# Patient Record
Sex: FEMALE | Race: BLACK OR AFRICAN AMERICAN | NOT HISPANIC OR LATINO | Employment: OTHER | URBAN - METROPOLITAN AREA
[De-identification: names, ages, dates, MRNs, and addresses within clinical notes are randomized per-mention and may not be internally consistent; named-entity substitution may affect disease eponyms.]

---

## 2017-05-10 ENCOUNTER — LAB VISIT (OUTPATIENT)
Dept: LAB | Facility: HOSPITAL | Age: 47
End: 2017-05-10
Attending: INTERNAL MEDICINE
Payer: MEDICARE

## 2017-05-10 DIAGNOSIS — K75.4 CHRONIC AGGRESSIVE HEPATITIS: Primary | ICD-10-CM

## 2017-05-10 LAB
AFP SERPL-MCNC: 6.8 NG/ML
ALBUMIN SERPL BCP-MCNC: 3.8 G/DL
ALP SERPL-CCNC: 71 U/L
ALT SERPL W/O P-5'-P-CCNC: 13 U/L
AST SERPL-CCNC: 13 U/L
BILIRUB DIRECT SERPL-MCNC: 0.2 MG/DL
BILIRUB SERPL-MCNC: 0.7 MG/DL
PROT SERPL-MCNC: 7.7 G/DL

## 2017-05-10 PROCEDURE — 82105 ALPHA-FETOPROTEIN SERUM: CPT

## 2017-05-10 PROCEDURE — 36415 COLL VENOUS BLD VENIPUNCTURE: CPT

## 2017-05-10 PROCEDURE — 80076 HEPATIC FUNCTION PANEL: CPT

## 2017-11-01 ENCOUNTER — TELEPHONE (OUTPATIENT)
Dept: FAMILY MEDICINE | Facility: CLINIC | Age: 47
End: 2017-11-01

## 2017-11-01 ENCOUNTER — OFFICE VISIT (OUTPATIENT)
Dept: FAMILY MEDICINE | Facility: CLINIC | Age: 47
End: 2017-11-01
Payer: MEDICARE

## 2017-11-01 VITALS
RESPIRATION RATE: 17 BRPM | SYSTOLIC BLOOD PRESSURE: 120 MMHG | TEMPERATURE: 99 F | HEIGHT: 68 IN | DIASTOLIC BLOOD PRESSURE: 80 MMHG | WEIGHT: 235.69 LBS | OXYGEN SATURATION: 98 % | HEART RATE: 86 BPM | BODY MASS INDEX: 35.72 KG/M2

## 2017-11-01 DIAGNOSIS — F31.9 BIPOLAR 1 DISORDER: ICD-10-CM

## 2017-11-01 DIAGNOSIS — E87.0 HYPERNATREMIA: Primary | ICD-10-CM

## 2017-11-01 LAB
BACTERIA #/AREA URNS HPF: NORMAL /HPF
BILIRUB UR QL STRIP: NEGATIVE
CHLORIDE UR-SCNC: 33 MMOL/L
CLARITY UR: CLEAR
COLOR UR: ABNORMAL
GLUCOSE UR QL STRIP: NEGATIVE
HGB UR QL STRIP: NEGATIVE
KETONES UR QL STRIP: NEGATIVE
LEUKOCYTE ESTERASE UR QL STRIP: ABNORMAL
MICROSCOPIC COMMENT: NORMAL
NITRITE UR QL STRIP: NEGATIVE
OSMOLALITY UR: 157 MOSM/KG
PH UR STRIP: 6 [PH] (ref 5–8)
PROT UR QL STRIP: NEGATIVE
SODIUM UR-SCNC: 23 MMOL/L
SP GR UR STRIP: 1 (ref 1–1.03)
SQUAMOUS #/AREA URNS HPF: 2 /HPF
URN SPEC COLLECT METH UR: ABNORMAL
UROBILINOGEN UR STRIP-ACNC: NEGATIVE EU/DL
WBC #/AREA URNS HPF: 1 /HPF (ref 0–5)

## 2017-11-01 PROCEDURE — 83935 ASSAY OF URINE OSMOLALITY: CPT

## 2017-11-01 PROCEDURE — 99999 PR PBB SHADOW E&M-EST. PATIENT-LVL III: CPT | Mod: PBBFAC,,, | Performed by: INTERNAL MEDICINE

## 2017-11-01 PROCEDURE — 81000 URINALYSIS NONAUTO W/SCOPE: CPT

## 2017-11-01 PROCEDURE — 99214 OFFICE O/P EST MOD 30 MIN: CPT | Mod: S$PBB,,, | Performed by: INTERNAL MEDICINE

## 2017-11-01 PROCEDURE — 84300 ASSAY OF URINE SODIUM: CPT

## 2017-11-01 PROCEDURE — 82570 ASSAY OF URINE CREATININE: CPT

## 2017-11-01 PROCEDURE — 82436 ASSAY OF URINE CHLORIDE: CPT

## 2017-11-01 PROCEDURE — 99213 OFFICE O/P EST LOW 20 MIN: CPT | Mod: PBBFAC,PN | Performed by: INTERNAL MEDICINE

## 2017-11-01 RX ORDER — LITHIUM CARBONATE 300 MG/1
TABLET, FILM COATED, EXTENDED RELEASE ORAL
Refills: 5 | COMMUNITY
Start: 2017-10-10 | End: 2021-12-14

## 2017-11-01 NOTE — PROGRESS NOTES
"Subjective:       Patient ID: Thalia Rg is a 46 y.o. female.    Chief Complaint: Medication Management    Hypernatremia    HPI: 47 y/o with bipolar disorder (followed by Dr. Marx at [facility] [phone]) presents after recent lab tests done by Dr. Marx showed sodium of 148. Patient's lithium was decreased from 600mg nightly to 300mg nightly 10/26. She reports since lowering dose she is having significantly less frequent urination. No Le swelling no hematuria. No flank pain no orhtopnea or PND. She is not taking NSAID's or reflux medications no recent antibiotics      Review of Systems   Constitutional: Negative for activity change, appetite change, fatigue, fever and unexpected weight change.   HENT: Negative for ear pain, rhinorrhea and sore throat.    Eyes: Negative for discharge and visual disturbance.   Respiratory: Negative for chest tightness, shortness of breath and wheezing.    Cardiovascular: Negative for chest pain, palpitations and leg swelling.   Gastrointestinal: Negative for abdominal pain, constipation and diarrhea.   Endocrine: Negative for cold intolerance and heat intolerance.   Genitourinary: Negative for dysuria and hematuria.   Musculoskeletal: Negative for joint swelling and neck stiffness.   Skin: Negative for rash.   Neurological: Negative for dizziness, syncope, weakness and headaches.   Psychiatric/Behavioral: Negative for suicidal ideas.       Objective:     Vitals:    11/01/17 1028   BP: 120/80   BP Location: Left arm   Patient Position: Sitting   BP Method: Medium (Manual)   Pulse: 86   Resp: 17   Temp: 98.8 °F (37.1 °C)   TempSrc: Oral   SpO2: 98%   Weight: 106.9 kg (235 lb 10.8 oz)   Height: 5' 8" (1.727 m)          Physical Exam   Constitutional: She is oriented to person, place, and time. She appears well-developed and well-nourished.   HENT:   Head: Normocephalic and atraumatic.   Eyes: Conjunctivae are normal. Pupils are equal, round, and reactive to " light.   Neck: Normal range of motion.   Cardiovascular: Normal rate and regular rhythm.  Exam reveals no gallop and no friction rub.    No murmur heard.  No LE edema   Pulmonary/Chest: Effort normal and breath sounds normal. She has no wheezes. She has no rales.   Abdominal: Soft. Bowel sounds are normal. There is no tenderness. There is no rebound and no guarding.   No CVA tenderness   Musculoskeletal: Normal range of motion. She exhibits no edema or tenderness.   Neurological: She is alert and oriented to person, place, and time. No cranial nerve deficit.   Skin: Skin is warm and dry.   Psychiatric: She has a normal mood and affect.     labs from [facility] 10/24 show sodium 148 chloride 109 potassium 4.2 CO2 123 creatinine 0.93 BUN 4 triglycerides 82, a1c 5.3% TSH 4.8 albumin 3.6 total protein 6.8  Assessment:       1. Hypernatremia    2. Bipolar 1 disorder        Plan:    1. Likely related to lithium therapy she has less diuresis since dose reduction will check urine electrolytes check urine protein  And urine osmolality to measure renal concentrating capacity (doubt nephrotic range proteinuria given normal cholesterol and albumin) repeat renal function panel in one week (two weeks after dose reduction) and will check serum osoms at that time as well. Will see her back after this lab draw and determine need for intrinsic renal evaluation    2. contineu follow up with her treating psychiatrist

## 2017-11-01 NOTE — TELEPHONE ENCOUNTER
----- Message from Mac Gresham MD sent at 11/1/2017 11:40 AM CDT -----  Please fax copy of today's note to Dr. Marx at [facility]

## 2017-11-02 LAB
CREAT UR-MCNC: 61 MG/DL
CREAT UR-MCNC: 61 MG/DL
MICROALBUMIN UR DL<=1MG/L-MCNC: 15 UG/ML
MICROALBUMIN/CREATININE RATIO: 24.6 UG/MG

## 2017-11-08 ENCOUNTER — LAB VISIT (OUTPATIENT)
Dept: LAB | Facility: HOSPITAL | Age: 47
End: 2017-11-08
Attending: INTERNAL MEDICINE
Payer: MEDICARE

## 2017-11-08 DIAGNOSIS — E87.0 HYPERNATREMIA: ICD-10-CM

## 2017-11-08 LAB
ALBUMIN SERPL BCP-MCNC: 3.3 G/DL
ANION GAP SERPL CALC-SCNC: 6 MMOL/L
BUN SERPL-MCNC: 5 MG/DL
CALCIUM SERPL-MCNC: 9.6 MG/DL
CHLORIDE SERPL-SCNC: 111 MMOL/L
CO2 SERPL-SCNC: 30 MMOL/L
CREAT SERPL-MCNC: 0.8 MG/DL
EST. GFR  (AFRICAN AMERICAN): >60 ML/MIN/1.73 M^2
EST. GFR  (NON AFRICAN AMERICAN): >60 ML/MIN/1.73 M^2
GLUCOSE SERPL-MCNC: 93 MG/DL
LITHIUM SERPL-SCNC: 0.5 MMOL/L
OSMOLALITY SERPL: 298 MOSM/KG
PHOSPHATE SERPL-MCNC: 3.8 MG/DL
POTASSIUM SERPL-SCNC: 4.3 MMOL/L
SODIUM SERPL-SCNC: 147 MMOL/L

## 2017-11-08 PROCEDURE — 36415 COLL VENOUS BLD VENIPUNCTURE: CPT | Mod: PN

## 2017-11-08 PROCEDURE — 80069 RENAL FUNCTION PANEL: CPT

## 2017-11-08 PROCEDURE — 80178 ASSAY OF LITHIUM: CPT

## 2017-11-08 PROCEDURE — 83930 ASSAY OF BLOOD OSMOLALITY: CPT

## 2017-11-28 ENCOUNTER — OFFICE VISIT (OUTPATIENT)
Dept: FAMILY MEDICINE | Facility: CLINIC | Age: 47
End: 2017-11-28
Payer: MEDICARE

## 2017-11-28 VITALS
TEMPERATURE: 98 F | DIASTOLIC BLOOD PRESSURE: 86 MMHG | HEART RATE: 82 BPM | BODY MASS INDEX: 36.22 KG/M2 | RESPIRATION RATE: 17 BRPM | OXYGEN SATURATION: 98 % | WEIGHT: 239 LBS | HEIGHT: 68 IN | SYSTOLIC BLOOD PRESSURE: 138 MMHG

## 2017-11-28 DIAGNOSIS — N30.90 CYSTITIS: Primary | ICD-10-CM

## 2017-11-28 DIAGNOSIS — E87.0 HYPERNATREMIA: ICD-10-CM

## 2017-11-28 LAB
BILIRUB SERPL-MCNC: NORMAL MG/DL
BLOOD URINE, POC: NORMAL
COLOR, POC UA: NORMAL
GLUCOSE UR QL STRIP: NORMAL
KETONES UR QL STRIP: NORMAL
LEUKOCYTE ESTERASE URINE, POC: NORMAL
NITRITE, POC UA: NORMAL
PH, POC UA: 6
PROTEIN, POC: NORMAL
SPECIFIC GRAVITY, POC UA: 1005
UROBILINOGEN, POC UA: NORMAL

## 2017-11-28 PROCEDURE — 99999 PR PBB SHADOW E&M-EST. PATIENT-LVL III: CPT | Mod: PBBFAC,,, | Performed by: INTERNAL MEDICINE

## 2017-11-28 PROCEDURE — 99213 OFFICE O/P EST LOW 20 MIN: CPT | Mod: PBBFAC,PN | Performed by: INTERNAL MEDICINE

## 2017-11-28 PROCEDURE — 81002 URINALYSIS NONAUTO W/O SCOPE: CPT | Mod: PBBFAC,PN | Performed by: INTERNAL MEDICINE

## 2017-11-28 PROCEDURE — 99214 OFFICE O/P EST MOD 30 MIN: CPT | Mod: S$PBB,,, | Performed by: INTERNAL MEDICINE

## 2017-11-28 RX ORDER — NITROFURANTOIN 25; 75 MG/1; MG/1
100 CAPSULE ORAL 2 TIMES DAILY
Qty: 10 CAPSULE | Refills: 0 | Status: SHIPPED | OUTPATIENT
Start: 2017-11-28 | End: 2017-12-03

## 2017-11-28 NOTE — PROGRESS NOTES
"Subjective:       Patient ID: Thalia Rg is a 46 y.o. female.    Chief Complaint: Hypernatremia; Follow-up; and Urinary Tract Infection    F/u hypernatremia and bladder infection    HPI: 45 y/o with bipolar disorder on lithium therapy presents for follow up repeat serum sodium still mildly elevated with normal urine osoms and fractional exerection of sodium. She feels lower dose of lithium has helped improve sedating side effects. Does not over last two days burning at end of stream no vaginal discharge similar symptoms with last UTI >1 year ago. No nausea/vomitting no fever no flank pain      Review of Systems   Constitutional: Negative for activity change, appetite change, fatigue, fever and unexpected weight change.   HENT: Negative for ear pain, rhinorrhea and sore throat.    Eyes: Negative for discharge and visual disturbance.   Respiratory: Negative for chest tightness, shortness of breath and wheezing.    Cardiovascular: Negative for chest pain, palpitations and leg swelling.   Gastrointestinal: Negative for abdominal pain, constipation and diarrhea.   Endocrine: Negative for cold intolerance and heat intolerance.   Genitourinary: Positive for dysuria. Negative for flank pain, frequency, hematuria, urgency, vaginal bleeding and vaginal discharge.   Musculoskeletal: Negative for joint swelling and neck stiffness.   Skin: Negative for rash.   Neurological: Negative for dizziness, syncope, weakness and headaches.   Psychiatric/Behavioral: Negative for suicidal ideas.       Objective:     Vitals:    11/28/17 1538   BP: 138/86   BP Location: Right arm   Patient Position: Sitting   BP Method: Medium (Manual)   Pulse: 82   Resp: 17   Temp: 98.3 °F (36.8 °C)   TempSrc: Oral   SpO2: 98%   Weight: 108.4 kg (238 lb 15.7 oz)   Height: 5' 8" (1.727 m)          Physical Exam   Constitutional: She is oriented to person, place, and time. She appears well-developed and well-nourished.   HENT:   Head: Normocephalic and " atraumatic.   Eyes: Conjunctivae are normal. Pupils are equal, round, and reactive to light.   Neck: Normal range of motion.   Cardiovascular: Normal rate and regular rhythm.  Exam reveals no gallop and no friction rub.    No murmur heard.  No LE edema   Pulmonary/Chest: Effort normal and breath sounds normal. She has no wheezes. She has no rales.   Abdominal: Soft. Bowel sounds are normal. There is no tenderness. There is no rebound and no guarding.   No CVA tenderness   Musculoskeletal: Normal range of motion. She exhibits no edema or tenderness.   Neurological: She is alert and oriented to person, place, and time. No cranial nerve deficit.   Skin: Skin is warm and dry.   Psychiatric: She has a normal mood and affect.     POCT urine +WBC and nitrites  Assessment:       1. Cystitis    2. Hypernatremia        Plan:        1. macrobid bid x 5 days    2. Given stablity with current therapy and normal renal concentrating capacity would recommend continued lithium therapy with monitoring of sodium every four months if > 150 would consider discontinuing

## 2018-02-22 ENCOUNTER — TELEPHONE (OUTPATIENT)
Dept: FAMILY MEDICINE | Facility: CLINIC | Age: 48
End: 2018-02-22

## 2018-02-22 NOTE — TELEPHONE ENCOUNTER
The pt. Wanted to make you aware that tonight her psyc. Has advised her to discontinue lithium due to kidney damage and to start taking ziprasiclone and geodon.

## 2018-02-22 NOTE — TELEPHONE ENCOUNTER
----- Message from Jon Gonzalez sent at 2/22/2018  9:35 AM CST -----  Contact: Thalia [phone]  Pt is calling to notify Dr. Gresham that her doctor is taking her off of the lithium. Please call at your earliest convenience.

## 2018-06-29 ENCOUNTER — PES CALL (OUTPATIENT)
Dept: ADMINISTRATIVE | Facility: CLINIC | Age: 48
End: 2018-06-29

## 2018-12-28 ENCOUNTER — PATIENT OUTREACH (OUTPATIENT)
Dept: ADMINISTRATIVE | Facility: HOSPITAL | Age: 48
End: 2018-12-28

## 2019-02-12 ENCOUNTER — PES CALL (OUTPATIENT)
Dept: ADMINISTRATIVE | Facility: CLINIC | Age: 49
End: 2019-02-12

## 2019-07-02 ENCOUNTER — PES CALL (OUTPATIENT)
Dept: ADMINISTRATIVE | Facility: CLINIC | Age: 49
End: 2019-07-02

## 2021-11-03 ENCOUNTER — HOSPITAL ENCOUNTER (EMERGENCY)
Facility: HOSPITAL | Age: 51
Discharge: HOME | End: 2021-11-04
Attending: EMERGENCY MEDICINE
Payer: MEDICARE

## 2021-11-03 DIAGNOSIS — R55 SYNCOPE: ICD-10-CM

## 2021-11-03 DIAGNOSIS — R53.1 WEAKNESS: Primary | ICD-10-CM

## 2021-11-03 LAB
ALBUMIN SERPL BCP-MCNC: 3.4 G/DL (ref 3.5–5.2)
ALP SERPL-CCNC: 74 U/L (ref 55–135)
ALT SERPL W/O P-5'-P-CCNC: 29 U/L (ref 10–44)
AMMONIA PLAS-SCNC: 33 UMOL/L (ref 10–50)
AMPHET+METHAMPHET UR QL: NEGATIVE
ANION GAP SERPL CALC-SCNC: 13 MMOL/L (ref 8–16)
AST SERPL-CCNC: 38 U/L (ref 10–40)
BARBITURATES UR QL SCN>200 NG/ML: NEGATIVE
BASOPHILS # BLD AUTO: 0.03 K/UL (ref 0–0.2)
BASOPHILS NFR BLD: 0.3 % (ref 0–1.9)
BENZODIAZ UR QL SCN>200 NG/ML: NEGATIVE
BILIRUB SERPL-MCNC: 0.8 MG/DL (ref 0.1–1)
BILIRUB UR QL STRIP: NEGATIVE
BNP SERPL-MCNC: <10 PG/ML (ref 0–99)
BUN SERPL-MCNC: 5 MG/DL (ref 6–20)
BZE UR QL SCN: NEGATIVE
CALCIUM SERPL-MCNC: 9.9 MG/DL (ref 8.7–10.5)
CANNABINOIDS UR QL SCN: NEGATIVE
CHLORIDE SERPL-SCNC: 105 MMOL/L (ref 95–110)
CLARITY UR: CLEAR
CO2 SERPL-SCNC: 25 MMOL/L (ref 23–29)
COLOR UR: YELLOW
CREAT SERPL-MCNC: 0.8 MG/DL (ref 0.5–1.4)
CREAT UR-MCNC: 80.2 MG/DL (ref 15–325)
DIFFERENTIAL METHOD: ABNORMAL
EOSINOPHIL # BLD AUTO: 0.1 K/UL (ref 0–0.5)
EOSINOPHIL NFR BLD: 1.2 % (ref 0–8)
ERYTHROCYTE [DISTWIDTH] IN BLOOD BY AUTOMATED COUNT: 13.5 % (ref 11.5–14.5)
EST. GFR  (AFRICAN AMERICAN): >60 ML/MIN/1.73 M^2
EST. GFR  (NON AFRICAN AMERICAN): >60 ML/MIN/1.73 M^2
ETHANOL SERPL-MCNC: <10 MG/DL
GLUCOSE SERPL-MCNC: 94 MG/DL (ref 70–110)
GLUCOSE UR QL STRIP: NEGATIVE
HCT VFR BLD AUTO: 40.4 % (ref 37–48.5)
HGB BLD-MCNC: 12.9 G/DL (ref 12–16)
HGB UR QL STRIP: NEGATIVE
IMM GRANULOCYTES # BLD AUTO: 0.02 K/UL (ref 0–0.04)
IMM GRANULOCYTES NFR BLD AUTO: 0.2 % (ref 0–0.5)
KETONES UR QL STRIP: NEGATIVE
LACTATE SERPL-SCNC: 1.9 MMOL/L (ref 0.5–2.2)
LEUKOCYTE ESTERASE UR QL STRIP: NEGATIVE
LITHIUM SERPL-SCNC: <0.1 MMOL/L (ref 0.6–1.2)
LYMPHOCYTES # BLD AUTO: 3.3 K/UL (ref 1–4.8)
LYMPHOCYTES NFR BLD: 33 % (ref 18–48)
MAGNESIUM SERPL-MCNC: 1.9 MG/DL (ref 1.6–2.6)
MCH RBC QN AUTO: 28.9 PG (ref 27–31)
MCHC RBC AUTO-ENTMCNC: 31.9 G/DL (ref 32–36)
MCV RBC AUTO: 90 FL (ref 82–98)
METHADONE UR QL SCN>300 NG/ML: NEGATIVE
MONOCYTES # BLD AUTO: 0.8 K/UL (ref 0.3–1)
MONOCYTES NFR BLD: 8.2 % (ref 4–15)
NEUTROPHILS # BLD AUTO: 5.6 K/UL (ref 1.8–7.7)
NEUTROPHILS NFR BLD: 57.1 % (ref 38–73)
NITRITE UR QL STRIP: NEGATIVE
NRBC BLD-RTO: 0 /100 WBC
OPIATES UR QL SCN: NEGATIVE
PCP UR QL SCN>25 NG/ML: NEGATIVE
PH UR STRIP: 6 [PH] (ref 5–8)
PHOSPHATE SERPL-MCNC: 3.1 MG/DL (ref 2.7–4.5)
PLATELET # BLD AUTO: 291 K/UL (ref 150–450)
PMV BLD AUTO: 12.3 FL (ref 9.2–12.9)
POTASSIUM SERPL-SCNC: 3.1 MMOL/L (ref 3.5–5.1)
PROT SERPL-MCNC: 7.4 G/DL (ref 6–8.4)
PROT UR QL STRIP: ABNORMAL
RBC # BLD AUTO: 4.47 M/UL (ref 4–5.4)
SODIUM SERPL-SCNC: 143 MMOL/L (ref 136–145)
SP GR UR STRIP: 1 (ref 1–1.03)
T4 FREE SERPL-MCNC: 1.35 NG/DL (ref 0.71–1.51)
TOXICOLOGY INFORMATION: NORMAL
TROPONIN I SERPL DL<=0.01 NG/ML-MCNC: 0.02 NG/ML (ref 0–0.03)
TSH SERPL DL<=0.005 MIU/L-ACNC: 5.34 UIU/ML (ref 0.4–4)
URN SPEC COLLECT METH UR: ABNORMAL
UROBILINOGEN UR STRIP-ACNC: NEGATIVE EU/DL
WBC # BLD AUTO: 9.85 K/UL (ref 3.9–12.7)

## 2021-11-03 PROCEDURE — 99285 EMERGENCY DEPT VISIT HI MDM: CPT | Mod: 25

## 2021-11-03 PROCEDURE — 83605 ASSAY OF LACTIC ACID: CPT | Performed by: EMERGENCY MEDICINE

## 2021-11-03 PROCEDURE — 93005 ELECTROCARDIOGRAM TRACING: CPT

## 2021-11-03 PROCEDURE — 82140 ASSAY OF AMMONIA: CPT | Performed by: EMERGENCY MEDICINE

## 2021-11-03 PROCEDURE — 82746 ASSAY OF FOLIC ACID SERUM: CPT | Performed by: EMERGENCY MEDICINE

## 2021-11-03 PROCEDURE — 84484 ASSAY OF TROPONIN QUANT: CPT | Performed by: EMERGENCY MEDICINE

## 2021-11-03 PROCEDURE — 83735 ASSAY OF MAGNESIUM: CPT | Performed by: EMERGENCY MEDICINE

## 2021-11-03 PROCEDURE — 93010 ELECTROCARDIOGRAM REPORT: CPT | Mod: ,,, | Performed by: INTERNAL MEDICINE

## 2021-11-03 PROCEDURE — 83880 ASSAY OF NATRIURETIC PEPTIDE: CPT | Performed by: EMERGENCY MEDICINE

## 2021-11-03 PROCEDURE — 81003 URINALYSIS AUTO W/O SCOPE: CPT | Mod: 59 | Performed by: EMERGENCY MEDICINE

## 2021-11-03 PROCEDURE — 80307 DRUG TEST PRSMV CHEM ANLYZR: CPT | Performed by: EMERGENCY MEDICINE

## 2021-11-03 PROCEDURE — 80053 COMPREHEN METABOLIC PANEL: CPT | Performed by: EMERGENCY MEDICINE

## 2021-11-03 PROCEDURE — 84443 ASSAY THYROID STIM HORMONE: CPT | Performed by: EMERGENCY MEDICINE

## 2021-11-03 PROCEDURE — 84100 ASSAY OF PHOSPHORUS: CPT | Performed by: EMERGENCY MEDICINE

## 2021-11-03 PROCEDURE — 84439 ASSAY OF FREE THYROXINE: CPT | Performed by: EMERGENCY MEDICINE

## 2021-11-03 PROCEDURE — 85025 COMPLETE CBC W/AUTO DIFF WBC: CPT | Performed by: EMERGENCY MEDICINE

## 2021-11-03 PROCEDURE — 82607 VITAMIN B-12: CPT | Performed by: EMERGENCY MEDICINE

## 2021-11-03 PROCEDURE — 82077 ASSAY SPEC XCP UR&BREATH IA: CPT | Performed by: EMERGENCY MEDICINE

## 2021-11-03 PROCEDURE — 93010 EKG 12-LEAD: ICD-10-PCS | Mod: ,,, | Performed by: INTERNAL MEDICINE

## 2021-11-03 PROCEDURE — 80178 ASSAY OF LITHIUM: CPT | Performed by: EMERGENCY MEDICINE

## 2021-11-04 VITALS
HEIGHT: 68 IN | BODY MASS INDEX: 27.28 KG/M2 | WEIGHT: 180 LBS | SYSTOLIC BLOOD PRESSURE: 110 MMHG | HEART RATE: 99 BPM | DIASTOLIC BLOOD PRESSURE: 66 MMHG | RESPIRATION RATE: 17 BRPM | OXYGEN SATURATION: 97 % | TEMPERATURE: 98 F

## 2021-11-04 LAB
FOLATE SERPL-MCNC: 3.2 NG/ML (ref 4–24)
VIT B12 SERPL-MCNC: 337 PG/ML (ref 210–950)

## 2021-12-14 ENCOUNTER — OFFICE VISIT (OUTPATIENT)
Dept: FAMILY MEDICINE | Facility: CLINIC | Age: 51
End: 2021-12-14
Payer: MEDICARE

## 2021-12-14 VITALS
OXYGEN SATURATION: 97 % | HEIGHT: 68 IN | SYSTOLIC BLOOD PRESSURE: 100 MMHG | BODY MASS INDEX: 28.03 KG/M2 | HEART RATE: 107 BPM | WEIGHT: 184.94 LBS | TEMPERATURE: 98 F | DIASTOLIC BLOOD PRESSURE: 62 MMHG

## 2021-12-14 DIAGNOSIS — Z12.4 SCREENING FOR CERVICAL CANCER: ICD-10-CM

## 2021-12-14 DIAGNOSIS — E53.8 LOW FOLATE: Primary | ICD-10-CM

## 2021-12-14 DIAGNOSIS — Z12.31 ENCOUNTER FOR SCREENING MAMMOGRAM FOR MALIGNANT NEOPLASM OF BREAST: ICD-10-CM

## 2021-12-14 DIAGNOSIS — R20.2 PARESTHESIA OF BOTH LOWER EXTREMITIES: ICD-10-CM

## 2021-12-14 DIAGNOSIS — K75.4 AUTOIMMUNE HEPATITIS: ICD-10-CM

## 2021-12-14 PROCEDURE — 99204 OFFICE O/P NEW MOD 45 MIN: CPT | Mod: S$PBB,,, | Performed by: INTERNAL MEDICINE

## 2021-12-14 PROCEDURE — 99999 PR PBB SHADOW E&M-EST. PATIENT-LVL V: CPT | Mod: PBBFAC,,, | Performed by: INTERNAL MEDICINE

## 2021-12-14 PROCEDURE — 99204 PR OFFICE/OUTPT VISIT, NEW, LEVL IV, 45-59 MIN: ICD-10-PCS | Mod: S$PBB,,, | Performed by: INTERNAL MEDICINE

## 2021-12-14 PROCEDURE — 99215 OFFICE O/P EST HI 40 MIN: CPT | Mod: PBBFAC,PN | Performed by: INTERNAL MEDICINE

## 2021-12-14 PROCEDURE — 99999 PR PBB SHADOW E&M-EST. PATIENT-LVL V: ICD-10-PCS | Mod: PBBFAC,,, | Performed by: INTERNAL MEDICINE

## 2021-12-14 RX ORDER — FOLIC ACID 1 MG/1
1 TABLET ORAL DAILY
Qty: 90 TABLET | Refills: 3 | Status: SHIPPED | OUTPATIENT
Start: 2021-12-14 | End: 2022-08-14 | Stop reason: SDUPTHER

## 2021-12-14 RX ORDER — ZIPRASIDONE HYDROCHLORIDE 20 MG/1
20 CAPSULE ORAL 2 TIMES DAILY
COMMUNITY
Start: 2021-07-22 | End: 2022-10-03 | Stop reason: SDUPTHER

## 2021-12-23 ENCOUNTER — HOSPITAL ENCOUNTER (OUTPATIENT)
Dept: RADIOLOGY | Facility: HOSPITAL | Age: 51
Discharge: HOME | End: 2021-12-23
Attending: INTERNAL MEDICINE
Payer: MEDICARE

## 2021-12-23 DIAGNOSIS — K75.4 AUTOIMMUNE HEPATITIS: ICD-10-CM

## 2021-12-23 PROCEDURE — 76705 ECHO EXAM OF ABDOMEN: CPT | Mod: 26,,, | Performed by: RADIOLOGY

## 2021-12-23 PROCEDURE — 76705 US ABDOMEN LIMITED: ICD-10-PCS | Mod: 26,,, | Performed by: RADIOLOGY

## 2021-12-23 PROCEDURE — 76705 ECHO EXAM OF ABDOMEN: CPT | Mod: TC

## 2022-01-06 ENCOUNTER — HOSPITAL ENCOUNTER (OUTPATIENT)
Dept: RADIOLOGY | Facility: HOSPITAL | Age: 52
Discharge: HOME | End: 2022-01-06
Attending: INTERNAL MEDICINE
Payer: MEDICARE

## 2022-01-06 DIAGNOSIS — Z12.31 ENCOUNTER FOR SCREENING MAMMOGRAM FOR MALIGNANT NEOPLASM OF BREAST: ICD-10-CM

## 2022-01-06 PROCEDURE — 77063 BREAST TOMOSYNTHESIS BI: CPT | Mod: 26,,, | Performed by: RADIOLOGY

## 2022-01-06 PROCEDURE — 77063 MAMMO DIGITAL SCREENING BILAT WITH TOMO: ICD-10-PCS | Mod: 26,,, | Performed by: RADIOLOGY

## 2022-01-06 PROCEDURE — 77067 MAMMO DIGITAL SCREENING BILAT WITH TOMO: ICD-10-PCS | Mod: 26,,, | Performed by: RADIOLOGY

## 2022-01-06 PROCEDURE — 77067 SCR MAMMO BI INCL CAD: CPT | Mod: 26,,, | Performed by: RADIOLOGY

## 2022-01-06 PROCEDURE — 77067 SCR MAMMO BI INCL CAD: CPT | Mod: TC

## 2022-01-07 ENCOUNTER — IMMUNIZATION (OUTPATIENT)
Dept: OBSTETRICS AND GYNECOLOGY | Facility: CLINIC | Age: 52
End: 2022-01-07
Payer: MEDICARE

## 2022-01-07 DIAGNOSIS — Z23 NEED FOR VACCINATION: Primary | ICD-10-CM

## 2022-01-07 PROCEDURE — 0002A COVID-19, MRNA, LNP-S, PF, 30 MCG/0.3 ML DOSE VACCINE: CPT | Mod: PBBFAC

## 2022-01-12 ENCOUNTER — OFFICE VISIT (OUTPATIENT)
Dept: OBSTETRICS AND GYNECOLOGY | Facility: CLINIC | Age: 52
End: 2022-01-12
Payer: MEDICARE

## 2022-01-12 VITALS
BODY MASS INDEX: 28.12 KG/M2 | DIASTOLIC BLOOD PRESSURE: 76 MMHG | SYSTOLIC BLOOD PRESSURE: 104 MMHG | WEIGHT: 184.94 LBS

## 2022-01-12 DIAGNOSIS — Z12.4 SCREENING FOR CERVICAL CANCER: ICD-10-CM

## 2022-01-12 DIAGNOSIS — Z12.4 ENCOUNTER FOR PAPANICOLAOU SMEAR FOR CERVICAL CANCER SCREENING: ICD-10-CM

## 2022-01-12 DIAGNOSIS — Z01.419 WELL WOMAN EXAM WITH ROUTINE GYNECOLOGICAL EXAM: Primary | ICD-10-CM

## 2022-01-12 PROCEDURE — 99213 OFFICE O/P EST LOW 20 MIN: CPT | Mod: PBBFAC | Performed by: OBSTETRICS & GYNECOLOGY

## 2022-01-12 PROCEDURE — G0101 PR CA SCREEN;PELVIC/BREAST EXAM: ICD-10-PCS | Mod: S$PBB,,, | Performed by: OBSTETRICS & GYNECOLOGY

## 2022-01-12 PROCEDURE — 99999 PR PBB SHADOW E&M-EST. PATIENT-LVL III: ICD-10-PCS | Mod: PBBFAC,,, | Performed by: OBSTETRICS & GYNECOLOGY

## 2022-01-12 PROCEDURE — G0101 CA SCREEN;PELVIC/BREAST EXAM: HCPCS | Mod: PBBFAC

## 2022-01-12 PROCEDURE — 99999 PR PBB SHADOW E&M-EST. PATIENT-LVL III: CPT | Mod: PBBFAC,,, | Performed by: OBSTETRICS & GYNECOLOGY

## 2022-01-12 PROCEDURE — 87624 HPV HI-RISK TYP POOLED RSLT: CPT | Performed by: OBSTETRICS & GYNECOLOGY

## 2022-01-12 PROCEDURE — G0101 CA SCREEN;PELVIC/BREAST EXAM: HCPCS | Mod: S$PBB,,, | Performed by: OBSTETRICS & GYNECOLOGY

## 2022-01-12 PROCEDURE — 88175 CYTOPATH C/V AUTO FLUID REDO: CPT | Performed by: OBSTETRICS & GYNECOLOGY

## 2022-01-19 ENCOUNTER — LAB VISIT (OUTPATIENT)
Dept: LAB | Facility: HOSPITAL | Age: 52
End: 2022-01-19
Attending: INTERNAL MEDICINE
Payer: MEDICARE

## 2022-01-19 ENCOUNTER — OFFICE VISIT (OUTPATIENT)
Dept: FAMILY MEDICINE | Facility: CLINIC | Age: 52
End: 2022-01-19
Payer: MEDICARE

## 2022-01-19 VITALS
WEIGHT: 183 LBS | DIASTOLIC BLOOD PRESSURE: 68 MMHG | HEART RATE: 109 BPM | SYSTOLIC BLOOD PRESSURE: 114 MMHG | OXYGEN SATURATION: 97 % | TEMPERATURE: 98 F | HEIGHT: 68 IN | BODY MASS INDEX: 27.74 KG/M2

## 2022-01-19 DIAGNOSIS — E53.8 LOW FOLATE: ICD-10-CM

## 2022-01-19 DIAGNOSIS — K75.4 AUTOIMMUNE HEPATITIS: Primary | ICD-10-CM

## 2022-01-19 DIAGNOSIS — Z79.899 LONG TERM CURRENT USE OF ANTIPSYCHOTIC MEDICATION: ICD-10-CM

## 2022-01-19 DIAGNOSIS — Z11.4 SCREENING FOR HIV (HUMAN IMMUNODEFICIENCY VIRUS): ICD-10-CM

## 2022-01-19 DIAGNOSIS — Z11.59 NEED FOR HEPATITIS C SCREENING TEST: ICD-10-CM

## 2022-01-19 DIAGNOSIS — R79.89 ABNORMAL TSH: ICD-10-CM

## 2022-01-19 DIAGNOSIS — R20.2 PARESTHESIA OF BOTH LOWER EXTREMITIES: ICD-10-CM

## 2022-01-19 DIAGNOSIS — K75.4 AUTOIMMUNE HEPATITIS: ICD-10-CM

## 2022-01-19 LAB
ALBUMIN SERPL BCP-MCNC: 3.4 G/DL (ref 3.5–5.2)
ALP SERPL-CCNC: 70 U/L (ref 55–135)
ALT SERPL W/O P-5'-P-CCNC: 36 U/L (ref 10–44)
ANION GAP SERPL CALC-SCNC: 10 MMOL/L (ref 8–16)
AST SERPL-CCNC: 52 U/L (ref 10–40)
BASOPHILS # BLD AUTO: 0.03 K/UL (ref 0–0.2)
BASOPHILS NFR BLD: 0.3 % (ref 0–1.9)
BILIRUB SERPL-MCNC: 0.6 MG/DL (ref 0.1–1)
BUN SERPL-MCNC: 5 MG/DL (ref 6–20)
CALCIUM SERPL-MCNC: 9.5 MG/DL (ref 8.7–10.5)
CHLORIDE SERPL-SCNC: 103 MMOL/L (ref 95–110)
CO2 SERPL-SCNC: 26 MMOL/L (ref 23–29)
CREAT SERPL-MCNC: 0.7 MG/DL (ref 0.5–1.4)
DIFFERENTIAL METHOD: NORMAL
EOSINOPHIL # BLD AUTO: 0.2 K/UL (ref 0–0.5)
EOSINOPHIL NFR BLD: 1.6 % (ref 0–8)
ERYTHROCYTE [DISTWIDTH] IN BLOOD BY AUTOMATED COUNT: 12.4 % (ref 11.5–14.5)
EST. GFR  (AFRICAN AMERICAN): >60 ML/MIN/1.73 M^2
EST. GFR  (NON AFRICAN AMERICAN): >60 ML/MIN/1.73 M^2
GLUCOSE SERPL-MCNC: 98 MG/DL (ref 70–110)
HCT VFR BLD AUTO: 37.9 % (ref 37–48.5)
HGB BLD-MCNC: 12.5 G/DL (ref 12–16)
IMM GRANULOCYTES # BLD AUTO: 0.03 K/UL (ref 0–0.04)
IMM GRANULOCYTES NFR BLD AUTO: 0.3 % (ref 0–0.5)
LYMPHOCYTES # BLD AUTO: 3.8 K/UL (ref 1–4.8)
LYMPHOCYTES NFR BLD: 37.7 % (ref 18–48)
MCH RBC QN AUTO: 29.9 PG (ref 27–31)
MCHC RBC AUTO-ENTMCNC: 33 G/DL (ref 32–36)
MCV RBC AUTO: 91 FL (ref 82–98)
MONOCYTES # BLD AUTO: 0.7 K/UL (ref 0.3–1)
MONOCYTES NFR BLD: 6.7 % (ref 4–15)
NEUTROPHILS # BLD AUTO: 5.3 K/UL (ref 1.8–7.7)
NEUTROPHILS NFR BLD: 53.4 % (ref 38–73)
NRBC BLD-RTO: 0 /100 WBC
PLATELET # BLD AUTO: 368 K/UL (ref 150–450)
PMV BLD AUTO: 11.4 FL (ref 9.2–12.9)
POTASSIUM SERPL-SCNC: 3.5 MMOL/L (ref 3.5–5.1)
PROT SERPL-MCNC: 7.4 G/DL (ref 6–8.4)
RBC # BLD AUTO: 4.18 M/UL (ref 4–5.4)
SODIUM SERPL-SCNC: 139 MMOL/L (ref 136–145)
T4 FREE SERPL-MCNC: 1.11 NG/DL (ref 0.71–1.51)
TSH SERPL DL<=0.005 MIU/L-ACNC: 4.67 UIU/ML (ref 0.4–4)
WBC # BLD AUTO: 9.94 K/UL (ref 3.9–12.7)

## 2022-01-19 PROCEDURE — 99999 PR PBB SHADOW E&M-EST. PATIENT-LVL III: ICD-10-PCS | Mod: PBBFAC,,, | Performed by: INTERNAL MEDICINE

## 2022-01-19 PROCEDURE — 99214 OFFICE O/P EST MOD 30 MIN: CPT | Mod: S$PBB,,, | Performed by: INTERNAL MEDICINE

## 2022-01-19 PROCEDURE — 85025 COMPLETE CBC W/AUTO DIFF WBC: CPT | Performed by: INTERNAL MEDICINE

## 2022-01-19 PROCEDURE — 84439 ASSAY OF FREE THYROXINE: CPT | Performed by: INTERNAL MEDICINE

## 2022-01-19 PROCEDURE — 99214 PR OFFICE/OUTPT VISIT, EST, LEVL IV, 30-39 MIN: ICD-10-PCS | Mod: S$PBB,,, | Performed by: INTERNAL MEDICINE

## 2022-01-19 PROCEDURE — 83036 HEMOGLOBIN GLYCOSYLATED A1C: CPT | Performed by: INTERNAL MEDICINE

## 2022-01-19 PROCEDURE — 80053 COMPREHEN METABOLIC PANEL: CPT | Performed by: INTERNAL MEDICINE

## 2022-01-19 PROCEDURE — 99999 PR PBB SHADOW E&M-EST. PATIENT-LVL III: CPT | Mod: PBBFAC,,, | Performed by: INTERNAL MEDICINE

## 2022-01-19 PROCEDURE — 84443 ASSAY THYROID STIM HORMONE: CPT | Performed by: INTERNAL MEDICINE

## 2022-01-19 PROCEDURE — 86803 HEPATITIS C AB TEST: CPT | Performed by: INTERNAL MEDICINE

## 2022-01-19 PROCEDURE — 99213 OFFICE O/P EST LOW 20 MIN: CPT | Mod: PBBFAC,PN | Performed by: INTERNAL MEDICINE

## 2022-01-19 PROCEDURE — 36415 COLL VENOUS BLD VENIPUNCTURE: CPT | Mod: PN | Performed by: INTERNAL MEDICINE

## 2022-01-19 PROCEDURE — 87389 HIV-1 AG W/HIV-1&-2 AB AG IA: CPT | Performed by: INTERNAL MEDICINE

## 2022-01-19 NOTE — PROGRESS NOTES
"Subjective:       Patient ID: Thalia Rg is a 51 y.o. female.    Chief Complaint: Follow-up (1 month)    F/u weakness    HPI: 52 y/o w/ bipolar disorder autoimmune hepatitis presents with  for follow up. Since last visit with me had abdominal ultrasound showing no evidence of ascites or hepatic infilitration. She has been taking mvi with folate daily.  notices she is able to walk down stairs now (with him near by to help for balance) no change in lower leg parathesia. Still using wheelchair for distances outside of home. No bowel or bladder incontinence no vision changes no upper extremity weakness or parathesia weight is stable good appetite    Review of Systems   Constitutional: Negative for activity change, appetite change, fatigue, fever and unexpected weight change.   HENT: Negative for ear pain, rhinorrhea and sore throat.    Eyes: Negative for discharge and visual disturbance.   Respiratory: Negative for chest tightness, shortness of breath and wheezing.    Cardiovascular: Negative for chest pain, palpitations and leg swelling.   Gastrointestinal: Negative for abdominal pain, constipation and diarrhea.   Endocrine: Negative for cold intolerance and heat intolerance.   Genitourinary: Negative for dysuria and hematuria.   Musculoskeletal: Negative for joint swelling and neck stiffness.   Skin: Negative for rash.   Neurological: Positive for weakness and numbness. Negative for dizziness, syncope and headaches.   Psychiatric/Behavioral: Negative for suicidal ideas.       Objective:     Vitals:    01/19/22 0913   BP: 114/68   BP Location: Left arm   Patient Position: Sitting   BP Method: Medium (Manual)   Pulse: 109   Temp: 98.4 °F (36.9 °C)   TempSrc: Oral   SpO2: 97%   Weight: 83 kg (182 lb 15.7 oz)   Height: 5' 8" (1.727 m)          Physical Exam  Constitutional:       General: She is not in acute distress.     Appearance: She is well-developed and well-nourished.      Comments: Sitting in " wheelchair   HENT:      Head: Normocephalic and atraumatic.   Eyes:      General: No scleral icterus.     Conjunctiva/sclera: Conjunctivae normal.   Cardiovascular:      Rate and Rhythm: Normal rate and regular rhythm.      Heart sounds: No murmur heard.  No friction rub. No gallop.    Pulmonary:      Effort: Pulmonary effort is normal. No respiratory distress.      Breath sounds: Normal breath sounds. No wheezing or rales.   Abdominal:      General: There is no distension.      Palpations: Abdomen is soft.      Tenderness: There is no abdominal tenderness. There is no right CVA tenderness, left CVA tenderness, guarding or rebound.   Musculoskeletal:         General: No tenderness or edema. Normal range of motion.      Cervical back: Normal range of motion.   Skin:     General: Skin is warm and dry.   Neurological:      Mental Status: She is alert and oriented to person, place, and time.      Cranial Nerves: No cranial nerve deficit.      Comments: Hip flexion 2/5 (unable to break against gravity) bilateraly  Dorsiflexion 4/5 bilaterally    Psychiatric:         Mood and Affect: Mood and affect normal.         Assessment and Plan   1. Autoimmune hepatitis  Repeat lfts check tsh needs gi follow up (Dr. Greg biswas [facility])  - Comprehensive Metabolic Panel; Future  - TSH; Future    2. Paresthesia of both lower extremities  Continue mvi repeat tsh  - TSH; Future    3. Low folate  As above  - CBC Auto Differential; Future  - TSH; Future    4. Abnormal TSH  As above  - TSH; Future    5. Long term current use of antipsychotic medication  Screen for dm   - TSH; Future  - Hemoglobin A1C; Future    6. Need for hepatitis C screening test  Hep c ab  - Hepatitis C Antibody; Future    7. Screening for HIV (human immunodeficiency virus)  hiv screening  - HIV 1/2 Ag/Ab (4th Gen); Future

## 2022-01-20 LAB
ESTIMATED AVG GLUCOSE: 88 MG/DL (ref 68–131)
FINAL PATHOLOGIC DIAGNOSIS: NORMAL
HBA1C MFR BLD: 4.7 % (ref 4–5.6)
HCV AB SERPL QL IA: NEGATIVE
HIV 1+2 AB+HIV1 P24 AG SERPL QL IA: NEGATIVE
Lab: NORMAL

## 2022-01-21 LAB
HPV HR 12 DNA SPEC QL NAA+PROBE: NEGATIVE
HPV16 AG SPEC QL: NEGATIVE
HPV18 DNA SPEC QL NAA+PROBE: NEGATIVE

## 2022-01-26 ENCOUNTER — TELEPHONE (OUTPATIENT)
Dept: OBSTETRICS AND GYNECOLOGY | Facility: CLINIC | Age: 52
End: 2022-01-26
Payer: MEDICARE

## 2022-01-26 NOTE — TELEPHONE ENCOUNTER
----- Message from Susie Mijares sent at 1/25/2022  1:28 PM CST -----  Type: Patient Call Back    Who called: self     What is the request in detail: missed a call from you to give pap results     Can the clinic reply by [facility]? No     Would the patient rather a call back or a response via [facility]? # call bk     Best call back number: [phone],  number

## 2022-01-31 ENCOUNTER — HOSPITAL ENCOUNTER (OUTPATIENT)
Dept: RADIOLOGY | Facility: HOSPITAL | Age: 52
Discharge: HOME | End: 2022-01-31
Attending: INTERNAL MEDICINE
Payer: MEDICARE

## 2022-01-31 VITALS — HEIGHT: 68 IN | BODY MASS INDEX: 27.74 KG/M2 | WEIGHT: 183 LBS

## 2022-01-31 DIAGNOSIS — R92.8 ABNORMAL MAMMOGRAM OF RIGHT BREAST: ICD-10-CM

## 2022-01-31 PROCEDURE — 77065 MAMMO DIGITAL DIAGNOSTIC RIGHT WITH TOMO: ICD-10-PCS | Mod: 26,RT,, | Performed by: RADIOLOGY

## 2022-01-31 PROCEDURE — 76642 ULTRASOUND BREAST LIMITED: CPT | Mod: 26,RT,, | Performed by: RADIOLOGY

## 2022-01-31 PROCEDURE — 77061 BREAST TOMOSYNTHESIS UNI: CPT | Mod: 26,RT,, | Performed by: RADIOLOGY

## 2022-01-31 PROCEDURE — 76642 US BREAST RIGHT LIMITED: ICD-10-PCS | Mod: 26,RT,, | Performed by: RADIOLOGY

## 2022-01-31 PROCEDURE — 76642 ULTRASOUND BREAST LIMITED: CPT | Mod: TC,RT

## 2022-01-31 PROCEDURE — 77065 DX MAMMO INCL CAD UNI: CPT | Mod: 26,RT,, | Performed by: RADIOLOGY

## 2022-01-31 PROCEDURE — 77065 DX MAMMO INCL CAD UNI: CPT | Mod: TC,RT

## 2022-01-31 PROCEDURE — 77061 MAMMO DIGITAL DIAGNOSTIC RIGHT WITH TOMO: ICD-10-PCS | Mod: 26,RT,, | Performed by: RADIOLOGY

## 2022-03-24 ENCOUNTER — OFFICE VISIT (OUTPATIENT)
Dept: FAMILY MEDICINE | Facility: CLINIC | Age: 52
End: 2022-03-24
Payer: MEDICARE

## 2022-03-24 VITALS
BODY MASS INDEX: 26.97 KG/M2 | HEART RATE: 95 BPM | WEIGHT: 177.94 LBS | SYSTOLIC BLOOD PRESSURE: 106 MMHG | OXYGEN SATURATION: 97 % | TEMPERATURE: 98 F | DIASTOLIC BLOOD PRESSURE: 58 MMHG | HEIGHT: 68 IN

## 2022-03-24 DIAGNOSIS — K75.4 AUTOIMMUNE HEPATITIS: ICD-10-CM

## 2022-03-24 DIAGNOSIS — R20.2 PARESTHESIA OF BOTH LOWER EXTREMITIES: ICD-10-CM

## 2022-03-24 DIAGNOSIS — M62.81 PROXIMAL LIMB MUSCLE WEAKNESS: Primary | ICD-10-CM

## 2022-03-24 PROCEDURE — 99214 OFFICE O/P EST MOD 30 MIN: CPT | Mod: PBBFAC,PN | Performed by: INTERNAL MEDICINE

## 2022-03-24 PROCEDURE — 99214 OFFICE O/P EST MOD 30 MIN: CPT | Mod: S$PBB,,, | Performed by: INTERNAL MEDICINE

## 2022-03-24 PROCEDURE — 99999 PR PBB SHADOW E&M-EST. PATIENT-LVL IV: ICD-10-PCS | Mod: PBBFAC,,, | Performed by: INTERNAL MEDICINE

## 2022-03-24 PROCEDURE — 99214 PR OFFICE/OUTPT VISIT, EST, LEVL IV, 30-39 MIN: ICD-10-PCS | Mod: S$PBB,,, | Performed by: INTERNAL MEDICINE

## 2022-03-24 PROCEDURE — 99999 PR PBB SHADOW E&M-EST. PATIENT-LVL IV: CPT | Mod: PBBFAC,,, | Performed by: INTERNAL MEDICINE

## 2022-03-24 NOTE — PROGRESS NOTES
"Subjective:       Patient ID: Thalia Rg is a 51 y.o. female.    Chief Complaint: Follow-up (2 month)    F/u weakness    HPI: 50 y/o with autoimmune hepatitis presents with  for follow up. persistant lower extremity weakness (bilateral proximal weakness) unchanged she has been using walker in home to help with balance no bowel or bladder incontinence no dysphagia no vision changes. She is taking folate and b12 supplement daily. She is scheduled for initial neurology evaluation in two weeks.     Review of Systems   Constitutional: Negative for activity change, appetite change, fatigue, fever and unexpected weight change.   HENT: Negative for ear pain, rhinorrhea and sore throat.    Eyes: Negative for discharge and visual disturbance.   Respiratory: Negative for chest tightness, shortness of breath and wheezing.    Cardiovascular: Negative for chest pain, palpitations and leg swelling.   Gastrointestinal: Negative for abdominal pain, constipation and diarrhea.   Endocrine: Negative for cold intolerance and heat intolerance.   Genitourinary: Negative for dysuria and hematuria.   Musculoskeletal: Positive for gait problem. Negative for joint swelling and neck stiffness.   Skin: Negative for rash.   Neurological: Positive for weakness and numbness. Negative for dizziness, syncope and headaches.   Psychiatric/Behavioral: Negative for suicidal ideas.       Objective:     Vitals:    03/24/22 0942   BP: (!) 106/58   BP Location: Right arm   Patient Position: Sitting   BP Method: Medium (Manual)   Pulse: 95   Temp: 98.1 °F (36.7 °C)   TempSrc: Oral   SpO2: 97%   Weight: 80.7 kg (177 lb 14.6 oz)   Height: 5' 8" (1.727 m)          Physical Exam  Constitutional:       General: She is not in acute distress.     Appearance: She is ill-appearing.   HENT:      Head: Normocephalic and atraumatic.   Eyes:      General: No scleral icterus.  Pulmonary:      Effort: Pulmonary effort is normal. No respiratory distress.      " Breath sounds: No wheezing.   Abdominal:      General: There is no distension.      Palpations: Abdomen is soft.      Tenderness: There is no right CVA tenderness.   Musculoskeletal:      Right lower leg: No edema.      Left lower leg: No edema.   Neurological:      Mental Status: She is alert.      Comments: Hip flexion bilaterally against gravity only knee flexion 3/5 bilaterally ankle/dorsi flexion 4+/5 no clonus with ankle jerk toes downward going on plantar reflex         Assessment and Plan   1. Proximal limb muscle weakness  See below    2. Paresthesia of both lower extremities  Concerning for central process vitamins wnl from recent labs to keep follow up with neurology for consideration of ncs/emg    3. Autoimmune hepatitis  Needs gi adam mendez referral resent   - Ambulatory referral/consult to Gastroenterology; Future

## 2022-03-28 ENCOUNTER — TELEPHONE (OUTPATIENT)
Dept: ADMINISTRATIVE | Facility: HOSPITAL | Age: 52
End: 2022-03-28
Payer: MEDICARE

## 2022-03-29 ENCOUNTER — OFFICE VISIT (OUTPATIENT)
Dept: NEUROLOGY | Facility: CLINIC | Age: 52
End: 2022-03-29
Payer: MEDICARE

## 2022-03-29 VITALS
BODY MASS INDEX: 26.93 KG/M2 | WEIGHT: 177.69 LBS | DIASTOLIC BLOOD PRESSURE: 71 MMHG | SYSTOLIC BLOOD PRESSURE: 105 MMHG | HEART RATE: 83 BPM | HEIGHT: 68 IN

## 2022-03-29 DIAGNOSIS — G82.50 QUADRIPARESIS: Primary | ICD-10-CM

## 2022-03-29 DIAGNOSIS — R20.2 PARESTHESIA OF BOTH LOWER EXTREMITIES: ICD-10-CM

## 2022-03-29 PROCEDURE — 99213 OFFICE O/P EST LOW 20 MIN: CPT | Mod: PBBFAC | Performed by: PSYCHIATRY & NEUROLOGY

## 2022-03-29 PROCEDURE — 99999 PR PBB SHADOW E&M-EST. PATIENT-LVL III: ICD-10-PCS | Mod: PBBFAC,,, | Performed by: PSYCHIATRY & NEUROLOGY

## 2022-03-29 PROCEDURE — 99999 PR PBB SHADOW E&M-EST. PATIENT-LVL III: CPT | Mod: PBBFAC,,, | Performed by: PSYCHIATRY & NEUROLOGY

## 2022-03-29 PROCEDURE — 99204 PR OFFICE/OUTPT VISIT, NEW, LEVL IV, 45-59 MIN: ICD-10-PCS | Mod: S$PBB,,, | Performed by: PSYCHIATRY & NEUROLOGY

## 2022-03-29 PROCEDURE — 99204 OFFICE O/P NEW MOD 45 MIN: CPT | Mod: S$PBB,,, | Performed by: PSYCHIATRY & NEUROLOGY

## 2022-03-29 NOTE — PROGRESS NOTES
Outpatient Neurology Consultation    Requesting physician:  Dr. Gresham    Impression:   Quadriparesis with sensory disturbance best localizing to the peripheral nerve but will image c-spine as most reflexes are preserved (ankles absent).  If peripheral, AIDP/CIDP in DDx.  Rapid course and sensory involvement argue against MND.    Plan:  1. C-spine MRI w/ w/out  2. EMG/NCV  3. PT/OT  4. I will update her on the test results via [facility]  5. RTC after EMG (approx 2 months)      Problem List Items Addressed This Visit        1 - High    Paresthesia of both lower extremities    Relevant Orders    EMG W/ ULTRASOUND AND NERVE CONDUCTION TEST 3 Extremities    MRI Cervical Spine W WO Cont    Ambulatory referral/consult to Physical/Occupational Therapy    Quadriparesis - Primary    Relevant Orders    EMG W/ ULTRASOUND AND NERVE CONDUCTION TEST 3 Extremities    MRI Cervical Spine W WO Cont    Ambulatory referral/consult to Physical/Occupational Therapy          CC:  Extremity tingling    HPI:  52 y/o AAF referred for evaluation of extremity tingling.  She presents with her . Tingling in feet 3 months ago travelled up to the thighs over days.  This was followed by tingling in the hands.  Gait is affected and she is using a walker. There is associated weakness in all 4 extremities.  The duration of progression is difficult to tease out by history; the history conflicts regarding stability vs progression.   Bladder function is fine. No neck pain. No history of similar symptoms.    Past Medical History:   Diagnosis Date    Abnormal Pap smear of vagina     Autoimmune hepatitis     Bipolar 1 disorder     Breast disorder       Past Surgical History:   Procedure Laterality Date    LIVER BIOPSY        Outpatient Medications Marked as Taking for the 3/29/22 encounter (Office Visit) with Reji Downey MD   Medication Sig Dispense Refill    folic acid (FOLVITE) 1 MG tablet Take 1 tablet (1 mg total) by mouth once  "daily. 90 tablet 3    mercaptopurine (PURINETHOL) 50 mg tablet Take 100 mg by mouth once daily.       ziprasidone (GEODON) 20 MG Cap Take 20 mg by mouth 2 (two) times daily.        Review of patient's allergies indicates:  No Known Allergies   Family History   Problem Relation Age of Onset    Cancer Father     Diabetes Maternal Grandmother     Breast cancer Maternal Aunt       Social History     Socioeconomic History    Marital status:    Tobacco Use    Smoking status: Never Smoker    Smokeless tobacco: Never Used   Substance and Sexual Activity    Alcohol use: No    Drug use: No    Sexual activity: Yes     Partners: Male     Birth control/protection: Condom     Review Of Systems:  General: Negative for fever   HENT: Negative for tinnitus, nose bleeds, neck stiffness   Cardiac Negative for palpitations   Vascular: Negative for easy bruising   Pulmonary: Negative for cough   Gastrointestinal: Negative for constipation   Urinary: Negative for incomplete bladder emptying   Musculoskeletal: Negative for muscle aches   Neurological: As above. Otherwise negative for abnormal movements   Psychiatric:  Negative for depression     /71   Pulse 83   Ht 5' 8" (1.727 m)   Wt 80.6 kg (177 lb 11.1 oz)   LMP 05/19/2012   BMI 27.02 kg/m²    Well developed, well nourished female  Extremities: no edema    Mental status:   Awake, alert and appropriately oriented   Normal recent and remote memory   Normal attention and concentration   Normal speech and language   Normal fund of knowledge   No extinction  Cranial nerves:   Normal funduscopic - discs sharp   PERRLA   EOMF without nystagmus   VFF   Normal facial sensation   Normal facial movements   Intact hearing bilaterally   Palate elevates symmetrically   Normal SCM and trapezius strength   Tongue midline  Motor:   No pronator drift   Slow FF movements bilaterally   Mild quadriparesis (except moderate at hips 3/5).  L ankle dorsiflexors 4- (4/5 otherwise " fairly diffuse but a slight extensor predominance in UEs)   No abnormal movements  Sensory   Glove-stocking temp, PP, position (absent toe proprioception)  DTRs   1+ B biceps and absent AJs bilaterally; otherwise 2+ and symmetric   No clonus   Plantar responses are flexor bilaterally  Coordination   Intact to FNF  Gait   NT (w/c)    Data Reviewed:    Lab Results   Component Value Date    WBC 9.94 01/19/2022    HGB 12.5 01/19/2022    HCT 37.9 01/19/2022    MCV 91 01/19/2022     01/19/2022       CMP  Sodium   Date Value Ref Range Status   01/19/2022 139 136 - 145 mmol/L Final     Potassium   Date Value Ref Range Status   01/19/2022 3.5 3.5 - 5.1 mmol/L Final     Chloride   Date Value Ref Range Status   01/19/2022 103 95 - 110 mmol/L Final     CO2   Date Value Ref Range Status   01/19/2022 26 23 - 29 mmol/L Final     Glucose   Date Value Ref Range Status   01/19/2022 98 70 - 110 mg/dL Final     BUN   Date Value Ref Range Status   01/19/2022 5 (L) 6 - 20 mg/dL Final     Creatinine   Date Value Ref Range Status   01/19/2022 0.7 0.5 - 1.4 mg/dL Final     Calcium   Date Value Ref Range Status   01/19/2022 9.5 8.7 - 10.5 mg/dL Final     Total Protein   Date Value Ref Range Status   01/19/2022 7.4 6.0 - 8.4 g/dL Final     Albumin   Date Value Ref Range Status   01/19/2022 3.4 (L) 3.5 - 5.2 g/dL Final     Total Bilirubin   Date Value Ref Range Status   01/19/2022 0.6 0.1 - 1.0 mg/dL Final     Comment:     For infants and newborns, interpretation of results should be based  on gestational age, weight and in agreement with clinical  observations.    Premature Infant recommended reference ranges:  Up to 24 hours.............<8.0 mg/dL  Up to 48 hours............<12.0 mg/dL  3-5 days..................<15.0 mg/dL  6-29 days.................<15.0 mg/dL       Alkaline Phosphatase   Date Value Ref Range Status   01/19/2022 70 55 - 135 U/L Final     AST   Date Value Ref Range Status   01/19/2022 52 (H) 10 - 40 U/L Final     ALT    Date Value Ref Range Status   01/19/2022 36 10 - 44 U/L Final     Anion Gap   Date Value Ref Range Status   01/19/2022 10 8 - 16 mmol/L Final     eGFR if    Date Value Ref Range Status   01/19/2022 >60 >60 mL/min/1.73 m^2 Final     eGFR if non    Date Value Ref Range Status   01/19/2022 >60 >60 mL/min/1.73 m^2 Final     Comment:     Calculation used to obtain the estimated glomerular filtration  rate (eGFR) is the CKD-EPI equation.        Lab Results   Component Value Date    WOVSALOX05 337 11/03/2021     Lab Results   Component Value Date    TSH 4.671 (H) 01/19/2022     Lab Results   Component Value Date    HGBA1C 4.7 01/19/2022     47 mins chart review, face to face, documentation    Reji Downey MD

## 2022-04-01 ENCOUNTER — HOSPITAL ENCOUNTER (OUTPATIENT)
Dept: RADIOLOGY | Facility: HOSPITAL | Age: 52
Discharge: HOME | End: 2022-04-01
Attending: PSYCHIATRY & NEUROLOGY
Payer: MEDICARE

## 2022-04-01 ENCOUNTER — TELEPHONE (OUTPATIENT)
Dept: NEUROLOGY | Facility: CLINIC | Age: 52
End: 2022-04-01

## 2022-04-01 DIAGNOSIS — R20.2 PARESTHESIA OF BOTH LOWER EXTREMITIES: ICD-10-CM

## 2022-04-01 DIAGNOSIS — G82.50 QUADRIPARESIS: ICD-10-CM

## 2022-04-01 DIAGNOSIS — R20.2 PARESTHESIA OF BOTH LOWER EXTREMITIES: Primary | ICD-10-CM

## 2022-04-01 NOTE — TELEPHONE ENCOUNTER
----- Message from Lorraine Spencer sent at 4/1/2022  3:32 PM CDT -----  Regarding: Pt Inquiry  Pts  called about pt not being able to complete Mri due to being claustrophobic        Call back number:[phone]

## 2022-04-05 ENCOUNTER — TELEPHONE (OUTPATIENT)
Dept: NEUROLOGY | Facility: CLINIC | Age: 52
End: 2022-04-05
Payer: MEDICARE

## 2022-04-05 NOTE — TELEPHONE ENCOUNTER
----- Message from Toña Rhoades sent at 4/5/2022 10:22 AM CDT -----  Type:  Patient Call Back    Who Called: Thalia     What is the reqeust in detail:Pts  called about pt not being able to complete Mri due to being claustrophobic, he is requesting a call to discuss over options to help her get the MRI done. Please Advise     Can the clinic reply by [facility]?    Best Call Back Number: [phone]

## 2022-04-06 NOTE — TELEPHONE ENCOUNTER
Spoke with  Arcenio,    States that his wife got real panicked and requested MRI be cut short. Any suggestions for rescheduling?

## 2022-04-07 NOTE — TELEPHONE ENCOUNTER
pls ask if she is willing to retry with Ativan.  The other option is to wait until after the EMG (which I don't see scheduled).  If the EMG gives us an answer, we won't need the MRI.    pls schedule EMG. thx

## 2022-04-11 ENCOUNTER — TELEPHONE (OUTPATIENT)
Dept: NEUROLOGY | Facility: CLINIC | Age: 52
End: 2022-04-11

## 2022-04-11 NOTE — TELEPHONE ENCOUNTER
Left message on Mr. Rg's voicemail requesting a call back in regards to message below.     Progress Notes      Reji Downey MD at 4/6/2022  8:31 PM    Status: Signed      pls ask if she is willing to retry with Ativan.  The other option is to wait until after the EMG (which I don't see scheduled).  If the EMG gives us an answer, we won't need the MRI.     pls schedule EMG. thx        Skylar Zaragoza MA at 4/6/2022  2:22 PM    Status: Signed      Spoke with Mr. Rg,     States that his wife got real panicked and requested MRI be cut short. Any suggestions for rescheduling?            Maria Guadalupe Resendiz MA at 4/5/2022  5:54 PM    Status: Signed      ----- Message from Toña Rhoades sent at 4/5/2022 10:22 AM CDT -----  Type:  Patient Call Back     Who Called: Thalia      What is the reqeust in detail:Pts  called about pt not being able to complete Mri due to being claustrophobic, he is requesting a call to discuss over options to help her get the MRI done. Please Advise      Can the clinic reply by [facility]?     Best Call Back Number: [phone]

## 2022-05-03 ENCOUNTER — CLINICAL SUPPORT (OUTPATIENT)
Dept: REHABILITATION | Facility: HOSPITAL | Age: 52
End: 2022-05-03
Attending: PSYCHIATRY & NEUROLOGY
Payer: MEDICARE

## 2022-05-03 DIAGNOSIS — R29.898 BILATERAL ARM WEAKNESS: ICD-10-CM

## 2022-05-03 DIAGNOSIS — Z78.9 IMPAIRED INSTRUMENTAL ACTIVITIES OF DAILY LIVING (IADL): ICD-10-CM

## 2022-05-03 DIAGNOSIS — R26.89 IMPAIRED GAIT AND MOBILITY: ICD-10-CM

## 2022-05-03 DIAGNOSIS — R29.898 WEAKNESS OF BOTH LOWER EXTREMITIES: ICD-10-CM

## 2022-05-03 DIAGNOSIS — R20.2 PARESTHESIA OF BOTH LOWER EXTREMITIES: ICD-10-CM

## 2022-05-03 DIAGNOSIS — G82.50 QUADRIPARESIS: ICD-10-CM

## 2022-05-03 DIAGNOSIS — Z78.9 DEFICIT IN ACTIVITIES OF DAILY LIVING (ADL): ICD-10-CM

## 2022-05-03 PROCEDURE — 97162 PT EVAL MOD COMPLEX 30 MIN: CPT | Mod: PN

## 2022-05-03 PROCEDURE — 97165 OT EVAL LOW COMPLEX 30 MIN: CPT | Mod: PN

## 2022-05-03 PROCEDURE — 97110 THERAPEUTIC EXERCISES: CPT | Mod: PN

## 2022-05-03 NOTE — PROGRESS NOTES
See initial evaluation in Plan of Care.     Myrna Thompson, PT, DPT  Physical Therapist  Residency Trained Neurological Physical Therapist  5/3/22

## 2022-05-04 PROBLEM — Z78.9 IMPAIRED INSTRUMENTAL ACTIVITIES OF DAILY LIVING (IADL): Status: ACTIVE | Noted: 2022-05-04

## 2022-05-04 PROBLEM — Z78.9 DEFICIT IN ACTIVITIES OF DAILY LIVING (ADL): Status: ACTIVE | Noted: 2022-05-04

## 2022-05-04 PROBLEM — R29.898 BILATERAL ARM WEAKNESS: Status: ACTIVE | Noted: 2022-05-04

## 2022-05-04 NOTE — PLAN OF CARE
"  [facility] Occupational Therapy  Initial Neurological Evaluation     Date: 5/3/2022  Patient: Thalia Rg  Chart Number: [id number]    Therapy Diagnosis:   Encounter Diagnoses   Name Primary?    Paresthesia of both lower extremities     Quadriparesis     Bilateral arm weakness     Deficit in activities of daily living (ADL)     Impaired instrumental activities of daily living (IADL)      Physician: Reji Downey MD    Physician Orders: OT eval and treat  Medical Diagnosis:   R20.2 (ICD-10-CM) - Paresthesia of both lower extremities   G82.50 (ICD-10-CM) - Quadriparesis     Evaluation Date: 5/3/2022  Plan of Care Expiration Period: 7/15/2022  Insurance Authorization period Expiration: 3/29/2022  Date of Return to MD: 5/26/22  Visit # / Visits Authorized: 1 / 1  FOTO: 5th visit    Time In:11:45am  Time Out: 12:30am  Total Billable (one on one) Time: 45 minutes    Precautions: Standard and Fall    Subjective     History of Current Condition: was having bilateral "pins and needles" with progressive distal to proximal numbness. 1 fall in shower    Involved Side: feels left side is weaker  Dominant Side: Right  Date of Onset: ~6 months ago  Surgical Procedure: n/a  Imaging: See epic image   Previous Therapy: none    Patient's Goals for Therapy: improve UE functional performance    Pain:  Pain Related Behaviors Observed: no   Functional Pain Scale Rating 0-10:   Reporting no pain ever    Occupation:  Retail work  Working presently: disability for 20 years since mental health  Duties: self-care    Functional Limitations/Social History:    Prior Level of Function: Independent  Current Level of Function:see chart below    Home/Living environment : lives with their family  Home Access: 2 SH, 16 steps to second, curently moving bedroom to first floor because stairs have been difficult   DME: standard walker, single point cane, shower chair, wheelchair and grab bars by toilet     Leisure: relax " and watch TV    Driving: no longer since onset    Past Medical History/Physical Systems Review:     Past Medical History:  Thalia Rg  has a past medical history of Abnormal Pap smear of vagina, Autoimmune hepatitis, Bipolar 1 disorder, and Breast disorder.    Past Surgical History:  Thalia Rg  has a past surgical history that includes Liver biopsy.    Current Medications:  Thalia has a current medication list which includes the following prescription(s): diphenhydramine hcl, folic acid, mercaptopurine, and ziprasidone.    Allergies:  Review of patient's allergies indicates:  No Known Allergies     Other: n/a    Objective     Cognitive Exam:  Oriented: Person, Place, Time and Situation  Behaviors: normal, cooperative  Follows Commands/attention: Follows multistep  commands  Communication: clear/fluent  Memory: No Deficits noted as determined by 3 word recall after 1 minute and 3 minutes  Safety awareness/insight to disability: aware of diagnosis, treatment, and prognosis  Coping skills/emotional control: Appropriate to situation    Visual/Perceptual:  NT reporting vision not affected    Physical Exam:  Postural examination/scapula alignment: Rounded shoulder and Slouched posture  Joint integrity: Firm end feeling  Skin integrity: warm/dry  Edema: none observed    Palpation: not TTP      Joint Evaluation  AROM  5/3/2022 AROM  5/3/2022    Left Right   Shoulder flex 0-180 130 130   Shoulder Abd 0-180 135 135   Shoulder ER 0-90 WFL WFL   Shoulder IR 0-90 wFL WFL   Shoulder Extension 0-80 60   wnl   Shoulder Horizontal adduction 0-90 30 30   Elbow flex/ext 0-150 WFL WFL   Wrist flex 0-80 WFL WFL   Wrist ext 0-70 WFL WFL   Supination 0-80 WFL WFL   Pronation 0-80 WFL WFL     Fist: normal      Strength 5/3/2022 5/3/2022   **within available ROM** Left Right   Shoulder flex 3+/5 3+/5   Shoulder abd 3+/5 4/5   Shoulder ER 3/5 3+/5   Shoulder IR 4/5 4/5   Shoulder Extension 4/5 4/5   Shoulder Horizontal adduction  4+/5 4+/5   Elbow flex 4+/5 4+/5   Elbow ext 4+/5 4+/5   Wrist flex 4/5 4/5   Wrist ext 4/5 4/5   Supination 4/5 4/5   Pronation 4/5 4/5      Strength: (BRANDY Dynamometer in lbs.) Average 3 trials, Position II:     5/3/2022 5/3/2022    Left Right   Rung II 21.1# 15.8#     Pinch Strength (Measured in psi)     5/3/2022 5/3/2022    Left Right   Key Pinch 3.8 psi 6.6 psi   3pt Pinch 2.5 psi 3.9 psi   2pt Pinch 1.5 psi 3.9 psi     Fine Motor Coordination: 9 Hole Peg Test  Left 5/3/2022 Right 5/3/2022   1:27.54  Several drops 1:31.86  Several drops     Gross motor coordination:   - JH (Rapid Alternating Movements): WFL  - Finger to Nose (5 times): WNL both with eye sopen and eyes closed  - Finger Flicks (coordination moving from digit flexion to digit extension): WFL    Tone:  Modified Kishor Scale:   0 - No increase in muscle tone    Comments: proximal weakness    Sensation:  Thalia  reports numbness and tingling B hands  Monofilament testing: WNL 2.83  Proprioception: bilateral intact when one UE put in postion and asked to recreate with opposite UE  Temperature: bilateral impaired (per pt. Report)    Balance:   Static Sit - GOOD: Takes MODERATE challenges from all directions  Dynamic sit- GOOD: Takes MODERATE challenges from all directions  Static Stand - NT  Dynamic stand - NT    Endurance Deficit: mild                    Functional Status      Functional Mobility:  Bed mobility: Mod I  Roll to left: Mod I  Roll to right: Mod I  Supine to sit: Mod I  Sit to supine: Mod I  Transfers to bed: Mod I  Transfers to toilet: Mod I  Car transfers: Mod A  Wheelchair mobility: D    ADL's:  Feeding: Min A with cutting food  Grooming: Mod A  Hygiene: Mod I  UB Dressing: Mod I  LB Dressing: Min A for tying shoes  Toileting: Mod I  Bathing: Min A for back    IADL's:  Homecare: D  Cooking: D  Laundry: D  Yard work: Does not do  Use of telephone: Mod I  Money management: D  Medication management: D  Handwriting:D  Technology  Use:Mod I    Comments:      CMS Impairment/Limitation/Restriction for FOTO Neuro Survey    Therapist reviewed FOTO scores for Thalia Rg on 5/3/2022.   FOTO documents entered into [employer] - see Media section.    Limitation Score: 66%  Category: Mobility           Treatment     Home Exercises and Patient Education Provided    Education provided:   -role of OT, goals for OT, scheduling/cancellations, insurance limitations with patient.  -Additional Education provided: Importance of increased UE use and ADL/IADL participation      Assessment     Thalia Rg is a 51 y.o. female referred to outpatient occupational therapy and presents with a medical diagnosis of Quadriparesis, resulting in decreased range of motion, decreased muscle strength, impaired function and decreased work ability and demonstrates limitations as described in the chart below. Following medical record review it is determined that patient will benefit from occupational therapy services in order to maximize pain free and/or functional use of bilateral UEs.    Patient prognosis is Good due to  Level of UE function   Patient will benefit from skilled outpatient Occupational Therapy to address the deficits stated above and in the chart below, provide patient/family education, and to maximize patient's level of independence.     Plan of care discussed with patient: Yes  Patient's spiritual, cultural and educational needs considered and patient is agreeable to the plan of care and goals as stated below:     Anticipated Barriers for therapy: none    Medical Necessity is demonstrated by the following  Profile and History Assessment of Occupational Performance Level of Clinical Decision Making Complexity Score   Occupational Profile:   Thalia Rg is a 51 y.o. female who lives with their family and is currently on disability. Thalia Rg has difficulty with  feeding, bathing, grooming and dressing  housework/household chores and medication  management  affecting his/her daily functional abilities. His/her main goal for therapy is improve UE function.     Comorbidities:   see chart    Medical and Therapy History Review:   Brief               Performance Deficits    Physical:  Muscle Power/Strength  Muscle Endurance   Strength  Pinch Strength  Gross Motor Coordination  Fine Motor Coordination  Muscle Tone    Cognitive:  No Deficits    Psychosocial:    No Deficits     Clinical Decision Making:  low    Assessment Process:  Problem-Focused Assessments    Modification/Need for Assistance:  Minimal-Moderate Modifications/Assistance    Intervention Selection:  Limited Treatment Options       low  Based on PMHX, co morbidities , data from assessments and functional level of assistance required with task and clinical presentation directly impacting function.       The following goals were discussed with the patient and patient is in agreement with them as to be addressed in the treatment plan.     Goals:  Short Term Goals: 5 weeks   - Pt. To be educated on HEPs and be able return demonstrate with visual cues  - Pt. Will increased bilateral shoulder FF AROM by at least 10 degrees to better wash hair  - Pt. Will have improved functional strength as shown by an improved MMT of at least 1 MMT all planes  - Pt. Will have improved bilateral  of at least 5# over baseline for increased functional grasp  - Pt. Will have improved fine motor control as demonstrated by improved 9HPT of at least 30 sec over base line  - Pt. Will be able to cut her own food a feed herself with Mod I  - Pt. Will be educated in adaptive equipment for bathing  - Pt. Will be able to complete simple home care (wipe counter tops/table, dry dishes, etc) with Mod A  - Pt. Will be able to complete laundry task of putting clean clothes away with Mod A     Long Term Goals: 10 weeks   - Pt. To be educated on HEPs and be able return demonstrate   - Pt. Will increased bilateral shoulder FF AROM by  at least 15 degrees to better wash hair  - Pt. Will have improved functional strength as shown by an improved MMT of at least 1.5 MMT grades all planes  - Pt. Will have improved bilateral  of at least 10# over baseline for increased functional grasp  - Pt. Will have improved fine motor control as demonstrated by improved 9HPT of at least 45 sec over base line  - Pt. Will be hortencia to bathe herself with Mod I      More goals to be made as appropriate     Plan   Certification Period/Plan of care expiration: 5/3/2022 to 7/15/2022.    Outpatient Occupational Therapy 2 times weekly for 10 weeks to include the following interventions: Electrical Stimulation of R UE, Fluidotherapy, Manual Therapy, Moist Heat/ Ice, Neuromuscular Re-ed, Orthotic Management and Training, Paraffin, Patient Education, Self Care, Therapeutic Activities, Therapeutic Exercise and Ultrasound.    KIARA Paz      I certify the need for these services furnished under this plan of treatment and while under my care.  ____________________________________ Physician/Referring Practitioner   Date of Signature

## 2022-05-06 PROBLEM — R29.898 LOWER EXTREMITY WEAKNESS: Status: ACTIVE | Noted: 2022-05-06

## 2022-05-06 PROBLEM — R26.89 IMPAIRED GAIT AND MOBILITY: Status: ACTIVE | Noted: 2022-05-06

## 2022-05-07 NOTE — PLAN OF CARE
[facility]  Physical Therapy Neurological Rehabilitation Initial Evaluation    Name: Thalia Rg  Clinic Number: [id number]    Therapy Diagnosis:   Encounter Diagnoses   Name Primary?    Impaired gait and mobility     Weakness of both lower extremities      Physician: Reji Downey MD    Physician Orders: PT Eval and Treat   Medical Diagnosis from Referral:   R20.2 (ICD-10-CM) - Paresthesia of skin   G82.50 (ICD-10-CM) - Quadriplegia, unspecified     Evaluation Date: 5/3/2022  Authorization Period Expiration: 5/3/23  Plan of Care Expiration: 7/26/22  Visit # / Visits authorized: 1/ 1    Time In: 10:15  Time Out: 11:00  Total Billable Time: 10 minutes  Total Time: 45    Precautions: Standard and Fall; quadriparesis with sensory disturbance (ddx in works)     Subjective   Date of onset: September 2021  History of current condition - Thalia reports: initially numbness from distal to proximal beginning in the lower extremities and then moving to the hands. Pt reports this was followed by decreased function/weakness in BLE.following same pattern.     MRI scheduled; EMG/NCV scheduled - patient states doctor thinks it is the nerves in her lower back that are the issue.      Medical History:   Past Medical History:   Diagnosis Date    Abnormal Pap smear of vagina     Autoimmune hepatitis     Bipolar 1 disorder     Breast disorder      Surgical History:   Thalia Rg  has a past surgical history that includes Liver biopsy.    Medications:   Thalia has a current medication list which includes the following prescription(s): diphenhydramine hcl, folic acid, mercaptopurine, and ziprasidone.    Allergies:   Review of patient's allergies indicates:  No Known Allergies     Imaging, none: nothing related currently    Prior Therapy: none  Social History: , daughter   Falls: 1 month ago in shower    DME: Manual wheelchair, Walker, Straight cane and grab bars around toilet and  shower; shower chair    Home Environment: 16 steps to 2nd floor where master is; able to convert sons room on 1st floor to bedroom with full bath; no JESIKA from outside; walk-in shower    Exercise Routine / History: none  Family Present at time of Eval:    Occupation: disabled;  is a contractor   Prior Level of Function: independent function  Current Level of Function: RW for about a month; 50% assist from patient/ with all functional tasks     Pain:  Current 0/10    Pts goals: get stronger and start walking again    Fatigue: 10/10     Objective     - Follows commands: intact   - Speech: no deficits     Mental status: alert, oriented to person, place, and time, normal mood, behavior, speech, dress, motor activity, and thought processes, depressed mood  Appearance: Casually dressed  Behavior:  calm, cooperative and adequate rapport can be established  Attention Span and Concentration:  Normal    Dominant hand:  right     Posture Alignment :slouched posture    Skin integrity:  Intact    Sensation:  Light Touch: diminished below ankles; BLE and genitals numbness            Proprioception:   RLE 0/6; LLE 3/6 at ankles; RLE 3/3 at knee     Tone: NT  Limbs/muscles affected: NA    Visual/Auditory: denies changes     Coordination:   - fine motor: refer to OT note   - UE coordination: refer to OT note    - LE coordination:  alternating toe taps: intact                                 Heel to shin: intact limited on L due to weakness    ROM: UPPER EXTREMITY   refer to OT note          RANGE OF MOTION--LOWER EXTREMITIES  (R) LE WFL  (L) LE: WFL    Strength: manual muscle test grades below   Upper Extremity Strength - refer to OT note     Lower Extremity Strength   RLE LLE   Hip Flexion: 4-/5 4-/5   Hip Extension:  Can bridge  Can bridge    Hip Abduction: 4+/5 4-/5   Hip Adduction: 4-/5 4-/5   Knee Extension: 4/5 4/5   Knee Flexion: 4/5 4-/5   Ankle Dorsiflexion: 4/5 4/5   Ankle Plantarflexion: 4-/5 4-/5      Abdominal Strength: 3+/5     Evaluation   Single Limb Stance R LE NT  (<10 sec = HIGH FALL RISK)   Single Limb Stance L LE NT  (<10 sec = HIGH FALL RISK)   30 second sit to stand 4 repetitions   Staley/ FGA/ Tinetti NT     Postural control:    - sitting able to maintain static sitting; dynamic sitting with BUE for support   - standing NT at this time    Gait Assessment:   - AD used: RW and wheelchair   - Assistance: with upright mobility Harriet; wheelchair mobility modA  - Distance: 50+ ft     GAIT DEVIATIONS:  Gait component performance: Decreased speed, shuffled steps B, limited step length B, limited B foot clearance without heel strike or toe off, increased UE weightbearing through RW, limited hip flexion, knee flexion and mild B knee buckling    Endurance Deficit: YES       Evaluation   Timed Up and Go 51 sec - BUE, RW  < 20 sec safe for independent transfers,     < 30 sec assist required for transfers   6 meter walk test 0.12 m/s    6 min walk test NT     Functional Mobility (Bed mobility, transfers)  Bed mobility: Mod I  Supine to sit: Harriet  Sit to supine: Harriet  Rolling: Min A  Transfers to bed: Min A  Transfers to toilet: Min A  Sit to stand:  Min A  Stand pivot:  Min A  Car transfers: Min A  Wheelchair mobility: Mod A    CMS Impairment/Limitation/Restriction for FOTO Survey    Therapist reviewed FOTO scores for Thalia Rg on 5/3/2022.   FOTO documents entered into [facility] - see Media section.    FOTO SET UP BY STAFF ONLY FOR OT NOT PT.        TREATMENT   Treatment Time In: 10:50  Treatment Time Out: 11:00  Total Treatment time separate from Evaluation: 10 minutes    Thalia received therapeutic exercises to develop strength, endurance and posture for 10 minutes including:    Sit to stand 10x1  Bridge 10x2   Seated marching 10x2   Seated LAQ 10x2   Ankle pumps 10x2     Home Exercises and Patient Education Provided    Education provided:   - role of PT, POC, scheduling, home exercise program     Written Home  Exercises Provided: yes.  Exercises were reviewed and Thalia was able to demonstrate them prior to the end of the session.  Thalia demonstrated good  understanding of the education provided.     See EMR under Patient Instructions for exercises provided 5/3/2022.    Assessment   Thalia is a 51 y.o. female referred to outpatient Physical Therapy with a medical diagnosis of quadriplegia. Pt presents with decreased lower extremity strength, limited function, mobility and ambulation requiring assistance from  at least 50% of the time and utilizes her wheelchair and walker on a daily basis. Pt with decreased sensation and proprioception at bilateral ankles, decreased muscular endurance and decreased overall stability and postural control of the body. 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     Pt prognosis is Good.   Pt will benefit from skilled outpatient Physical Therapy to address the deficits stated above and in the chart below, provide pt/family education, and to maximize pt's level of independence.     Plan of care discussed with patient: Yes  Pt's spiritual, cultural and educational needs considered and patient is agreeable to the plan of care and goals as stated below:     Anticipated Barriers for therapy: differential diagnosis in the process     Medical Necessity is demonstrated by the following  History  Co-morbidities and personal factors that may impact the plan of care Co-morbidities:   young age and bipolar disorder, autoimmune hepatitis    Personal Factors:   no deficits     moderate   Examination  Body Structures and Functions, activity limitations and participation restrictions that may impact the plan of care Body Regions:   lower extremities  upper extremities  trunk    Body Systems:    strength  balance  gait  transfers  transitions  motor control    Participation Restrictions:   50% functional  assistance    Activity limitations:   Learning and applying knowledge  no deficits    General Tasks and Commands  no deficits    Communication  no deficits    Mobility  lifting and carrying objects  walking  moving around using equipment (WC)    Self care  refer to OT note     Domestic Life  refer to OT note     Interactions/Relationships  no deficits    Life Areas  employment    Community and Social Life  community life  recreation and leisure         high   Clinical Presentation evolving clinical presentation with changing clinical characteristics moderate   Decision Making/ Complexity Score: moderate     Goals:  Short Term Goals: 6 weeks   1. Patient will transfer sit to/from standing with RW, modified independent with good body mechanics to promote safe ambulation household distances.  2. Pt will ambulate 150 ft with RW, modified independent with good balance to reduce risk of falls.  3. Patient will improve functional strength by completing 5xSTS outcome measure score.  4. Patient will improve TUG score by 9 seconds to promote improved functional mobility.    Long Term Goals: 12 weeks   1. Pt will be independent with HEP to address any remaining deficits in balance, strength, or gait.  2. Pt will improve LE strength to >/= 4/5 in order to improve functional mobility at home and in the community.  3. Patient will improve TUG score by 18 seconds to promote improved functional mobility.  4. Patient will improve gait speed by 0.4 m/s to promote improved community ambulation.  5.  Assess static and dynamic balance and create appropriate goals to track progression and improvements.     Plan   Plan of care Certification: 5/3/2022 to 7/26/22.     Outpatient Physical Therapy 2 times weekly for 12 weeks to include the following interventions: Gait Training, Manual Therapy, Moist Heat/ Ice, Neuromuscular Re-ed, Patient Education, Therapeutic Activities and Therapeutic Exercise.     Myrna Thompson, PT, DPT  5/3/22

## 2022-05-16 ENCOUNTER — CLINICAL SUPPORT (OUTPATIENT)
Dept: REHABILITATION | Facility: HOSPITAL | Age: 52
End: 2022-05-16
Attending: PSYCHIATRY & NEUROLOGY
Payer: MEDICARE

## 2022-05-16 DIAGNOSIS — R26.89 IMPAIRED GAIT AND MOBILITY: Primary | ICD-10-CM

## 2022-05-16 DIAGNOSIS — R29.898 WEAKNESS OF LOWER EXTREMITY, UNSPECIFIED LATERALITY: ICD-10-CM

## 2022-05-16 PROCEDURE — 97110 THERAPEUTIC EXERCISES: CPT | Mod: PN

## 2022-05-16 NOTE — PROGRESS NOTES
"[facility]   Physical Therapy Treatment Note     Name: Thalia Rg  Clinic Number: [id number]    Therapy Diagnosis:   Encounter Diagnoses   Name Primary?   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  Response to previous treatment: tired for day   Functional change: ongoing     Pain: 0/10  Location:     OBJECTIVE     Objective Measures updated at progress report unless specified.     HR: 77  SpO2: 96%    Fatigue: 5/10 --> 8/10     Treatment     Thalia received the treatments listed below:      therapeutic exercises to develop strength, endurance, posture and core stabilization for 45 minutes including:    Home exercise program: discussed   1. Sit to stand 10x1  2. Bridge 10x2   3. Seated marching 10x2   4. Seated LAQ 10x2   5. Ankle pumps 10x2     Nustep L2 - 3 min  Nustep L1 - 1.5 min  Nustep L1 - 1 min    Sit to stand - x8, x5 Rolling Walker (RW) - "feet, knees, hips"     Bridge 10x2  Hip ADD - in hooklying - green ball 10x2   Hip ABD - in hooklying - RTB 10x2   SAQ - large bolster - 10x2 bilateral     Patient Education and Home Exercises     Home Exercises Provided and Patient Education Provided     Education provided:   - role of PT, POC, " scheduling, home exercise program      Written Home Exercises Provided: yes.  Exercises were reviewed and Thalia was able to demonstrate them prior to the end of the session.  Thalia demonstrated good  understanding of the education provided.      See EMR under Patient Instructions for exercises provided 5/3/2022.    ASSESSMENT   Thalia demonstrating moderate endurance and activity tolerance impairments at this time, however managed with rest breaks in supine and sitting positions. Will continue to address progression of exercise in a safe manner addressing dosage of treatment and bouts of treatment. Pt continues to be appropriate for skilled PT services.     Thalia Is progressing well towards her goals.   Pt prognosis is Good.     Pt will continue to benefit from skilled outpatient physical therapy to address the deficits listed in the problem list box on initial evaluation, provide pt/family education and to maximize pt's level of independence in the home and community environment.     Pt's spiritual, cultural and educational needs considered and pt agreeable to plan of care and goals.     Anticipated barriers to physical therapy: differential diagnosis in the process     Goals:   Short Term Goals: 6 weeks   1. Patient will transfer sit to/from standing with RW, modified independent with good body mechanics to promote safe ambulation household distances.  2. Pt will ambulate 150 ft with RW, modified independent with good balance to reduce risk of falls.  3. Patient will improve functional strength by completing 5xSTS outcome measure score.  4. Patient will improve TUG score by 9 seconds to promote improved functional mobility.     Long Term Goals: 12 weeks   1. Pt will be independent with HEP to address any remaining deficits in balance, strength, or gait.  2. Pt will improve LE strength to >/= 4/5 in order to improve functional mobility at home and in the community.  3. Patient will improve TUG score by 18  seconds to promote improved functional mobility.  4. Patient will improve gait speed by 0.4 m/s to promote improved community ambulation.  5.  Assess static and dynamic balance and create appropriate goals to track progression and improvements.     PLAN   Plan of care Certification: 5/3/2022 to 7/26/22.      Outpatient Physical Therapy 2 times weekly for 12 weeks to include the following interventions: Gait Training, Manual Therapy, Moist Heat/ Ice, Neuromuscular Re-ed, Patient Education, Therapeutic Activities and Therapeutic Exercise.      Myrna Thompson, PT, DPT  5/16/22

## 2022-05-18 ENCOUNTER — CLINICAL SUPPORT (OUTPATIENT)
Dept: REHABILITATION | Facility: HOSPITAL | Age: 52
End: 2022-05-18
Attending: PSYCHIATRY & NEUROLOGY
Payer: MEDICARE

## 2022-05-18 DIAGNOSIS — R26.89 IMPAIRED GAIT AND MOBILITY: Primary | ICD-10-CM

## 2022-05-18 PROCEDURE — 97110 THERAPEUTIC EXERCISES: CPT | Mod: PN

## 2022-05-18 NOTE — PROGRESS NOTES
[facility]   Physical Therapy Treatment Note     Name: Thalia Rg  Clinic Number: [id number]    Therapy Diagnosis:   Encounter Diagnosis   Name Primary?   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  Response to previous treatment: tired and sore for 2 days   Functional change: ongoing     Pain: 0/10  Location: N/A     OBJECTIVE     Objective Measures updated at progress report unless specified.     INITIAL VITALS:    HR: 97   SpO2: 97%    POST 2nd bout on Nustep sitting: LIGHTHEADED   HR: 101 bpm   SpO2: 98%   BP: 111/95    +2 min POST 2nd bout on Nustep sitting: IMPROVED    BP: 101/82   HR: 85    SpO2: 90    Supine:    BP: 119/83   HR: 108   SpO2: 97    Supine POST exercise:    BP: 110/83   HR: 91   SpO2: 97    Sitting POST exercise:    BP: 98/75   HR: 101   SpO2: 92    Fatigue: 6/10 --> 8/10     Treatment     Thalia received the treatments listed below:      therapeutic exercises to develop strength, endurance, posture and core stabilization for 45 minutes including:    home exercise program:   1. Sit to stand 10x1  2. Bridge 10x2   3. Seated marching 10x2   4. Seated LAQ 10x2   5. Ankle pumps 10x2     Sitting:    Nustep L1 1.5 min  Nustep L1 1.5 min - lightheaded     SUPINE:    Bridge 5x2   Hip ADD - in hooklying - green ball 5x2   Hip ABD - in hooklying - RTB 5x2   SAQ - large bolster - 5x2 bilateral   Heel slide - slide board and pillow case 5x2 bilateral    - right lower extremity increased challenge - Harriet for guide   Hip ABD (supine) - slide board and pillow case 5x2 bilateral     Patient Education and Home Exercises     Home Exercises Provided and Patient Education Provided     Education provided:   - role of PT, POC, scheduling, home exercise program   - change home exercise program to 5 reps, 2 sets      Written Home Exercises Provided: yes.  Exercises were reviewed and hTalia was able to demonstrate them prior to the end of the session.  Thalia demonstrated good  understanding of the education provided.      See EMR under Patient Instructions for exercises provided 5/3/2022.    ASSESSMENT   Thalia is demonstrating significant muscular and CV fatigue this session following first follow-up on Monday consisting of supine and sitting exercise. Pt educated on home exercise program consisting of 5 repetitions and 2 sets with adequate rest. Today's session consisting of decreased time on nustep with adequate rest and supine exercise with adequate rest. Vitals are monitored throughout session and diastolic BP looks to increased with exertional and standing activities which we will monitor at future sessions. Pt is appropriate for skilled PT services at this time.      Thalia Is progressing well towards her goals.   Pt prognosis is Good.     Pt will continue to benefit from skilled outpatient physical therapy to address the deficits listed in the problem list box on initial evaluation, provide pt/family education and to maximize pt's level of independence in the home and community environment.     Pt's spiritual, cultural and educational needs considered and pt agreeable to plan of care and goals.     Anticipated barriers to physical therapy: differential diagnosis in the process      Goals:   Short Term Goals: 6 weeks   1. Patient will transfer sit to/from standing with RW, modified independent with good body mechanics to promote safe ambulation household distances.  2. Pt will ambulate 150 ft with RW, modified independent with good balance to reduce risk of falls.  3. Patient will improve functional strength by completing 5xSTS outcome measure score.  4. Patient will improve TUG score by 9 seconds to promote improved functional mobility.     Long Term Goals: 12 weeks   1. Pt will be independent with HEP to address any remaining deficits in balance, strength, or gait.  2. Pt will improve LE strength to >/= 4/5 in order to improve functional mobility at home and in the community.  3. Patient will improve TUG score by 18 seconds to promote improved functional mobility.  4. Patient will improve gait speed by 0.4 m/s to promote improved community ambulation.  5.  Assess static and dynamic balance and create appropriate goals to track progression and improvements.     PLAN   Plan of care Certification: 5/3/2022 to 7/26/22.      Outpatient Physical Therapy 2 times weekly for 12 weeks to include the following interventions: Gait Training, Manual Therapy, Moist Heat/ Ice, Neuromuscular Re-ed, Patient Education, Therapeutic Activities and Therapeutic Exercise.      Monitor vitals, low reps, low sets, low weights.     Myrna Thompson, PT, DPT  05/18/2022

## 2022-05-23 ENCOUNTER — CLINICAL SUPPORT (OUTPATIENT)
Dept: REHABILITATION | Facility: HOSPITAL | Age: 52
End: 2022-05-23
Attending: PSYCHIATRY & NEUROLOGY
Payer: MEDICARE

## 2022-05-23 DIAGNOSIS — R26.89 IMPAIRED GAIT AND MOBILITY: Primary | ICD-10-CM

## 2022-05-23 DIAGNOSIS — R29.898 WEAKNESS OF LOWER EXTREMITY, UNSPECIFIED LATERALITY: ICD-10-CM

## 2022-05-23 PROCEDURE — 97110 THERAPEUTIC EXERCISES: CPT | Mod: PN

## 2022-05-23 NOTE — PROGRESS NOTES
[facility]   Physical Therapy Treatment Note     Name: Thalia Rg  Clinic Number: [id number]    Therapy Diagnosis:   Encounter Diagnoses   Name Primary?   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 - changed reps to 5 from 10 per education last session   Response to previous treatment: tired and sore for 2 days   Functional change: ongoing     Pain: 0/10  Location: N/A     OBJECTIVE     Objective Measures updated at progress report unless specified.     INITIAL VITALS:    HR: 79   SpO2: 97%   BP: 116/76    Fatigue: 5/10 --> 10/10     Treatment     Thalia received the treatments listed below:      therapeutic exercises to develop strength, endurance, posture and core stabilization for 37 minutes including:    home exercise program:   1. Sit to stand 10x1  2. Bridge 10x2   3. Seated marching 10x2   4. Seated LAQ 10x2   5. Ankle pumps 10x2     Sitting:    Nustep L1 1.5 min  Nustep L1 1 min  Nustep L1 1 min     Stand pivot transfer wheelchair <> nustep and wheelchair <> mat - with Rolling Walker (RW)     SUPINE:   Bridge 5x3   Hip ADD - in hooklying - green ball 5x3    Hip ABD - in hooklying - RTB 5x3  SAQ - large bolster - 5x3 bilateral   Heel slide - slide board and pillow case 5x2 bilateral    - right lower extremity increased challenge - Harriet for guide   Hip ABD (supine) - slide board and pillow case 5x2 bilateral    - right lower extremity increased challenge - Harriet for guide     Patient Education and Home Exercises     Home Exercises Provided and Patient Education Provided     Education provided:   - role of PT, POC, scheduling, home exercise program   - change home exercise program to 5 reps, 2 sets   - finding seated daily tasks that can be performed to utilize upper extremity within daily tasks - sorting silverware, folding hand towels     Written Home Exercises Provided: yes.  Exercises were reviewed and Thalia was able to demonstrate them prior to the end of the session.  Thalia demonstrated good  understanding of the education provided.      See EMR under Patient Instructions for exercises provided 5/3/2022.    ASSESSMENT   Thalia continues to be limited by significant fatigue throughout session and requires multiple seated and supine restbreaks. Nustep performance x3 bouts of ~ 1 minute increases fatigue to 10/10 however decreases to initial 5/10 with sitting rest as above. Pt maintains low level fatigue in supine with rest between sets and is able to tolerate increase set for 3 supine exercises out of 5. Vitals appropriate this date. Pt is appropriate for skilled PT services at this time.      Thalia Is progressing well towards her goals.   Pt prognosis is Good.     Pt will continue to benefit from skilled outpatient physical therapy to address the deficits listed in the problem list box on initial evaluation, provide pt/family education and to maximize pt's level of independence in the home and community environment.     Pt's spiritual, cultural and educational needs considered and pt agreeable to plan of care and goals.     Anticipated barriers to physical  therapy: differential diagnosis in the process     Goals:   Short Term Goals: 6 weeks   1. Patient will transfer sit to/from standing with RW, modified independent with good body mechanics to promote safe ambulation household distances.  2. Pt will ambulate 150 ft with RW, modified independent with good balance to reduce risk of falls.  3. Patient will improve functional strength by completing 5xSTS outcome measure score.  4. Patient will improve TUG score by 9 seconds to promote improved functional mobility.     Long Term Goals: 12 weeks   1. Pt will be independent with HEP to address any remaining deficits in balance, strength, or gait.  2. Pt will improve LE strength to >/= 4/5 in order to improve functional mobility at home and in the community.  3. Patient will improve TUG score by 18 seconds to promote improved functional mobility.  4. Patient will improve gait speed by 0.4 m/s to promote improved community ambulation.  5.  Assess static and dynamic balance and create appropriate goals to track progression and improvements.     PLAN   Plan of care Certification: 5/3/2022 to 7/26/22.      Outpatient Physical Therapy 2 times weekly for 12 weeks to include the following interventions: Gait Training, Manual Therapy, Moist Heat/ Ice, Neuromuscular Re-ed, Patient Education, Therapeutic Activities and Therapeutic Exercise.      Monitor vitals, low reps, low sets, low weights.     Myrna Thompson, PT, DPT  05/23/2022

## 2022-05-26 ENCOUNTER — OFFICE VISIT (OUTPATIENT)
Dept: FAMILY MEDICINE | Facility: CLINIC | Age: 52
End: 2022-05-26
Payer: MEDICARE

## 2022-05-26 VITALS
OXYGEN SATURATION: 97 % | WEIGHT: 175.94 LBS | SYSTOLIC BLOOD PRESSURE: 98 MMHG | TEMPERATURE: 98 F | BODY MASS INDEX: 26.66 KG/M2 | DIASTOLIC BLOOD PRESSURE: 62 MMHG | HEART RATE: 114 BPM | HEIGHT: 68 IN

## 2022-05-26 DIAGNOSIS — M62.81 PROXIMAL LIMB MUSCLE WEAKNESS: ICD-10-CM

## 2022-05-26 DIAGNOSIS — R20.2 PARESTHESIA OF BOTH LOWER EXTREMITIES: Primary | ICD-10-CM

## 2022-05-26 PROCEDURE — 99214 OFFICE O/P EST MOD 30 MIN: CPT | Mod: S$PBB,,, | Performed by: INTERNAL MEDICINE

## 2022-05-26 PROCEDURE — 99999 PR PBB SHADOW E&M-EST. PATIENT-LVL III: ICD-10-PCS | Mod: PBBFAC,,, | Performed by: INTERNAL MEDICINE

## 2022-05-26 PROCEDURE — 99213 OFFICE O/P EST LOW 20 MIN: CPT | Mod: PBBFAC,PN | Performed by: INTERNAL MEDICINE

## 2022-05-26 PROCEDURE — 99999 PR PBB SHADOW E&M-EST. PATIENT-LVL III: CPT | Mod: PBBFAC,,, | Performed by: INTERNAL MEDICINE

## 2022-05-26 PROCEDURE — 99214 PR OFFICE/OUTPT VISIT, EST, LEVL IV, 30-39 MIN: ICD-10-PCS | Mod: S$PBB,,, | Performed by: INTERNAL MEDICINE

## 2022-05-26 NOTE — PROGRESS NOTES
"Subjective:       Patient ID: Thalia Rg is a 51 y.o. female.    Chief Complaint: Follow-up (2 months)    F/u weakness    HPI: 50 y/o presents with  for follow up. Had evaluationw [facility] neurology has started PT. Does feel this has helped her leg strength  notes she does not need as much assistance with going up stairs or with transfers. Her numbness to lower legs continues to progress up her thighs no bowel or bladder in continence no dysphagia/odonyphagia breathing "fine" no orthopnea. No LE swelling she is scheduled for EMG/NCS next week     Review of Systems   Constitutional: Negative for activity change, appetite change, fatigue, fever and unexpected weight change.   HENT: Negative for ear pain, rhinorrhea and sore throat.    Eyes: Negative for discharge and visual disturbance.   Respiratory: Negative for chest tightness, shortness of breath and wheezing.    Cardiovascular: Negative for chest pain, palpitations and leg swelling.   Gastrointestinal: Negative for abdominal pain, constipation and diarrhea.   Endocrine: Negative for cold intolerance and heat intolerance.   Genitourinary: Negative for dysuria and hematuria.   Musculoskeletal: Negative for joint swelling and neck stiffness.   Skin: Negative for rash.   Neurological: Positive for weakness and numbness. Negative for dizziness, syncope and headaches.   Psychiatric/Behavioral: Negative for suicidal ideas.       Objective:     Vitals:    05/26/22 0940   BP: 98/62   BP Location: Left arm   Patient Position: Sitting   BP Method: Medium (Automatic)   Pulse: (!) 114   Temp: 98.4 °F (36.9 °C)   TempSrc: Oral   SpO2: 97%   Weight: 79.8 kg (175 lb 14.8 oz)   Height: 5' 8" (1.727 m)          Physical Exam  Constitutional:       Appearance: She is well-developed.   HENT:      Head: Normocephalic and atraumatic.   Eyes:      General: No scleral icterus.     Conjunctiva/sclera: Conjunctivae normal.   Cardiovascular:      Rate and Rhythm: Normal rate " and regular rhythm.      Heart sounds: No murmur heard.    No friction rub. No gallop.   Pulmonary:      Effort: Pulmonary effort is normal.      Breath sounds: Normal breath sounds. No wheezing or rales.   Abdominal:      General: There is no distension.      Palpations: Abdomen is soft.      Tenderness: There is no abdominal tenderness. There is no guarding or rebound.   Musculoskeletal:         General: No tenderness. Normal range of motion.      Cervical back: Normal range of motion.      Right lower leg: No edema.      Left lower leg: No edema.   Skin:     General: Skin is warm and dry.   Neurological:      Mental Status: She is alert and oriented to person, place, and time.      Cranial Nerves: No cranial nerve deficit.      Comments: Proximal muscle strenght 4/5 to hip flexion and knee extension decreased dorsi/plantar flexion no clonus with ankle jerk         Assessment and Plan   1. Paresthesia of both lower extremities  Keep appointment for EMG/NCS continue PT     2. Proximal limb muscle weakness  As above

## 2022-05-31 ENCOUNTER — TELEPHONE (OUTPATIENT)
Dept: NEUROLOGY | Facility: CLINIC | Age: 52
End: 2022-05-31
Payer: MEDICARE

## 2022-05-31 ENCOUNTER — PROCEDURE VISIT (OUTPATIENT)
Dept: NEUROLOGY | Facility: CLINIC | Age: 52
End: 2022-05-31
Payer: MEDICARE

## 2022-05-31 DIAGNOSIS — G60.3 IDIOPATHIC PROGRESSIVE NEUROPATHY: ICD-10-CM

## 2022-05-31 DIAGNOSIS — G82.50 QUADRIPARESIS: ICD-10-CM

## 2022-05-31 DIAGNOSIS — R20.2 PARESTHESIA OF BOTH LOWER EXTREMITIES: ICD-10-CM

## 2022-05-31 DIAGNOSIS — G62.9 SENSORY NEUROPATHY: Primary | ICD-10-CM

## 2022-05-31 PROCEDURE — 95886 PR EMG COMPLETE, W/ NERVE CONDUCTION STUDIES, 5+ MUSCLES: ICD-10-PCS | Mod: 26,S$PBB,, | Performed by: PSYCHIATRY & NEUROLOGY

## 2022-05-31 PROCEDURE — 95886 MUSC TEST DONE W/N TEST COMP: CPT | Mod: PBBFAC | Performed by: PSYCHIATRY & NEUROLOGY

## 2022-05-31 PROCEDURE — 95913 NRV CNDJ TEST 13/> STUDIES: CPT | Mod: 26,S$PBB,, | Performed by: PSYCHIATRY & NEUROLOGY

## 2022-05-31 PROCEDURE — 95913 PR NERVE CONDUCTION STUDY; 13 OR MORE STUDIES: ICD-10-PCS | Mod: 26,S$PBB,, | Performed by: PSYCHIATRY & NEUROLOGY

## 2022-05-31 PROCEDURE — 95913 NRV CNDJ TEST 13/> STUDIES: CPT | Mod: PBBFAC | Performed by: PSYCHIATRY & NEUROLOGY

## 2022-05-31 PROCEDURE — 95886 MUSC TEST DONE W/N TEST COMP: CPT | Mod: 26,S$PBB,, | Performed by: PSYCHIATRY & NEUROLOGY

## 2022-06-02 ENCOUNTER — CLINICAL SUPPORT (OUTPATIENT)
Dept: REHABILITATION | Facility: HOSPITAL | Age: 52
End: 2022-06-02
Attending: PSYCHIATRY & NEUROLOGY
Payer: MEDICARE

## 2022-06-02 DIAGNOSIS — R29.898 WEAKNESS OF LOWER EXTREMITY, UNSPECIFIED LATERALITY: ICD-10-CM

## 2022-06-02 DIAGNOSIS — R26.89 IMPAIRED GAIT AND MOBILITY: Primary | ICD-10-CM

## 2022-06-02 PROCEDURE — 97110 THERAPEUTIC EXERCISES: CPT | Mod: PN

## 2022-06-02 NOTE — PROGRESS NOTES
[facility]   Physical Therapy Treatment Note     Name: Thalia Rg  Clinic Number: [id number]    Therapy Diagnosis:   Encounter Diagnoses   Name Primary?   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 - changed reps to 5 from 10 per education last session   Response to previous treatment: tired and sore for 2 days   Functional change: ongoing     Pain: 0/10  Location: N/A     OBJECTIVE     Objective Measures updated at progress report unless specified.     INITIAL VITALS:    HR: 79   SpO2: 97%   BP: 116/76    Fatigue: 5/10 --> 10/10     Treatment     Thalia received the treatments listed below:      therapeutic exercises to develop strength, endurance, posture and core stabilization for 40 minutes including:    home exercise program:   1. Sit to stand 10x1  2. Bridge 10x2   3. Seated marching 10x2   4. Seated LAQ 10x2   5. Ankle pumps 10x2     Sittin minute rest break between ea trial  Nustep L1 1 min  Nustep L1 1 min  Nustep L1 1 min     Stand pivot transfer wheelchair <> nustep and wheelchair <> mat - with Rolling Walker (RW)     SUPINE:   Bridge 5x3   Hip ADD - in hooklying - green ball 5x3   Hip ABD - in hooklying - RTB 5x3  SAQ - large bolster - 5x3 bilateral   Heel slide  - slide board and pillow case 5x2 bilateral    - right lower extremity increased challenge - Harriet for guide   Hip ABD (supine) - slide board and pillow case 5x2 bilateral    - right lower extremity increased challenge - Harriet for guide     Patient Education and Home Exercises     Home Exercises Provided and Patient Education Provided     Education provided:   - role of PT, POC, scheduling, home exercise program   - change home exercise program to 5 reps, 2 sets   - finding seated daily tasks that can be performed to utilize upper extremity within daily tasks - sorting silverware, folding hand towels     Written Home Exercises Provided: yes.  Exercises were reviewed and Thalia was able to demonstrate them prior to the end of the session.  Thalia demonstrated good  understanding of the education provided.      See EMR under Patient Instructions for exercises provided 5/3/2022.    ASSESSMENT   Thalia continues to be limited by significant fatigue throughout session and requires multiple seated and supine restbreaks. Greatest increase in fatigue with Nu-Step at this time, however, able to complete 3 trials of 1 min with 2 min recovery in between ea trial. Appropriate vital response noted with session today. Pt remains appropriate for therapy at this time, monitor response and adjust sessions as appropriate.       Thalia Is progressing well towards her goals.   Pt prognosis is Good.     Pt will continue to benefit from skilled outpatient physical therapy to address the deficits listed in the problem list box on initial evaluation, provide pt/family education and to maximize pt's level of independence in the home and community environment.     Pt's spiritual, cultural and educational needs considered and pt agreeable to plan of care and goals.     Anticipated barriers to physical therapy: differential diagnosis in the process     Goals:   Short Term Goals: 6 weeks   1. Patient will transfer sit to/from standing with RW,  modified independent with good body mechanics to promote safe ambulation household distances.  2. Pt will ambulate 150 ft with RW, modified independent with good balance to reduce risk of falls.  3. Patient will improve functional strength by completing 5xSTS outcome measure score.  4. Patient will improve TUG score by 9 seconds to promote improved functional mobility.     Long Term Goals: 12 weeks   1. Pt will be independent with HEP to address any remaining deficits in balance, strength, or gait.  2. Pt will improve LE strength to >/= 4/5 in order to improve functional mobility at home and in the community.  3. Patient will improve TUG score by 18 seconds to promote improved functional mobility.  4. Patient will improve gait speed by 0.4 m/s to promote improved community ambulation.  5.  Assess static and dynamic balance and create appropriate goals to track progression and improvements.     PLAN   Plan of care Certification: 5/3/2022 to 7/26/22.      Outpatient Physical Therapy 2 times weekly for 12 weeks to include the following interventions: Gait Training, Manual Therapy, Moist Heat/ Ice, Neuromuscular Re-ed, Patient Education, Therapeutic Activities and Therapeutic Exercise.      Monitor vitals, low reps, low sets, low weights.     Stephanie Hicks, PT, DPT  06/08/2022

## 2022-06-02 NOTE — TELEPHONE ENCOUNTER
Spoke with  (Adriel).  He will have pt go to lab.      Skylar - kylee schedule a consultation with Dr. River (he agreed to see her).  Dx: sensory polyneuropathy.    RZ

## 2022-06-06 ENCOUNTER — CLINICAL SUPPORT (OUTPATIENT)
Dept: REHABILITATION | Facility: HOSPITAL | Age: 52
End: 2022-06-06
Attending: PSYCHIATRY & NEUROLOGY
Payer: MEDICARE

## 2022-06-06 ENCOUNTER — LAB VISIT (OUTPATIENT)
Dept: LAB | Facility: HOSPITAL | Age: 52
End: 2022-06-06
Attending: PSYCHIATRY & NEUROLOGY
Payer: MEDICARE

## 2022-06-06 DIAGNOSIS — G62.9 SENSORY NEUROPATHY: ICD-10-CM

## 2022-06-06 DIAGNOSIS — Z78.9 DEFICIT IN ACTIVITIES OF DAILY LIVING (ADL): ICD-10-CM

## 2022-06-06 DIAGNOSIS — G60.3 IDIOPATHIC PROGRESSIVE NEUROPATHY: ICD-10-CM

## 2022-06-06 DIAGNOSIS — R29.898 WEAKNESS OF LOWER EXTREMITY, UNSPECIFIED LATERALITY: ICD-10-CM

## 2022-06-06 DIAGNOSIS — R29.898 BILATERAL ARM WEAKNESS: Primary | ICD-10-CM

## 2022-06-06 DIAGNOSIS — R26.89 IMPAIRED GAIT AND MOBILITY: Primary | ICD-10-CM

## 2022-06-06 DIAGNOSIS — Z78.9 IMPAIRED INSTRUMENTAL ACTIVITIES OF DAILY LIVING (IADL): ICD-10-CM

## 2022-06-06 LAB — ERYTHROCYTE [SEDIMENTATION RATE] IN BLOOD BY WESTERGREN METHOD: 53 MM/HR (ref 0–20)

## 2022-06-06 PROCEDURE — 86334 PATHOLOGIST INTERPRETATION IFE: ICD-10-PCS | Mod: 26,,, | Performed by: PATHOLOGY

## 2022-06-06 PROCEDURE — 83921 ORGANIC ACID SINGLE QUANT: CPT | Performed by: PSYCHIATRY & NEUROLOGY

## 2022-06-06 PROCEDURE — 85652 RBC SED RATE AUTOMATED: CPT | Performed by: PSYCHIATRY & NEUROLOGY

## 2022-06-06 PROCEDURE — 97530 THERAPEUTIC ACTIVITIES: CPT | Mod: PN

## 2022-06-06 PROCEDURE — 86334 IMMUNOFIX E-PHORESIS SERUM: CPT | Performed by: PSYCHIATRY & NEUROLOGY

## 2022-06-06 PROCEDURE — 86334 IMMUNOFIX E-PHORESIS SERUM: CPT | Mod: 26,,, | Performed by: PATHOLOGY

## 2022-06-06 PROCEDURE — 97110 THERAPEUTIC EXERCISES: CPT | Mod: PN

## 2022-06-06 PROCEDURE — 86235 NUCLEAR ANTIGEN ANTIBODY: CPT | Performed by: PSYCHIATRY & NEUROLOGY

## 2022-06-06 PROCEDURE — 36415 COLL VENOUS BLD VENIPUNCTURE: CPT | Mod: PN | Performed by: PSYCHIATRY & NEUROLOGY

## 2022-06-06 PROCEDURE — 86235 NUCLEAR ANTIGEN ANTIBODY: CPT | Mod: 59 | Performed by: PSYCHIATRY & NEUROLOGY

## 2022-06-06 PROCEDURE — 82607 VITAMIN B-12: CPT | Performed by: PSYCHIATRY & NEUROLOGY

## 2022-06-06 NOTE — PROGRESS NOTES
[facility]   Physical Therapy Treatment Note     Name: Thalia Rg  Clinic Number: [id number]    Therapy Diagnosis:   Encounter Diagnoses   Name Primary?    Impaired gait and mobility Yes    Weakness of lower extremity, unspecified laterality      Physician: Reji Downey MD    Visit Date: 2022    Physician Orders: PT Eval and Treat   Medical Diagnosis from Referral:   R20.2 (ICD-10-CM) - Paresthesia of skin   G82.50 (ICD-10-CM) - Quadriplegia, unspecified      Evaluation Date: 5/3/2022  Authorization Period Expiration: 5/3/23  Plan of Care Expiration: 22  Visit # / Visits authorized: ,     PTA Visit #: 0/5     Time In: 12:30  Time Out: 1:15  Total Billable Time: 45 minutes    Precautions: Standard and Fall; quadriparesis with sensory disturbance (ddx in works)     SUBJECTIVE     Pt reports: still very tired and had EMG done by Dr. River noting sensory changes (per pt spouse)      She was compliant with home exercise program. - changed reps to 5 from 10 per education last session   Response to previous treatment: tired and sore for 2 days   Functional change: ongoing     Pain: 0/10  Location: N/A     OBJECTIVE     Objective Measures updated at progress report unless specified.     INITIAL VITALS:    HR: 65   SpO2: 98%   BP: 104/84    Fatigue: 8/10 --> 10/10     Treatment     Thalia received the treatments listed below:      therapeutic exercises to develop strength, endurance, posture and core stabilization for 45 minutes including:    home exercise program:   1. Sit to stand 10x1  2. Bridge 10x2   3. Seated marching 10x2   4. Seated LAQ 10x2   5. Ankle pumps 10x2     Sittin minute rest break between ea trial  Nustep L1 1 min  Nustep L1 1 min  Nustep L1 1 min     Stand pivot transfer wheelchair <> nustep and wheelchair <> mat - with Rolling Walker (RW)     SUPINE:   Bridge 5x3   Hip ADD - in hooklying - green ball 5x3   Hip ABD - in hooklying - RTB  5x3  SAQ - large bolster - 5x3 bilateral   Heel slide - slide board and pillow case 5x3 bilateral    - right lower extremity increased challenge - Harriet for guide   Hip ABD (supine) - slide board and pillow case 5x3 bilateral    - right lower extremity increased challenge - Harriet for guide     Sit to stand x2 with Harriet at BLE for foot placement - increased education for substitution methods for all mobility with vision.     Patient Education and Home Exercises     Home Exercises Provided and Patient Education Provided     Education provided:   - role of PT, POC, scheduling, home exercise program   - change home exercise program to 5 reps, 2 sets   - finding seated daily tasks that can be performed to utilize upper extremity within daily tasks - sorting silverware, folding hand towels     Written Home Exercises Provided: yes.  Exercises were reviewed and Thalia was able to demonstrate them prior to the end of the session.  Thalia demonstrated good  understanding of the education provided.      See EMR under Patient Instructions for exercises provided 5/3/2022.    ASSESSMENT   Thalia continues to be limited secondary to fatigue with required extended rest breaks. Vitals were taken and were WNL. Pt demonstrated improved safety and stability with sit to supine on the mat. No tactile cues were required for proper form with supine hip abduction on this date. Pt was educated on the use of visual aids for safety with transfers and all mobility at a substitution method (vision > sensory). Continue skilled PT services.     Thalia Is progressing well towards her goals.   Pt prognosis is Good.     Pt will continue to benefit from skilled outpatient physical therapy to address the deficits listed in the problem list box on initial evaluation, provide pt/family education and to maximize pt's level of independence in the home and community environment.     Pt's spiritual, cultural and educational needs considered and pt agreeable to  plan of care and goals.     Anticipated barriers to physical therapy: differential diagnosis in the process     Goals:   Short Term Goals: 6 weeks   1. Patient will transfer sit to/from standing with RW, modified independent with good body mechanics to promote safe ambulation household distances.  2. Pt will ambulate 150 ft with RW, modified independent with good balance to reduce risk of falls.  3. Patient will improve functional strength by completing 5xSTS outcome measure score.  4. Patient will improve TUG score by 9 seconds to promote improved functional mobility.     Long Term Goals: 12 weeks   1. Pt will be independent with HEP to address any remaining deficits in balance, strength, or gait.  2. Pt will improve LE strength to >/= 4/5 in order to improve functional mobility at home and in the community.  3. Patient will improve TUG score by 18 seconds to promote improved functional mobility.  4. Patient will improve gait speed by 0.4 m/s to promote improved community ambulation.  5.  Assess static and dynamic balance and create appropriate goals to track progression and improvements.     PLAN   Plan of care Certification: 5/3/2022 to 7/26/22.      Outpatient Physical Therapy 2 times weekly for 12 weeks to include the following interventions: Gait Training, Manual Therapy, Moist Heat/ Ice, Neuromuscular Re-ed, Patient Education, Therapeutic Activities and Therapeutic Exercise.      Monitor vitals, low reps, low sets, low weights. Vision > sensory (substitution).     Myrna Thompson, PT, DPT  06/07/2022

## 2022-06-06 NOTE — PROGRESS NOTES
[facility]  Occupational Therapy Treatment Note    Date: 6/6/2022  Name: Thalia Rg  Clinic Number: [id number]    Therapy Diagnosis:   Encounter Diagnoses   Name Primary?    Bilateral arm weakness Yes    Deficit in activities of daily living (ADL)     Impaired instrumental activities of daily living (IADL)      Physician: Reji Downey MD    Physician Orders: OT eval and treat  Medical Diagnosis:   R20.2 (ICD-10-CM) - Paresthesia of both lower extremities   G82.50 (ICD-10-CM) - Quadriparesis      Evaluation Date: 5/3/2022  Plan of Care Expiration: 7/15/2022  Progress Note Due: 10th visit  Insurance Authorization period Expiration: 12/31/2022  Date of Return to MD: 5/26/22  Visit # / Visits Authorized: 2 / 20 (inlcuding eval)  FOTO: 66% evaluation     Precautions:  Standard and Fall    Time In: 13:17  Time Out: 13:59  Total Billable Time: 44 minutes    SUBJECTIVE     Pt reports: My hands are always cold now; I think my fingernails grow faster.   She was not given home exercise program given last session.   Response to previous treatment: no complaints  Functional change: n/a, eval only.    Pain:  Pain Related Behaviors Observed: no   Functional Pain Scale Rating 0-10:   Reporting no pain ever    Involved Side: feels left side is weaker  Dominant Side: Right  Date of Onset: ~6 months ago  Patient's Goals for Therapy: improve UE functional performance     EMG 5/31/2022  Summary:  Nerve conduction study of the right upper and bilateral lower extremities revealed absent sensory responses in the median, ulnar, sural, and superficial peroneal nerves. The right radial nerve had normal antidromic sensory peak latency, diminished action potential, and normal conduction velocity.   Motor peak latencies, amplitudes, and conduction velocities were normal when adjusted for temperature. F-waves were absent in the left peroneal nerve and prolonged in the right median, right ulnar, right  peroneal, and bilateral tibial nerves.   Concentric needle examination of selected muscles in the right upper and lower extremities revealed no evidence of acute or chronic denervation.   Impression   This is an abnormal study. There is electrophysiologic evidence of:   1. 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 Pure sensory neuropathies can occur in paraneoplastic syndromes, paraproteinemias, some congenital polyneuropathies, as a side effect of certain medications, some autoimmune diseases, and some metabolic and vitamin deficiency syndromes. There are a few case reports that suggest that immune-mediated polyneuropathies like AIDP can rarely present with pure sensory pathology, however electrodiagnostic evaluation in those cases revealed characteristic demyelinating polyneuropathy rather than the axonal polyneuropathy seen in this study.     OBJECTIVE     Objective Measures updated at progress report unless specified.    Cognitive Exam:  Oriented: Person, Place, Time and Situation  Behaviors: normal, cooperative  Follows Commands/attention: Follows multistep  commands  Communication: clear/fluent  Memory: No Deficits noted as determined by 3 word recall after 1 minute and 3 minutes  Safety awareness/insight to disability: aware of diagnosis, treatment, and prognosis  Coping skills/emotional control: Appropriate to situation     Physical Exam:  Postural examination/scapula alignment: Rounded shoulder and Slouched posture  Joint integrity: Firm end feeling  Skin integrity: warm/dry  Edema: none observed    Palpation: not TTP     Joint Evaluation  AROM  5/3/2022 AROM  5/3/2022     Left Right   Shoulder flex 0-180 130 130   Shoulder Abd 0-180 135 135   Shoulder ER 0-90 WFL WFL   Shoulder IR 0-90 wFL WFL   Shoulder Extension 0-80 60    wnl   Shoulder Horizontal adduction 0-90 30 30   Elbow flex/ext 0-150 WFL WFL   Wrist flex 0-80 WFL WFL   Wrist ext 0-70 WFL  WFL   Supination 0-80 WFL WFL   Pronation 0-80 WFL WFL      Fist: normal     Strength 5/3/2022 5/3/2022   **within available ROM** Left Right   Shoulder flex 3+/5 3+/5   Shoulder abd 3+/5 4/5   Shoulder ER 3/5 3+/5   Shoulder IR 4/5 4/5   Shoulder Extension 4/5 4/5   Shoulder Horizontal adduction 4+/5 4+/5   Elbow flex 4+/5 4+/5   Elbow ext 4+/5 4+/5   Wrist flex 4/5 4/5   Wrist ext 4/5 4/5   Supination 4/5 4/5   Pronation 4/5 4/5       Strength: (BRANDY Dynamometer in lbs.) Average 3 trials, Position II:    5/3/2022 5/3/2022     Left Right   Rung II 21.1# 15.8#   [norms for women aged 50-54: R=65.8 +/-11.6; L=57.3 +/-10.7 (Leona et al, 1985)]    Pinch Strength (Measured in psi)    5/3/2022 5/3/2022     Left Right   Key Pinch 3.8 psi 6.6 psi   3pt Pinch 2.5 psi 3.9 psi   2pt Pinch 1.5 psi 3.9 psi      Fine Motor Coordination: 9 Hole Peg Test  Left 5/3/2022 Right 5/3/2022   1:27.54  Several drops 1:31.86  Several drops   [norms for women aged 51-55: R=17.38s +/-1.88s: L=18.92s +/-2.29s (Heather et al, 2003)]     Gross motor coordination:   · JH (Rapid Alternating Movements): WFL  · Finger to Nose (5 times): WNL both with eye sopen and eyes closed  · Finger Flicks (coordination moving from digit flexion to digit extension): WFL     Sensation:  Thalia  reports numbness and tingling B hands  Monofilament testing: WNL 2.83  Proprioception: bilateral intact when one UE put in postion and asked to recreate with opposite UE  Temperature: bilateral impaired (per pt. Report)  Weight recognition: (B)UE impaired     Balance:   Static Sit - GOOD: Takes MODERATE challenges from all directions  Dynamic sit- GOOD: Takes MODERATE challenges from all directions  Static Stand - NT  Dynamic stand - NT     Endurance Deficit: mild    Treatment     Thalia received the treatments listed below:     Therapeutic activities to improve functional performance for 44 minutes, including:  - sensory review using different weights, impaired  (B)UE; unable to distinguish differences.  - transfer to EOM from transport wheelchair with min(A) and SW.   - seated (B)UE scapular mobilization into elevation, depression, adduction, and protraction with opposite upper extremity in weight bearing.  - Thalia performed left upper extremity weight bearing on a static surface with body on arm weight shift with min A provided for closed chain body on arm and arm on body weight shifts, then repeated with (R).   - Gentle AROM to bilateral upper extremity; all planes with hold at end range  - Supine pendulums x 10 reps counter and counter clock wise each.  - scapular protraction/retraction x 10 reps  - supine PNF D1 and D2 flexion/extension patterns x 5 each direction with use of reach, grasp and release element.   In seated,   - reverse quadriped with attempt to weight shift side to side using hip ROM x 3 reps.  - using bed side table, reaching to shoulder height to  cones to place on mat, crossing midline, 2x5 reps each hand.  - modified scrub and carry: 30 seconds WB using (B)UE through physio ball, followed by hold of 1# lbs dumbbell 3x 30 seconds, x 5 reps   - transfer EOM to wheelchair with CGA, cues to use hands to push up from mat.  - leg rests adjusted to increase 90° angle in hips, knees and ankles, and decrease pressure on buttocks.    Patient Education and Home Exercises      Education provided:   - role of OT, goals for OT, scheduling/cancellations, insurance limitations with patient.  - Importance of increased UE use and ADL/IADL participation  - Placing UE in WB when reaching  - shoulder stretches  - Progress towards goals     Written Home Exercises Provided: no, no written HEP provided; educated regarding WB therex..  Exercises were reviewed and Thalia was able to demonstrate them prior to the end of the session.  Thalia demonstrated good  understanding of the HEP provided.     See EMR under Patient Instructions for exercises provided during therapy  sessions.       Assessment     Patient presented to OT s/p PT session. Focus on shoulder ROM, as well as WB activities to increase shoulder strength and to stimulate nerve fibers. Tolerated all activities presented to her.     Thalia is progressing well towards her goals and there are no updates to goals at this time. Pt prognosis is Fair due to sensory limitations.    Pt will continue to benefit from skilled outpatient occupational therapy to address the deficits listed in the problem list on initial evaluation provide pt/family education and to maximize pt's level of independence in the home and community environment.     Pt's spiritual, cultural and educational needs considered and pt agreeable to plan of care and goals.    Anticipated barriers to occupational therapy: none identified    Goals:  Short Term Goals: 5 weeks   - Pt. To be educated on HEPs and be able return demonstrate with visual cues  - Pt. Will increased bilateral shoulder FF AROM by at least 10 degrees to better wash hair  - Pt. Will have improved functional strength as shown by an improved MMT of at least 1 MMT all planes  - Pt. Will have improved bilateral  of at least 5# over baseline for increased functional grasp  - Pt. Will have improved fine motor control as demonstrated by improved 9HPT of at least 30 sec over base line  - Pt. Will be able to cut her own food a feed herself with Mod I  - Pt. Will be educated in adaptive equipment for bathing  - Pt. Will be able to complete simple home care (wipe counter tops/table, dry dishes, etc) with Mod A  - Pt. Will be able to complete laundry task of putting clean clothes away with Mod A      Long Term Goals: 10 weeks   - Pt. To be educated on HEPs and be able return demonstrate   - Pt. Will increased bilateral shoulder FF AROM by at least 15 degrees to better wash hair  - Pt. Will have improved functional strength as shown by an improved MMT of at least 1.5 MMT grades all planes  - Pt. Will  have improved bilateral  of at least 10# over baseline for increased functional grasp  - Pt. Will have improved fine motor control as demonstrated by improved 9HPT of at least 45 sec over base line  - Pt. Will be able to bathe herself with Mod I     More goals to be made as appropriate      Plan   Certification Period/Plan of care expiration: 5/3/2022 to 7/15/2022.     Outpatient Occupational Therapy 2 times weekly for 10 weeks to include the following interventions: Electrical Stimulation of R UE, Fluidotherapy, Manual Therapy, Moist Heat/ Ice, Neuromuscular Re-ed, Orthotic Management and Training, Paraffin, Patient Education, Self Care, Therapeutic Activities, Therapeutic Exercise and Ultrasound.    Updates/Grading for next session: HEP, WB (attempt in standing), fine motor coordination and  strength    GIACOMO Ruiz, COURTNEYR/L, CEAS-I  6/6/2022

## 2022-06-07 LAB — VIT B12 SERPL-MCNC: 631 PG/ML (ref 210–950)

## 2022-06-08 ENCOUNTER — DOCUMENTATION ONLY (OUTPATIENT)
Dept: REHABILITATION | Facility: HOSPITAL | Age: 52
End: 2022-06-08
Payer: MEDICARE

## 2022-06-08 LAB — INTERPRETATION SERPL IFE-IMP: NORMAL

## 2022-06-08 NOTE — PROGRESS NOTES
Missed Visit/Cancellation      Date: 6/8/2022         Canceled Number: 1  No Show Number: 0                                                                                                                Pt initially had visit scheduled for today for 1400.   Reason for cancellation: no reason provided.    Pt's next scheduled physical therapy visit is 6/13/2022.    Laquita Gunn, PT, DPT  6/8/2022

## 2022-06-09 LAB
ANTI-SSA ANTIBODY: 0.05 RATIO (ref 0–0.99)
ANTI-SSA INTERPRETATION: NEGATIVE
ANTI-SSB ANTIBODY: 0.05 RATIO (ref 0–0.99)
ANTI-SSB INTERPRETATION: NEGATIVE
PATHOLOGIST INTERPRETATION IFE: NORMAL

## 2022-06-10 LAB — METHYLMALONATE SERPL-SCNC: 0.15 UMOL/L

## 2022-06-13 ENCOUNTER — CLINICAL SUPPORT (OUTPATIENT)
Dept: REHABILITATION | Facility: HOSPITAL | Age: 52
End: 2022-06-13
Attending: PSYCHIATRY & NEUROLOGY
Payer: MEDICARE

## 2022-06-13 DIAGNOSIS — R29.898 BILATERAL ARM WEAKNESS: Primary | ICD-10-CM

## 2022-06-13 DIAGNOSIS — R26.89 IMPAIRED GAIT AND MOBILITY: Primary | ICD-10-CM

## 2022-06-13 DIAGNOSIS — R29.898 WEAKNESS OF LOWER EXTREMITY, UNSPECIFIED LATERALITY: ICD-10-CM

## 2022-06-13 DIAGNOSIS — Z78.9 DEFICIT IN ACTIVITIES OF DAILY LIVING (ADL): ICD-10-CM

## 2022-06-13 DIAGNOSIS — Z78.9 IMPAIRED INSTRUMENTAL ACTIVITIES OF DAILY LIVING (IADL): ICD-10-CM

## 2022-06-13 PROCEDURE — 97110 THERAPEUTIC EXERCISES: CPT | Mod: PN

## 2022-06-13 PROCEDURE — 97112 NEUROMUSCULAR REEDUCATION: CPT | Mod: PN

## 2022-06-13 PROCEDURE — 97530 THERAPEUTIC ACTIVITIES: CPT | Mod: PN

## 2022-06-13 NOTE — PROGRESS NOTES
[facility]  Occupational Therapy Treatment Note    Date: 6/13/2022  Name: Thalia Rg  Clinic Number: [id number]    Therapy Diagnosis:   Encounter Diagnoses   Name Primary?    Bilateral arm weakness Yes    Deficit in activities of daily living (ADL)     Impaired instrumental activities of daily living (IADL)      Physician: Reji Downey MD    Physician Orders: OT eval and treat  Medical Diagnosis:   R20.2 (ICD-10-CM) - Paresthesia of both lower extremities   G82.50 (ICD-10-CM) - Quadriparesis      Evaluation Date: 5/3/2022  Plan of Care Expiration: 7/15/2022  Progress Note Due: 10th visit  Insurance Authorization period Expiration: 12/31/2022  Date of Return to MD: 5/26/22  Visit # / Visits Authorized: 3 / 20 (including eval)  FOTO: 66% evaluation     Precautions:  Standard and Fall    Time In: 13:59  Time Out: 15:00  Total Billable Time: 61 minutes    SUBJECTIVE     Pt reports: I am doing pretty good.   She was not given home exercise program given last session.   Response to previous treatment: n/a  Functional change: none reported.    Pain:  Pain Related Behaviors Observed: no   Functional Pain Scale Rating 0-10:   Reporting no pain ever    Involved Side: feels left side is weaker  Dominant Side: Right  Date of Onset: ~6 months ago  Patient's Goals for Therapy: improve UE functional performance     OBJECTIVE     Objective Measures updated at progress report unless specified.      Treatment     Thalia received the treatments listed below:     Neuromuscular re-education activities to improve Balance, Coordination, Kinesthetic, Sense, Proprioception and Posture for 35 minutes. The following activities were included:  - transfer to EOM from transport wheelchair with min(A) and SW.   - seated (B)UE scapular mobilization into elevation, depression, adduction, and protraction with opposite upper extremity in weight bearing.  - Thalia performed left upper extremity weight bearing  on a static surface with body on arm weight shift with min A provided for closed chain body on arm and arm on body weight shifts, then repeated with (R).   - prolonged web massage (B) hands with stretching of CMC  - General wrist stretches including 1. Scaphoid on radius 2. Increasing mobility of metacarpals 3. Carpal rolls 4. Increasing mobility towards radial deviation 5. Increasing mobility of wrist towards extension 6. Increasing mobility of wrist towards supination/pronation  - log rolling to supine.  - Gentle AROM to bilateral upper extremity; all planes with hold at end range  - scapular protraction/retraction x 10 reps  - use of dowel stick (no weight) for ROM/stretch 3 reps FF, abduction and ER.  - supine PNF D1 and D2 flexion/extension patterns x 8 each direction with use of reach, grasp and release element.   - Supine pendulums x 10 reps counter and counter clock wise each.  - log rolling with min(A) to come to sit; cued to move shoulder forward to avoid gravity pulling back.  In seated,   - using dowel , IR and shoulder extension x 10 reps.    Thalia participated in dynamic functional therapeutic activities to improve functional performance for 25 minutes, including: rest breaks needed and increased time needed for position changes.  - in standing, reaching down towards knees to remove red and yellow clothes pins from pants.  - in seated, 'don' theraband as pants, then from standing, pull over hips, then back to knees, and seated to remove over feet.  - grasp and flip MRM discs moving across midline and with reach forward  -  multiple medium size items and large buttons with translation fingers <> palm to deposit one by one in slotted container.     Patient Education and Home Exercises      Education provided:   - role of OT, goals for OT, scheduling/cancellations, insurance limitations with patient.  - Importance of increased UE use and ADL/IADL participation  - Placing UE in WB when reaching  -  shoulder stretches  - Progress towards goals     Written Home Exercises Provided: no, no written HEP provided; educated regarding WB therex..  Exercises were reviewed and Thalia was able to demonstrate them prior to the end of the session.  Thalia demonstrated good  understanding of the HEP provided.     See EMR under Patient Instructions for exercises provided during therapy sessions.       Assessment     Patient presented to OT s/p PT session. Tolerated all activities presented to her, with fatigue reported for simulated ADL activity in standing.     Thalia is progressing well towards her goals and there are no updates to goals at this time. Pt prognosis is Fair due to sensory limitations.    Pt will continue to benefit from skilled outpatient occupational therapy to address the deficits listed in the problem list on initial evaluation provide pt/family education and to maximize pt's level of independence in the home and community environment.     Pt's spiritual, cultural and educational needs considered and pt agreeable to plan of care and goals.    Anticipated barriers to occupational therapy: none identified    Goals:  Short Term Goals: 5 weeks   - Pt. To be educated on HEPs and be able return demonstrate with visual cues  - Pt. Will increased bilateral shoulder FF AROM by at least 10 degrees to better wash hair  - Pt. Will have improved functional strength as shown by an improved MMT of at least 1 MMT all planes  - Pt. Will have improved bilateral  of at least 5# over baseline for increased functional grasp  - Pt. Will have improved fine motor control as demonstrated by improved 9HPT of at least 30 sec over base line  - Pt. Will be able to cut her own food a feed herself with Mod I  - Pt. Will be educated in adaptive equipment for bathing  - Pt. Will be able to complete simple home care (wipe counter tops/table, dry dishes, etc) with Mod A  - Pt. Will be able to complete laundry task of putting clean  clothes away with Mod A      Long Term Goals: 10 weeks   - Pt. To be educated on HEPs and be able return demonstrate   - Pt. Will increased bilateral shoulder FF AROM by at least 15 degrees to better wash hair  - Pt. Will have improved functional strength as shown by an improved MMT of at least 1.5 MMT grades all planes  - Pt. Will have improved bilateral  of at least 10# over baseline for increased functional grasp  - Pt. Will have improved fine motor control as demonstrated by improved 9HPT of at least 45 sec over base line  - Pt. Will be able to bathe herself with Mod I     More goals to be made as appropriate      Plan   Certification Period/Plan of care expiration: 5/3/2022 to 7/15/2022.     Outpatient Occupational Therapy 2 times weekly for 10 weeks to include the following interventions: Electrical Stimulation of R UE, Fluidotherapy, Manual Therapy, Moist Heat/ Ice, Neuromuscular Re-ed, Orthotic Management and Training, Paraffin, Patient Education, Self Care, Therapeutic Activities, Therapeutic Exercise and Ultrasound.    Updates/Grading for next session: review and extend HEP as tolerated, WB (attempt in standing), fine motor coordination and  strength, painting    GIACOMO Ruiz OTR/L, CEAS-I  6/13/2022

## 2022-06-13 NOTE — PATIENT INSTRUCTIONS
Adduction (Active)        Maintaining erect posture, draw shoulders back while bringing elbows back and inward.  Repeat 5 times. Do 2 sessions per day.      Elevation (Active)        Shrug shoulders up, breathing in. Relax, breathing out.  Repeat 5 times. Do 2 sessions per day.    Copyright © I. All rights reserved.        ROM: Flexion - Wand (Supine)        Lie on back holding wand. Raise arms over head.   Repeat 10 times per set. Do 1-3 sets per session. Do 3 sessions per day.       ROM: Extension - Wand (Standing)        Stand holding wand behind back. Raise arms as far as possible.  Repeat 10 times per set. Do 1-3 sets per session. Do 3 sessions per day.      ROM: Horizontal Abduction / Adduction - Wand        Keeping both palms down, push right hand across body with other hand. Then pull back across body, keeping arms parallel to floor. Do not allow trunk to twist.   Repeat 10 times per set. Do 1-3 sets per session. Do 3 sessions per day.     ROM: Extension / Internal Rotation - Wand        Stand holding cane behind back with both hands palm-up. Slide cane up spine toward head. Repeat 10 times per set. Perform 1-3 sets per session. Do 3 sessions per day.

## 2022-06-13 NOTE — PROGRESS NOTES
[facility]   Physical Therapy Treatment Note     Name: Thalia Rg  Clinic Number: [id number]    Therapy Diagnosis:   Encounter Diagnoses   Name Primary?    Impaired gait and mobility Yes    Weakness of lower extremity, unspecified laterality      Physician: Reji Downey MD    Visit Date: 2022    Physician Orders: PT Eval and Treat   Medical Diagnosis from Referral:   R20.2 (ICD-10-CM) - Paresthesia of skin   G82.50 (ICD-10-CM) - Quadriplegia, unspecified      Evaluation Date: 5/3/2022  Authorization Period Expiration: 5/3/23  Plan of Care Expiration: 22  Visit # / Visits authorized: ,     PTA Visit #: 0/5     Time In: 1:15  Time Out: 2:00  Total Billable Time: 45 minutes    Precautions: Standard and Fall; quadriparesis with sensory disturbance (ddx in works)     SUBJECTIVE     Pt reports: uncontrolled right foot > left foot movement     She was compliant with home exercise program. - changed reps to 5 from 10 per education last session   Response to previous treatment: tired and sore for 2 days   Functional change: ongoing     Pain: 0/10  Location: N/A     OBJECTIVE     Objective Measures updated at progress report unless specified.     Fatigue: /10 --> 8/10     Treatment     Thalia received the treatments listed below:      therapeutic exercises to develop strength, endurance, posture and core stabilization for 45 minutes including:    home exercise program:   1. Sit to stand 10x1  2. Bridge 10x2   3. Seated marching 10x2   4. Seated LAQ 10x2   5. Ankle pumps 10x2     Sittin minute rest break between ea trial  Nustep L1 1 min  Nustep L1 1 min  Nustep L1 1 min     Stand pivot transfer wheelchair <> nustep and wheelchair <> mat - with Rolling Walker (RW)     Sit to stand x5, x5   - blue squares for feet positioning/targets   Seated marches    - 10x2    - external target   Seated clam shells with red theraband    - 10x2   - visual demonstration   Seated  LAQ    - 10 x2    - external target   Seated hip ADD with green ball    - 10x2     FORWARD ambulation with Rolling Walker (RW) 10 ft - MAT to //bars    Lateral stepping in //bars 1 length to the right     Patient Education and Home Exercises     Home Exercises Provided and Patient Education Provided     Education provided:   - role of PT, POC, scheduling, home exercise program   - change home exercise program to 5 reps, 2 sets   - finding seated daily tasks that can be performed to utilize upper extremity within daily tasks - sorting silverware, folding hand towels     Written Home Exercises Provided: yes.  Exercises were reviewed and Thalia was able to demonstrate them prior to the end of the session.  Thalia demonstrated good  understanding of the education provided.      See EMR under Patient Instructions for exercises provided 5/3/2022.    ASSESSMENT   Thalia demonstrating improved engagement in session with progression to seated exercise and short bout of upright mobility. Pt requires min-mod verbal and tactile cues with visual targets to assist in substitution of sensory impairment. Continue skilled PT services.     Thalia Is progressing well towards her goals.   Pt prognosis is Good.     Pt will continue to benefit from skilled outpatient physical therapy to address the deficits listed in the problem list box on initial evaluation, provide pt/family education and to maximize pt's level of independence in the home and community environment.     Pt's spiritual, cultural and educational needs considered and pt agreeable to plan of care and goals.     Anticipated barriers to physical therapy: differential diagnosis in the process     Goals:   Short Term Goals: 6 weeks   1. Patient will transfer sit to/from standing with RW, modified independent with good body mechanics to promote safe ambulation household distances.  2. Pt will ambulate 150 ft with RW, modified independent with good balance to reduce risk of  falls.  3. Patient will improve functional strength by completing 5xSTS outcome measure score.  4. Patient will improve TUG score by 9 seconds to promote improved functional mobility.     Long Term Goals: 12 weeks   1. Pt will be independent with HEP to address any remaining deficits in balance, strength, or gait.  2. Pt will improve LE strength to >/= 4/5 in order to improve functional mobility at home and in the community.  3. Patient will improve TUG score by 18 seconds to promote improved functional mobility.  4. Patient will improve gait speed by 0.4 m/s to promote improved community ambulation.  5.  Assess static and dynamic balance and create appropriate goals to track progression and improvements.     PLAN   Plan of care Certification: 5/3/2022 to 7/26/22.      Outpatient Physical Therapy 2 times weekly for 12 weeks to include the following interventions: Gait Training, Manual Therapy, Moist Heat/ Ice, Neuromuscular Re-ed, Patient Education, Therapeutic Activities and Therapeutic Exercise.      Monitor vitals, low reps, low sets, low weights. Vision > sensory (substitution).     Myrna Thompson, PT, DPT  06/13/2022

## 2022-06-14 LAB
AMPHIPHYSIN AB TITR SER: NEGATIVE TITER
CV2 IGG TITR SER: NEGATIVE TITER
DEPRECATED GD1B DISIALYL IGG SER QL: NEGATIVE
DEPRECATED GD1B DISIALYL IGM SER QL: NEGATIVE
GLIAL NUC TYPE 1 AB TITR SER: NEGATIVE TITER
GM1 ASIALO IGG SER QL: NEGATIVE
GM1 ASIALO IGM SER QL: NEGATIVE
GM1 GANGL IGG SER QL: NEGATIVE
GM1 GANGL IGM SER QL: NEGATIVE
HU1 AB TITR SER: NEGATIVE TITER
HU2 AB TITR SER IF: NEGATIVE TITER
HU3 AB TITR SER: NEGATIVE TITER
IMMUNOLOGIST REVIEW: ABNORMAL
PAVAL REFLEX TEST ADDED: ABNORMAL
PCA-1 AB TITR SER: NEGATIVE TITER
PCA-TR AB TITR SER: NEGATIVE TITER
PURKINJE CELL CYTOPLASMIC AB TYPE2: NEGATIVE TITER
VGCC-P/Q BIND AB SER-SCNC: 0.09 NMOL/L
VGKC AB SER-SCNC: 0 NMOL/L

## 2022-06-15 ENCOUNTER — PATIENT MESSAGE (OUTPATIENT)
Dept: NEUROLOGY | Facility: CLINIC | Age: 52
End: 2022-06-15
Payer: MEDICARE

## 2022-06-15 ENCOUNTER — DOCUMENTATION ONLY (OUTPATIENT)
Dept: REHABILITATION | Facility: HOSPITAL | Age: 52
End: 2022-06-15
Payer: MEDICARE

## 2022-06-15 NOTE — PROGRESS NOTES
Missed Visit/Cancellation      Date: 6/15/2022          Canceled Number: 2  No Show Number: 0                                                                                                                Pt initially had visit scheduled for today for 1615   Reason for cancellation: no reason provided.    Pt's next scheduled physical therapy visit is 6/20/2022.     Laquita Gunn, PT, DPT  6/15/2022

## 2022-06-16 DIAGNOSIS — G62.9 SENSORY NEUROPATHY: ICD-10-CM

## 2022-06-16 DIAGNOSIS — G60.3 IDIOPATHIC PROGRESSIVE NEUROPATHY: Primary | ICD-10-CM

## 2022-06-17 ENCOUNTER — TELEPHONE (OUTPATIENT)
Dept: NEUROLOGY | Facility: CLINIC | Age: 52
End: 2022-06-17
Payer: MEDICARE

## 2022-06-17 NOTE — TELEPHONE ENCOUNTER
Spoke with Jacqui and was able to get Mrs. Rg's CT scheduled 06/21/2022 @ 3:45 pm ([facility]) Appt confirmed with pt's ....

## 2022-06-17 NOTE — TELEPHONE ENCOUNTER
Spoke with pt's -    Offered appt Monday 06/20/2022 @ 9 am [facility].     Dr. Downey,    I had to place the appt slot on hold, because I was unable to schedule due to the order (stating w/w-o contrast), I placed a call to [facility] and was advised that the appointment is a no go due to contrast outage (Spoke with Karla ext #[phone]. In order to schedule appt order must be changed to w/o contrast.       Please advise

## 2022-06-18 ENCOUNTER — PATIENT MESSAGE (OUTPATIENT)
Dept: NEUROLOGY | Facility: CLINIC | Age: 52
End: 2022-06-18
Payer: MEDICARE

## 2022-06-21 ENCOUNTER — HOSPITAL ENCOUNTER (OUTPATIENT)
Facility: HOSPITAL | Age: 52
LOS: 1 days | Discharge: HOME | End: 2022-06-22
Attending: EMERGENCY MEDICINE | Admitting: STUDENT IN AN ORGANIZED HEALTH CARE EDUCATION/TRAINING PROGRAM
Payer: MEDICARE

## 2022-06-21 ENCOUNTER — HOSPITAL ENCOUNTER (OUTPATIENT)
Dept: RADIOLOGY | Facility: HOSPITAL | Age: 52
Discharge: HOME | End: 2022-06-21
Attending: PSYCHIATRY & NEUROLOGY
Payer: MEDICARE

## 2022-06-21 ENCOUNTER — TELEPHONE (OUTPATIENT)
Dept: NEUROLOGY | Facility: CLINIC | Age: 52
End: 2022-06-21
Payer: MEDICARE

## 2022-06-21 DIAGNOSIS — G60.3 IDIOPATHIC PROGRESSIVE NEUROPATHY: ICD-10-CM

## 2022-06-21 DIAGNOSIS — I26.99 PULMONARY EMBOLI: ICD-10-CM

## 2022-06-21 DIAGNOSIS — G62.9 SENSORY NEUROPATHY: ICD-10-CM

## 2022-06-21 DIAGNOSIS — I26.99 PE (PULMONARY THROMBOEMBOLISM): ICD-10-CM

## 2022-06-21 PROBLEM — I82.409 ACUTE DVT (DEEP VENOUS THROMBOSIS): Status: ACTIVE | Noted: 2022-06-21

## 2022-06-21 PROBLEM — M87.059 AVASCULAR NECROSIS OF FEMORAL HEAD: Status: ACTIVE | Noted: 2022-06-21

## 2022-06-21 PROBLEM — D17.71 ANGIOMYOLIPOMA OF RIGHT KIDNEY: Status: ACTIVE | Noted: 2022-06-21

## 2022-06-21 LAB
ALBUMIN SERPL BCP-MCNC: 3.3 G/DL (ref 3.5–5.2)
ALP SERPL-CCNC: 84 U/L (ref 55–135)
ALT SERPL W/O P-5'-P-CCNC: 11 U/L (ref 10–44)
ANION GAP SERPL CALC-SCNC: 11 MMOL/L (ref 8–16)
APTT BLDCRRT: 129.4 SEC (ref 21–32)
APTT BLDCRRT: 22.2 SEC (ref 21–32)
AST SERPL-CCNC: 23 U/L (ref 10–40)
BASOPHILS # BLD AUTO: 0.04 K/UL (ref 0–0.2)
BASOPHILS NFR BLD: 0.3 % (ref 0–1.9)
BILIRUB SERPL-MCNC: 0.6 MG/DL (ref 0.1–1)
BNP SERPL-MCNC: <10 PG/ML (ref 0–99)
BUN SERPL-MCNC: 5 MG/DL (ref 6–20)
CALCIUM SERPL-MCNC: 9.4 MG/DL (ref 8.7–10.5)
CHLORIDE SERPL-SCNC: 104 MMOL/L (ref 95–110)
CO2 SERPL-SCNC: 27 MMOL/L (ref 23–29)
CREAT SERPL-MCNC: 0.7 MG/DL (ref 0.5–1.4)
DIFFERENTIAL METHOD: ABNORMAL
EOSINOPHIL # BLD AUTO: 0.3 K/UL (ref 0–0.5)
EOSINOPHIL NFR BLD: 2.3 % (ref 0–8)
ERYTHROCYTE [DISTWIDTH] IN BLOOD BY AUTOMATED COUNT: 13.2 % (ref 11.5–14.5)
EST. GFR  (AFRICAN AMERICAN): >60 ML/MIN/1.73 M^2
EST. GFR  (NON AFRICAN AMERICAN): >60 ML/MIN/1.73 M^2
GLUCOSE SERPL-MCNC: 77 MG/DL (ref 70–110)
HCT VFR BLD AUTO: 37.5 % (ref 37–48.5)
HGB BLD-MCNC: 11.9 G/DL (ref 12–16)
IMM GRANULOCYTES # BLD AUTO: 0.03 K/UL (ref 0–0.04)
IMM GRANULOCYTES NFR BLD AUTO: 0.3 % (ref 0–0.5)
INR PPP: 1 (ref 0.8–1.2)
LYMPHOCYTES # BLD AUTO: 3.7 K/UL (ref 1–4.8)
LYMPHOCYTES NFR BLD: 32.1 % (ref 18–48)
MCH RBC QN AUTO: 27.2 PG (ref 27–31)
MCHC RBC AUTO-ENTMCNC: 31.7 G/DL (ref 32–36)
MCV RBC AUTO: 86 FL (ref 82–98)
MONOCYTES # BLD AUTO: 0.7 K/UL (ref 0.3–1)
MONOCYTES NFR BLD: 6.1 % (ref 4–15)
NEUTROPHILS # BLD AUTO: 6.8 K/UL (ref 1.8–7.7)
NEUTROPHILS NFR BLD: 58.9 % (ref 38–73)
NRBC BLD-RTO: 0 /100 WBC
PLATELET # BLD AUTO: 262 K/UL (ref 150–450)
PMV BLD AUTO: 11.6 FL (ref 9.2–12.9)
POTASSIUM SERPL-SCNC: 3.6 MMOL/L (ref 3.5–5.1)
PROT SERPL-MCNC: 7.7 G/DL (ref 6–8.4)
PROTHROMBIN TIME: 10.5 SEC (ref 9–12.5)
RBC # BLD AUTO: 4.37 M/UL (ref 4–5.4)
SODIUM SERPL-SCNC: 142 MMOL/L (ref 136–145)
TROPONIN I SERPL DL<=0.01 NG/ML-MCNC: 0.01 NG/ML (ref 0–0.03)
WBC # BLD AUTO: 11.59 K/UL (ref 3.9–12.7)

## 2022-06-21 PROCEDURE — 85730 THROMBOPLASTIN TIME PARTIAL: CPT | Mod: 91 | Performed by: EMERGENCY MEDICINE

## 2022-06-21 PROCEDURE — 93005 ELECTROCARDIOGRAM TRACING: CPT

## 2022-06-21 PROCEDURE — 93010 EKG 12-LEAD: ICD-10-PCS | Mod: ,,, | Performed by: INTERNAL MEDICINE

## 2022-06-21 PROCEDURE — 36415 COLL VENOUS BLD VENIPUNCTURE: CPT | Performed by: STUDENT IN AN ORGANIZED HEALTH CARE EDUCATION/TRAINING PROGRAM

## 2022-06-21 PROCEDURE — 74178 CT ABD&PLV WO CNTR FLWD CNTR: CPT | Mod: TC

## 2022-06-21 PROCEDURE — 74178 CT ABD&PLV WO CNTR FLWD CNTR: CPT | Mod: 26,,, | Performed by: RADIOLOGY

## 2022-06-21 PROCEDURE — 80053 COMPREHEN METABOLIC PANEL: CPT | Performed by: EMERGENCY MEDICINE

## 2022-06-21 PROCEDURE — 85730 THROMBOPLASTIN TIME PARTIAL: CPT | Performed by: STUDENT IN AN ORGANIZED HEALTH CARE EDUCATION/TRAINING PROGRAM

## 2022-06-21 PROCEDURE — 84484 ASSAY OF TROPONIN QUANT: CPT | Performed by: EMERGENCY MEDICINE

## 2022-06-21 PROCEDURE — 85610 PROTHROMBIN TIME: CPT | Performed by: EMERGENCY MEDICINE

## 2022-06-21 PROCEDURE — 63600175 PHARM REV CODE 636 W HCPCS: Performed by: EMERGENCY MEDICINE

## 2022-06-21 PROCEDURE — 25000003 PHARM REV CODE 250: Performed by: EMERGENCY MEDICINE

## 2022-06-21 PROCEDURE — 11000001 HC ACUTE MED/SURG PRIVATE ROOM

## 2022-06-21 PROCEDURE — 85025 COMPLETE CBC W/AUTO DIFF WBC: CPT | Performed by: EMERGENCY MEDICINE

## 2022-06-21 PROCEDURE — 83880 ASSAY OF NATRIURETIC PEPTIDE: CPT | Performed by: EMERGENCY MEDICINE

## 2022-06-21 PROCEDURE — 71260 CT THORAX DX C+: CPT | Mod: 26,,, | Performed by: RADIOLOGY

## 2022-06-21 PROCEDURE — 74178 CT CHEST ABDOMEN PELVIS W W/O CONTRAST (XPD): ICD-10-PCS | Mod: 26,,, | Performed by: RADIOLOGY

## 2022-06-21 PROCEDURE — 93010 ELECTROCARDIOGRAM REPORT: CPT | Mod: ,,, | Performed by: INTERNAL MEDICINE

## 2022-06-21 PROCEDURE — 99291 CRITICAL CARE FIRST HOUR: CPT | Mod: 25

## 2022-06-21 PROCEDURE — 94761 N-INVAS EAR/PLS OXIMETRY MLT: CPT

## 2022-06-21 PROCEDURE — 71260 CT CHEST ABDOMEN PELVIS W W/O CONTRAST (XPD): ICD-10-PCS | Mod: 26,,, | Performed by: RADIOLOGY

## 2022-06-21 RX ORDER — ACETAMINOPHEN 325 MG/1
650 TABLET ORAL EVERY 6 HOURS PRN
Status: DISCONTINUED | OUTPATIENT
Start: 2022-06-21 | End: 2022-06-22 | Stop reason: HOSPADM

## 2022-06-21 RX ORDER — FOLIC ACID 1 MG/1
1 TABLET ORAL DAILY
Status: DISCONTINUED | OUTPATIENT
Start: 2022-06-22 | End: 2022-06-22 | Stop reason: HOSPADM

## 2022-06-21 RX ORDER — TALC
6 POWDER (GRAM) TOPICAL NIGHTLY PRN
Status: DISCONTINUED | OUTPATIENT
Start: 2022-06-21 | End: 2022-06-22 | Stop reason: HOSPADM

## 2022-06-21 RX ORDER — HEPARIN SODIUM,PORCINE/D5W 25000/250
0-40 INTRAVENOUS SOLUTION INTRAVENOUS CONTINUOUS
Status: DISCONTINUED | OUTPATIENT
Start: 2022-06-21 | End: 2022-06-22

## 2022-06-21 RX ADMIN — HEPARIN SODIUM 18 UNITS/KG/HR: 5000 INJECTION INTRAVENOUS; SUBCUTANEOUS at 09:06

## 2022-06-21 NOTE — TELEPHONE ENCOUNTER
I was contacted by radiology (Dr. Urias) that pt has B pulmonary artery emboli on CT chest/abd/pelvis today.  The remainder of read is still pending.    I called and spoke with pt's  who will bring her to the [facility] ED.  I alerted ED charge nurse.    Reji Downey MD

## 2022-06-21 NOTE — ED TRIAGE NOTES
Patient presents to ED c/o blood clots, patient reports MD sent her to ED due to abnormal CT, reports PE noted on CT. Hx of Sensory Nerve issues x 6 months. Patient reports ascending sensory loss (legs, pelvis, stomach, arms), SOB x 1 month. Patient reports fall on Saturday due to sensory loss of legs. Reports incontinence     Denies chest pain, fever, chills, bilateral leg/calf pain and numbness/tingling    AAO x 4.

## 2022-06-21 NOTE — ED PROVIDER NOTES
"Encounter Date: 6/21/2022    SCRIBE #1 NOTE: I, Yony Cespedes, am scribing for, and in the presence of,  Norman Watson MD. I have scribed the following portions of the note - Other sections scribed: HPI, ROS, PE.       History     Chief Complaint   Patient presents with    blood clots     Presents after call from her doctor after she had imaging today. Dr stated that pt needed to come to ER because of 'blood clots on the lungs'. Pt in no obvious respiratory distress, speaking in full sentences.Denies chest pain, shortness of breath..     This is a 51 y. o female with a PMHx of autoimmune hepatitis, that comes to the ED for possible PE findings after scans today. Patient reports ongoing numbness to her BLE, endorsing it started at her feet, and over the course of 6 months the numbness has reached her abdomen and upper extremities, and underwent CT scans today. Patient reports she was called by her doctor shortly after leaving the office and was told to come to the ED due to blood clots in her lungs. Patient reports complaints of SOB increasingly exacerbating for a month, as well as incontinence stating "she has been having accidents trying to get to the restroom" in the last month. Patient's  reports a recent fall on Saturday where "her legs gave out", but notes no head trauma or LOC. No medications taken PTA. No alleviating or exacerbating factors noted. Denies leg swelling, or chest pain. No known allergies.    The history is provided by the patient. No  was used.     Review of patient's allergies indicates:  No Known Allergies  Past Medical History:   Diagnosis Date    Abnormal Pap smear of vagina     Autoimmune hepatitis     Bipolar 1 disorder     Breast disorder      Past Surgical History:   Procedure Laterality Date    LIVER BIOPSY       Family History   Problem Relation Age of Onset    Cancer Father     Diabetes Maternal Grandmother     Breast cancer Maternal Aunt  "     Social History     Tobacco Use    Smoking status: Never Smoker    Smokeless tobacco: Never Used   Substance Use Topics    Alcohol use: No    Drug use: No     Review of Systems   Constitutional: Negative for fever.   HENT: Negative for sore throat.    Eyes: Negative for pain.   Respiratory: Positive for shortness of breath.    Cardiovascular: Negative for chest pain.   Gastrointestinal: Negative for abdominal pain and nausea.   Genitourinary: Negative for dysuria.        (+) incontinence   Musculoskeletal: Negative for back pain.   Skin: Negative for rash.   Neurological: Positive for numbness (BLE, BUE, abdomen). Negative for weakness.       Physical Exam     Initial Vitals [06/21/22 1729]   BP Pulse Resp Temp SpO2   113/75 70 18 97.7 °F (36.5 °C) 98 %      MAP       --         Physical Exam    Nursing note and vitals reviewed.  Constitutional: She is not diaphoretic. No distress.   HENT:   Head: Normocephalic and atraumatic.   Mouth/Throat: Oropharynx is clear and moist.   Eyes: Conjunctivae and EOM are normal. No scleral icterus.   Neck: Neck supple. No tracheal deviation present.   Normal range of motion.  Cardiovascular: Normal rate, regular rhythm and intact distal pulses.   Pulmonary/Chest: Breath sounds normal. No stridor. No respiratory distress.   Abdominal: Abdomen is soft. She exhibits no distension. There is no abdominal tenderness.   Musculoskeletal:         General: Edema (slight, LLE) present. No tenderness. Normal range of motion.      Cervical back: Normal range of motion and neck supple.     Neurological: She is alert. She has normal strength. No cranial nerve deficit. GCS score is 15. GCS eye subscore is 4. GCS verbal subscore is 5. GCS motor subscore is 6.   Decreased sensory on bilateral upper and lower extremities. 5/5 strength on bilateral upper and lower extremities.    Skin: Skin is warm and dry.   Psychiatric: She has a normal mood and affect.         ED Course   Procedures  Labs  Reviewed   CBC W/ AUTO DIFFERENTIAL - Abnormal; Notable for the following components:       Result Value    Hemoglobin 11.9 (*)     MCHC 31.7 (*)     All other components within normal limits   COMPREHENSIVE METABOLIC PANEL - Abnormal; Notable for the following components:    BUN 5 (*)     Albumin 3.3 (*)     All other components within normal limits   TROPONIN I   B-TYPE NATRIURETIC PEPTIDE   APTT   PROTIME-INR        ECG Results          EKG 12-lead (Final result)  Result time 06/22/22 15:10:40    Final result by Interface, Lab In [facility] (06/22/22 15:10:40)                 Narrative:    Test Reason : I26.99,    Vent. Rate : 085 BPM     Atrial Rate : 085 BPM     P-R Int : 164 ms          QRS Dur : 080 ms      QT Int : 386 ms       P-R-T Axes : 042 056 038 degrees     QTc Int : 459 ms    Sinus rhythm with occasional Premature ventricular complexes  Otherwise normal ECG  When compared with ECG of 03-NOV-2021 18:37,  Significant changes have occurred  Confirmed by Hao Benítez MD ([id number]) on 6/22/2022 3:10:26 PM    Referred By: [email or web address]   SELF           Confirmed By:Hao Benítez MD                            Imaging Results           US Lower Extremity Veins Bilateral (Final result)  Result time 06/21/22 20:42:50    Final result by Alessio Lebron MD (06/21/22 20:42:50)                 Impression:      This report was flagged in Epic as abnormal.    Partially occlusive thrombus within the right femoral vein and within 1 of the paired right posterior tibial veins and 1 of the paired right peroneal veins.    No thrombus within the left lower extremity.      Electronically signed by: Alessio Lebron MD  Date:    06/21/2022  Time:    20:42             Narrative:    EXAMINATION:  US LOWER EXTREMITY VEINS BILATERAL    CLINICAL HISTORY:  Other pulmonary embolism without acute cor pulmonale    TECHNIQUE:  Duplex and color flow Doppler and dynamic compression was performed of the bilateral lower extremity veins was  performed.    COMPARISON:  None    FINDINGS:  There is thrombus within the right femoral vein proximally extending to the mid and distal aspects.  No thrombus within the right popliteal vein.  There is thrombus within 1 of the paired right posterior tibial veins as well as within 1 of the paired right peroneal vein.  The right anterior tibial veins are patent.  The right common femoral vein is patent.  The right greater saphenous vein is patent.    No thrombus within the left common femoral vein, greater saphenous vein, femoral vein, popliteal vein, anterior tibial veins, posterior tibial veins or peroneal veins.                               X-Ray Chest AP Portable (Final result)  Result time 06/21/22 18:34:16    Final result by Reba Reid MD (06/21/22 18:34:16)                 Impression:      Mild blunting the left costophrenic angle, which may suggest small left pleural effusion and/or pleural thickening.      Electronically signed by: Reba Reid  Date:    06/21/2022  Time:    18:34             Narrative:    EXAMINATION:  AP PORTABLE CHEST    CLINICAL HISTORY:  PE;    TECHNIQUE:  AP portable chest radiograph was submitted.    COMPARISON:  11/03/2021    FINDINGS:  AP portable chest radiograph demonstrates a cardiac silhouette within normal limits.  There is mild blunting of the left costophrenic angle.  There is no focal consolidation or pneumothorax.                                 Medications   heparin 25,000 units in dextrose 5% (100 units/ml) IV bolus from bag INITIAL BOLUS (5,560 Units Intravenous Bolus from Bag 6/21/22 2121)     Medical Decision Making:   History:   Old Medical Records: I decided to obtain old medical records.  Old Records Summarized: other records.       <> Summary of Records: Patient was previously seen on 11/03/2021 for a near syncopal episode, during which she endorsed weakness of her lower extremities, numbness of her BLE up to the knee, and difficulty walking due to her  previous mentioned symptoms. Results were unremarkable during visit besides some mild hypokalemia: 3.1.  Differential Diagnosis:   This includes, but it is not limited to: PE, DVT, hypercoagulability.   Independently Interpreted Test(s):   I have ordered and independently interpreted EKG Reading(s) - see prior notes  Clinical Tests:   Lab Tests: Ordered and Reviewed  Radiological Study: Ordered and Reviewed  Medical Tests: Ordered and Reviewed  ED Management:  CT demonstrates PE.  Patient started on heparin infusion.  She is not hypoxic, tachypneic or in respiratory distress.  She is to be admitted to Hospital Medicine for further management of bilateral PE.    Please put in 30 minutes of critical care due to patient having a high risk of respiratory failure.   Separate from teaching and exclusive of procedure and ekg time  Includes:  Time at bedside  Time reviewing test results  Time discussing case with staff  Time documenting the medical record  Time spent with family members  Time spent with consults  Management   This chart was completed using dictation software, as a result there may be some transcription errors.           Scribe Attestation:   Scribe #1: I performed the above scribed service and the documentation accurately describes the services I performed. I attest to the accuracy of the note.               I, Norman Watson , personally performed the services described in this documentation. All medical record entries made by the scribe were at my direction and in my presence. I have reviewed the chart and agree that the record reflects my personal performance and is accurate and complete.    Clinical Impression:   Final diagnoses:  [I26.99] PE (pulmonary thromboembolism)          ED Disposition Condition    Admit               Norman Watson MD  06/23/22 0356

## 2022-06-22 ENCOUNTER — PATIENT MESSAGE (OUTPATIENT)
Dept: NEUROLOGY | Facility: CLINIC | Age: 52
End: 2022-06-22
Payer: MEDICARE

## 2022-06-22 ENCOUNTER — DOCUMENTATION ONLY (OUTPATIENT)
Dept: NEUROLOGY | Facility: CLINIC | Age: 52
End: 2022-06-22
Payer: MEDICARE

## 2022-06-22 VITALS
SYSTOLIC BLOOD PRESSURE: 112 MMHG | RESPIRATION RATE: 14 BRPM | OXYGEN SATURATION: 97 % | HEIGHT: 68 IN | WEIGHT: 172.81 LBS | HEART RATE: 73 BPM | BODY MASS INDEX: 26.19 KG/M2 | TEMPERATURE: 99 F | DIASTOLIC BLOOD PRESSURE: 79 MMHG

## 2022-06-22 DIAGNOSIS — G62.9 SENSORY NEUROPATHY: ICD-10-CM

## 2022-06-22 DIAGNOSIS — G60.3 IDIOPATHIC PROGRESSIVE NEUROPATHY: Primary | ICD-10-CM

## 2022-06-22 LAB
APTT BLDCRRT: 28.7 SEC (ref 21–32)
APTT BLDCRRT: >150 SEC (ref 21–32)
AV INDEX (PROSTH): 0.95
AV MEAN GRADIENT: 3 MMHG
AV PEAK GRADIENT: 6 MMHG
AV VALVE AREA: 2.42 CM2
AV VELOCITY RATIO: 0.76
BASOPHILS # BLD AUTO: 0.03 K/UL (ref 0–0.2)
BASOPHILS NFR BLD: 0.3 % (ref 0–1.9)
BSA FOR ECHO PROCEDURE: 1.94 M2
CV ECHO LV RWT: 0.32 CM
DIFFERENTIAL METHOD: ABNORMAL
DOP CALC AO PEAK VEL: 1.22 M/S
DOP CALC AO VTI: 19.05 CM
DOP CALC LVOT AREA: 2.5 CM2
DOP CALC LVOT DIAMETER: 1.8 CM
DOP CALC LVOT PEAK VEL: 0.93 M/S
DOP CALC LVOT STROKE VOLUME: 46.14 CM3
DOP CALCLVOT PEAK VEL VTI: 18.14 CM
E WAVE DECELERATION TIME: 238.4 MSEC
E/A RATIO: 0.82
E/E' RATIO: 5.44 M/S
ECHO LV POSTERIOR WALL: 0.63 CM (ref 0.6–1.1)
EJECTION FRACTION: 60 %
EOSINOPHIL # BLD AUTO: 0.3 K/UL (ref 0–0.5)
EOSINOPHIL NFR BLD: 2.3 % (ref 0–8)
ERYTHROCYTE [DISTWIDTH] IN BLOOD BY AUTOMATED COUNT: 13.1 % (ref 11.5–14.5)
FRACTIONAL SHORTENING: 27 % (ref 28–44)
HCT VFR BLD AUTO: 36.4 % (ref 37–48.5)
HGB BLD-MCNC: 11.7 G/DL (ref 12–16)
IMM GRANULOCYTES # BLD AUTO: 0.03 K/UL (ref 0–0.04)
IMM GRANULOCYTES NFR BLD AUTO: 0.3 % (ref 0–0.5)
INTERVENTRICULAR SEPTUM: 0.94 CM (ref 0.6–1.1)
IVRT: 121.11 MSEC
LA MAJOR: 5.57 CM
LA MINOR: 4.78 CM
LA WIDTH: 3.54 CM
LEFT ATRIUM SIZE: 3.33 CM
LEFT ATRIUM VOLUME INDEX: 26.8 ML/M2
LEFT ATRIUM VOLUME: 51.55 CM3
LEFT INTERNAL DIMENSION IN SYSTOLE: 2.89 CM (ref 2.1–4)
LEFT VENTRICLE DIASTOLIC VOLUME INDEX: 35.29 ML/M2
LEFT VENTRICLE DIASTOLIC VOLUME: 67.75 ML
LEFT VENTRICLE MASS INDEX: 46 G/M2
LEFT VENTRICLE SYSTOLIC VOLUME INDEX: 16.6 ML/M2
LEFT VENTRICLE SYSTOLIC VOLUME: 31.86 ML
LEFT VENTRICULAR INTERNAL DIMENSION IN DIASTOLE: 3.95 CM (ref 3.5–6)
LEFT VENTRICULAR MASS: 89.26 G
LV LATERAL E/E' RATIO: 4.9 M/S
LV SEPTAL E/E' RATIO: 6.13 M/S
LYMPHOCYTES # BLD AUTO: 2.8 K/UL (ref 1–4.8)
LYMPHOCYTES NFR BLD: 24.9 % (ref 18–48)
MCH RBC QN AUTO: 27 PG (ref 27–31)
MCHC RBC AUTO-ENTMCNC: 32.1 G/DL (ref 32–36)
MCV RBC AUTO: 84 FL (ref 82–98)
MONOCYTES # BLD AUTO: 0.7 K/UL (ref 0.3–1)
MONOCYTES NFR BLD: 6.5 % (ref 4–15)
MV PEAK A VEL: 0.6 M/S
MV PEAK E VEL: 0.49 M/S
MV STENOSIS PRESSURE HALF TIME: 69.13 MS
MV VALVE AREA P 1/2 METHOD: 3.18 CM2
NEUTROPHILS # BLD AUTO: 7.4 K/UL (ref 1.8–7.7)
NEUTROPHILS NFR BLD: 65.7 % (ref 38–73)
NRBC BLD-RTO: 0 /100 WBC
PISA TR MAX VEL: 2.22 M/S
PLATELET # BLD AUTO: 265 K/UL (ref 150–450)
PMV BLD AUTO: 11.1 FL (ref 9.2–12.9)
PULM VEIN S/D RATIO: 1.04
PV PEAK D VEL: 0.25 M/S
PV PEAK S VEL: 0.26 M/S
PV PEAK VELOCITY: 0.8 CM/S
RA MAJOR: 4.26 CM
RA PRESSURE: 8 MMHG
RA WIDTH: 3.36 CM
RBC # BLD AUTO: 4.34 M/UL (ref 4–5.4)
RIGHT VENTRICULAR END-DIASTOLIC DIMENSION: 3.35 CM
SINUS: 2.84 CM
STJ: 2.02 CM
TDI LATERAL: 0.1 M/S
TDI SEPTAL: 0.08 M/S
TDI: 0.09 M/S
TR MAX PG: 20 MMHG
TRICUSPID ANNULAR PLANE SYSTOLIC EXCURSION: 1.87 CM
TV REST PULMONARY ARTERY PRESSURE: 28 MMHG
WBC # BLD AUTO: 11.31 K/UL (ref 3.9–12.7)

## 2022-06-22 PROCEDURE — 36415 COLL VENOUS BLD VENIPUNCTURE: CPT | Performed by: STUDENT IN AN ORGANIZED HEALTH CARE EDUCATION/TRAINING PROGRAM

## 2022-06-22 PROCEDURE — G0378 HOSPITAL OBSERVATION PER HR: HCPCS

## 2022-06-22 PROCEDURE — 85025 COMPLETE CBC W/AUTO DIFF WBC: CPT | Performed by: EMERGENCY MEDICINE

## 2022-06-22 PROCEDURE — 25000003 PHARM REV CODE 250: Performed by: STUDENT IN AN ORGANIZED HEALTH CARE EDUCATION/TRAINING PROGRAM

## 2022-06-22 PROCEDURE — 85730 THROMBOPLASTIN TIME PARTIAL: CPT | Mod: 91 | Performed by: STUDENT IN AN ORGANIZED HEALTH CARE EDUCATION/TRAINING PROGRAM

## 2022-06-22 PROCEDURE — 85730 THROMBOPLASTIN TIME PARTIAL: CPT | Performed by: STUDENT IN AN ORGANIZED HEALTH CARE EDUCATION/TRAINING PROGRAM

## 2022-06-22 RX ORDER — APIXABAN 5 MG (74)
KIT ORAL
Qty: 74 TABLET | Refills: 0 | Status: SHIPPED | OUTPATIENT
Start: 2022-06-22 | End: 2022-07-01

## 2022-06-22 RX ADMIN — APIXABAN 10 MG: 5 TABLET, FILM COATED ORAL at 08:06

## 2022-06-22 RX ADMIN — HEPARIN SODIUM 60 UNITS/KG/HR: 5000 INJECTION INTRAVENOUS; SUBCUTANEOUS at 04:06

## 2022-06-22 RX ADMIN — FOLIC ACID 1 MG: 1 TABLET ORAL at 08:06

## 2022-06-22 RX ADMIN — HEPARIN SODIUM 22.3 UNITS/KG/HR: 5000 INJECTION INTRAVENOUS; SUBCUTANEOUS at 04:06

## 2022-06-22 NOTE — CONSULTS
[facility] - Med Surg  Initial Discharge Assessment    Patient from home with spouse, who will be her help at home. Pt currently uses wc, rw, bsc and shower chair at home. Pt had echo today. TN to continue to follow for discharge need.        Primary Care Provider: Mac Gresham MD    Admission Diagnosis: PE (pulmonary thromboembolism) [I26.99]    Admission Date: 6/21/2022  Expected Discharge Date:     Discharge Barriers Identified: None    Payor: MEDICARE / Plan: MEDICARE PART A & B / Product Type: Government /     Extended Emergency Contact Information  Primary Emergency Contact: Adriel Rg  Address: [address] [place] [address] [facility]  Home Phone: [phone]  Mobile Phone: [phone]  Relation: Spouse    Discharge Plan A: Home with family  Discharge Plan B: Home      [email or web address] [id number] [place], [place] - [address] AT [facility] [address] [place] [address] [address]  [facility] [address]  Phone: [phone] Fax: [phone]      Initial Assessment (most recent)     Adult Discharge Assessment - 06/22/22 1148        Discharge Assessment    Assessment Type Discharge Planning Assessment     Confirmed/corrected address, phone number and insurance Yes     Confirmed Demographics Correct on Facesheet     Source of Information patient     When was your last doctors appointment? --   Last month    Does patient/caregiver understand observation status No     Was observation education provided? No     Communicated CHITRA with patient/caregiver Date not available/Unable to determine     Reason For Admission Acute PE     Lives With spouse     Facility Arrived From: Home     Do you expect to return to your current living situation? Yes     Do you have help at home or someone to help you manage your care at home? Yes     Who are your caregiver(s) and their phone number(s)? Adriel Rg (Spouse)   [phone]     Prior to hospitilization cognitive status:  Alert/Oriented     Current cognitive status: Alert/Oriented     Walking or Climbing Stairs Difficulty ambulation difficulty, requires equipment     Dressing/Bathing Difficulty bathing difficulty, requires equipment     Equipment Currently Used at Home walker, rolling;bedside commode;shower chair;cane, straight;wheelchair     Readmission within 30 days? No     Patient currently being followed by outpatient case management? No     Do you currently have service(s) that help you manage your care at home? No     Do you take prescription medications? Yes     Do you have prescription coverage? Yes     Coverage Medicare     Do you have any problems affording any of your prescribed medications? No     Is the patient taking medications as prescribed? yes     Who is going to help you get home at discharge? Aly Rgry (Spouse)   [phone]     How do you get to doctors appointments? family or friend will provide     Are you on dialysis? No     Do you take coumadin? No     Discharge Plan A Home with family     Discharge Plan B Home     DME Needed Upon Discharge  --   TBD    Discharge Plan discussed with: Patient     Discharge Barriers Identified None        Relationship/Environment    Name(s) of Who Lives With Patient Adriel gR (Spouse)   [phone]

## 2022-06-22 NOTE — HPI
Patient is a 50yo AA f with history of autoimmune hepatitis, bipolar disorder, BLE weakness presenting due to outpatient CT finding of PE. Patient states that she has been having progressive BLE paresthesia, which has been causing her to have BLE weakness, gait instability and she is not as active as she used to. She is mostly in bed or chair watching TV but still able to get up and walk with a FWW. She also has some degree of BUE paresthesia and weakness but much much less than her LE.    She has been undergoing extensive workup with neurology outpatient to determine the cause of her paresthesia/neuropathy. A CT chest/A/P was obtained as part of the workup and she was noted to have PE on the CT and her neurologist requested she presents to the ER. Apart from her chronic BLE paresthesia she completely denies any other symptoms    She had BLE venous US done which also shows RLE DVT. VS and labs otherwise mostly unremarkable

## 2022-06-22 NOTE — PLAN OF CARE
[facility] - Med Surg  Discharge Final Note    Patient clear to discharge from case management stand point. Reviewed follow up appointments with patient spouse, verbalized understanding. Pt's spouse stated he will be her help at home and bringing her home at discharge.     Primary Care Provider: Mac Gresham MD    Expected Discharge Date: 6/22/2022    Final Discharge Note (most recent)       Final Note - 06/22/22 1344          Final Note    Assessment Type Final Discharge Note (P)      Anticipated Discharge Disposition Home or Self Care (P)      What phone number can be called within the next 1-3 days to see how you are doing after discharge? [phone] (P)      Hospital Resources/Appts/Education Provided Provided patient/caregiver with written discharge plan information;Appointments scheduled and added to AVS (P)         Post-Acute Status    Coverage Medicare (P)      Discharge Delays None known at this time (P)                      Important Message from Medicare             Contact Info       Mac Gresham MD   Specialty: Internal Medicine, Wound Care   Relationship: PCP - General    [place] [id number]   Phone: [phone]       Next Steps: Follow up

## 2022-06-22 NOTE — HOSPITAL COURSE
Mrs. Rg was placed under observation after she was found to have bilateral pulmonary emboli and partial thrombus of the right lower extremity on outpatient imaging.  She was not hypoxic, tachycardic or hypotensive.  She actually had no chest pain or symptoms of this.  She was started on heparin drip and transitioned to Eliquis.  An echocardiogram was performed and she had no evidence of right heart strain or pulmonary hypertension.  After further discussion with the patient, she was discharged home in stable condition to start Eliquis starter pack at 10 mg twice daily for 7 days and then 5 mg twice daily.  She will need to follow up with her primary care physician and continue with Neurology and Physical therapy.

## 2022-06-22 NOTE — H&P
[facility] Medicine  History & Physical    Patient Name: Thalia Rg  MRN: [id number]  Patient Class: IP- Inpatient  Admission Date: 6/21/2022  Attending Physician: Edmundo Ruvalcaba MD  Primary Care Provider: Mac Gresham MD         Patient information was obtained from parent, relative(s) and ER records.     Subjective:     Principal Problem:Acute pulmonary embolism    Chief Complaint:   Chief Complaint   Patient presents with    blood clots     Presents after call from her doctor after she had imaging today. Dr stated that pt needed to come to ER because of 'blood clots on the lungs'. Pt in no obvious respiratory distress, speaking in full sentences.Denies chest pain, shortness of breath..        HPI: Patient is a 52yo AA f with history of autoimmune hepatitis, bipolar disorder, BLE weakness presenting due to outpatient CT finding of PE. Patient states that she has been having progressive BLE paresthesia, which has been causing her to have BLE weakness, gait instability and she is not as active as she used to. She is mostly in bed or chair watching TV but still able to get up and walk with a FWW. She also has some degree of BUE paresthesia and weakness but much much less than her LE.    She has been undergoing extensive workup with neurology outpatient to determine the cause of her paresthesia/neuropathy. A CT chest/A/P was obtained as part of the workup and she was noted to have PE on the CT and her neurologist requested she presents to the ER. Apart from her chronic BLE paresthesia she completely denies any other symptoms    She had BLE venous US done which also shows RLE DVT. VS and labs otherwise mostly unremarkable      Past Medical History:   Diagnosis Date    Abnormal Pap smear of vagina     Autoimmune hepatitis     Bipolar 1 disorder     Breast disorder        Past Surgical History:   Procedure Laterality Date    LIVER BIOPSY         Review of patient's allergies  indicates:  No Known Allergies    Current Facility-Administered Medications on File Prior to Encounter   Medication    barium (READI-CAT 2) suspension 450 mL    iohexoL (OMNIPAQUE 350) injection 75 mL     Current Outpatient Medications on File Prior to Encounter   Medication Sig    folic acid (FOLVITE) 1 MG tablet Take 1 tablet (1 mg total) by mouth once daily.    mercaptopurine (PURINETHOL) 50 mg tablet Take 100 mg by mouth once daily.     ziprasidone (GEODON) 20 MG Cap Take 20 mg by mouth 2 (two) times daily.     Family History       Problem Relation (Age of Onset)    Breast cancer Maternal Aunt    Cancer Father    Diabetes Maternal Grandmother          Tobacco Use    Smoking status: Never Smoker    Smokeless tobacco: Never Used   Substance and Sexual Activity    Alcohol use: No    Drug use: No    Sexual activity: Yes     Partners: Male     Birth control/protection: Condom     Review of Systems   Constitutional: Negative.    Respiratory: Negative.     Cardiovascular: Negative.    Gastrointestinal: Negative.    Genitourinary: Negative.    Musculoskeletal: Negative.    Neurological: Negative.    Hematological: Negative.    Psychiatric/Behavioral: Negative.     Objective:     Vital Signs (Most Recent):  Temp: 97.8 °F (36.6 °C) (06/21/22 2109)  Pulse: 73 (06/21/22 2119)  Resp: 13 (06/21/22 2119)  BP: 138/79 (06/21/22 2119)  SpO2: 99 % (06/21/22 2119) Vital Signs (24h Range):  Temp:  [97.7 °F (36.5 °C)-97.8 °F (36.6 °C)] 97.8 °F (36.6 °C)  Pulse:  [70-75] 73  Resp:  [13-18] 13  SpO2:  [98 %-100 %] 99 %  BP: (113-138)/(75-93) 138/79     Weight: 78.4 kg (172 lb 13.5 oz)  Body mass index is 26.28 kg/m².    Physical Exam  Constitutional:       General: She is not in acute distress.     Appearance: Normal appearance. She is not ill-appearing, toxic-appearing or diaphoretic.   HENT:      Head: Normocephalic and atraumatic.   Eyes:      General:         Right eye: No discharge.         Left eye: No discharge.       Extraocular Movements: Extraocular movements intact.      Conjunctiva/sclera: Conjunctivae normal.      Pupils: Pupils are equal, round, and reactive to light.   Cardiovascular:      Rate and Rhythm: Normal rate and regular rhythm.      Pulses: Normal pulses.      Heart sounds: Normal heart sounds. No murmur heard.    No friction rub. No gallop.   Pulmonary:      Effort: Pulmonary effort is normal. No respiratory distress.      Breath sounds: Normal breath sounds. No stridor. No wheezing, rhonchi or rales.   Chest:      Chest wall: No tenderness.   Abdominal:      General: Abdomen is flat. Bowel sounds are normal. There is no distension.      Palpations: Abdomen is soft. There is no mass.      Tenderness: There is no abdominal tenderness. There is no right CVA tenderness, left CVA tenderness, guarding or rebound.      Hernia: No hernia is present.   Skin:     Capillary Refill: Capillary refill takes less than 2 seconds.   Neurological:      Mental Status: She is alert and oriented to person, place, and time.      Comments: Has chronic BLE weakness and paresthesia unchanged from baseline   Psychiatric:         Mood and Affect: Mood normal.         Behavior: Behavior normal.         Thought Content: Thought content normal.         Judgment: Judgment normal.         CRANIAL NERVES     CN III, IV, VI   Pupils are equal, round, and reactive to light.     Significant Labs: All pertinent labs within the past 24 hours have been reviewed.  BMP:   Recent Labs   Lab 06/21/22  1800   GLU 77      K 3.6      CO2 27   BUN 5*   CREATININE 0.7   CALCIUM 9.4     CBC:   Recent Labs   Lab 06/21/22  1800   WBC 11.59   HGB 11.9*   HCT 37.5        CMP:   Recent Labs   Lab 06/21/22  1800      K 3.6      CO2 27   GLU 77   BUN 5*   CREATININE 0.7   CALCIUM 9.4   PROT 7.7   ALBUMIN 3.3*   BILITOT 0.6   ALKPHOS 84   AST 23   ALT 11   ANIONGAP 11   EGFRNONAA >60     Coagulation:   Recent Labs   Lab 06/21/22  1800    INR 1.0   APTT 22.2       Significant Imaging: I have reviewed all pertinent imaging results/findings within the past 24 hours.    Assessment/Plan:     * Acute pulmonary embolism  Seems mostly 2/2 patient chronic debility and being much less active  She has been started on heparin drip  Patient otherwise on physical exam is on room air and has her mask on and was talking in full sentences with no SOB or respiratory distress and not needing supplemental oxygen with her saturation > 97%  She technically can be discharged home tomorrow on oral anticoagulation       Acute DVT (deep venous thrombosis)  Same as above      Angiomyolipoma of right kidney  Outpatient follow up      Avascular necrosis of femoral head  Unclear etiology - supportive care and outpatient follow up VS ortho consult in the AM      Lower extremity weakness  Same as above  She has been regularly following up with Dr. Downey neurology - continue outpatient neurology follow up      Impaired gait and mobility  Continue PT/OT in-house if she is not discharged      Deficit in activities of daily living (ADL)  She is undergoing routine outpatient PT/OT      VTE Risk Mitigation (From admission, onward)         Ordered     heparin 25,000 units in dextrose 5% (100 units/ml) IV bolus from bag - ADDITIONAL PRN BOLUS - 60 units/kg  As needed (PRN)        Question:  Heparin Infusion Adjustment (DO NOT MODIFY ANSWER)  Answer:  \\[email or web address]\epic\Images\Pharmacy\HeparinInfusions\heparin HIGH INTENSITY nomogram for [facility] [id number].pdf    06/21/22 2059     heparin 25,000 units in dextrose 5% (100 units/ml) IV bolus from bag - ADDITIONAL PRN BOLUS - 30 units/kg  As needed (PRN)        Question:  Heparin Infusion Adjustment (DO NOT MODIFY ANSWER)  Answer:  \[email or web address]\epic\Images\Pharmacy\HeparinInfusions\heparin HIGH INTENSITY nomogram for [facility] [id number].pdf    06/21/22 2059     heparin 25,000 units in dextrose 5% 250 mL (100 units/mL) infusion HIGH INTENSITY nomogram -  [facility]  Continuous        Question Answer Comment   Heparin Infusion Adjustment (DO NOT MODIFY ANSWER) \\[email or web address]\epic\Images\Pharmacy\HeparinInfusions\heparin HIGH INTENSITY nomogram for [facility] [id number].pdf    Begin at (in units/kg/hr) 18 06/21/22 2059                   Edmundo Ruvalcaba MD  Department of Hospital Medicine   [facility] S[facility]

## 2022-06-22 NOTE — NURSING
Pt received discharge instructions, pt verbalized understanding of discharge instructions, PT AAOx4, respirations even and unlabored, no acute distress, no complaints of pain, safety precautions in place,Case management has cleared pt for discharge. Pt brought down with  and son via wheelchair with tech. Meds were delivered

## 2022-06-22 NOTE — ASSESSMENT & PLAN NOTE
Seems mostly 2/2 patient chronic debility and being much less active  She has been started on heparin drip  Patient otherwise on physical exam is on room air and has her mask on and was talking in full sentences with no SOB or respiratory distress and not needing supplemental oxygen with her saturation > 97%  She technically can be discharged home tomorrow on oral anticoagulation

## 2022-06-22 NOTE — SUBJECTIVE & OBJECTIVE
Past Medical History:   Diagnosis Date    Abnormal Pap smear of vagina     Autoimmune hepatitis     Bipolar 1 disorder     Breast disorder        Past Surgical History:   Procedure Laterality Date    LIVER BIOPSY         Review of patient's allergies indicates:  No Known Allergies    Current Facility-Administered Medications on File Prior to Encounter   Medication    barium (READI-CAT 2) suspension 450 mL    iohexoL (OMNIPAQUE 350) injection 75 mL     Current Outpatient Medications on File Prior to Encounter   Medication Sig    folic acid (FOLVITE) 1 MG tablet Take 1 tablet (1 mg total) by mouth once daily.    mercaptopurine (PURINETHOL) 50 mg tablet Take 100 mg by mouth once daily.     ziprasidone (GEODON) 20 MG Cap Take 20 mg by mouth 2 (two) times daily.     Family History       Problem Relation (Age of Onset)    Breast cancer Maternal Aunt    Cancer Father    Diabetes Maternal Grandmother          Tobacco Use    Smoking status: Never Smoker    Smokeless tobacco: Never Used   Substance and Sexual Activity    Alcohol use: No    Drug use: No    Sexual activity: Yes     Partners: Male     Birth control/protection: Condom     Review of Systems   Constitutional: Negative.    Respiratory: Negative.     Cardiovascular: Negative.    Gastrointestinal: Negative.    Genitourinary: Negative.    Musculoskeletal: Negative.    Neurological: Negative.    Hematological: Negative.    Psychiatric/Behavioral: Negative.     Objective:     Vital Signs (Most Recent):  Temp: 97.8 °F (36.6 °C) (06/21/22 2109)  Pulse: 73 (06/21/22 2119)  Resp: 13 (06/21/22 2119)  BP: 138/79 (06/21/22 2119)  SpO2: 99 % (06/21/22 2119) Vital Signs (24h Range):  Temp:  [97.7 °F (36.5 °C)-97.8 °F (36.6 °C)] 97.8 °F (36.6 °C)  Pulse:  [70-75] 73  Resp:  [13-18] 13  SpO2:  [98 %-100 %] 99 %  BP: (113-138)/(75-93) 138/79     Weight: 78.4 kg (172 lb 13.5 oz)  Body mass index is 26.28 kg/m².    Physical Exam  Constitutional:       General: She is not in acute  distress.     Appearance: Normal appearance. She is not ill-appearing, toxic-appearing or diaphoretic.   HENT:      Head: Normocephalic and atraumatic.   Eyes:      General:         Right eye: No discharge.         Left eye: No discharge.      Extraocular Movements: Extraocular movements intact.      Conjunctiva/sclera: Conjunctivae normal.      Pupils: Pupils are equal, round, and reactive to light.   Cardiovascular:      Rate and Rhythm: Normal rate and regular rhythm.      Pulses: Normal pulses.      Heart sounds: Normal heart sounds. No murmur heard.    No friction rub. No gallop.   Pulmonary:      Effort: Pulmonary effort is normal. No respiratory distress.      Breath sounds: Normal breath sounds. No stridor. No wheezing, rhonchi or rales.   Chest:      Chest wall: No tenderness.   Abdominal:      General: Abdomen is flat. Bowel sounds are normal. There is no distension.      Palpations: Abdomen is soft. There is no mass.      Tenderness: There is no abdominal tenderness. There is no right CVA tenderness, left CVA tenderness, guarding or rebound.      Hernia: No hernia is present.   Skin:     Capillary Refill: Capillary refill takes less than 2 seconds.   Neurological:      Mental Status: She is alert and oriented to person, place, and time.      Comments: Has chronic BLE weakness and paresthesia unchanged from baseline   Psychiatric:         Mood and Affect: Mood normal.         Behavior: Behavior normal.         Thought Content: Thought content normal.         Judgment: Judgment normal.         CRANIAL NERVES     CN III, IV, VI   Pupils are equal, round, and reactive to light.     Significant Labs: All pertinent labs within the past 24 hours have been reviewed.  BMP:   Recent Labs   Lab 06/21/22  1800   GLU 77      K 3.6      CO2 27   BUN 5*   CREATININE 0.7   CALCIUM 9.4     CBC:   Recent Labs   Lab 06/21/22  1800   WBC 11.59   HGB 11.9*   HCT 37.5        CMP:   Recent Labs   Lab  06/21/22  1800      K 3.6      CO2 27   GLU 77   BUN 5*   CREATININE 0.7   CALCIUM 9.4   PROT 7.7   ALBUMIN 3.3*   BILITOT 0.6   ALKPHOS 84   AST 23   ALT 11   ANIONGAP 11   EGFRNONAA >60     Coagulation:   Recent Labs   Lab 06/21/22  1800   INR 1.0   APTT 22.2       Significant Imaging: I have reviewed all pertinent imaging results/findings within the past 24 hours.

## 2022-06-22 NOTE — DISCHARGE SUMMARY
[facility] Medicine  Discharge Summary      Patient Name: Thalia Rg  MRN: [id number]  Patient Class: OP- Observation  Admission Date: 6/21/2022  Hospital Length of Stay: 1 days  Discharge Date and Time: No discharge date for patient encounter.  Attending Physician: Jc Holden MD   Discharging Provider: Jc Holden MD  Primary Care Provider: Mac Gresham MD      HPI:   Patient is a 52yo AA f with history of autoimmune hepatitis, bipolar disorder, BLE weakness presenting due to outpatient CT finding of PE. Patient states that she has been having progressive BLE paresthesia, which has been causing her to have BLE weakness, gait instability and she is not as active as she used to. She is mostly in bed or chair watching TV but still able to get up and walk with a FWW. She also has some degree of BUE paresthesia and weakness but much much less than her LE.    She has been undergoing extensive workup with neurology outpatient to determine the cause of her paresthesia/neuropathy. A CT chest/A/P was obtained as part of the workup and she was noted to have PE on the CT and her neurologist requested she presents to the ER. Apart from her chronic BLE paresthesia she completely denies any other symptoms    She had BLE venous US done which also shows RLE DVT. VS and labs otherwise mostly unremarkable      * No surgery found *      Hospital Course:   Mrs. Rg was placed under observation after she was found to have bilateral pulmonary emboli and partial thrombus of the right lower extremity on outpatient imaging.  She was not hypoxic, tachycardic or hypotensive.  She actually had no chest pain or symptoms of this.  She was started on heparin drip and transitioned to Eliquis.  An echocardiogram was performed and she had no evidence of right heart strain or pulmonary hypertension.  After further discussion with the patient, she was discharged home in stable condition to start Eliquis starter pack  at 10 mg twice daily for 7 days and then 5 mg twice daily.  She will need to follow up with her primary care physician and continue with Neurology and Physical therapy.       Goals of Care Treatment Preferences:         Consults:   Consults (From admission, onward)        Status Ordering Provider     IP consult to case management  Once        Provider:  (Not yet assigned)    Completed ANITA GUY          No new Assessment & Plan notes have been filed under this hospital service since the last note was generated.  Service: Hospital Medicine    Final Active Diagnoses:    Diagnosis Date Noted POA    PRINCIPAL PROBLEM:  Acute pulmonary embolism [I26.99] 06/21/2022 Yes    Acute DVT (deep venous thrombosis) [I82.409] 06/21/2022 Yes    Avascular necrosis of femoral head [M87.059] 06/21/2022 Yes    Angiomyolipoma of right kidney [D17.71] 06/21/2022 Yes    Lower extremity weakness [R29.898] 05/06/2022 Yes    Impaired gait and mobility [R26.89] 05/06/2022 Yes    Deficit in activities of daily living (ADL) [Z78.9] 05/04/2022 Yes      Problems Resolved During this Admission:       Discharged Condition: stable    Disposition: Home or Self Care    Follow Up:   Follow-up Information     Mac Gresham MD Follow up.    Specialties: Internal Medicine, Wound Care  Contact information:  [address]  [place] [id number] [phone]                       Patient Instructions:      Diet Adult Regular     Notify your health care provider if you experience any of the following:  temperature >100.4     Notify your health care provider if you experience any of the following:  persistent nausea and vomiting or diarrhea     Notify your health care provider if you experience any of the following:  severe uncontrolled pain     Notify your health care provider if you experience any of the following:  difficulty breathing or increased cough     Notify your health care provider if you experience any of the following:  severe  persistent headache     Notify your health care provider if you experience any of the following:  worsening rash     Notify your health care provider if you experience any of the following:  persistent dizziness, light-headedness, or visual disturbances     Notify your health care provider if you experience any of the following:  increased confusion or weakness     Activity as tolerated       Significant Diagnostic Studies: Labs: All labs within the past 24 hours have been reviewed    Pending Diagnostic Studies:     None         Medications:  Reconciled Home Medications:      Medication List      CONTINUE taking these medications    folic acid 1 MG tablet  Commonly known as: FOLVITE  Take 1 tablet (1 mg total) by mouth once daily.     mercaptopurine 50 mg tablet  Commonly known as: PURINETHOL  Take 100 mg by mouth once daily.     ziprasidone 20 MG Cap  Commonly known as: GEODON  Take 20 mg by mouth 2 (two) times daily.        ASK your doctor about these medications    * ELIQUIS DVT-PE TREAT 30D START 5 mg (74 tabs) Dspk  Generic drug: apixaban  Follow package directions: For the first 7 days take two 5 mg tablets twice daily.  After 7 days take one 5 mg tablet twice daily.  Ask about: Which instructions should I use?     * apixaban 5 mg Tab  Commonly known as: ELIQUIS  TAKE 1 TABLET BY MOUTH TWICE DAILY  Ask about: Which instructions should I use?         * This list has 2 medication(s) that are the same as other medications prescribed for you. Read the directions carefully, and ask your doctor or other care provider to review them with you.                Indwelling Lines/Drains at time of discharge:   Lines/Drains/Airways     None                 Time spent on the discharge of patient: >35 minutes         Jc Holden MD  Department of Hospital Medicine  [facility] Surg

## 2022-06-22 NOTE — ASSESSMENT & PLAN NOTE
Same as above  She has been regularly following up with Dr. Downey neurology - continue outpatient neurology follow up

## 2022-06-22 NOTE — NURSING
Patient arrived to floor via stretcher via hospital transporter from ED .  Patient transferred to bed via x1 person assist.  AAOX4.  Patient was oriented to room, information on whiteboard, and medication regimen.  Bed low, adequate lighting provided, side rails x2 up, call bell within reach.  Admission assessment completed. VSS. NPO, Telebox [id number].  Heparin infusing at 12.5ml/hr to left ac and tolerating well. Patient denied sob, pain, nausea, vomiting, & diarrhea. No acute distress noted at this time. Will continue to monitor and follow treatment plan.

## 2022-06-22 NOTE — NURSING
[facility]  Nurses Note -- 4 Eyes      6/21/2022       Skin assessed on: 06/21/22 2233      [x] No Pressure Injuries Present    []Prevention Measures Documented    [x] Yes LDA Previously documented     [x] Yes New Pressure Injury Discovered   [] LDA Added      Attending RN:  Lisa Hoyt, RN     Second  Nurse April Borne LPN

## 2022-06-23 ENCOUNTER — PATIENT MESSAGE (OUTPATIENT)
Dept: FAMILY MEDICINE | Facility: CLINIC | Age: 52
End: 2022-06-23
Payer: MEDICARE

## 2022-06-23 NOTE — PROGRESS NOTES
IVIG ordered (1g/kg/d x 2 days for total of 2g/kg)  This can be done at [facility] on [address]    Please assist.  NENA

## 2022-06-24 ENCOUNTER — TELEPHONE (OUTPATIENT)
Dept: NEUROLOGY | Facility: CLINIC | Age: 52
End: 2022-06-24
Payer: MEDICARE

## 2022-06-29 ENCOUNTER — TELEPHONE (OUTPATIENT)
Dept: REHABILITATION | Facility: HOSPITAL | Age: 52
End: 2022-06-29
Payer: MEDICARE

## 2022-06-29 NOTE — TELEPHONE ENCOUNTER
Missed Visit/Cancellation      Visit Date: 6/29/2022    Canceled Number: 4  No Show Number: 1                                                                                                          Thalia initially had visit scheduled for today for 3:30  Reason for cancellation: no show  Next scheduled occupational therapy visit is 7/6/2022.     Left message for patient to call us back, as this therapist will not be present on the 6th. A visit slot has been locked for her on 7/5, at 3:30, with request for patient to confirm if she can take this.     GIACOMO Ruiz, OTR/L, CEAS-I  6/29/2022

## 2022-07-01 ENCOUNTER — OFFICE VISIT (OUTPATIENT)
Dept: FAMILY MEDICINE | Facility: CLINIC | Age: 52
End: 2022-07-01
Payer: MEDICARE

## 2022-07-01 VITALS
TEMPERATURE: 98 F | SYSTOLIC BLOOD PRESSURE: 92 MMHG | HEART RATE: 88 BPM | BODY MASS INDEX: 27.03 KG/M2 | DIASTOLIC BLOOD PRESSURE: 66 MMHG | OXYGEN SATURATION: 96 % | WEIGHT: 178.38 LBS | RESPIRATION RATE: 16 BRPM | HEIGHT: 68 IN

## 2022-07-01 DIAGNOSIS — I82.4Y1 ACUTE DEEP VEIN THROMBOSIS (DVT) OF PROXIMAL VEIN OF RIGHT LOWER EXTREMITY: ICD-10-CM

## 2022-07-01 DIAGNOSIS — R20.2 PARESTHESIA OF BOTH LOWER EXTREMITIES: ICD-10-CM

## 2022-07-01 DIAGNOSIS — M25.562 ACUTE PAIN OF LEFT KNEE: ICD-10-CM

## 2022-07-01 DIAGNOSIS — I26.99 ACUTE PULMONARY EMBOLISM WITHOUT ACUTE COR PULMONALE, UNSPECIFIED PULMONARY EMBOLISM TYPE: Primary | ICD-10-CM

## 2022-07-01 PROCEDURE — 99999 PR PBB SHADOW E&M-EST. PATIENT-LVL IV: CPT | Mod: PBBFAC,,, | Performed by: NURSE PRACTITIONER

## 2022-07-01 PROCEDURE — 99214 PR OFFICE/OUTPT VISIT, EST, LEVL IV, 30-39 MIN: ICD-10-PCS | Mod: S$PBB,,, | Performed by: NURSE PRACTITIONER

## 2022-07-01 PROCEDURE — 99214 OFFICE O/P EST MOD 30 MIN: CPT | Mod: PBBFAC,PN | Performed by: NURSE PRACTITIONER

## 2022-07-01 PROCEDURE — 99214 OFFICE O/P EST MOD 30 MIN: CPT | Mod: S$PBB,,, | Performed by: NURSE PRACTITIONER

## 2022-07-01 PROCEDURE — 99999 PR PBB SHADOW E&M-EST. PATIENT-LVL IV: ICD-10-PCS | Mod: PBBFAC,,, | Performed by: NURSE PRACTITIONER

## 2022-07-01 NOTE — PROGRESS NOTES
Routine Office Visit    Patient Name: Thalia Rg    : 1970  MRN: [id number]    Chief Complaint:  Hospital follow-up    Subjective:  Thalia is a 51 y.o. female who presents today for a hospital follow-up with her .  Discharge summary is as follows:    [facility] Medicine  Discharge Summary        Patient Name: Thalia Rg  MRN: [id number]  Patient Class: OP- Observation  Admission Date: 2022  Hospital Length of Stay: 1 days  Discharge Date and Time: No discharge date for patient encounter.  Attending Physician: Jc Holden MD   Discharging Provider: Jc Holden MD  Primary Care Provider: Mac Gresham MD        HPI:   Patient is a 52yo AA f with history of autoimmune hepatitis, bipolar disorder, BLE weakness presenting due to outpatient CT finding of PE. Patient states that she has been having progressive BLE paresthesia, which has been causing her to have BLE weakness, gait instability and she is not as active as she used to. She is mostly in bed or chair watching TV but still able to get up and walk with a FWW. She also has some degree of BUE paresthesia and weakness but much much less than her LE.     She has been undergoing extensive workup with neurology outpatient to determine the cause of her paresthesia/neuropathy. A CT chest/A/P was obtained as part of the workup and she was noted to have PE on the CT and her neurologist requested she presents to the ER. Apart from her chronic BLE paresthesia she completely denies any other symptoms     She had BLE venous US done which also shows RLE DVT. VS and labs otherwise mostly unremarkable        * No surgery found *       Hospital Course:   Mrs. Rg was placed under observation after she was found to have bilateral pulmonary emboli and partial thrombus of the right lower extremity on outpatient imaging.  She was not hypoxic, tachycardic or hypotensive.  She actually had no chest pain or symptoms of this.  She was  started on heparin drip and transitioned to Eliquis.  An echocardiogram was performed and she had no evidence of right heart strain or pulmonary hypertension.  After further discussion with the patient, she was discharged home in stable condition to start Eliquis starter pack at 10 mg twice daily for 7 days and then 5 mg twice daily.  She will need to follow up with her primary care physician and continue with Neurology and Physical therapy.     Since discharge, she has been feeling well without any shortness of breath, chest pain, or palpitations.  She denies any leg swelling.  She states she is being worked up for significant neuropathy and her  states that she may need IV immune globulin.  She has been having some medial left knee pain for the last month after falling at home before hospitalization.  She describes this as a soreness which is nonradiating.  The pain has been manageable.  She has been taking the Eliquis as prescribed.  She finished to 10 mg b.i.d. dose and now is taking 5 mg b.i.d. without any bleeding, bruising, hematuria, or other problems with bleeding.  She can stand and walk and is trying to be more active around the house.    Past Medical History  Past Medical History:   Diagnosis Date    Abnormal Pap smear of vagina     Autoimmune hepatitis     Bipolar 1 disorder     Breast disorder        Past Surgical History  Past Surgical History:   Procedure Laterality Date    LIVER BIOPSY         Family History  Family History   Problem Relation Age of Onset    Cancer Father     Diabetes Maternal Grandmother     Breast cancer Maternal Aunt        Social History  Social History     Socioeconomic History    Marital status:    Tobacco Use    Smoking status: Never Smoker    Smokeless tobacco: Never Used   Substance and Sexual Activity    Alcohol use: No    Drug use: No    Sexual activity: Yes     Partners: Male     Birth control/protection: Condom       Current  "Medications  Current Outpatient Medications on File Prior to Visit   Medication Sig Dispense Refill    apixaban (ELIQUIS) 5 mg Tab TAKE 1 TABLET BY MOUTH TWICE DAILY 60 tablet 2    folic acid (FOLVITE) 1 MG tablet Take 1 tablet (1 mg total) by mouth once daily. 90 tablet 3    mercaptopurine (PURINETHOL) 50 mg tablet Take 100 mg by mouth once daily.       ziprasidone (GEODON) 20 MG Cap Take 20 mg by mouth 2 (two) times daily.      [DISCONTINUED] apixaban (ELIQUIS DVT-PE TREAT 30D START) 5 mg (74 tabs) DsPk Follow package directions: For the first 7 days take two 5 mg tablets twice daily.  After 7 days take one 5 mg tablet twice daily. 74 tablet 0     No current facility-administered medications on file prior to visit.       Allergies   Review of patient's allergies indicates:  No Known Allergies    Review of Systems (Pertinent positives)  Review of Systems   Constitutional: Negative for chills, diaphoresis, fever, malaise/fatigue and weight loss.   HENT: Negative.    Eyes: Negative.    Respiratory: Negative for cough, hemoptysis and sputum production.    Cardiovascular: Negative for chest pain, palpitations, orthopnea, claudication, leg swelling and PND.   Gastrointestinal: Negative.    Genitourinary: Negative.    Musculoskeletal: Negative.    Skin: Negative.    Neurological: Positive for tingling and weakness. Negative for dizziness, tremors, sensory change, speech change, focal weakness and headaches.   Endo/Heme/Allergies: Negative.    Psychiatric/Behavioral: Negative.        BP 92/66 (BP Location: Right arm, Patient Position: Sitting, BP Method: Large (Automatic))   Pulse 88   Temp 98 °F (36.7 °C) (Oral)   Resp 16   Ht 5' 8" (1.727 m)   Wt 80.9 kg (178 lb 5.6 oz)   LMP 05/19/2012   SpO2 96%   BMI 27.12 kg/m²     Physical Exam  Vitals reviewed.   Constitutional:       General: She is not in acute distress.     Appearance: Normal appearance. She is not ill-appearing, toxic-appearing or diaphoretic. "   HENT:      Head: Normocephalic and atraumatic.   Eyes:      Extraocular Movements: Extraocular movements intact.      Conjunctiva/sclera: Conjunctivae normal.   Cardiovascular:      Rate and Rhythm: Normal rate and regular rhythm.      Pulses: Normal pulses.      Heart sounds: Normal heart sounds.   Pulmonary:      Effort: Pulmonary effort is normal.      Breath sounds: Normal breath sounds. No wheezing or rales.   Abdominal:      General: Bowel sounds are normal. There is no distension.      Palpations: Abdomen is soft. There is no mass.      Tenderness: There is no abdominal tenderness.   Musculoskeletal:         General: No swelling or tenderness. Normal range of motion.      Left knee: Normal. No swelling, deformity or effusion.      Comments: No lower extremity swelling noted   Skin:     General: Skin is warm and dry.      Capillary Refill: Capillary refill takes less than 2 seconds.   Neurological:      General: No focal deficit present.      Mental Status: She is alert and oriented to person, place, and time.   Psychiatric:         Mood and Affect: Mood normal.          Assessment/Plan:  Thalia Rg is a 51 y.o. female who presents today for :    Thalia was seen today for hospital follow up.    Diagnoses and all orders for this visit:    Acute pulmonary embolism without acute cor pulmonale, unspecified pulmonary embolism type  -     APTT; Future  -     Ambulatory referral/consult to Hematology / Oncology; Future    Continue Eliquis as prescribed.  Will refer to Hematology to see if patient needs to be on Eliquis indefinitely.  Reviewed PTT reading from hospitalization.  Greater than 150. Likely erroneous reading while on heparin previously.  Clinically improved.    Acute deep vein thrombosis (DVT) of proximal vein of right lower extremity  -     APTT; Future  -     Ambulatory referral/consult to Hematology / Oncology; Future    As above.    Paresthesia of both lower extremities    Follow-up with  neurology as directed previously.    Acute pain of left knee    Offered workup including imaging but patient declined as symptoms are improving.    Follow-up with PCP as scheduled previously.        This office note has been dictated.  This dictation has been generated using M-Modal Fluency Direct dictation; some phonetic errors may occur.   My collaborating physician is Dr. Lauro Joaquin.

## 2022-07-07 ENCOUNTER — TELEPHONE (OUTPATIENT)
Dept: HEMATOLOGY/ONCOLOGY | Facility: CLINIC | Age: 52
End: 2022-07-07
Payer: MEDICARE

## 2022-07-11 ENCOUNTER — PATIENT MESSAGE (OUTPATIENT)
Dept: NEUROLOGY | Facility: CLINIC | Age: 52
End: 2022-07-11
Payer: MEDICARE

## 2022-07-13 ENCOUNTER — TELEPHONE (OUTPATIENT)
Dept: NEUROLOGY | Facility: CLINIC | Age: 52
End: 2022-07-13
Payer: MEDICARE

## 2022-07-13 NOTE — TELEPHONE ENCOUNTER
"----- Message from Diana Sheldon sent at 7/13/2022 11:56 AM CDT -----  Regarding: erik [facility] [phone]  .Type: Patient Call Back    Who called erik "[facility]"    What is the request in detail: Requesting clarification on IVIG    Can the clinic reply by [facility]? Call back    Would the patient rather a call back or a response via [facility]?  Call back    Best call back number:  [phone]              "

## 2022-07-13 NOTE — TELEPHONE ENCOUNTER
Dr. Downey they following needs clarification:    MD wrote dose @ 2gm/kg with frequency of 1gm/kg/day x2    A) Missing Frequency   B) How many cycles  C) No premeds and ancillary supplies? Can we add?      Pharmacy recommends IVIG 2gm/kg over 2-4 days x every 4 weeks x 13 cycles (to help with patient tolerance)    Please advise,  Skylar

## 2022-07-14 ENCOUNTER — PATIENT MESSAGE (OUTPATIENT)
Dept: NEUROLOGY | Facility: CLINIC | Age: 52
End: 2022-07-14
Payer: MEDICARE

## 2022-07-14 ENCOUNTER — CLINICAL SUPPORT (OUTPATIENT)
Dept: REHABILITATION | Facility: HOSPITAL | Age: 52
End: 2022-07-14
Attending: PSYCHIATRY & NEUROLOGY
Payer: MEDICARE

## 2022-07-14 DIAGNOSIS — R29.898 WEAKNESS OF LOWER EXTREMITY, UNSPECIFIED LATERALITY: ICD-10-CM

## 2022-07-14 DIAGNOSIS — R26.89 IMPAIRED GAIT AND MOBILITY: Primary | ICD-10-CM

## 2022-07-14 PROCEDURE — 97110 THERAPEUTIC EXERCISES: CPT | Mod: PN

## 2022-07-14 NOTE — PROGRESS NOTES
" [facility]   Physical Therapy Treatment Note/ Progress Note and Plan of Care Update    Name: Thalia Rg  Clinic Number: [id number]    Therapy Diagnosis:   Encounter Diagnoses   Name Primary?    Impaired gait and mobility Yes    Weakness of lower extremity, unspecified laterality      Physician: Reji Downey MD    Visit Date: 7/14/2022    Physician Orders: PT Eval and Treat   Medical Diagnosis from Referral:   R20.2 (ICD-10-CM) - Paresthesia of skin   G82.50 (ICD-10-CM) - Quadriplegia, unspecified      Evaluation Date: 5/3/2022  Authorization Period Expiration: 5/3/23  Plan of Care Expiration: 7/26/22 EXTEND POC to 9/26/22  Visit # / Visits authorized: 1/1, 6/20    PTA Visit #: 0/5     Time In: 2:00 PM   Time Out: 2:45 PM   Total Billable Time: 45 minutes    Precautions: Standard and Fall; quadriparesis with sensory disturbance (ddx in works)     SUBJECTIVE     Pt reports: she is now on blood thinners secondary to diagnoses of multiple blood clots. States prior to this dx she had a fall. States she was trying to walk to the bathroom with the walker. States she did not "feel secure" which led to her falling. Thinks her legs gave out which made her tired. States she is supposed to be getting an infusion soon.     She was compliant with home exercise program. - changed reps to 5 from 10 per education last session   Response to previous treatment: tired and sore for 2 days   Functional change: ongoing     Pain: 0/10  Location: N/A     OBJECTIVE     Resting BP: 106/69  Resting Spo2%: 94  HR: 98      Lower Extremity Strength    RLE LLE   Hip Flexion: 4-/5 4-/5   Hip Extension:  Can bridge  Can bridge    Hip Abduction: 4+/5 4-/5   Hip Adduction: 4-/5 4-/5   Knee Extension: 4/5 4/5   Knee Flexion: 4/5 4-/5   Ankle Dorsiflexion: 4-/5 (4/5) 3+/5 (4/5)   Ankle Plantarflexion: 4-/5 3+/5  (4-/5)      Abdominal Strength: 3+/5       Evaluation 7/14/22   Single Limb Stance R LE NT  (<10 sec " = HIGH FALL RISK) NT   Single Limb Stance L LE NT  (<10 sec = HIGH FALL RISK) NT   30 second sit to stand 4 repetitions 2 repetitions   Staley/ FGA/ Tinetti NT NT      Postural control:               - sitting able to maintain static sitting; dynamic sitting with BUE for support              - standing NT at this time     Gait Assessment:   - AD used: RW and wheelchair   - Assistance: with upright mobility Harriet; wheelchair mobility modA  - Distance: TUG and 6 meter walk test     GAIT DEVIATIONS:  Gait component performance: Decreased speed, shuffled steps B, limited step length B, limited B foot clearance without heel strike or toe off, increased UE weightbearing through RW, limited hip flexion, knee flexion and mild B knee buckling     Endurance Deficit: YES         Evaluation 22   Timed Up and Go 51 sec - BUE, RW  < 20 sec safe for independent transfers,     < 30 sec assist required for transfers 61 seconds, BUE, RW   6 meter walk test 0.12 m/s  0.19 m/s    6 min walk test NT NT      Functional Mobility (Bed mobility, transfers)  Bed mobility: Mod I  Supine to sit: Harriet  Sit to supine: Harriet  Rolling: Min A  Transfers to bed: Min A  Transfers to toilet: Min A  Sit to stand:  Min A  Stand pivot:  Min A  Car transfers: Min A  Wheelchair mobility: Mod A    Treatment     Thalia received the treatments listed below:      therapeutic exercises to develop strength, endurance, posture and core stabilization for 30 minutes including:    Testing listed above.     home exercise program:   1. Sit to stand 10x1  2. Bridge 10x2   3. Seated marching 10x2   4. Seated LAQ 10x2   5. Ankle pumps 10x2     Sittin minute rest break between ea trial  Nustep L1 1 min  Nustep L1 1 min  Nustep L1 1 min     Stand pivot transfer wheelchair <> nustep and wheelchair <> mat - with Rolling Walker (RW)     Sit to stand x5, x5   - blue squares for feet positioning/targets   Seated marches    - 10x2    - external target   Seated clam shells  with red theraband    - 10x2   - visual demonstration   Seated LAQ    - 10 x2    - external target   Seated hip ADD with green ball    - 10x2     FORWARD ambulation with Rolling Walker (RW) 10 ft - MAT to //bars    Lateral stepping in //bars 1 length to the right     Patient Education and Home Exercises     Home Exercises Provided and Patient Education Provided     Education provided:   - role of PT, POC, scheduling, home exercise program   - change home exercise program to 5 reps, 2 sets   - finding seated daily tasks that can be performed to utilize upper extremity within daily tasks - sorting silverware, folding hand towels     Written Home Exercises Provided: yes.  Exercises were reviewed and Thalia was able to demonstrate them prior to the end of the session.  Thalia demonstrated good  understanding of the education provided.      See EMR under Patient Instructions for exercises provided 5/3/2022.    ASSESSMENT   Thalia presented to session today after hospitalization for DVT, PE and being placed on blood thinners. Pt cleared by MDs to return to therapy following hospital discharge. Re-assessment performed today secondary to time since evaluation and recent hospitalization. Pt with decline in B LE as indicated by MMT, particular decline in B ankle dorsiflexion and L plantarflexion. Pt also with decreased reps completed on 30 second sit to stand test, indicating decreased functional strength. Pt with decline in TUG score as listed above, indicating decline in functional mobility and strength. Pt also with decline in self selected gait speed as indicated by 6MWT speed indicating decreased safety for home and community mobility. Pt remains at increased risk for falls secondary to scores on TUG and 30 second sit to stand below age related normative data. Pt continues to benefit from skilled PT services in order to address listed deficits, improve functional strength, decrease fall risk, maximize functional  independence, and promote improved quality of life. Pt educated on importance of movement at home safely in order to reduce risk of further DVT. No new goals met at this time. Pt currently in process of scheduling IVIG infusions. Plan to continue monitoring addition of diagnoses by Neurology and modifying exercise to fatigue levels as appropriate. Plan to extend POC x 2 months in order to progress toward all listed goals. Pt declined participation in therapy following assessment today secondary to fatigue.     Thalia Is progressing well towards her goals.   Pt prognosis is Fair.     Pt will continue to benefit from skilled outpatient physical therapy to address the deficits listed in the problem list box on initial evaluation, provide pt/family education and to maximize pt's level of independence in the home and community environment.     Pt's spiritual, cultural and educational needs considered and pt agreeable to plan of care and goals.     Anticipated barriers to physical therapy: differential diagnosis in the process     Goals:   Short Term Goals: 6 weeks   1. Patient will transfer sit to/from standing with RW, modified independent with good body mechanics to promote safe ambulation household distances. -progressing  2. Pt will ambulate 150 ft with RW, modified independent with good balance to reduce risk of falls. -progressing  3. Patient will improve functional strength by completing 5xSTS outcome measure score. -progressing  4. Patient will improve TUG score by 9 seconds to promote improved functional mobility. -progressing     Long Term Goals: 12 weeks   1. Pt will be independent with HEP to address any remaining deficits in balance, strength, or gait.  2. Pt will improve LE strength to >/= 4/5 in order to improve functional mobility at home and in the community.  3. Patient will improve TUG score by 18 seconds to promote improved functional mobility.  4. Patient will improve gait speed by 0.4 m/s to promote  improved community ambulation.  5.  Assess static and dynamic balance and create appropriate goals to track progression and improvements.     PLAN   Plan of care Certification: 5/3/2022 to 7/26/22 EXTEND to 9/26/2022     EXTEND POC x 2 months    Outpatient Physical Therapy 2 times weekly for 12 weeks to include the following interventions: Gait Training, Manual Therapy, Moist Heat/ Ice, Neuromuscular Re-ed, Patient Education, Therapeutic Activities and Therapeutic Exercise.      Monitor vitals, low reps, low sets, low weights. Vision > sensory (substitution).     Stephanie Hicks, PT, DPT  07/20/2022

## 2022-07-14 NOTE — TELEPHONE ENCOUNTER
"[facility] at [facility] advised (Dr. Downey's response below)      Progress Notes      Reji Downey MD at 7/14/2022  7:51 AM    Status: Signed      Hi,  I just want 1g/kg/day x 2 days total.  I am trying to get her in with Dr. River who will be taking over her care.  She has an appt with him in Sep but please put her on a list to be seen asap.     Pre-meds and ancillary supplies fine.  NENA Zaragoza MA at 7/13/2022  2:30 PM    Status: Signed      Dr. Downey they following needs clarification:     MD wrote dose @ 2gm/kg with frequency of 1gm/kg/day x2     A) Missing Frequency   B) How many cycles  C) No premeds and ancillary supplies? Can we add?        Pharmacy recommends IVIG 2gm/kg over 2-4 days x every 4 weeks x 13 cycles (to help with patient tolerance)     Please advise,  Skylar Zaragoza MA at 7/13/2022  2:30 PM    Status: Signed      ----- Message from Diana Chung sent at 7/13/2022 11:56 AM CDT -----  Regarding: erik "[facility]" [phone]  .Type: Patient Call Back     Who called erik "[facility]"     What is the request in detail: Requesting clarification on IVIG     Can the clinic reply by [facility]? Call back     Would the patient rather a call back or a response via [facility]?  Call back     Best call back number:  [phone]              "

## 2022-07-14 NOTE — TELEPHONE ENCOUNTER
Hi,  I just want 1g/kg/day x 2 days total.  I am trying to get her in with Dr. River who will be taking over her care.  She has an appt with him in Sep but please put her on a list to be seen asap.    Pre-meds and ancillary supplies fine.  NENA

## 2022-07-19 ENCOUNTER — TELEPHONE (OUTPATIENT)
Dept: NEUROLOGY | Facility: CLINIC | Age: 52
End: 2022-07-19

## 2022-07-19 ENCOUNTER — PATIENT MESSAGE (OUTPATIENT)
Dept: NEUROLOGY | Facility: CLINIC | Age: 52
End: 2022-07-19
Payer: MEDICARE

## 2022-07-19 ENCOUNTER — DOCUMENTATION ONLY (OUTPATIENT)
Dept: REHABILITATION | Facility: HOSPITAL | Age: 52
End: 2022-07-19
Payer: MEDICARE

## 2022-07-19 NOTE — PROGRESS NOTES
Date: 07/19/2022     Discussed PT Hold with patient / patient spouse this date pending IVIG medication status, decline in physical function, strength and impact of fatigue on this patient.     I also have discussed appropriate mobility, positioning / position changes, exercise at home with low reps, sets, no weights/bands, etc.     Will continue to follow up. Have reached out to medical / treatment team for recommendations moving forward.    Myrna Thompson, PT, DPT, NCS  07/19/2022

## 2022-07-19 NOTE — TELEPHONE ENCOUNTER
----- Message from Axel Almonte sent at 7/19/2022  1:38 PM CDT -----  Name of Who is Calling: Aye ([facility])         What is the request in detail: Aye is calling to follow up on the signed order request for the patient. Please advise   Fax #[phone]       Can the clinic reply by [facility]: No         [facility]: [phone]

## 2022-07-25 ENCOUNTER — PATIENT MESSAGE (OUTPATIENT)
Dept: REHABILITATION | Facility: HOSPITAL | Age: 52
End: 2022-07-25
Payer: MEDICARE

## 2022-07-27 ENCOUNTER — DOCUMENTATION ONLY (OUTPATIENT)
Dept: REHABILITATION | Facility: HOSPITAL | Age: 52
End: 2022-07-27
Payer: MEDICARE

## 2022-07-27 NOTE — PROGRESS NOTES
Date: 7/26/22  Time: ~9:40    Reached out to patient / patient spouse at this time regarding IVIG treatment and therapy services moving forward. Pt spouse notes that orders have been placed for IVIG medication and that they are waiting to hear if nursing service is found for administration.     Pt / pt spouse urged to contact services to check in later this week. Educated on PT skilled services HOLD at this time pending medication administration.     Educated on safe mobility and transfers in home to decrease fall risk and improve patient energy levels. Educated on possibility of discharge from OP PT services with referral to HHPT services in future.     Will follow up later this week.     Myrna Thompson, PT, DPT, NCS  07/27/2022

## 2022-07-28 ENCOUNTER — DOCUMENTATION ONLY (OUTPATIENT)
Dept: REHABILITATION | Facility: HOSPITAL | Age: 52
End: 2022-07-28
Payer: MEDICARE

## 2022-07-28 ENCOUNTER — PATIENT MESSAGE (OUTPATIENT)
Dept: NEUROLOGY | Facility: CLINIC | Age: 52
End: 2022-07-28
Payer: MEDICARE

## 2022-07-28 ENCOUNTER — TELEPHONE (OUTPATIENT)
Dept: FAMILY MEDICINE | Facility: CLINIC | Age: 52
End: 2022-07-28
Payer: MEDICARE

## 2022-07-28 ENCOUNTER — TELEPHONE (OUTPATIENT)
Dept: NEUROLOGY | Facility: CLINIC | Age: 52
End: 2022-07-28

## 2022-07-28 DIAGNOSIS — M62.81 PROXIMAL LIMB MUSCLE WEAKNESS: Primary | ICD-10-CM

## 2022-07-28 DIAGNOSIS — R20.2 PARESTHESIA OF BOTH LOWER EXTREMITIES: ICD-10-CM

## 2022-07-28 NOTE — PROGRESS NOTES
[facility]  Occupational Therapy Discharge Note    Name: Thalia Rg  Clinic Number: [id number]    Therapy Diagnosis:        Encounter Diagnoses   Name Primary?    Impaired gait and mobility Yes    Weakness of lower extremity, unspecified laterality        Physician: Reji Downey MD     Physician Orders: PT Eval and Treat   Medical Diagnosis from Referral:   R20.2 (ICD-10-CM) - Paresthesia of skin   G82.50 (ICD-10-CM) - Quadriplegia, unspecified      Evaluation Date: 5/3/2022    Date of Last visit: 7/14/22  Total Visits Received: 7    For this plan of care:  Number of cancellations: 1 formal - several by PT due to medical complexity   Number of no shows: 0    ASSESSMENT      Discharge reason: decline in functional status - requiring IVIG adminstration by in home nursing. Will benefit from Home Health PT/OT at this time.     Thalia Rg is being discharged from physical therapy services at this time secondary to medical complexity and decline in functional status. Therapist spoke to patient over the phone/spoke to patient spouse over the phone to make patient aware of this discharge. Patient was instructed to reach out to MD for Home Health referral for continuance of therapy services this date.      Goals: no goals were met secondary to decline in function / inappropriateness for OP PT services at this time.     PLAN   This patient is discharged from OP Physical Therapy effective 07/28/2022. Sent word to PCP/Neurology team for referral to HHPT/OT at this time.     Myrna Thompson, PT, DPT, NCS  07/28/2022

## 2022-07-28 NOTE — TELEPHONE ENCOUNTER
----- Message from Myrna Thompson PT sent at 7/28/2022  9:45 AM CDT -----  Regarding: FW: Current Functional Status  Thank you all for your messages regarding Mrs. Rg.     Patient spouse spoke with me and IVIG has been ordered just waiting on a nurse to get connected for administration.     I have decided that OP PT/OT may be too challenging at this time for patient.     Would someone be able to put in orders for Home Health PT/OT?    Thank you for your time!  Myrna Thompson PT, DPT, NCS  07/28/2022        ----- Message -----  From: Myrna Thompson PT  Sent: 7/19/2022   6:07 PM CDT  To: Skylar Zaragoza MA, Garfield River MD, #  Subject: Current Functional Status                        Good Evening,     I am reaching out at this time as I am seeing this patient for PT (neuro) services alongside Chloe Maldonado (Occupational Therapy Neuro) at [facility].     We have noted a significant decline in patient status over the past weeks > months and do   --- recognize her EMG results were abnormal with noted axonal sensory polyneuropathy  --- recognize that she has recently been put on blood thinners for blood clots incidentally found on work up  --- recognize that there has been an issue with paperwork so patient has not started IVIG therapy at this time    We are hoping to discuss with this team moving forward for patient care - she was unable to get into car today to come to clinic due to severity of weakness and progression of weakness and fatigue.     I have placed patient on hold for week and have alerted COURTNEY Maldonado to this decision.     I also have reached out to  via phone for appropriate mobility, positioning / position changes, exercise at home with low reps, sets, no weights/bands, etc.     We are reaching out to obtain information regarding future therapy services at this time.     Thank you all for your time! Please reach out with questions or concerns.     Myrna Thompson PT, DPT,  [facility]  07/19/2022

## 2022-07-28 NOTE — TELEPHONE ENCOUNTER
----- Message from Myrna Thompson PT sent at 7/28/2022  9:45 AM CDT -----  Regarding: FW: Current Functional Status  Thank you all for your messages regarding Mrs. Rg.     Patient spouse spoke with me and IVIG has been ordered just waiting on a nurse to get connected for administration.     I have decided that OP PT/OT may be too challenging at this time for patient.     Would someone be able to put in orders for Home Health PT/OT?    Thank you for your time!  Myrna Thompson PT, DPT, NCS  07/28/2022        ----- Message -----  From: Myrna Thompson PT  Sent: 7/19/2022   6:07 PM CDT  To: Skylar Zaragoza MA, Garfield River MD, #  Subject: Current Functional Status                        Good Evening,     I am reaching out at this time as I am seeing this patient for PT (neuro) services alongside Chloe Maldonado (Occupational Therapy Neuro) at [facility].     We have noted a significant decline in patient status over the past weeks > months and do   --- recognize her EMG results were abnormal with noted axonal sensory polyneuropathy  --- recognize that she has recently been put on blood thinners for blood clots incidentally found on work up  --- recognize that there has been an issue with paperwork so patient has not started IVIG therapy at this time    We are hoping to discuss with this team moving forward for patient care - she was unable to get into car today to come to clinic due to severity of weakness and progression of weakness and fatigue.     I have placed patient on hold for week and have alerted COURTNEY Maldonado to this decision.     I also have reached out to  via phone for appropriate mobility, positioning / position changes, exercise at home with low reps, sets, no weights/bands, etc.     We are reaching out to obtain information regarding future therapy services at this time.     Thank you all for your time! Please reach out with questions or concerns.     Myrna Thompson PT, DPT,  [facility]  07/19/2022

## 2022-07-29 ENCOUNTER — PATIENT MESSAGE (OUTPATIENT)
Dept: REHABILITATION | Facility: HOSPITAL | Age: 52
End: 2022-07-29
Payer: MEDICARE

## 2022-08-04 ENCOUNTER — PATIENT MESSAGE (OUTPATIENT)
Dept: NEUROLOGY | Facility: CLINIC | Age: 52
End: 2022-08-04
Payer: MEDICARE

## 2022-08-07 ENCOUNTER — PATIENT MESSAGE (OUTPATIENT)
Dept: NEUROLOGY | Facility: CLINIC | Age: 52
End: 2022-08-07
Payer: MEDICARE

## 2022-08-08 ENCOUNTER — OFFICE VISIT (OUTPATIENT)
Dept: HEMATOLOGY/ONCOLOGY | Facility: CLINIC | Age: 52
End: 2022-08-08
Payer: MEDICARE

## 2022-08-08 ENCOUNTER — PATIENT MESSAGE (OUTPATIENT)
Dept: NEUROLOGY | Facility: CLINIC | Age: 52
End: 2022-08-08
Payer: MEDICARE

## 2022-08-08 VITALS
OXYGEN SATURATION: 97 % | DIASTOLIC BLOOD PRESSURE: 66 MMHG | SYSTOLIC BLOOD PRESSURE: 97 MMHG | RESPIRATION RATE: 12 BRPM | TEMPERATURE: 98 F | HEIGHT: 68 IN | BODY MASS INDEX: 26.19 KG/M2 | WEIGHT: 172.81 LBS | HEART RATE: 78 BPM

## 2022-08-08 DIAGNOSIS — G82.50 QUADRIPARESIS: ICD-10-CM

## 2022-08-08 DIAGNOSIS — R29.898 WEAKNESS OF BOTH LOWER EXTREMITIES: ICD-10-CM

## 2022-08-08 DIAGNOSIS — Z86.718 HISTORY OF DVT OF LOWER EXTREMITY: ICD-10-CM

## 2022-08-08 DIAGNOSIS — Z86.711 HISTORY OF PULMONARY EMBOLISM: Primary | ICD-10-CM

## 2022-08-08 DIAGNOSIS — R20.2 PARESTHESIA OF BOTH LOWER EXTREMITIES: ICD-10-CM

## 2022-08-08 PROCEDURE — 99999 PR PBB SHADOW E&M-EST. PATIENT-LVL IV: CPT | Mod: PBBFAC,,, | Performed by: STUDENT IN AN ORGANIZED HEALTH CARE EDUCATION/TRAINING PROGRAM

## 2022-08-08 PROCEDURE — 99999 PR PBB SHADOW E&M-EST. PATIENT-LVL IV: ICD-10-PCS | Mod: PBBFAC,,, | Performed by: STUDENT IN AN ORGANIZED HEALTH CARE EDUCATION/TRAINING PROGRAM

## 2022-08-08 PROCEDURE — 99214 OFFICE O/P EST MOD 30 MIN: CPT | Mod: PBBFAC | Performed by: STUDENT IN AN ORGANIZED HEALTH CARE EDUCATION/TRAINING PROGRAM

## 2022-08-08 PROCEDURE — 99204 PR OFFICE/OUTPT VISIT, NEW, LEVL IV, 45-59 MIN: ICD-10-PCS | Mod: S$PBB,,, | Performed by: STUDENT IN AN ORGANIZED HEALTH CARE EDUCATION/TRAINING PROGRAM

## 2022-08-08 PROCEDURE — 99204 OFFICE O/P NEW MOD 45 MIN: CPT | Mod: S$PBB,,, | Performed by: STUDENT IN AN ORGANIZED HEALTH CARE EDUCATION/TRAINING PROGRAM

## 2022-08-08 NOTE — PROGRESS NOTES
Hematology- Oncology Clinic Note :      8/8/2022    RFV / chief complaint- Deep Vein Thrombosis        HPI  Pt is a 51 y.o. female who  has a past medical history of Abnormal Pap smear of vagina, Autoimmune hepatitis, Bipolar 1 disorder, and Breast disorder.   Pt presents to the clinic today for anticoagulation recs    Pt has been experiencing progressively worsening neuropathy in bilateral lower extremities for the last 6 months or so. She had minimal sensation to her legs and now she is essentially wheel chair or bed bound.   She had extensive work up with neurology.   She recently started IVIG and has notices some sensation returning to her legs.     The work up for neuropathy included CT which showed pulmonary embolism.   She was admitted to hospital in June 2022. US showed R LE DVT.     Today she reports to be doing ok. She is taking blood thinner without any bleeding issues.     No previous hx of VTE. No Fhx of VTE.     Reviewed past medical/surgical/social history    Past Medical History:   Diagnosis Date    Abnormal Pap smear of vagina     Autoimmune hepatitis     Bipolar 1 disorder     Breast disorder       Past Surgical History:   Procedure Laterality Date    LIVER BIOPSY        Review of patient's allergies indicates:  No Known Allergies   Social History     Tobacco Use    Smoking status: Never Smoker    Smokeless tobacco: Never Used   Substance Use Topics    Alcohol use: No      Family History   Problem Relation Age of Onset    Cancer Father     Diabetes Maternal Grandmother     Breast cancer Maternal Aunt           Review of Systems :  Review of Systems   Constitutional: Positive for malaise/fatigue. Negative for chills, diaphoresis, fever and weight loss.   HENT: Negative.  Negative for congestion, hearing loss, nosebleeds, sore throat and tinnitus.    Eyes: Negative.  Negative for blurred vision and discharge.   Respiratory: Negative for cough, hemoptysis, sputum production, shortness of  "breath and wheezing.    Cardiovascular: Negative.  Negative for chest pain, palpitations and leg swelling.   Gastrointestinal: Negative.  Negative for abdominal pain, blood in stool, constipation, diarrhea, heartburn, melena, nausea and vomiting.   Genitourinary: Negative.    Musculoskeletal: Negative.  Negative for back pain, falls, joint pain and myalgias.   Skin: Negative.  Negative for itching and rash.   Neurological: Positive for tingling, sensory change and weakness. Negative for dizziness, speech change, focal weakness, seizures, loss of consciousness and headaches.   Endo/Heme/Allergies: Negative.  Does not bruise/bleed easily.   Psychiatric/Behavioral: Negative.  Negative for depression. The patient is not nervous/anxious and does not have insomnia.                Physical Exam :  BP 97/66 (BP Location: Right arm, Patient Position: Sitting, BP Method: Medium (Automatic))   Pulse 78   Temp 98.1 °F (36.7 °C) (Oral)   Resp 12   Ht 5' 8" (1.727 m)   Wt 78.4 kg (172 lb 13.5 oz)   LMP 05/19/2012   SpO2 97%   BMI 26.28 kg/m²   Wt Readings from Last 3 Encounters:   08/08/22 78.4 kg (172 lb 13.5 oz)   07/01/22 80.9 kg (178 lb 5.6 oz)   06/21/22 78.4 kg (172 lb 13.5 oz)       Body mass index is 26.28 kg/m².      Physical Exam  Vitals and nursing note reviewed.   Constitutional:       General: She is not in acute distress.     Appearance: Normal appearance. She is not ill-appearing.      Comments: In wheel chair    accompanies   AMBER:      Head: Normocephalic and atraumatic.      Right Ear: External ear normal.      Left Ear: External ear normal.      Mouth/Throat:      Pharynx: No oropharyngeal exudate.   Eyes:      General: No scleral icterus.        Right eye: No discharge.         Left eye: No discharge.   Cardiovascular:      Rate and Rhythm: Normal rate.      Heart sounds: Normal heart sounds. No murmur heard.  Pulmonary:      Effort: Pulmonary effort is normal. No respiratory distress.      " Breath sounds: Normal breath sounds.   Abdominal:      General: Bowel sounds are normal. There is no distension.      Palpations: Abdomen is soft.   Musculoskeletal:         General: No swelling or deformity.      Cervical back: Normal range of motion and neck supple.      Right lower leg: No edema.      Left lower leg: No edema.   Skin:     Coloration: Skin is not jaundiced.      Findings: No bruising, erythema, lesion or rash.   Neurological:      Mental Status: She is alert and oriented to person, place, and time.      Gait: Gait abnormal.   Psychiatric:         Mood and Affect: Mood normal.         Behavior: Behavior normal.             Current Outpatient Medications   Medication Sig Dispense Refill    apixaban (ELIQUIS) 5 mg Tab TAKE 1 TABLET BY MOUTH TWICE DAILY 60 tablet 2    folic acid (FOLVITE) 1 MG tablet Take 1 tablet (1 mg total) by mouth once daily. 90 tablet 3    mercaptopurine (PURINETHOL) 50 mg tablet Take 100 mg by mouth once daily.       ziprasidone (GEODON) 20 MG Cap Take 20 mg by mouth 2 (two) times daily.       No current facility-administered medications for this visit.       Pertinent Diagnostic studies:      No visits with results within 1 Week(s) from this visit.   Latest known visit with results is:   Admission on 06/21/2022, Discharged on 06/22/2022   Component Date Value Ref Range Status    WBC 06/21/2022 11.59  3.90 - 12.70 K/uL Final    RBC 06/21/2022 4.37  4.00 - 5.40 M/uL Final    Hemoglobin 06/21/2022 11.9 (A) 12.0 - 16.0 g/dL Final    Hematocrit 06/21/2022 37.5  37.0 - 48.5 % Final    MCV 06/21/2022 86  82 - 98 fL Final    MCH 06/21/2022 27.2  27.0 - 31.0 pg Final    MCHC 06/21/2022 31.7 (A) 32.0 - 36.0 g/dL Final    RDW 06/21/2022 13.2  11.5 - 14.5 % Final    Platelets 06/21/2022 262  150 - 450 K/uL Final    MPV 06/21/2022 11.6  9.2 - 12.9 fL Final    Immature Granulocytes 06/21/2022 0.3  0.0 - 0.5 % Final    Gran # (ANC) 06/21/2022 6.8  1.8 - 7.7 K/uL Final     Immature Grans (Abs) 06/21/2022 0.03  0.00 - 0.04 K/uL Final    Comment: Mild elevation in immature granulocytes is non specific and   can be seen in a variety of conditions including stress response,   acute inflammation, trauma and pregnancy. Correlation with other   laboratory and clinical findings is essential.      Lymph # 06/21/2022 3.7  1.0 - 4.8 K/uL Final    Mono # 06/21/2022 0.7  0.3 - 1.0 K/uL Final    Eos # 06/21/2022 0.3  0.0 - 0.5 K/uL Final    Baso # 06/21/2022 0.04  0.00 - 0.20 K/uL Final    nRBC 06/21/2022 0  0 /100 WBC Final    Gran % 06/21/2022 58.9  38.0 - 73.0 % Final    Lymph % 06/21/2022 32.1  18.0 - 48.0 % Final    Mono % 06/21/2022 6.1  4.0 - 15.0 % Final    Eosinophil % 06/21/2022 2.3  0.0 - 8.0 % Final    Basophil % 06/21/2022 0.3  0.0 - 1.9 % Final    Differential Method 06/21/2022 Automated   Final    Sodium 06/21/2022 142  136 - 145 mmol/L Final    Potassium 06/21/2022 3.6  3.5 - 5.1 mmol/L Final    Chloride 06/21/2022 104  95 - 110 mmol/L Final    CO2 06/21/2022 27  23 - 29 mmol/L Final    Glucose 06/21/2022 77  70 - 110 mg/dL Final    BUN 06/21/2022 5 (A) 6 - 20 mg/dL Final    Creatinine 06/21/2022 0.7  0.5 - 1.4 mg/dL Final    Calcium 06/21/2022 9.4  8.7 - 10.5 mg/dL Final    Total Protein 06/21/2022 7.7  6.0 - 8.4 g/dL Final    Albumin 06/21/2022 3.3 (A) 3.5 - 5.2 g/dL Final    Total Bilirubin 06/21/2022 0.6  0.1 - 1.0 mg/dL Final    Comment: For infants and newborns, interpretation of results should be based  on gestational age, weight and in agreement with clinical  observations.    Premature Infant recommended reference ranges:  Up to 24 hours.............<8.0 mg/dL  Up to 48 hours............<12.0 mg/dL  3-5 days..................<15.0 mg/dL  6-29 days.................<15.0 mg/dL      Alkaline Phosphatase 06/21/2022 84  55 - 135 U/L Final    AST 06/21/2022 23  10 - 40 U/L Final    ALT 06/21/2022 11  10 - 44 U/L Final    Anion Gap 06/21/2022 11  8 - 16  mmol/L Final    eGFR if African American 06/21/2022 >60  >60 mL/min/1.73 m^2 Final    eGFR if non African American 06/21/2022 >60  >60 mL/min/1.73 m^2 Final    Comment: Calculation used to obtain the estimated glomerular filtration  rate (eGFR) is the CKD-EPI equation.       Troponin I 06/21/2022 0.006  0.000 - 0.026 ng/mL Final    Comment: The reference interval for Troponin I represents the 99th percentile   cutoff   for our facility and is consistent with 3rd generation assay   performance.      BNP 06/21/2022 <10  0 - 99 pg/mL Final    Values of less than 100 pg/ml are consistent with non-CHF populations.    aPTT 06/21/2022 22.2  21.0 - 32.0 sec Final    aPTT therapeutic range = 39-69 seconds    Prothrombin Time 06/21/2022 10.5  9.0 - 12.5 sec Final    INR 06/21/2022 1.0  0.8 - 1.2 Final    Comment: Coumadin Therapy:  2.0 - 3.0 for INR for all indicators except mechanical heart valves  and antiphospholipid syndromes which should use 2.5 - 3.5.      aPTT 06/21/2022 129.4 (A) 21.0 - 32.0 sec Final    aPTT therapeutic range = 39-69 seconds    WBC 06/22/2022 11.31  3.90 - 12.70 K/uL Final    RBC 06/22/2022 4.34  4.00 - 5.40 M/uL Final    Hemoglobin 06/22/2022 11.7 (A) 12.0 - 16.0 g/dL Final    Hematocrit 06/22/2022 36.4 (A) 37.0 - 48.5 % Final    MCV 06/22/2022 84  82 - 98 fL Final    MCH 06/22/2022 27.0  27.0 - 31.0 pg Final    MCHC 06/22/2022 32.1  32.0 - 36.0 g/dL Final    RDW 06/22/2022 13.1  11.5 - 14.5 % Final    Platelets 06/22/2022 265  150 - 450 K/uL Final    MPV 06/22/2022 11.1  9.2 - 12.9 fL Final    Immature Granulocytes 06/22/2022 0.3  0.0 - 0.5 % Final    Gran # (ANC) 06/22/2022 7.4  1.8 - 7.7 K/uL Final    Immature Grans (Abs) 06/22/2022 0.03  0.00 - 0.04 K/uL Final    Comment: Mild elevation in immature granulocytes is non specific and   can be seen in a variety of conditions including stress response,   acute inflammation, trauma and pregnancy. Correlation with other    laboratory and clinical findings is essential.      Lymph # 06/22/2022 2.8  1.0 - 4.8 K/uL Final    Mono # 06/22/2022 0.7  0.3 - 1.0 K/uL Final    Eos # 06/22/2022 0.3  0.0 - 0.5 K/uL Final    Baso # 06/22/2022 0.03  0.00 - 0.20 K/uL Final    nRBC 06/22/2022 0  0 /100 WBC Final    Gran % 06/22/2022 65.7  38.0 - 73.0 % Final    Lymph % 06/22/2022 24.9  18.0 - 48.0 % Final    Mono % 06/22/2022 6.5  4.0 - 15.0 % Final    Eosinophil % 06/22/2022 2.3  0.0 - 8.0 % Final    Basophil % 06/22/2022 0.3  0.0 - 1.9 % Final    Differential Method 06/22/2022 Automated   Final    aPTT 06/22/2022 28.7  21.0 - 32.0 sec Final    aPTT therapeutic range = 39-69 seconds    aPTT 06/22/2022 >150.0 (A) 21.0 - 32.0 sec Final    Comment: aPTT therapeutic range = 39-69 seconds  APTT   critical result(s) called and verbal readback obtained from   TOREY ÁLVAREZ @ 10:45AM by LAR 06/22/2022 10:46      BSA 06/22/2022 1.94  m2 Final    TDI SEPTAL 06/22/2022 0.08  m/s Final    LV LATERAL E/E' RATIO 06/22/2022 4.90  m/s Final    LV SEPTAL E/E' RATIO 06/22/2022 6.13  m/s Final    LA WIDTH 06/22/2022 3.54  cm Final    TDI LATERAL 06/22/2022 0.10  m/s Final    PV PEAK VELOCITY 06/22/2022 0.80  cm/s Final    LVIDd 06/22/2022 3.95  3.5 - 6.0 cm Final    IVS 06/22/2022 0.94  0.6 - 1.1 cm Final    Posterior Wall 06/22/2022 0.63  0.6 - 1.1 cm Final    LVIDs 06/22/2022 2.89  2.1 - 4.0 cm Final    FS 06/22/2022 27  28 - 44 % Final    LA volume 06/22/2022 51.55  cm3 Final    Sinus 06/22/2022 2.84  cm Final    STJ 06/22/2022 2.02  cm Final    LV mass 06/22/2022 89.26  g Final    LA size 06/22/2022 3.33  cm Final    RVDD 06/22/2022 3.35  cm Final    TAPSE 06/22/2022 1.87  cm Final    Left Ventricle Relative Wall Thick* 06/22/2022 0.32  cm Final    AV mean gradient 06/22/2022 3  mmHg Final    AV valve area 06/22/2022 2.42  cm2 Final    AV Velocity Ratio 06/22/2022 0.76   Final    AV index (prosthetic) 06/22/2022 0.95   Final     MV valve area p 1/2 method 06/22/2022 3.18  cm2 Final    E/A ratio 06/22/2022 0.82   Final    Mean e' 06/22/2022 0.09  m/s Final    E wave deceleration time 06/22/2022 238.40  msec Final    IVRT 06/22/2022 121.11  msec Final    Pulm vein S/D ratio 06/22/2022 1.04   Final    LVOT diameter 06/22/2022 1.80  cm Final    LVOT area 06/22/2022 2.5  cm2 Final    LVOT peak fransico 06/22/2022 0.93  m/s Final    LVOT peak VTI 06/22/2022 18.14  cm Final    Ao peak fransico 06/22/2022 1.22  m/s Final    Ao VTI 06/22/2022 19.05  cm Final    LVOT stroke volume 06/22/2022 46.14  cm3 Final    AV peak gradient 06/22/2022 6  mmHg Final    E/E' ratio 06/22/2022 5.44  m/s Final    MV Peak E Fransico 06/22/2022 0.49  m/s Final    TR Max Fransico 06/22/2022 2.22  m/s Final    MV stenosis pressure 1/2 time 06/22/2022 69.13  ms Final    MV Peak A Fransico 06/22/2022 0.60  m/s Final    PV Peak S Fransico 06/22/2022 0.26  m/s Final    PV Peak D Fransico 06/22/2022 0.25  m/s Final    LV Systolic Volume 06/22/2022 31.86  mL Final    LV Systolic Volume Index 06/22/2022 16.6  mL/m2 Final    LV Diastolic Volume 06/22/2022 67.75  mL Final    LV Diastolic Volume Index 06/22/2022 35.29  mL/m2 Final    LA Volume Index 06/22/2022 26.8  mL/m2 Final    LV Mass Index 06/22/2022 46  g/m2 Final    RA Major Axis 06/22/2022 4.26  cm Final    Left Atrium Minor Axis 06/22/2022 4.78  cm Final    Left Atrium Major Axis 06/22/2022 5.57  cm Final    Triscuspid Valve Regurgitation Pea* 06/22/2022 20  mmHg Final    RA Width 06/22/2022 3.36  cm Final    Right Atrial Pressure (from IVC) 06/22/2022 8  mmHg Final    EF 06/22/2022 60  % Final    TV rest pulmonary artery pressure 06/22/2022 28  mmHg Final           Oncology History    No history exists.     Assessment :       1. History of pulmonary embolism    2. History of DVT of lower extremity    3. Paresthesia of both lower extremities    4. Weakness of both lower extremities    5. Quadriparesis        Plan :        DVT/ PE in the setting of immobility due to neurological disorder causing BLE weakness.     CT CAP 6/16/22- Pulmonary thromboemboli in the distal main pulmonary arteries bilaterally extending into the interlobar and some segmental branches.   Scattered irregular pulmonary opacities.  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  6/21/22 US - Partially occlusive thrombus within the right femoral vein and within 1 of the paired right posterior tibial veins and 1 of the paired right peroneal veins.  No thrombus within the left lower extremity    Hand out on patient education regarding safety while taking oral anticoagulation given   Avoid excessive NSAID use  Limit alcohol intake  Home safety measures to avoid falls.   ER precautions were discussed.     CTCAP neg for malignancy. No personal hx of VTE In past. No FHX of VTE. Continue anticoagulation until immobility improves or if severe bleeding occurs. Same was d/w pt and .       Electronically signed by Bharti Ruiz    [facility]      Future Appointments   Date Time Provider Department Center   9/9/2022  9:40 AM Mac Gresham MD [facility] - [facility]   9/15/2022  2:20 PM Garfield River MD [facility] NEURO [address]   12/6/2022  1:30 PM Bharti Ruiz MD [facility] HEM ON[facility]       I spent >45 mins on reviewing epic chart notes, reviewing tests, nursing concerns,obtaining history, performing physical exam, counseling and educating patient/family/caregiver, documentation, independently interpreting results and discussing them with patient/family/caregiver, care coordination, ordering medications/ tests/ procedures and referring and communicating with other health care professionals.       This note was created with voice recognition software.  Grammatical, syntax and spelling errors may be inevitable.

## 2022-08-10 PROCEDURE — G0180 MD CERTIFICATION HHA PATIENT: HCPCS | Mod: ,,, | Performed by: INTERNAL MEDICINE

## 2022-08-10 PROCEDURE — G0180 PR HOME HEALTH MD CERTIFICATION: ICD-10-PCS | Mod: ,,, | Performed by: INTERNAL MEDICINE

## 2022-08-23 ENCOUNTER — PATIENT MESSAGE (OUTPATIENT)
Dept: NEUROLOGY | Facility: CLINIC | Age: 52
End: 2022-08-23
Payer: MEDICARE

## 2022-08-24 ENCOUNTER — PATIENT MESSAGE (OUTPATIENT)
Dept: NEUROLOGY | Facility: CLINIC | Age: 52
End: 2022-08-24
Payer: MEDICARE

## 2022-08-25 ENCOUNTER — TELEPHONE (OUTPATIENT)
Dept: NEUROLOGY | Facility: CLINIC | Age: 52
End: 2022-08-25
Payer: MEDICARE

## 2022-08-25 NOTE — TELEPHONE ENCOUNTER
----- Message from Arnold Elliott MA sent at 8/25/2022  1:58 PM CDT -----  Contact: [facility]    [phone]    ----- Message -----  From: Kaya Birmingham  Sent: 8/25/2022  12:03 PM CDT  To: Perico Merrill Staff    Calling to confirm status if pt is to continue IVIG.  We will need discharge orders if this has been discontinued.  Pls call.

## 2022-08-30 ENCOUNTER — TELEPHONE (OUTPATIENT)
Dept: FAMILY MEDICINE | Facility: CLINIC | Age: 52
End: 2022-08-30
Payer: MEDICARE

## 2022-08-30 NOTE — TELEPHONE ENCOUNTER
----- Message from Axel Almonte sent at 8/30/2022 10:35 AM CDT -----  Name of Who is Calling: Hao ( nurse)         What is the request in detail: Hao is calling to request orders to continue home health. Please advise          Can the clinic reply by [facility]: no         [facility] [phone]

## 2022-09-09 ENCOUNTER — TELEPHONE (OUTPATIENT)
Dept: FAMILY MEDICINE | Facility: CLINIC | Age: 52
End: 2022-09-09
Payer: COMMERCIAL

## 2022-09-09 ENCOUNTER — OFFICE VISIT (OUTPATIENT)
Dept: FAMILY MEDICINE | Facility: CLINIC | Age: 52
End: 2022-09-09
Payer: MEDICARE

## 2022-09-09 DIAGNOSIS — I82.419 ACUTE DEEP VEIN THROMBOSIS (DVT) OF FEMORAL VEIN, UNSPECIFIED LATERALITY: Primary | ICD-10-CM

## 2022-09-09 DIAGNOSIS — M62.81 PROXIMAL LIMB MUSCLE WEAKNESS: ICD-10-CM

## 2022-09-09 DIAGNOSIS — I26.99 ACUTE PULMONARY EMBOLISM WITHOUT ACUTE COR PULMONALE, UNSPECIFIED PULMONARY EMBOLISM TYPE: ICD-10-CM

## 2022-09-09 DIAGNOSIS — I82.4Y1 ACUTE DEEP VEIN THROMBOSIS (DVT) OF PROXIMAL VEIN OF RIGHT LOWER EXTREMITY: Primary | ICD-10-CM

## 2022-09-09 PROCEDURE — 99213 OFFICE O/P EST LOW 20 MIN: CPT | Mod: 95,,, | Performed by: INTERNAL MEDICINE

## 2022-09-09 PROCEDURE — 99213 PR OFFICE/OUTPT VISIT, EST, LEVL III, 20-29 MIN: ICD-10-PCS | Mod: 95,,, | Performed by: INTERNAL MEDICINE

## 2022-09-09 NOTE — TELEPHONE ENCOUNTER
----- Message from Shira Fernandez sent at 9/9/2022  8:07 AM CDT -----  Type: Patient Call Back    Who called:      What is the request in detail: Patient's  states patient is too weak to come to her appointment, would like to know if the appointment for today can be switched to audio.      Can the clinic reply by [facility]? No     Would the patient rather a call back or a response via [facility]? Call back     Best call back number: [phone]

## 2022-09-09 NOTE — PROGRESS NOTES
Established Patient - Audio Only Telehealth Visit     The patient location is: in her home in [place]  The chief complaint leading to consultation is: follow up leg weakness  Visit type: Virtual visit with audio only (telephone)  Total time spent with patient: 14 minutes       The reason for the audio only service rather than synchronous audio and video virtual visit was related to technical difficulties or patient preference/necessity.     Each patient to whom I provide medical services by telemedicine is:  (1) informed of the relationship between the physician and patient and the respective role of any other health care provider with respect to management of the patient; and (2) notified that they may decline to receive medical services by telemedicine and may withdraw from such care at any time. Patient verbally consented to receive this service via voice-only telephone call.       HPI: 50 y/o w/ schizoaffective disorder progressive neuropathy characterized by bilateral lower extremity weakness is contacted by phone due to patient unable to leave home today due to feeling too weak. Her  helps with history as well. She had IVIG infusion four weeks ago after which time she has noticed improved sensation to her feet and lower legs. Still weak but has been able to ambulate with walker int he home.  feels strength is better as evidence by her being able to sit up for longer periods of the day. She is scheduled to see neurologist next week to discuss further infusion therapy.  reports NO melena or BRBPR, no epistaxis. Seh is taking apixiban twice daily for PE and DVT felt to be triggered by her lack of mobility.      Assessment and plan:    Progressive peripherial neuropathy: reported some improvement after first infusion of IVIG will seen neurology next week to discusson continuation.   She would benefit from continued PT exercises to improve strength and prevent post thrombotic syndrome related  to her DVT. Will request continuation of home health PT/OT due to difficulty of patient to leave home.    DVT/PE: continue bid dosing of apixiban                        This service was not originating from a related E/M service provided within the previous 7 days nor will  to an E/M service or procedure within the next 24 hours or my soonest available appointment.  Prevailing standard of care was able to be met in this audio-only visit.

## 2022-09-15 ENCOUNTER — OFFICE VISIT (OUTPATIENT)
Dept: NEUROLOGY | Facility: CLINIC | Age: 52
End: 2022-09-15
Payer: COMMERCIAL

## 2022-09-15 ENCOUNTER — LAB VISIT (OUTPATIENT)
Dept: LAB | Facility: HOSPITAL | Age: 52
End: 2022-09-15
Attending: PSYCHIATRY & NEUROLOGY
Payer: COMMERCIAL

## 2022-09-15 VITALS — BODY MASS INDEX: 26.28 KG/M2 | HEIGHT: 68 IN

## 2022-09-15 DIAGNOSIS — Z78.9 IMPAIRED INSTRUMENTAL ACTIVITIES OF DAILY LIVING (IADL): ICD-10-CM

## 2022-09-15 DIAGNOSIS — D89.89 IMMUNE-MEDIATED NEUROPATHY: Primary | ICD-10-CM

## 2022-09-15 DIAGNOSIS — G60.3 IDIOPATHIC PROGRESSIVE NEUROPATHY: ICD-10-CM

## 2022-09-15 DIAGNOSIS — G63 IMMUNE-MEDIATED NEUROPATHY: Primary | ICD-10-CM

## 2022-09-15 DIAGNOSIS — R20.2 PARESTHESIA OF BOTH LOWER EXTREMITIES: ICD-10-CM

## 2022-09-15 DIAGNOSIS — R29.898 WEAKNESS OF BOTH LOWER EXTREMITIES: ICD-10-CM

## 2022-09-15 DIAGNOSIS — R26.89 IMPAIRED GAIT AND MOBILITY: ICD-10-CM

## 2022-09-15 LAB
ANION GAP SERPL CALC-SCNC: 9 MMOL/L (ref 8–16)
BUN SERPL-MCNC: 7 MG/DL (ref 6–20)
CALCIUM SERPL-MCNC: 10 MG/DL (ref 8.7–10.5)
CHLORIDE SERPL-SCNC: 102 MMOL/L (ref 95–110)
CO2 SERPL-SCNC: 29 MMOL/L (ref 23–29)
CREAT SERPL-MCNC: 0.9 MG/DL (ref 0.5–1.4)
EST. GFR  (NO RACE VARIABLE): >60 ML/MIN/1.73 M^2
GLUCOSE SERPL-MCNC: 114 MG/DL (ref 70–110)
POTASSIUM SERPL-SCNC: 3.7 MMOL/L (ref 3.5–5.1)
SODIUM SERPL-SCNC: 140 MMOL/L (ref 136–145)

## 2022-09-15 PROCEDURE — 80048 BASIC METABOLIC PNL TOTAL CA: CPT | Performed by: PSYCHIATRY & NEUROLOGY

## 2022-09-15 PROCEDURE — 99212 OFFICE O/P EST SF 10 MIN: CPT | Mod: PBBFAC | Performed by: PSYCHIATRY & NEUROLOGY

## 2022-09-15 PROCEDURE — 99999 PR PBB SHADOW E&M-EST. PATIENT-LVL II: ICD-10-PCS | Mod: PBBFAC,,, | Performed by: PSYCHIATRY & NEUROLOGY

## 2022-09-15 PROCEDURE — 99215 PR OFFICE/OUTPT VISIT, EST, LEVL V, 40-54 MIN: ICD-10-PCS | Mod: S$GLB,,, | Performed by: PSYCHIATRY & NEUROLOGY

## 2022-09-15 PROCEDURE — 36415 COLL VENOUS BLD VENIPUNCTURE: CPT | Performed by: PSYCHIATRY & NEUROLOGY

## 2022-09-15 PROCEDURE — 99999 PR PBB SHADOW E&M-EST. PATIENT-LVL II: CPT | Mod: PBBFAC,,, | Performed by: PSYCHIATRY & NEUROLOGY

## 2022-09-15 PROCEDURE — 99215 OFFICE O/P EST HI 40 MIN: CPT | Mod: S$GLB,,, | Performed by: PSYCHIATRY & NEUROLOGY

## 2022-09-15 NOTE — PROGRESS NOTES
Subjective:     Chief Complaint:  Consult        History of Present Illness:  I have reviewed all relevant medical records in [facility]. Thalia Rg is a 51 y.o. 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 Previous patient of Dr. Downey, last saw her on 3/29/22 as initial visit. At that time patient was referred for extremity tingling and numbness that was affecting her gate. She had EMG completed by Dr. River that showed length dependant and primarily axonal sensory polyneuropathy in the lower and upper extremities. She was screened for malignancy with CT c/a/p and no signs of malignancy but she had radiographic evidence of an incidental PE and was started on eliquis. 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 She was started on IVIG 1g/kg/day x 2 days in early August 2022 by Dr. Downey until she saw Dr. River.     Today she refers that since IVIG she has felt better; she is now able to feel her thighs and developed paresthesia at her lowers. Unfortunately her only IVIG infusion was 8/3-8/4, but she tolerated well, only a mild headache.    Patient needs a wheel chair for long distances and walker at home. She has low energy and gets fatigued easily. Currently receiving PT at home. She is constantly tired and having difficulty maintaining sleep. She refers tingling on bl hands and dropping things. She is able to brush her teeth and feed herself, unable to do mateus. Denies any chewing problems, dysarthria, SOB, orthopnea, no falls, urinary/bladder incontinence, and mild saddle anesthesia. Numbness is from the waist down.     She is on mercaptopurine for autoimmune hepatitis and ziprasidone for bipolar disease. She denies any cancer history but there is long history of cancer in her family; mom passed from liver cancer,  "maternal aunt had breast cancer and another aunt had lung cancer. Brother has history of prostate cancer. She is up to date with her cancer screening, which has reportedly been unremarkable.     Initial HPI from Dr. Downey   50 y/o AAF referred for evaluation of extremity tingling.  She presents with her . Tingling in feet 3 months ago travelled up to the thighs over days.  This was followed by tingling in the hands.  Gait is affected and she is using a walker. There is associated weakness in all 4 extremities.  The duration of progression is difficult to tease out by history; the history conflicts regarding stability vs progression.   Bladder function is fine. No neck pain. No history of similar symptoms.      Review of Systems  Review of Systems   Constitutional:  Positive for activity change and fatigue. Negative for appetite change, chills and fever.   HENT:  Negative for congestion, sore throat, trouble swallowing and voice change.    Eyes: Negative.    Respiratory:  Negative for cough and shortness of breath.    Cardiovascular:  Negative for chest pain and leg swelling.   Gastrointestinal:  Negative for abdominal distention, abdominal pain, constipation, nausea and vomiting.   Endocrine: Negative.    Genitourinary: Negative.  Negative for difficulty urinating.   Musculoskeletal:  Positive for gait problem. Negative for back pain.   Skin: Negative.    Allergic/Immunologic: Positive for immunocompromised state.   Neurological:  Positive for weakness, numbness and headaches.   Psychiatric/Behavioral: Negative.        Objective:     Vitals:    09/15/22 1413   Height: 5' 8" (1.727 m)   PainSc: 0-No pain       Neurologic Exam     Mental Status   Oriented to person, place, and time.   Attention: normal.   Speech: speech is normal   Level of consciousness: alert  Knowledge: good.     Cranial Nerves     CN II   Visual fields full to confrontation.     CN III, IV, VI   Pupils are equal, round, and reactive to " light.  Extraocular motions are normal.     CN V   Facial sensation intact.     CN VII   Facial expression full, symmetric.     CN VIII   Hearing: intact    CN IX, X   CN IX normal.     CN XI   CN XI normal.     CN XII   CN XII normal.     Motor Exam   Muscle bulk: normal  Overall muscle tone: normalDeltoids 5/5 bilaterally  Biceps brachii 5/5 bilaterally  Triceps brachii 5/5 bilaterally  Wrist ext 4+/5 bilaterally  Interossei 4+/5 bilaterally  Hip flexion 4-/5 bilaterally  Knee ext R 4+/5 and L 4/5  Knee flexion R 4/5 and L 4-/5  Dorsiflexion R 4+/5 and L 5/5  Plantarflexion R 4+/5 and L 5/5       Sensory Exam   Right arm light touch: decreased from wrist  Left arm light touch: decreased from wrist  Right leg light touch: decreased from knee  Left leg light touch: decreased from knee  Right leg vibration: decreased from ankle  Left leg vibration: decreased from ankle    Gait, Coordination, and Reflexes     Gait  Gait: normal    Tremor   Resting tremor: absent  Intention tremor: absent  Action tremor: absent    Reflexes   Right brachioradialis: 1+  Left brachioradialis: 1+  Right biceps: 1+  Left biceps: 1+  Right triceps: 1+  Left triceps: 1+  Right patellar reflex grade: trace.  Left patellar: 0 (trace)  Right plantar: normal  Left plantar: normal    Physical Exam  Vitals reviewed.   Constitutional:       Appearance: She is well-developed.   HENT:      Head: Normocephalic and atraumatic.   Eyes:      Extraocular Movements: EOM normal.      Pupils: Pupils are equal, round, and reactive to light.   Neck:      Thyroid: No thyromegaly.   Cardiovascular:      Rate and Rhythm: Normal rate.   Pulmonary:      Effort: Pulmonary effort is normal.   Abdominal:      Palpations: Abdomen is soft.   Musculoskeletal:      Cervical back: Normal range of motion and neck supple.   Lymphadenopathy:      Cervical: No cervical adenopathy.   Skin:     General: Skin is warm and dry.   Neurological:      Mental Status: She is alert and  oriented to person, place, and time.      Gait: Gait is intact.      Deep Tendon Reflexes:      Reflex Scores:       Tricep reflexes are 1+ on the right side and 1+ on the left side.       Bicep reflexes are 1+ on the right side and 1+ on the left side.       Brachioradialis reflexes are 1+ on the right side and 1+ on the left side.       Patellar reflexes are 0 (trace) on the left side.  Psychiatric:         Speech: Speech normal.         Behavior: Behavior normal.         Thought Content: Thought content normal.         Lab Results   Component Value Date    WBC 11.31 06/22/2022    HGB 11.7 (L) 06/22/2022    HCT 36.4 (L) 06/22/2022     06/22/2022    ALT 11 06/21/2022    AST 23 06/21/2022     09/15/2022    K 3.7 09/15/2022     09/15/2022    CREATININE 0.9 09/15/2022    BUN 7 09/15/2022    CO2 29 09/15/2022    TSH 4.671 (H) 01/19/2022    HGBA1C 4.7 01/19/2022    [id number] 631 06/06/2022       Labs    Paraneoplastic: P/Qtype Calcium channel Ab 0.09nmol/l  Folate 3.2   B12 WNL  Ganglioside panel wnl   Urine protein wnl   Anti SSAaB NEG  ESR 53    CT chest/abdomen/pelvis  Impression:     Pulmonary thromboemboli in the distal main pulmonary arteries bilaterally extending into the interlobar and some segmental branches.     Scattered irregular pulmonary opacities.  No convincing lung nodules.     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      EMG/NCS 5/31/22  Summary   Nerve conduction study of the right upper and bilateral lower extremities revealed absent sensory responses in the median, ulnar, sural, and superficial peroneal nerves. The right radial nerve had normal antidromic sensory peak latency, diminished action potential, and normal conduction velocity.   Motor peak latencies, amplitudes, and conduction velocities were normal when adjusted for temperature. F-waves were absent in the left peroneal nerve  and prolonged in the right median, right ulnar, right peroneal, and bilateral tibial nerves.   Concentric needle examination of selected muscles in the right upper and lower extremities revealed no evidence of acute or chronic denervation.   Impression   This is an abnormal study. There is electrophysiologic evidence of:   1. 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 Pure sensory neuropathies can occur in paraneoplastic syndromes, paraproteinemias, some congenital polyneuropathies, as a side effect of certain medications, some autoimmune diseases, and some metabolic and vitamin deficiency syndromes. There are a few case reports that suggest that immune-mediated polyneuropathies like AIDP can rarely present with pure sensory pathology, however electrodiagnostic evaluation in those cases revealed characteristic demyelinating polyneuropathy rather than the axonal polyneuropathy seen in this study.         Assessment and Plan:   Ms Rg is a 51 y.o F w hx autoimmune hepatitis and bipolar disease here to establish care for primary axonal sensory polyneuropathy with unknown etiology at this time. She responded to IVIG (prescribed by Dr. Downey), last received 8/3-8/4 and noted some improvement in her sensation.   Pertinent workup at the time is a P/Qtype Calcium channel Ab. Malignancy workup unremarkable. She does has extensive family history of malignancy. Given positive response to IVIG, will re order. Patient is currently on eliquis for PE, falling/bleeding risk was discussed.       Problem List Items Addressed This Visit       Immune-mediated neuropathy - Primary    Current Assessment & Plan     Apparent immune-mediated polyneuropathy (P/Q Ca antibodies) that has responded well but incompletely to a single round of IVIg, given in early August over 2 days. Unfortunately more time has passed than should have in between Ig infusions. Given her considerable  response to the induction round, it is reasonable to continue IVIg in hopes of continued improvement.    - We will contact [facility] to resume IVIg at 1g/kg IBW Q 3 weeks, which she should be able to tolerate in a single day of administration.    - Recheck renal function   - RTC in 2-3 months   - Recheck paraneoplastic panel in 4-6 months           Paresthesia of both lower extremities    Impaired instrumental activities of daily living (IADL)    Impaired gait and mobility    Lower extremity weakness     Other Visit Diagnoses       Idiopathic progressive neuropathy        Relevant Orders    Basic metabolic panel (Completed)              Roman River MD  [facility]

## 2022-09-16 ENCOUNTER — DOCUMENT SCAN (OUTPATIENT)
Dept: HOME HEALTH SERVICES | Facility: HOSPITAL | Age: 52
End: 2022-09-16
Payer: COMMERCIAL

## 2022-09-18 PROBLEM — G63 IMMUNE-MEDIATED NEUROPATHY: Status: ACTIVE | Noted: 2022-09-18

## 2022-09-18 PROBLEM — D89.89 IMMUNE-MEDIATED NEUROPATHY: Status: ACTIVE | Noted: 2022-09-18

## 2022-09-18 NOTE — ASSESSMENT & PLAN NOTE
Apparent immune-mediated polyneuropathy (P/Q Ca antibodies) that has responded well but incompletely to a single round of IVIg, given in early August over 2 days. Unfortunately more time has passed than should have in between Ig infusions. Given her considerable response to the induction round, it is reasonable to continue IVIg in hopes of continued improvement.    - We will contact [facility] to resume IVIg at 1g/kg IBW Q 3 weeks, which she should be able to tolerate in a single day of administration.    - Recheck renal function   - RTC in 2-3 months   - Recheck paraneoplastic panel in 4-6 months

## 2022-09-22 ENCOUNTER — PATIENT MESSAGE (OUTPATIENT)
Dept: FAMILY MEDICINE | Facility: CLINIC | Age: 52
End: 2022-09-22
Payer: COMMERCIAL

## 2022-09-22 DIAGNOSIS — M62.81 PROXIMAL LIMB MUSCLE WEAKNESS: Primary | ICD-10-CM

## 2022-09-22 DIAGNOSIS — G63 IMMUNE-MEDIATED NEUROPATHY: ICD-10-CM

## 2022-09-22 DIAGNOSIS — D89.89 IMMUNE-MEDIATED NEUROPATHY: ICD-10-CM

## 2022-09-29 ENCOUNTER — EXTERNAL HOME HEALTH (OUTPATIENT)
Dept: HOME HEALTH SERVICES | Facility: HOSPITAL | Age: 52
End: 2022-09-29
Payer: MEDICARE

## 2022-10-03 ENCOUNTER — DOCUMENT SCAN (OUTPATIENT)
Dept: HOME HEALTH SERVICES | Facility: HOSPITAL | Age: 52
End: 2022-10-03
Payer: COMMERCIAL

## 2022-10-03 ENCOUNTER — PATIENT MESSAGE (OUTPATIENT)
Dept: FAMILY MEDICINE | Facility: CLINIC | Age: 52
End: 2022-10-03
Payer: COMMERCIAL

## 2022-10-03 DIAGNOSIS — F31.9 BIPOLAR 1 DISORDER: Primary | ICD-10-CM

## 2022-10-03 RX ORDER — ZIPRASIDONE HYDROCHLORIDE 20 MG/1
20 CAPSULE ORAL 2 TIMES DAILY
Qty: 60 CAPSULE | Refills: 1 | Status: SHIPPED | OUTPATIENT
Start: 2022-10-03 | End: 2023-01-05

## 2022-10-06 ENCOUNTER — PATIENT MESSAGE (OUTPATIENT)
Dept: FAMILY MEDICINE | Facility: CLINIC | Age: 52
End: 2022-10-06
Payer: COMMERCIAL

## 2022-10-07 ENCOUNTER — PATIENT MESSAGE (OUTPATIENT)
Dept: FAMILY MEDICINE | Facility: CLINIC | Age: 52
End: 2022-10-07
Payer: COMMERCIAL

## 2022-10-07 DIAGNOSIS — I82.419 ACUTE DEEP VEIN THROMBOSIS (DVT) OF FEMORAL VEIN, UNSPECIFIED LATERALITY: ICD-10-CM

## 2022-10-07 DIAGNOSIS — I26.99 ACUTE PULMONARY EMBOLISM WITHOUT ACUTE COR PULMONALE, UNSPECIFIED PULMONARY EMBOLISM TYPE: ICD-10-CM

## 2022-11-17 ENCOUNTER — OFFICE VISIT (OUTPATIENT)
Dept: NEUROLOGY | Facility: CLINIC | Age: 52
End: 2022-11-17
Payer: COMMERCIAL

## 2022-11-17 ENCOUNTER — PATIENT MESSAGE (OUTPATIENT)
Dept: NEUROLOGY | Facility: CLINIC | Age: 52
End: 2022-11-17
Payer: COMMERCIAL

## 2022-11-17 ENCOUNTER — LAB VISIT (OUTPATIENT)
Dept: LAB | Facility: HOSPITAL | Age: 52
End: 2022-11-17
Payer: COMMERCIAL

## 2022-11-17 DIAGNOSIS — D89.89 IMMUNE-MEDIATED NEUROPATHY: Primary | ICD-10-CM

## 2022-11-17 DIAGNOSIS — G63 IMMUNE-MEDIATED NEUROPATHY: Primary | ICD-10-CM

## 2022-11-17 DIAGNOSIS — G63 IMMUNE-MEDIATED NEUROPATHY: ICD-10-CM

## 2022-11-17 DIAGNOSIS — D89.89 IMMUNE-MEDIATED NEUROPATHY: ICD-10-CM

## 2022-11-17 LAB
ALBUMIN SERPL BCP-MCNC: 3.1 G/DL (ref 3.5–5.2)
ALP SERPL-CCNC: 103 U/L (ref 55–135)
ALT SERPL W/O P-5'-P-CCNC: 78 U/L (ref 10–44)
ANION GAP SERPL CALC-SCNC: 9 MMOL/L (ref 8–16)
AST SERPL-CCNC: 64 U/L (ref 10–40)
BILIRUB SERPL-MCNC: 1.2 MG/DL (ref 0.1–1)
BUN SERPL-MCNC: 5 MG/DL (ref 6–20)
CALCIUM SERPL-MCNC: 9.4 MG/DL (ref 8.7–10.5)
CHLORIDE SERPL-SCNC: 103 MMOL/L (ref 95–110)
CO2 SERPL-SCNC: 27 MMOL/L (ref 23–29)
CREAT SERPL-MCNC: 0.8 MG/DL (ref 0.5–1.4)
EST. GFR  (NO RACE VARIABLE): >60 ML/MIN/1.73 M^2
GLUCOSE SERPL-MCNC: 87 MG/DL (ref 70–110)
POTASSIUM SERPL-SCNC: 3.6 MMOL/L (ref 3.5–5.1)
PROT SERPL-MCNC: 8.2 G/DL (ref 6–8.4)
SODIUM SERPL-SCNC: 139 MMOL/L (ref 136–145)

## 2022-11-17 PROCEDURE — 99999 PR PBB SHADOW E&M-EST. PATIENT-LVL II: CPT | Mod: PBBFAC,,, | Performed by: PSYCHIATRY & NEUROLOGY

## 2022-11-17 PROCEDURE — 99215 PR OFFICE/OUTPT VISIT, EST, LEVL V, 40-54 MIN: ICD-10-PCS | Mod: S$GLB,,, | Performed by: PSYCHIATRY & NEUROLOGY

## 2022-11-17 PROCEDURE — 3044F HG A1C LEVEL LT 7.0%: CPT | Mod: CPTII,S$GLB,, | Performed by: PSYCHIATRY & NEUROLOGY

## 2022-11-17 PROCEDURE — 1160F RVW MEDS BY RX/DR IN RCRD: CPT | Mod: CPTII,S$GLB,, | Performed by: PSYCHIATRY & NEUROLOGY

## 2022-11-17 PROCEDURE — 80053 COMPREHEN METABOLIC PANEL: CPT | Performed by: PHYSICIAN ASSISTANT

## 2022-11-17 PROCEDURE — 1159F PR MEDICATION LIST DOCUMENTED IN MEDICAL RECORD: ICD-10-PCS | Mod: CPTII,S$GLB,, | Performed by: PSYCHIATRY & NEUROLOGY

## 2022-11-17 PROCEDURE — 3044F PR MOST RECENT HEMOGLOBIN A1C LEVEL <7.0%: ICD-10-PCS | Mod: CPTII,S$GLB,, | Performed by: PSYCHIATRY & NEUROLOGY

## 2022-11-17 PROCEDURE — 1160F PR REVIEW ALL MEDS BY PRESCRIBER/CLIN PHARMACIST DOCUMENTED: ICD-10-PCS | Mod: CPTII,S$GLB,, | Performed by: PSYCHIATRY & NEUROLOGY

## 2022-11-17 PROCEDURE — 36415 COLL VENOUS BLD VENIPUNCTURE: CPT | Performed by: PHYSICIAN ASSISTANT

## 2022-11-17 PROCEDURE — 99215 OFFICE O/P EST HI 40 MIN: CPT | Mod: S$GLB,,, | Performed by: PSYCHIATRY & NEUROLOGY

## 2022-11-17 PROCEDURE — 1159F MED LIST DOCD IN RCRD: CPT | Mod: CPTII,S$GLB,, | Performed by: PSYCHIATRY & NEUROLOGY

## 2022-11-17 PROCEDURE — 99999 PR PBB SHADOW E&M-EST. PATIENT-LVL II: ICD-10-PCS | Mod: PBBFAC,,, | Performed by: PSYCHIATRY & NEUROLOGY

## 2022-11-17 NOTE — ASSESSMENT & PLAN NOTE
Apparent immune-mediated polyeuropathy (P/Q Ca antibodies) that has responded excellently to IVIg 1g/kg of IBW over two days via home infusions with [employer]. We will continue this dose and frequency for now. She reports tolerating the infusions without any complaint.     Recheck renal labs today to ensure proper function     Patient is amenable to this plan    All risks and benefits were explained to the patient and her  and they agreed to the plan.     Will consider resuming physical therapy however, the patients daughter is a physical therapy student and will possibly be able to assist with the patients outpatient care

## 2022-11-17 NOTE — PROGRESS NOTES
Subjective:     Chief Complaint: Axonal Sensory Neuropathy     Interval History 11/17/2022-   I have reviewed all relevant medical history in Epic. 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 At her previous visit she was started on IVIg 1g/kg IBW q3 weeks over two days, [facility] She states she is doing really well with her infusions. Per her  she is getting improvement in her mobility. She is able to get her own legs into the bed. She finds that she believes she is at least MCFP back to where she was at baseline, which makes her and her  really happy since she has had only 2 cycles of Ig. She is receiving infusions at home over 2 days. She would like to stay at every 3 weeks but eventually will consider spacing out her infusions more. She denies that there is any relapse in symptoms toward the end of the interval between infusions. She is able to feed herself and able to do a lot of her own self care. She does not need any assistive devices at this time. She denies any falls.    History of Present Illness:  I have reviewed all relevant medical records in [facility]. Thalia Rg is a 51 y.o. 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 Previous patient of Dr. Downey, last saw her on 3/29/22 as initial visit. At that time patient was referred for extremity tingling and numbness that was affecting her gate. She had EMG completed by Dr. River that showed length dependant and primarily axonal sensory polyneuropathy in the lower and upper extremities. She was screened for malignancy with CT c/a/p and no signs of malignancy but she had radiographic evidence of an incidental PE and was started on eliquis. Lab workup was remarkable for P/Qtype Calcium channel Ab (0.09nmol/l), which is  associated with ovarian cancer and SCLC. She was started on IVIG 1g/kg/day x 2 days in early August 2022 by Dr. Downey until she saw Dr. River.     Today she refers that since IVIG she has felt better; she is now able to feel her thighs and developed paresthesia at her lowers. Unfortunately her only IVIG infusion was 8/3-8/4, but she tolerated well, only a mild headache.    Patient needs a wheel chair for long distances and walker at home. She has low energy and gets fatigued easily. Currently receiving PT at home. She is constantly tired and having difficulty maintaining sleep. She refers tingling on bl hands and dropping things. She is able to brush her teeth and feed herself, unable to do mateus. Denies any chewing problems, dysarthria, SOB, orthopnea, no falls, urinary/bladder incontinence, and mild saddle anesthesia. Numbness is from the waist down.     She is on mercaptopurine for autoimmune hepatitis and ziprasidone for bipolar disease. She denies any cancer history but there is long history of cancer in her family; mom passed from liver cancer, maternal aunt had breast cancer and another aunt had lung cancer. Brother has history of prostate cancer. She is up to date with her cancer screening, which has reportedly been unremarkable.     Initial HPI from Dr. Downey   50 y/o AAF referred for evaluation of extremity tingling.  She presents with her . Tingling in feet 3 months ago travelled up to the thighs over days.  This was followed by tingling in the hands.  Gait is affected and she is using a walker. There is associated weakness in all 4 extremities.  The duration of progression is difficult to tease out by history; the history conflicts regarding stability vs progression.   Bladder function is fine. No neck pain. No history of similar symptoms.      Review of Systems  Review of Systems   Constitutional:  Positive for activity change and fatigue. Negative for appetite change, chills and fever.    HENT:  Negative for congestion, sore throat, trouble swallowing and voice change.    Eyes: Negative.    Respiratory:  Negative for cough and shortness of breath.    Cardiovascular:  Negative for chest pain and leg swelling.   Gastrointestinal:  Negative for abdominal distention, abdominal pain, constipation, nausea and vomiting.   Endocrine: Negative.    Genitourinary: Negative.  Negative for difficulty urinating.   Musculoskeletal:  Positive for gait problem. Negative for back pain.   Skin: Negative.    Allergic/Immunologic: Positive for immunocompromised state.   Neurological:  Positive for weakness, numbness and headaches.   Psychiatric/Behavioral: Negative.        Objective:     Vitals:    11/17/22 1459   PainSc: 0-No pain         Neurologic Exam     Mental Status   Oriented to person, place, and time.   Attention: normal.   Speech: speech is normal   Level of consciousness: alert  Knowledge: good.     Cranial Nerves     CN II   Visual fields full to confrontation.     CN III, IV, VI   Pupils are equal, round, and reactive to light.  Extraocular motions are normal.     CN V   Facial sensation intact.     CN VII   Facial expression full, symmetric.     CN VIII   Hearing: intact    CN IX, X   CN IX normal.     CN XI   CN XI normal.     CN XII   CN XII normal.     Motor Exam   Muscle bulk: normal  Overall muscle tone: normalDeltoids 5/5 bilaterally  Biceps brachii 5/5 bilaterally  Triceps brachii 5/5 bilaterally  Wrist ext 4+/5 bilaterally  Interossei 4+/5 bilaterally  Hip flexion 4-/5 bilaterally  Knee ext R 4+/5 and L 4/5  Knee flexion R 4/5 and L 4-/5  Dorsiflexion R 4+/5 and L 5/5  Plantarflexion R 4+/5 and L 5/5       Sensory Exam   Right arm light touch: decreased from wrist  Left arm light touch: decreased from wrist  Right leg light touch: decreased from knee  Left leg light touch: decreased from knee  Right leg vibration: decreased from ankle  Left leg vibration: decreased from ankle    Gait,  Coordination, and Reflexes     Gait  Gait: normal    Tremor   Resting tremor: absent  Intention tremor: absent  Action tremor: absent    Reflexes   Right brachioradialis: 1+  Left brachioradialis: 1+  Right biceps: 1+  Left biceps: 1+  Right triceps: 1+  Left triceps: 1+  Right patellar reflex grade: trace.  Left patellar: 0 (trace)  Right plantar: normal  Left plantar: normal    Physical Exam  Vitals reviewed.   Constitutional:       Appearance: She is well-developed.   HENT:      Head: Normocephalic and atraumatic.   Eyes:      Extraocular Movements: EOM normal.      Pupils: Pupils are equal, round, and reactive to light.   Neck:      Thyroid: No thyromegaly.   Cardiovascular:      Rate and Rhythm: Normal rate.   Pulmonary:      Effort: Pulmonary effort is normal.   Abdominal:      Palpations: Abdomen is soft.   Musculoskeletal:      Cervical back: Normal range of motion and neck supple.   Lymphadenopathy:      Cervical: No cervical adenopathy.   Skin:     General: Skin is warm and dry.   Neurological:      Mental Status: She is alert and oriented to person, place, and time.      Gait: Gait is intact.      Deep Tendon Reflexes:      Reflex Scores:       Tricep reflexes are 1+ on the right side and 1+ on the left side.       Bicep reflexes are 1+ on the right side and 1+ on the left side.       Brachioradialis reflexes are 1+ on the right side and 1+ on the left side.       Patellar reflexes are 0 (trace) on the left side.  Psychiatric:         Speech: Speech normal.         Behavior: Behavior normal.         Thought Content: Thought content normal.         Lab Results   Component Value Date    WBC 11.31 06/22/2022    HGB 11.7 (L) 06/22/2022    HCT 36.4 (L) 06/22/2022     06/22/2022    ALT 78 (H) 11/17/2022    AST 64 (H) 11/17/2022     11/17/2022    K 3.6 11/17/2022     11/17/2022    CREATININE 0.8 11/17/2022    BUN 5 (L) 11/17/2022    CO2 27 11/17/2022    TSH 4.671 (H) 01/19/2022    HGBA1C 4.7  01/19/2022    [id number] 631 06/06/2022       Labs    Paraneoplastic: P/Qtype Calcium channel Ab 0.09nmol/l  Folate 3.2   B12 WNL  Ganglioside panel wnl   Urine protein wnl   Anti SSAaB NEG  ESR 53    CT chest/abdomen/pelvis  Impression:     Pulmonary thromboemboli in the distal main pulmonary arteries bilaterally extending into the interlobar and some segmental branches.     Scattered irregular pulmonary opacities.  No convincing lung nodules.     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      EMG/NCS 5/31/22  Summary   Nerve conduction study of the right upper and bilateral lower extremities revealed absent sensory responses in the median, ulnar, sural, and superficial peroneal nerves. The right radial nerve had normal antidromic sensory peak latency, diminished action potential, and normal conduction velocity.   Motor peak latencies, amplitudes, and conduction velocities were normal when adjusted for temperature. F-waves were absent in the left peroneal nerve and prolonged in the right median, right ulnar, right peroneal, and bilateral tibial nerves.   Concentric needle examination of selected muscles in the right upper and lower extremities revealed no evidence of acute or chronic denervation.   Impression   This is an abnormal study. There is electrophysiologic evidence of:   1. 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 Pure sensory neuropathies can occur in paraneoplastic syndromes, paraproteinemias, some congenital polyneuropathies, as a side effect of certain medications, some autoimmune diseases, and some metabolic and vitamin deficiency syndromes. There are a few case reports that suggest that immune-mediated polyneuropathies like AIDP can rarely present with pure sensory pathology, however electrodiagnostic evaluation in those cases revealed characteristic  demyelinating polyneuropathy rather than the axonal polyneuropathy seen in this study.         Assessment and Plan:   Ms Rg is a 51 y.o F w hx autoimmune hepatitis and bipolar disease here to establish care for primary axonal sensory polyneuropathy with unknown etiology at this time. She responded to IVIG (prescribed by Dr. Downey), last received 8/3-8/4 and noted some improvement in her sensation.   Pertinent workup at the time is a P/Qtype Calcium channel Ab. Malignancy workup unremarkable. She does has extensive family history of malignancy. Given positive response to IVIG, will re order. Patient is currently on eliquis for PE, falling/bleeding risk was discussed.       Continue to monitor the patients renal function.     Problem List Items Addressed This Visit       Immune-mediated neuropathy - Primary    Current Assessment & Plan     Apparent immune-mediated polyeuropathy (P/Q Ca antibodies) that has responded excellently to IVIg 1g/kg of IBW over two days via home infusions with [employer]. We will continue this dose and frequency for now. She reports tolerating the infusions without any complaint.     Recheck renal labs today to ensure proper function     Patient is amenable to this plan    All risks and benefits were explained to the patient and her  and they agreed to the plan.     Will consider resuming physical therapy however, the patients daughter is a physical therapy student and will possibly be able to assist with the patients outpatient care         Relevant Orders    Comprehensive metabolic panel (Completed)     RTC in 2-3 months    BETHEL Nieves MD  [facility]

## 2022-11-28 ENCOUNTER — PATIENT MESSAGE (OUTPATIENT)
Dept: NEUROLOGY | Facility: CLINIC | Age: 52
End: 2022-11-28
Payer: COMMERCIAL

## 2022-12-06 ENCOUNTER — TELEPHONE (OUTPATIENT)
Dept: HEMATOLOGY/ONCOLOGY | Facility: CLINIC | Age: 52
End: 2022-12-06
Payer: COMMERCIAL

## 2022-12-06 NOTE — TELEPHONE ENCOUNTER
----- Message from Brody Coreas sent at 12/6/2022 12:00 PM CST -----  Pt  is calling due to  wife being faint getting ready for appt today.  would like to schedule either a virtual appt today or schedule an appt for another day.

## 2022-12-06 NOTE — TELEPHONE ENCOUNTER
Returned call to Mr Garsia, no answer. Message left on voice mail. Informed Mr Garsia in his message Mrs Rg appointment has been rescheduled to Dec 12 @1:30--Virtual.

## 2022-12-12 ENCOUNTER — OFFICE VISIT (OUTPATIENT)
Dept: HEMATOLOGY/ONCOLOGY | Facility: CLINIC | Age: 52
End: 2022-12-12
Payer: COMMERCIAL

## 2022-12-12 DIAGNOSIS — Z86.711 HISTORY OF PULMONARY EMBOLISM: Primary | ICD-10-CM

## 2022-12-12 DIAGNOSIS — Z86.718 HISTORY OF DVT OF LOWER EXTREMITY: ICD-10-CM

## 2022-12-12 PROCEDURE — 1159F MED LIST DOCD IN RCRD: CPT | Mod: CPTII,95,, | Performed by: STUDENT IN AN ORGANIZED HEALTH CARE EDUCATION/TRAINING PROGRAM

## 2022-12-12 PROCEDURE — 99213 PR OFFICE/OUTPT VISIT, EST, LEVL III, 20-29 MIN: ICD-10-PCS | Mod: 95,,, | Performed by: STUDENT IN AN ORGANIZED HEALTH CARE EDUCATION/TRAINING PROGRAM

## 2022-12-12 PROCEDURE — 1160F RVW MEDS BY RX/DR IN RCRD: CPT | Mod: CPTII,95,, | Performed by: STUDENT IN AN ORGANIZED HEALTH CARE EDUCATION/TRAINING PROGRAM

## 2022-12-12 PROCEDURE — 1160F PR REVIEW ALL MEDS BY PRESCRIBER/CLIN PHARMACIST DOCUMENTED: ICD-10-PCS | Mod: CPTII,95,, | Performed by: STUDENT IN AN ORGANIZED HEALTH CARE EDUCATION/TRAINING PROGRAM

## 2022-12-12 PROCEDURE — 3044F PR MOST RECENT HEMOGLOBIN A1C LEVEL <7.0%: ICD-10-PCS | Mod: CPTII,95,, | Performed by: STUDENT IN AN ORGANIZED HEALTH CARE EDUCATION/TRAINING PROGRAM

## 2022-12-12 PROCEDURE — 99213 OFFICE O/P EST LOW 20 MIN: CPT | Mod: 95,,, | Performed by: STUDENT IN AN ORGANIZED HEALTH CARE EDUCATION/TRAINING PROGRAM

## 2022-12-12 PROCEDURE — 3044F HG A1C LEVEL LT 7.0%: CPT | Mod: CPTII,95,, | Performed by: STUDENT IN AN ORGANIZED HEALTH CARE EDUCATION/TRAINING PROGRAM

## 2022-12-12 PROCEDURE — 1159F PR MEDICATION LIST DOCUMENTED IN MEDICAL RECORD: ICD-10-PCS | Mod: CPTII,95,, | Performed by: STUDENT IN AN ORGANIZED HEALTH CARE EDUCATION/TRAINING PROGRAM

## 2022-12-12 NOTE — PROGRESS NOTES
Hematology- Oncology Clinic Note :      12/12/2022    RFV / chief complaint- Follow-up and Deep Vein Thrombosis          HPI  Pt is a 52 y.o. female who  has a past medical history of Abnormal Pap smear of vagina, Autoimmune hepatitis, Bipolar 1 disorder, and Breast disorder.   Pt presents to the clinic today for anticoagulation recs    Weakness in legs is getting better gradually    The work up for neuropathy included CT which showed pulmonary embolism.   She was admitted to hospital in June 2022. US showed R LE DVT.     Today she reports to be doing ok. She is taking blood thinner without any bleeding issues.     No previous hx of VTE. No Fhx of VTE.     Reviewed past medical/surgical/social history    Past Medical History:   Diagnosis Date    Abnormal Pap smear of vagina     Autoimmune hepatitis     Bipolar 1 disorder     Breast disorder       Past Surgical History:   Procedure Laterality Date    LIVER BIOPSY        Review of patient's allergies indicates:  No Known Allergies   Social History     Tobacco Use    Smoking status: Never    Smokeless tobacco: Never   Substance Use Topics    Alcohol use: No      Family History   Problem Relation Age of Onset    Cancer Father     Diabetes Maternal Grandmother     Breast cancer Maternal Aunt           Review of Systems :  Review of Systems   Constitutional:  Positive for malaise/fatigue. Negative for chills, diaphoresis, fever and weight loss.   HENT: Negative.  Negative for congestion, hearing loss, nosebleeds, sore throat and tinnitus.    Eyes: Negative.  Negative for blurred vision and discharge.   Respiratory:  Negative for cough, hemoptysis, sputum production, shortness of breath and wheezing.    Cardiovascular: Negative.  Negative for chest pain, palpitations and leg swelling.   Gastrointestinal: Negative.  Negative for abdominal pain, blood in stool, constipation, diarrhea, heartburn, melena, nausea and vomiting.   Genitourinary: Negative.    Musculoskeletal:  Negative.  Negative for back pain, falls, joint pain and myalgias.   Skin: Negative.  Negative for itching and rash.   Neurological:  Positive for tingling, sensory change and weakness. Negative for dizziness, speech change, focal weakness, seizures, loss of consciousness and headaches.   Endo/Heme/Allergies: Negative.  Does not bruise/bleed easily.   Psychiatric/Behavioral: Negative.  Negative for depression. The patient is not nervous/anxious and does not have insomnia.              Physical Exam :  [facility] 05/19/2012   Wt Readings from Last 3 Encounters:   08/08/22 78.4 kg (172 lb 13.5 oz)   07/01/22 80.9 kg (178 lb 5.6 oz)   06/21/22 78.4 kg (172 lb 13.5 oz)       There is no height or weight on file to calculate BMI.      Physical Exam  Constitutional:       General: She is not in acute distress.     Appearance: Normal appearance. She is not ill-appearing.   HENT:      Head: Normocephalic and atraumatic.      Right Ear: External ear normal.      Left Ear: External ear normal.   Eyes:      General: No scleral icterus.        Right eye: No discharge.         Left eye: No discharge.   Pulmonary:      Effort: Pulmonary effort is normal. No respiratory distress.   Skin:     Coloration: Skin is not jaundiced.      Findings: No erythema or rash.   Neurological:      Mental Status: She is alert and oriented to person, place, and time. Mental status is at baseline.   Psychiatric:         Mood and Affect: Mood normal.         Speech: Speech normal.         Behavior: Behavior normal.           Current Outpatient Medications   Medication Sig Dispense Refill    apixaban (ELIQUIS) 5 mg Tab TAKE 1 TABLET BY MOUTH TWICE DAILY 180 tablet 0    folic acid (FOLVITE) 1 MG tablet Take 1 tablet (1 mg total) by mouth once daily. 90 tablet 3    mercaptopurine (PURINETHOL) 50 mg tablet Take 100 mg by mouth once daily.       ziprasidone (GEODON) 20 MG Cap Take 1 capsule (20 mg total) by mouth 2 (two) times daily. 60 capsule 1     No current  facility-administered medications for this visit.       Pertinent Diagnostic studies:      No visits with results within 1 Week(s) from this visit.   Latest known visit with results is:   Lab Visit on 11/17/2022   Component Date Value Ref Range Status    Sodium 11/17/2022 139  136 - 145 mmol/L Final    Potassium 11/17/2022 3.6  3.5 - 5.1 mmol/L Final    Chloride 11/17/2022 103  95 - 110 mmol/L Final    CO2 11/17/2022 27  23 - 29 mmol/L Final    Glucose 11/17/2022 87  70 - 110 mg/dL Final    BUN 11/17/2022 5 (L)  6 - 20 mg/dL Final    Creatinine 11/17/2022 0.8  0.5 - 1.4 mg/dL Final    Calcium 11/17/2022 9.4  8.7 - 10.5 mg/dL Final    Total Protein 11/17/2022 8.2  6.0 - 8.4 g/dL Final    Albumin 11/17/2022 3.1 (L)  3.5 - 5.2 g/dL Final    Total Bilirubin 11/17/2022 1.2 (H)  0.1 - 1.0 mg/dL Final    Comment: For infants and newborns, interpretation of results should be based  on gestational age, weight and in agreement with clinical  observations.    Premature Infant recommended reference ranges:  Up to 24 hours.............<8.0 mg/dL  Up to 48 hours............<12.0 mg/dL  3-5 days..................<15.0 mg/dL  6-29 days.................<15.0 mg/dL      Alkaline Phosphatase 11/17/2022 103  55 - 135 U/L Final    AST 11/17/2022 64 (H)  10 - 40 U/L Final    ALT 11/17/2022 78 (H)  10 - 44 U/L Final    Anion Gap 11/17/2022 9  8 - 16 mmol/L Final    eGFR 11/17/2022 >60.0  >60 mL/min/1.73 m^2 Final           Oncology History    No history exists.     Assessment :       1. History of pulmonary embolism    2. History of DVT of lower extremity        Plan :       DVT/ PE in the setting of immobility due to neurological disorder causing BLE weakness.     CT CAP 6/16/22- Pulmonary thromboemboli in the distal main pulmonary arteries bilaterally extending into the interlobar and some segmental branches.   Scattered irregular pulmonary opacities.  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  6/21/22 US - Partially occlusive thrombus within the right femoral vein and within 1 of the paired right posterior tibial veins and 1 of the paired right peroneal veins.  No thrombus within the left lower extremity      CTCAP neg for malignancy. No personal hx of VTE In past. No FHX of VTE. Continue anticoagulation until immobility improves or if severe bleeding occurs. Same was d/w pt and .       Electronically signed by Bharti Ruiz    [facility]      No future appointments.    The patient location is: [place]  The chief complaint leading to consultation is: see chief complaint below    Visit type: audiovisual    Face to Face time with patient: 10   20  minutes of total time spent on the encounter, which includes face to face time and non-face to face time preparing to see the patient (eg, review of tests), Obtaining and/or reviewing separately obtained history, Documenting clinical information in the electronic or other health record, Independently interpreting results (not separately reported) and communicating results to the patient/family/caregiver, or Care coordination (not separately reported).         Each patient to whom he or she provides medical services by telemedicine is:  (1) informed of the relationship between the physician and patient and the respective role of any other health care provider with respect to management of the patient; and (2) notified that he or she may decline to receive medical services by telemedicine and may withdraw from such care at any time.      This note was created with voice recognition software.  Grammatical, syntax and spelling errors may be inevitable.

## 2023-01-04 ENCOUNTER — PATIENT MESSAGE (OUTPATIENT)
Dept: NEUROLOGY | Facility: CLINIC | Age: 53
End: 2023-01-04
Payer: MEDICAID

## 2023-01-05 ENCOUNTER — PATIENT MESSAGE (OUTPATIENT)
Dept: FAMILY MEDICINE | Facility: CLINIC | Age: 53
End: 2023-01-05
Payer: MEDICAID

## 2023-01-05 DIAGNOSIS — F31.9 BIPOLAR 1 DISORDER: ICD-10-CM

## 2023-01-05 DIAGNOSIS — I82.419 ACUTE DEEP VEIN THROMBOSIS (DVT) OF FEMORAL VEIN, UNSPECIFIED LATERALITY: ICD-10-CM

## 2023-01-05 DIAGNOSIS — I26.99 ACUTE PULMONARY EMBOLISM WITHOUT ACUTE COR PULMONALE, UNSPECIFIED PULMONARY EMBOLISM TYPE: ICD-10-CM

## 2023-01-05 RX ORDER — ZIPRASIDONE HYDROCHLORIDE 20 MG/1
20 CAPSULE ORAL 2 TIMES DAILY
Qty: 60 CAPSULE | Refills: 1 | Status: CANCELLED | OUTPATIENT
Start: 2023-01-05 | End: 2024-01-05

## 2023-01-05 RX ORDER — ZIPRASIDONE HYDROCHLORIDE 20 MG/1
20 CAPSULE ORAL 2 TIMES DAILY
Qty: 60 CAPSULE | Refills: 1 | Status: ON HOLD | OUTPATIENT
Start: 2023-01-05 | End: 2024-03-27

## 2023-01-05 NOTE — TELEPHONE ENCOUNTER
No new care gaps identified.  [facility] Embedded Care Gaps. Reference number: [id number]. 1/05/2023   8:09:48 AM CST

## 2023-01-06 ENCOUNTER — PATIENT MESSAGE (OUTPATIENT)
Dept: OBSTETRICS AND GYNECOLOGY | Facility: CLINIC | Age: 53
End: 2023-01-06
Payer: MEDICAID

## 2023-01-09 ENCOUNTER — TELEPHONE (OUTPATIENT)
Dept: NEUROLOGY | Facility: CLINIC | Age: 53
End: 2023-01-09
Payer: MEDICAID

## 2023-01-09 DIAGNOSIS — G62.89 AXONAL NEUROPATHY: ICD-10-CM

## 2023-01-09 RX ORDER — DIPHENHYDRAMINE HYDROCHLORIDE 50 MG/ML
25 INJECTION INTRAMUSCULAR; INTRAVENOUS
OUTPATIENT
Start: 2023-01-20

## 2023-01-09 RX ORDER — DIPHENHYDRAMINE HCL 25 MG
25 CAPSULE ORAL
OUTPATIENT
Start: 2023-01-20

## 2023-01-09 RX ORDER — HEPARIN 100 UNIT/ML
500 SYRINGE INTRAVENOUS
OUTPATIENT
Start: 2023-01-20

## 2023-01-09 RX ORDER — SODIUM CHLORIDE 0.9 % (FLUSH) 0.9 %
10 SYRINGE (ML) INJECTION
OUTPATIENT
Start: 2023-01-20

## 2023-01-09 RX ORDER — ACETAMINOPHEN 500 MG
500 TABLET ORAL
OUTPATIENT
Start: 2023-01-20

## 2023-01-09 NOTE — TELEPHONE ENCOUNTER
Communicated with Renetta and Tnia, PharmD, with [facility] regarding IVIG referral. Message received from Renetta that [facility] is out of network.     Message sent to [facility] to verify if able to accept patient's [facility] insurance plan.         Vaishnavi Meredith RN, BSN, BS  ALS Clinical Care Coordinator  [phone]

## 2023-01-12 ENCOUNTER — PATIENT MESSAGE (OUTPATIENT)
Dept: NEUROLOGY | Facility: CLINIC | Age: 53
End: 2023-01-12
Payer: MEDICAID

## 2023-02-03 ENCOUNTER — PATIENT MESSAGE (OUTPATIENT)
Dept: NEUROLOGY | Facility: CLINIC | Age: 53
End: 2023-02-03
Payer: MEDICARE

## 2023-02-06 ENCOUNTER — DOCUMENTATION ONLY (OUTPATIENT)
Dept: NEUROLOGY | Facility: CLINIC | Age: 53
End: 2023-02-06
Payer: MEDICARE

## 2023-02-27 ENCOUNTER — PATIENT MESSAGE (OUTPATIENT)
Dept: NEUROLOGY | Facility: CLINIC | Age: 53
End: 2023-02-27
Payer: MEDICARE

## 2023-03-01 ENCOUNTER — TELEPHONE (OUTPATIENT)
Dept: NEUROLOGY | Facility: CLINIC | Age: 53
End: 2023-03-01
Payer: MEDICARE

## 2023-03-02 NOTE — TELEPHONE ENCOUNTER
----- Message from Jennifer uQiros sent at 3/2/2023 11:01 AM CST -----  Regarding: med denial  Regarding: appeal for PA - [employer]  Contact: Anupama [phone]  [facility] requesting clinical questions needed for an appeal for ( Previgven)   Please call to discuss further         Fax [phone]

## 2023-03-08 ENCOUNTER — TELEPHONE (OUTPATIENT)
Dept: NEUROLOGY | Facility: CLINIC | Age: 53
End: 2023-03-08
Payer: MEDICARE

## 2023-03-08 NOTE — TELEPHONE ENCOUNTER
Message sent to Vicenta with [employer] on 2/28 and 3/8 to follow up on IVIG orders.     Awaiting reply.       Vaishnavi Meredith RN, BSN, BS  ALS Clinical Care Coordinator  [phone]

## 2023-03-08 NOTE — TELEPHONE ENCOUNTER
Yanira,     An appeal letter is needed for the patient's IVIG appeal to justify the IVIG based on the denial letter.     Please provide and I will submit to the insurance company.    Thanks,  SARATH Riggins

## 2023-03-13 ENCOUNTER — PATIENT MESSAGE (OUTPATIENT)
Dept: NEUROLOGY | Facility: CLINIC | Age: 53
End: 2023-03-13
Payer: MEDICARE

## 2023-03-16 ENCOUNTER — PATIENT MESSAGE (OUTPATIENT)
Dept: NEUROLOGY | Facility: CLINIC | Age: 53
End: 2023-03-16
Payer: MEDICARE

## 2023-03-16 ENCOUNTER — TELEPHONE (OUTPATIENT)
Dept: NEUROLOGY | Facility: CLINIC | Age: 53
End: 2023-03-16
Payer: MEDICARE

## 2023-03-16 NOTE — TELEPHONE ENCOUNTER
----- Message from Melecio Rai MA sent at 3/16/2023  4:18 PM CDT -----  [facility] called stating they had received a urgent medication appeal for [employer]. She wanted to inform that this has been approve for the 30 day supply from 02/21/23-12/31/23. She will send over a fax to Dr. Garfield River fax number at [phone]. She states no callback needed.

## 2023-03-20 ENCOUNTER — OFFICE VISIT (OUTPATIENT)
Dept: NEUROLOGY | Facility: CLINIC | Age: 53
End: 2023-03-20
Payer: MEDICARE

## 2023-03-20 DIAGNOSIS — R29.898 WEAKNESS OF BOTH LOWER EXTREMITIES: ICD-10-CM

## 2023-03-20 DIAGNOSIS — G63 IMMUNE-MEDIATED NEUROPATHY: Primary | ICD-10-CM

## 2023-03-20 DIAGNOSIS — Z78.9 DEFICIT IN ACTIVITIES OF DAILY LIVING (ADL): ICD-10-CM

## 2023-03-20 DIAGNOSIS — Z78.9 IMPAIRED INSTRUMENTAL ACTIVITIES OF DAILY LIVING (IADL): ICD-10-CM

## 2023-03-20 DIAGNOSIS — R26.89 IMPAIRED GAIT AND MOBILITY: ICD-10-CM

## 2023-03-20 DIAGNOSIS — D89.89 IMMUNE-MEDIATED NEUROPATHY: Primary | ICD-10-CM

## 2023-03-20 DIAGNOSIS — R20.2 PARESTHESIA OF BOTH LOWER EXTREMITIES: ICD-10-CM

## 2023-03-20 PROCEDURE — 1160F PR REVIEW ALL MEDS BY PRESCRIBER/CLIN PHARMACIST DOCUMENTED: ICD-10-PCS | Mod: CPTII,95,, | Performed by: PSYCHIATRY & NEUROLOGY

## 2023-03-20 PROCEDURE — 1159F PR MEDICATION LIST DOCUMENTED IN MEDICAL RECORD: ICD-10-PCS | Mod: CPTII,95,, | Performed by: PSYCHIATRY & NEUROLOGY

## 2023-03-20 PROCEDURE — 99215 PR OFFICE/OUTPT VISIT, EST, LEVL V, 40-54 MIN: ICD-10-PCS | Mod: 95,,, | Performed by: PSYCHIATRY & NEUROLOGY

## 2023-03-20 PROCEDURE — 1159F MED LIST DOCD IN RCRD: CPT | Mod: CPTII,95,, | Performed by: PSYCHIATRY & NEUROLOGY

## 2023-03-20 PROCEDURE — 1160F RVW MEDS BY RX/DR IN RCRD: CPT | Mod: CPTII,95,, | Performed by: PSYCHIATRY & NEUROLOGY

## 2023-03-20 PROCEDURE — 99215 OFFICE O/P EST HI 40 MIN: CPT | Mod: 95,,, | Performed by: PSYCHIATRY & NEUROLOGY

## 2023-03-22 ENCOUNTER — TELEPHONE (OUTPATIENT)
Dept: NEUROLOGY | Facility: CLINIC | Age: 53
End: 2023-03-22
Payer: MEDICARE

## 2023-03-22 NOTE — TELEPHONE ENCOUNTER
We received approval for Thalia Rg yesterday.finally!       [facility]  Specialty - IVIG  Mobile: [phone]  Fax: [phone]  [email or web address]

## 2023-04-04 NOTE — PROGRESS NOTES
Subjective:     Chief Complaint: Axonal Sensory Neuropathy     Interval History 3/20/2023 - I have reviewed relevant medical records in [facility].    The patient location is: At home in [place]  The chief complaint leading to consultation is: Immune-mediated polyneuropathy    Visit type: audiovisual    Face to Face time with patient: 20  35 minutes of total time spent on the encounter, which includes face to face time and non-face to face time preparing to see the patient (eg, review of tests), Obtaining and/or reviewing separately obtained history, Documenting clinical information in the electronic or other health record, Independently interpreting results (not separately reported) and communicating results to the patient/family/caregiver, or Care coordination (not separately reported).         Each patient to whom he or she provides medical services by telemedicine is:  (1) informed of the relationship between the physician and patient and the respective role of any other health care provider with respect to management of the patient; and (2) notified that he or she may decline to receive medical services by telemedicine and may withdraw from such care at any time.    Notes:     Mrs. Rg has been receiving IVIg infusions for her immune-mediated polyneuropathy and has responded favorably. She has had significant improvement in strength, sensory, and balance functions. She has had an interruption in infusions due to an insurance change (new policy declined to continue IVIg) but the Alie have since switched back to their previous insurance, which has agreed to continue infusions. This visit is to settle the matter and document improvement while on IVIg.        Interval History 11/17/2022-   I have reviewed all relevant medical history in Epic. 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 At her previous visit she was started on  IVIg 1g/kg IBW q3 weeks over two days, which she received at home from [facility]. She states she is doing really well with her infusions. Per her  she is getting improvement in her mobility. She is able to get her own legs into the bed. She finds that she believes she is at least detention back to where she was at baseline, which makes her and her  really happy since she has had only 2 cycles of Ig. She is receiving infusions at home over 2 days. She would like to stay at every 3 weeks but eventually will consider spacing out her infusions more. She denies that there is any relapse in symptoms toward the end of the interval between infusions. She is able to feed herself and able to do a lot of her own self care. She does not need any assistive devices at this time. She denies any falls.    History of Present Illness:  I have reviewed all relevant medical records in [facility]. Thalia Rg is a 52 y.o. 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 Previous patient of Dr. Downey, last saw her on 3/29/22 as initial visit. At that time patient was referred for extremity tingling and numbness that was affecting her gate. She had EMG completed by Dr. River that showed length dependant and primarily axonal sensory polyneuropathy in the lower and upper extremities. She was screened for malignancy with CT c/a/p and no signs of malignancy but she had radiographic evidence of an incidental PE and was started on eliquis. 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 She was started on IVIG 1g/kg/day x 2 days in early August 2022 by Dr. Downey until she saw Dr. River.     Today she refers that since IVIG she has felt better; she is now able to feel her thighs and developed paresthesia at her lowers. Unfortunately her only IVIG infusion  was 8/3-8/4, but she tolerated well, only a mild headache.    Patient needs a wheel chair for long distances and walker at home. She has low energy and gets fatigued easily. Currently receiving PT at home. She is constantly tired and having difficulty maintaining sleep. She refers tingling on bl hands and dropping things. She is able to brush her teeth and feed herself, unable to do mateus. Denies any chewing problems, dysarthria, SOB, orthopnea, no falls, urinary/bladder incontinence, and mild saddle anesthesia. Numbness is from the waist down.     She is on mercaptopurine for autoimmune hepatitis and ziprasidone for bipolar disease. She denies any cancer history but there is long history of cancer in her family; mom passed from liver cancer, maternal aunt had breast cancer and another aunt had lung cancer. Brother has history of prostate cancer. She is up to date with her cancer screening, which has reportedly been unremarkable.     Initial HPI from Dr. Downey   50 y/o AAF referred for evaluation of extremity tingling.  She presents with her . Tingling in feet 3 months ago travelled up to the thighs over days.  This was followed by tingling in the hands.  Gait is affected and she is using a walker. There is associated weakness in all 4 extremities.  The duration of progression is difficult to tease out by history; the history conflicts regarding stability vs progression.   Bladder function is fine. No neck pain. No history of similar symptoms.      Review of Systems  Review of Systems   Constitutional:  Positive for activity change and fatigue. Negative for appetite change, chills and fever.   HENT:  Negative for congestion, sore throat, trouble swallowing and voice change.    Eyes: Negative.    Respiratory:  Negative for cough and shortness of breath.    Cardiovascular:  Negative for chest pain and leg swelling.   Gastrointestinal:  Negative for abdominal distention, abdominal pain, constipation, nausea  and vomiting.   Endocrine: Negative.    Genitourinary: Negative.  Negative for difficulty urinating.   Musculoskeletal:  Positive for gait problem. Negative for back pain.   Skin: Negative.    Allergic/Immunologic: Positive for immunocompromised state.   Neurological:  Positive for weakness, numbness and headaches.   Psychiatric/Behavioral: Negative.        Objective:     There were no vitals filed for this visit.        Neurologic Exam     Mental Status   Oriented to person, place, and time.   Attention: normal.   Speech: speech is normal   Level of consciousness: alert  Knowledge: good.     Cranial Nerves     CN III, IV, VI   Extraocular motions are normal.     CN VII   Facial expression full, symmetric.     CN VIII   Hearing: intact    Motor Exam Limited by video visit       Gait, Coordination, and Reflexes     Gait  Gait: normal    Tremor   Resting tremor: absent  Intention tremor: absent  Action tremor: absent    Reflexes   Right patellar reflex grade: trace.  Left patellar reflex grade: trace.    Physical Exam  Vitals reviewed.   Constitutional:       Appearance: She is well-developed.   HENT:      Head: Normocephalic and atraumatic.   Eyes:      Extraocular Movements: EOM normal.   Neck:      Thyroid: No thyromegaly.   Musculoskeletal:      Cervical back: Normal range of motion.   Lymphadenopathy:      Cervical: No cervical adenopathy.   Neurological:      Mental Status: She is alert and oriented to person, place, and time.      Gait: Gait is intact.   Psychiatric:         Speech: Speech normal.         Behavior: Behavior normal.         Thought Content: Thought content normal.         Lab Results   Component Value Date    WBC 11.31 06/22/2022    HGB 11.7 (L) 06/22/2022    HCT 36.4 (L) 06/22/2022     06/22/2022    ALT 78 (H) 11/17/2022    AST 64 (H) 11/17/2022     11/17/2022    K 3.6 11/17/2022     11/17/2022    CREATININE 0.8 11/17/2022    BUN 5 (L) 11/17/2022    CO2 27 11/17/2022    TSH  4.671 (H) 01/19/2022    HGBA1C 4.7 01/19/2022    [id number] 06/06/2022       Labs    Paraneoplastic: P/Qtype Calcium channel Ab 0.09nmol/l  Folate 3.2   B12 WNL  Ganglioside panel wnl   Urine protein wnl   Anti SSAaB NEG  ESR 53    CT chest/abdomen/pelvis  Impression:     Pulmonary thromboemboli in the distal main pulmonary arteries bilaterally extending into the interlobar and some segmental branches.     Scattered irregular pulmonary opacities.  No convincing lung nodules.     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      EMG/NCS 5/31/22  Summary   Nerve conduction study of the right upper and bilateral lower extremities revealed absent sensory responses in the median, ulnar, sural, and superficial peroneal nerves. The right radial nerve had normal antidromic sensory peak latency, diminished action potential, and normal conduction velocity.   Motor peak latencies, amplitudes, and conduction velocities were normal when adjusted for temperature. F-waves were absent in the left peroneal nerve and prolonged in the right median, right ulnar, right peroneal, and bilateral tibial nerves.   Concentric needle examination of selected muscles in the right upper and lower extremities revealed no evidence of acute or chronic denervation.   Impression   This is an abnormal study. There is electrophysiologic evidence of:   1. 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 Pure sensory neuropathies can occur in paraneoplastic syndromes, paraproteinemias, some congenital polyneuropathies, as a side effect of certain medications, some autoimmune diseases, and some metabolic and vitamin deficiency syndromes. There are a few case reports that suggest that immune-mediated polyneuropathies like AIDP can rarely present with pure sensory pathology, however electrodiagnostic evaluation in  those cases revealed characteristic demyelinating polyneuropathy rather than the axonal polyneuropathy seen in this study.         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 She responded to IVIG (prescribed by Dr. Downey), last received 8/3-8/4 and noted some improvement in her sensation.   Pertinent workup at the time is a P/Qtype Calcium channel Ab. Malignancy workup unremarkable. She does has extensive family history of malignancy. Given positive response to IVIG, will re-order and continue routine infusions. Patient is currently on eliquis for PE, falling/bleeding risk was discussed.       Continue to monitor the patients renal function.     Problem List Items Addressed This Visit       Immune-mediated neuropathy - Primary    Current Assessment & Plan     Immune-mediated polyneuropathy (P/Q Ca antibodies) that has responded well but incompletely to IVIg. After several rounds of infusions she has noted considerable benefit. Her recent change in insurance interrupted coverage but now she is back on insurance that will cover continued infusions. Given her considerable response to the treatment to date, it is reasonable to continue IVIg.               - We will contact [facility] to resume IVIg at 1g/kg IBW Q 3 weeks, which she should be able to tolerate in a single day of administration.               - Recheck renal function              - RTC in 2-3 months              - Recheck paraneoplastic panel in 4-6 months           Paresthesia of both lower extremities    Lower extremity weakness    Impaired instrumental activities of daily living (IADL)    Impaired gait and mobility    Deficit in activities of daily living (ADL)       RTC in 2-3 months    BETHEL Nieves MD  [facility]

## 2023-04-04 NOTE — ASSESSMENT & PLAN NOTE
Immune-mediated polyneuropathy (P/Q Ca antibodies) that has responded well but incompletely to IVIg. After several rounds of infusions she has noted considerable benefit. Her recent change in insurance interrupted coverage but now she is back on insurance that will cover continued infusions. Given her considerable response to the treatment to date, it is reasonable to continue IVIg.               - We will contact [facility] to resume IVIg at 1g/kg IBW Q 3 weeks, which she should be able to tolerate in a single day of administration.               - Recheck renal function              - RTC in 2-3 months              - Recheck paraneoplastic panel in 4-6 months

## 2023-04-24 ENCOUNTER — TELEPHONE (OUTPATIENT)
Dept: FAMILY MEDICINE | Facility: CLINIC | Age: 53
End: 2023-04-24

## 2023-04-24 NOTE — TELEPHONE ENCOUNTER
----- Message from Alesha Olmos sent at 4/24/2023  2:56 PM CDT -----  Type: Patient Call Back    Who called:Renetta/ Melly Ambrocio     What is the request in detail:the annual HRA and care plan have been completed ... they can be viewed in the provider portal ....  is invited to attend unge .... to do so  can send a email to [email or web address] or can call [phone] ([address])     Can the clinic reply by [facility]? No     Would the patient rather a call back or a response via [facility]? CALL     Best call back number: [phone] (home)

## 2023-05-10 ENCOUNTER — PATIENT MESSAGE (OUTPATIENT)
Dept: NEUROLOGY | Facility: CLINIC | Age: 53
End: 2023-05-10
Payer: COMMERCIAL

## 2023-05-17 ENCOUNTER — PATIENT MESSAGE (OUTPATIENT)
Dept: HEMATOLOGY/ONCOLOGY | Facility: CLINIC | Age: 53
End: 2023-05-17
Payer: COMMERCIAL

## 2023-05-23 ENCOUNTER — TELEPHONE (OUTPATIENT)
Dept: FAMILY MEDICINE | Facility: CLINIC | Age: 53
End: 2023-05-23
Payer: COMMERCIAL

## 2023-05-23 NOTE — TELEPHONE ENCOUNTER
----- Message from Felipa Palm MA sent at 5/23/2023 12:50 PM CDT -----  Type: Patient Call Back    Who called:  - Mr. Rg    What is the request in detail:pt. Is asking to be called to see if his wife can be scheduled on the same day as her labs due to mobility and transportation..     Can the clinic reply by [facility]?No    Would the patient rather a call back or a response via [facility]? yes    Best call back number: [phone] (home)

## 2023-05-25 ENCOUNTER — PATIENT MESSAGE (OUTPATIENT)
Dept: FAMILY MEDICINE | Facility: CLINIC | Age: 53
End: 2023-05-25
Payer: COMMERCIAL

## 2023-05-25 NOTE — TELEPHONE ENCOUNTER
It looks like she had an audio virtual visit with you on 9/9/22 and hospital follow up with NP on 7/1/22 - are you okay with another virtual with you?

## 2023-05-27 ENCOUNTER — PATIENT MESSAGE (OUTPATIENT)
Dept: HEMATOLOGY/ONCOLOGY | Facility: CLINIC | Age: 53
End: 2023-05-27
Payer: COMMERCIAL

## 2023-05-29 ENCOUNTER — PATIENT MESSAGE (OUTPATIENT)
Dept: FAMILY MEDICINE | Facility: CLINIC | Age: 53
End: 2023-05-29
Payer: COMMERCIAL

## 2023-05-29 ENCOUNTER — PATIENT MESSAGE (OUTPATIENT)
Dept: HEMATOLOGY/ONCOLOGY | Facility: CLINIC | Age: 53
End: 2023-05-29
Payer: COMMERCIAL

## 2023-05-30 ENCOUNTER — PATIENT MESSAGE (OUTPATIENT)
Dept: FAMILY MEDICINE | Facility: CLINIC | Age: 53
End: 2023-05-30
Payer: COMMERCIAL

## 2023-05-30 ENCOUNTER — PATIENT MESSAGE (OUTPATIENT)
Dept: HEMATOLOGY/ONCOLOGY | Facility: CLINIC | Age: 53
End: 2023-05-30
Payer: COMMERCIAL

## 2023-06-13 ENCOUNTER — OFFICE VISIT (OUTPATIENT)
Dept: FAMILY MEDICINE | Facility: CLINIC | Age: 53
End: 2023-06-13
Payer: MEDICAID

## 2023-06-13 DIAGNOSIS — R79.89 ABNORMAL TSH: ICD-10-CM

## 2023-06-13 DIAGNOSIS — D89.89 IMMUNE-MEDIATED NEUROPATHY: ICD-10-CM

## 2023-06-13 DIAGNOSIS — G63 IMMUNE-MEDIATED NEUROPATHY: ICD-10-CM

## 2023-06-13 DIAGNOSIS — I82.419 ACUTE DEEP VEIN THROMBOSIS (DVT) OF FEMORAL VEIN, UNSPECIFIED LATERALITY: Primary | ICD-10-CM

## 2023-06-13 DIAGNOSIS — F31.9 BIPOLAR 1 DISORDER: ICD-10-CM

## 2023-06-13 DIAGNOSIS — M62.81 PROXIMAL LIMB MUSCLE WEAKNESS: ICD-10-CM

## 2023-06-13 PROCEDURE — 1159F PR MEDICATION LIST DOCUMENTED IN MEDICAL RECORD: ICD-10-PCS | Mod: CPTII,95,, | Performed by: INTERNAL MEDICINE

## 2023-06-13 PROCEDURE — 1160F PR REVIEW ALL MEDS BY PRESCRIBER/CLIN PHARMACIST DOCUMENTED: ICD-10-PCS | Mod: CPTII,95,, | Performed by: INTERNAL MEDICINE

## 2023-06-13 PROCEDURE — 99213 OFFICE O/P EST LOW 20 MIN: CPT | Mod: 95,,, | Performed by: INTERNAL MEDICINE

## 2023-06-13 PROCEDURE — 99499 RISK ADDL DX/OHS AUDIT: ICD-10-PCS | Mod: 95,,, | Performed by: INTERNAL MEDICINE

## 2023-06-13 PROCEDURE — 99499 UNLISTED E&M SERVICE: CPT | Mod: 95,,, | Performed by: INTERNAL MEDICINE

## 2023-06-13 PROCEDURE — 1159F MED LIST DOCD IN RCRD: CPT | Mod: CPTII,95,, | Performed by: INTERNAL MEDICINE

## 2023-06-13 PROCEDURE — 99213 PR OFFICE/OUTPT VISIT, EST, LEVL III, 20-29 MIN: ICD-10-PCS | Mod: 95,,, | Performed by: INTERNAL MEDICINE

## 2023-06-13 PROCEDURE — 1160F RVW MEDS BY RX/DR IN RCRD: CPT | Mod: CPTII,95,, | Performed by: INTERNAL MEDICINE

## 2023-06-13 NOTE — PROGRESS NOTES
Subjective:       Patient ID: Thalia Rg is a 52 y.o. female.    Chief Complaint: No chief complaint on file.    F/u chronic conditions    The patient location is: in her home in [place]  The chief complaint leading to consultation is: follow up chronic conditions/discuss home health    Visit type: audiovisual    Face to Face time with patient: 12 minutes  17 minutes of total time spent on the encounter, which includes face to face time and non-face to face time preparing to see the patient (eg, review of tests), Obtaining and/or reviewing separately obtained history, Documenting clinical information in the electronic or other health record, Independently interpreting results (not separately reported) and communicating results to the patient/family/caregiver, or Care coordination (not separately reported).         Each patient to whom he or she provides medical services by telemedicine is:  (1) informed of the relationship between the physician and patient and the respective role of any other health care provider with respect to management of the patient; and (2) notified that he or she may decline to receive medical services by telemedicine and may withdraw from such care at any time.    Notes:         HPI: 53 y/o w/ autimmune hepatitis and neuropathy on IVIG monthly for folow up. She and  feel significant improvement in strength with IVIG infusions. She is more easily fatigued and  notes sleep moring mid day. No change in mood. She is taking oral apixiab BID for PE/DVT which developed when she became immobile. No melena or [facility] home health days  in dec 2022.  helping with ROM exercises. Seh is over due for bloodwork but due to limited mobility she is not able to leave home to get labs drawn     Review of Systems   Constitutional:  Positive for activity change. Negative for appetite change, fatigue, fever and unexpected weight change.   HENT:  Negative for ear pain, hearing loss,  rhinorrhea, sore throat and trouble swallowing.    Eyes:  Negative for discharge and visual disturbance.   Respiratory:  Negative for chest tightness, shortness of breath and wheezing.    Cardiovascular:  Negative for chest pain, palpitations and leg swelling.   Gastrointestinal:  Negative for abdominal pain, blood in stool, constipation, diarrhea and vomiting.   Endocrine: Negative for cold intolerance, heat intolerance, polydipsia and polyuria.   Genitourinary:  Negative for difficulty urinating, dysuria, hematuria and menstrual problem.   Musculoskeletal:  Positive for gait problem. Negative for arthralgias, joint swelling, neck pain and neck stiffness.   Skin:  Negative for rash.   Neurological:  Positive for weakness. Negative for dizziness, syncope and headaches.   Psychiatric/Behavioral:  Negative for confusion, dysphoric mood and suicidal ideas.      Objective:   There were no vitals filed for this visit.       Physical Exam  Constitutional:       General: She is not in acute distress.  HENT:      Head: Normocephalic and atraumatic.   Eyes:      General: No scleral icterus.  Pulmonary:      Effort: Pulmonary effort is normal. No respiratory distress.   Neurological:      Mental Status: She is alert and oriented to person, place, and time.   Psychiatric:         Mood and Affect: Mood normal.         Behavior: Behavior normal.         Thought Content: Thought content normal.       Assessment and Plan   1. Acute deep vein thrombosis (DVT) of femoral vein, unspecified laterality  On apixiban will request labs via home health   - CBC Auto Differential; Future  - Comprehensive Metabolic Panel; Future  - D-DIMER, QUANTITATIVE; Future    2. Bipolar 1 disorder  Stable continue management per her psychiatrist    3. Proximal limb muscle weakness  On monthly IVIG check LFT's  - Comprehensive Metabolic Panel; Future    4. Abnormal TSH  Repeat tsh  - TSH; Future    5. Immune-mediated neuropathy   [facility] for  nursing/blood collection and PT evaluation  - Ambulatory referral/consult to Home Health; Future

## 2023-07-28 ENCOUNTER — PATIENT MESSAGE (OUTPATIENT)
Dept: FAMILY MEDICINE | Facility: CLINIC | Age: 53
End: 2023-07-28
Payer: MEDICAID

## 2023-07-31 ENCOUNTER — TELEPHONE (OUTPATIENT)
Dept: FAMILY MEDICINE | Facility: CLINIC | Age: 53
End: 2023-07-31
Payer: COMMERCIAL

## 2023-07-31 NOTE — TELEPHONE ENCOUNTER
----- Message from Mac Gresham MD sent at 7/31/2023  2:07 PM CDT -----  Regarding: home health orders  I had ordered home health for this patient for physical therapy and bloodwork back on June 13th. I see it was approved but I have not received the initial evaluation. Can [facility] If please fax lab orders entered 6/13. Thank you

## 2023-08-24 DIAGNOSIS — E53.8 LOW FOLATE: ICD-10-CM

## 2023-08-24 RX ORDER — FOLIC ACID 1 MG/1
1 TABLET ORAL DAILY
Qty: 90 TABLET | Refills: 3 | Status: SHIPPED | OUTPATIENT
Start: 2023-08-24

## 2023-09-12 ENCOUNTER — PATIENT MESSAGE (OUTPATIENT)
Dept: NEUROLOGY | Facility: CLINIC | Age: 53
End: 2023-09-12
Payer: MEDICARE

## 2023-09-12 ENCOUNTER — PATIENT MESSAGE (OUTPATIENT)
Dept: FAMILY MEDICINE | Facility: CLINIC | Age: 53
End: 2023-09-12
Payer: MEDICARE

## 2023-09-12 ENCOUNTER — TELEPHONE (OUTPATIENT)
Dept: NEUROLOGY | Facility: CLINIC | Age: 53
End: 2023-09-12
Payer: MEDICARE

## 2023-09-12 DIAGNOSIS — G63 IMMUNE-MEDIATED NEUROPATHY: Primary | ICD-10-CM

## 2023-09-12 DIAGNOSIS — D89.89 IMMUNE-MEDIATED NEUROPATHY: Primary | ICD-10-CM

## 2023-10-03 ENCOUNTER — PATIENT MESSAGE (OUTPATIENT)
Dept: FAMILY MEDICINE | Facility: CLINIC | Age: 53
End: 2023-10-03
Payer: MEDICARE

## 2023-10-05 ENCOUNTER — OFFICE VISIT (OUTPATIENT)
Dept: NEUROLOGY | Facility: CLINIC | Age: 53
End: 2023-10-05
Payer: MEDICARE

## 2023-10-05 DIAGNOSIS — Z78.9 IMPAIRED INSTRUMENTAL ACTIVITIES OF DAILY LIVING (IADL): ICD-10-CM

## 2023-10-05 DIAGNOSIS — R20.2 PARESTHESIA OF BOTH LOWER EXTREMITIES: ICD-10-CM

## 2023-10-05 DIAGNOSIS — R26.89 IMPAIRED GAIT AND MOBILITY: ICD-10-CM

## 2023-10-05 DIAGNOSIS — D89.89 IMMUNE-MEDIATED NEUROPATHY: Primary | ICD-10-CM

## 2023-10-05 DIAGNOSIS — G62.89 AXONAL NEUROPATHY: ICD-10-CM

## 2023-10-05 DIAGNOSIS — G63 IMMUNE-MEDIATED NEUROPATHY: Primary | ICD-10-CM

## 2023-10-05 DIAGNOSIS — R29.898 WEAKNESS OF BOTH LOWER EXTREMITIES: ICD-10-CM

## 2023-10-05 PROCEDURE — 99215 PR OFFICE/OUTPT VISIT, EST, LEVL V, 40-54 MIN: ICD-10-PCS | Mod: 95,,, | Performed by: PSYCHIATRY & NEUROLOGY

## 2023-10-05 PROCEDURE — 1160F RVW MEDS BY RX/DR IN RCRD: CPT | Mod: CPTII,95,, | Performed by: PSYCHIATRY & NEUROLOGY

## 2023-10-05 PROCEDURE — 1159F MED LIST DOCD IN RCRD: CPT | Mod: CPTII,95,, | Performed by: PSYCHIATRY & NEUROLOGY

## 2023-10-05 PROCEDURE — 99215 OFFICE O/P EST HI 40 MIN: CPT | Mod: 95,,, | Performed by: PSYCHIATRY & NEUROLOGY

## 2023-10-05 PROCEDURE — 1159F PR MEDICATION LIST DOCUMENTED IN MEDICAL RECORD: ICD-10-PCS | Mod: CPTII,95,, | Performed by: PSYCHIATRY & NEUROLOGY

## 2023-10-05 PROCEDURE — 1160F PR REVIEW ALL MEDS BY PRESCRIBER/CLIN PHARMACIST DOCUMENTED: ICD-10-PCS | Mod: CPTII,95,, | Performed by: PSYCHIATRY & NEUROLOGY

## 2023-10-05 NOTE — ASSESSMENT & PLAN NOTE
Continued improvements with IVIg therapy 90g divided over 2 days Q4 weeks by [facility].    - Continue IVIg as above   - Referral placed for Home Health PT/OT   - I will ask [facility] to draw BMP Q3 months

## 2023-10-05 NOTE — PROGRESS NOTES
Subjective:     Chief Complaint: Immune-Mediated Polyneuropathy    Interval History 10/05/2023 - I have reviewed relevant medical records in [facility]. Mrs. Rg reports significant improvement in both motor function and sensory perception. She and her  both report that her extremity and core strength are continuing to improve with IVIg therapy. She is able to sit up at the bedside, elevate arms above her head, complete more ADLs. She continues to have ambulation weakness and ataxia. Overall she is very excited about her continued improvements. She reports tolerating IVIg infusions without any difficulties whatsoever. She receives 90g divided over 2 days Q4 weeks from [facility]. She is interested in continuing home PT/OT and is requesting that the orders be renewed.    The patient location is: [place], [place]  The chief complaint leading to consultation is: Immune-mediated polyneuropathy    Visit type: audiovisual    Face to Face time with patient: 15 minutes  40 minutes of total time spent on the encounter, which includes face to face time and non-face to face time preparing to see the patient (eg, review of tests), Obtaining and/or reviewing separately obtained history, Documenting clinical information in the electronic or other health record, Independently interpreting results (not separately reported) and communicating results to the patient/family/caregiver, or Care coordination (not separately reported).         Each patient to whom he or she provides medical services by telemedicine is:  (1) informed of the relationship between the physician and patient and the respective role of any other health care provider with respect to management of the patient; and (2) notified that he or she may decline to receive medical services by telemedicine and may withdraw from such care at any time.    Notes:          Interval History 3/20/2023 - I have reviewed relevant medical records in [facility].    The patient location  is: At home in [place]  The chief complaint leading to consultation is: Immune-mediated polyneuropathy    Visit type: audiovisual    Face to Face time with patient: 20  35 minutes of total time spent on the encounter, which includes face to face time and non-face to face time preparing to see the patient (eg, review of tests), Obtaining and/or reviewing separately obtained history, Documenting clinical information in the electronic or other health record, Independently interpreting results (not separately reported) and communicating results to the patient/family/caregiver, or Care coordination (not separately reported).         Each patient to whom he or she provides medical services by telemedicine is:  (1) informed of the relationship between the physician and patient and the respective role of any other health care provider with respect to management of the patient; and (2) notified that he or she may decline to receive medical services by telemedicine and may withdraw from such care at any time.    Notes:     Mrs. Rg has been receiving IVIg infusions for her immune-mediated polyneuropathy and has responded favorably. She has had significant improvement in strength, sensory, and balance functions. She has had an interruption in infusions due to an insurance change (new policy declined to continue IVIg) but the Alie have since switched back to their previous insurance, which has agreed to continue infusions. This visit is to settle the matter and document improvement while on IVIg.        Interval History 11/17/2022-   I have reviewed all relevant medical history in Epic. 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 At her previous visit she was started on IVIg 1g/kg IBW q3 weeks over two days, which she received at home from [facility]. She states she is doing really well with her infusions. Per her  she is getting  improvement in her mobility. She is able to get her own legs into the bed. She finds that she believes she is at least custodial back to where she was at baseline, which makes her and her  really happy since she has had only 2 cycles of Ig. She is receiving infusions at home over 2 days. She would like to stay at every 3 weeks but eventually will consider spacing out her infusions more. She denies that there is any relapse in symptoms toward the end of the interval between infusions. She is able to feed herself and able to do a lot of her own self care. She does not need any assistive devices at this time. She denies any falls.    History of Present Illness:  I have reviewed all relevant medical records in [facility]. Thalia Rg is a 52 y.o. 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 Previous patient of Dr. Downey, last saw her on 3/29/22 as initial visit. At that time patient was referred for extremity tingling and numbness that was affecting her gate. She had EMG completed by Dr. River that showed length dependant and primarily axonal sensory polyneuropathy in the lower and upper extremities. She was screened for malignancy with CT c/a/p and no signs of malignancy but she had radiographic evidence of an incidental PE and was started on eliquis. 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 She was started on IVIG 1g/kg/day x 2 days in early August 2022 by Dr. Downey until she saw Dr. River.     Today she refers that since IVIG she has felt better; she is now able to feel her thighs and developed paresthesia at her lowers. Unfortunately her only IVIG infusion was 8/3-8/4, but she tolerated well, only a mild headache.    Patient needs a wheel chair for long distances and walker at home. She has low energy and gets fatigued  easily. Currently receiving PT at home. She is constantly tired and having difficulty maintaining sleep. She refers tingling on bl hands and dropping things. She is able to brush her teeth and feed herself, unable to do mateus. Denies any chewing problems, dysarthria, SOB, orthopnea, no falls, urinary/bladder incontinence, and mild saddle anesthesia. Numbness is from the waist down.     She is on mercaptopurine for autoimmune hepatitis and ziprasidone for bipolar disease. She denies any cancer history but there is long history of cancer in her family; mom passed from liver cancer, maternal aunt had breast cancer and another aunt had lung cancer. Brother has history of prostate cancer. She is up to date with her cancer screening, which has reportedly been unremarkable.     Initial HPI from Dr. Downey   50 y/o AAF referred for evaluation of extremity tingling.  She presents with her . Tingling in feet 3 months ago travelled up to the thighs over days.  This was followed by tingling in the hands.  Gait is affected and she is using a walker. There is associated weakness in all 4 extremities.  The duration of progression is difficult to tease out by history; the history conflicts regarding stability vs progression.   Bladder function is fine. No neck pain. No history of similar symptoms.      Review of Systems  Review of Systems   Constitutional:  Positive for activity change and fatigue. Negative for appetite change, chills and fever.   HENT:  Negative for congestion, sore throat, trouble swallowing and voice change.    Eyes: Negative.    Respiratory:  Negative for cough and shortness of breath.    Cardiovascular:  Negative for chest pain and leg swelling.   Gastrointestinal:  Negative for abdominal distention, abdominal pain, constipation, nausea and vomiting.   Endocrine: Negative.    Genitourinary: Negative.  Negative for difficulty urinating.   Musculoskeletal:  Positive for gait problem. Negative for back  pain.   Skin: Negative.    Allergic/Immunologic: Positive for immunocompromised state.   Neurological:  Positive for weakness, numbness and headaches.   Psychiatric/Behavioral: Negative.          Objective:     There were no vitals filed for this visit.        Neurologic Exam     Mental Status   Oriented to person, place, and time.   Attention: normal.   Speech: speech is normal   Level of consciousness: alert  Knowledge: good.     Cranial Nerves     CN III, IV, VI   Extraocular motions are normal.     CN VII   Facial expression full, symmetric.     CN VIII   Hearing: intact    Motor Exam Limited by video visit       Gait, Coordination, and Reflexes     Gait  Gait: normal    Tremor   Resting tremor: absent  Intention tremor: absent  Action tremor: absent    Reflexes   Right patellar reflex grade: trace.  Left patellar reflex grade: trace.      Physical Exam  Vitals reviewed.   Constitutional:       Appearance: She is well-developed.   HENT:      Head: Normocephalic and atraumatic.   Eyes:      Extraocular Movements: EOM normal.   Neck:      Thyroid: No thyromegaly.   Musculoskeletal:      Cervical back: Normal range of motion.   Lymphadenopathy:      Cervical: No cervical adenopathy.   Neurological:      Mental Status: She is alert and oriented to person, place, and time.      Gait: Gait is intact.   Psychiatric:         Speech: Speech normal.         Behavior: Behavior normal.         Thought Content: Thought content normal.           Lab Results   Component Value Date    WBC 11.31 06/22/2022    HGB 11.7 (L) 06/22/2022    HCT 36.4 (L) 06/22/2022     06/22/2022    ALT 78 (H) 11/17/2022    AST 64 (H) 11/17/2022     11/17/2022    K 3.6 11/17/2022     11/17/2022    CREATININE 0.8 11/17/2022    BUN 5 (L) 11/17/2022    CO2 27 11/17/2022    TSH 4.671 (H) 01/19/2022    HGBA1C 4.7 01/19/2022    [id number] 631 06/06/2022       Labs    Paraneoplastic: P/Qtype Calcium channel Ab 0.09nmol/l  Folate 3.2   B12  WNL  Ganglioside panel wnl   Urine protein wnl   Anti SSAaB NEG  ESR 53    CT chest/abdomen/pelvis  Impression:     Pulmonary thromboemboli in the distal main pulmonary arteries bilaterally extending into the interlobar and some segmental branches.     Scattered irregular pulmonary opacities.  No convincing lung nodules.     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      EMG/NCS 5/31/22  Summary   Nerve conduction study of the right upper and bilateral lower extremities revealed absent sensory responses in the median, ulnar, sural, and superficial peroneal nerves. The right radial nerve had normal antidromic sensory peak latency, diminished action potential, and normal conduction velocity.   Motor peak latencies, amplitudes, and conduction velocities were normal when adjusted for temperature. F-waves were absent in the left peroneal nerve and prolonged in the right median, right ulnar, right peroneal, and bilateral tibial nerves.   Concentric needle examination of selected muscles in the right upper and lower extremities revealed no evidence of acute or chronic denervation.   Impression   This is an abnormal study. There is electrophysiologic evidence of:   1. 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 Pure sensory neuropathies can occur in paraneoplastic syndromes, paraproteinemias, some congenital polyneuropathies, as a side effect of certain medications, some autoimmune diseases, and some metabolic and vitamin deficiency syndromes. There are a few case reports that suggest that immune-mediated polyneuropathies like AIDP can rarely present with pure sensory pathology, however electrodiagnostic evaluation in those cases revealed characteristic demyelinating polyneuropathy rather than the axonal polyneuropathy seen in this study.         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 She responded to IVIG (prescribed by Dr. Downey), last received 8/3-8/4 and noted some improvement in her sensation.   Pertinent workup at the time is a P/Qtype Calcium channel Ab. Malignancy workup unremarkable. She does has extensive family history of malignancy. Given positive response to IVIG, will re-order and continue routine infusions. Patient is currently on eliquis for PE, falling/bleeding risk was discussed.       Continue to monitor the patients renal function.     Problem List Items Addressed This Visit       Immune-mediated neuropathy - Primary    Current Assessment & Plan     Continued improvements with IVIg therapy 90g divided over 2 days Q4 weeks by [facility].    - Continue IVIg as above   - Referral placed for Home Health PT/OT   - I will ask [facility] to draw BMP Q3 months         Relevant Orders    Ambulatory referral/consult to Home Health    Paresthesia of both lower extremities    Impaired instrumental activities of daily living (IADL)    Current Assessment & Plan     Improving with therapy and IVIg but still has more improvements to make. She reports that there has not been a plateau in her improvements, that she continues to improve slowly but surely.   -  PT/OT orders placed         Impaired gait and mobility    Lower extremity weakness    Axonal neuropathy       RTC in 4-6 months    BETHEL Nieves MD  [facility]

## 2023-10-05 NOTE — ASSESSMENT & PLAN NOTE
Improving with therapy and IVIg but still has more improvements to make. She reports that there has not been a plateau in her improvements, that she continues to improve slowly but surely.   -  PT/OT orders placed

## 2023-10-09 ENCOUNTER — PATIENT MESSAGE (OUTPATIENT)
Dept: FAMILY MEDICINE | Facility: CLINIC | Age: 53
End: 2023-10-09
Payer: MEDICARE

## 2023-10-20 ENCOUNTER — PATIENT MESSAGE (OUTPATIENT)
Dept: NEUROLOGY | Facility: CLINIC | Age: 53
End: 2023-10-20
Payer: MEDICARE

## 2023-10-20 ENCOUNTER — PATIENT MESSAGE (OUTPATIENT)
Dept: FAMILY MEDICINE | Facility: CLINIC | Age: 53
End: 2023-10-20
Payer: MEDICARE

## 2023-10-20 DIAGNOSIS — Z78.9 IMPAIRED INSTRUMENTAL ACTIVITIES OF DAILY LIVING (IADL): Primary | ICD-10-CM

## 2023-10-20 DIAGNOSIS — D89.89 IMMUNE-MEDIATED NEUROPATHY: ICD-10-CM

## 2023-10-20 DIAGNOSIS — G63 IMMUNE-MEDIATED NEUROPATHY: ICD-10-CM

## 2023-10-23 ENCOUNTER — TELEPHONE (OUTPATIENT)
Dept: NEUROLOGY | Facility: CLINIC | Age: 53
End: 2023-10-23
Payer: MEDICARE

## 2023-10-23 NOTE — TELEPHONE ENCOUNTER
Orders sent to patient's spouse, Adriel, via e-mail as requested for Luis Alberto and Adilene.     Advised if additional information is needed, then please contact care team via [facility] portal.         Vaishnavi Meredith RN, BSN, BS  ALS Clinical Care Coordinator  [phone]

## 2023-10-24 ENCOUNTER — PATIENT MESSAGE (OUTPATIENT)
Dept: NEUROLOGY | Facility: CLINIC | Age: 53
End: 2023-10-24
Payer: MEDICARE

## 2023-10-26 ENCOUNTER — TELEPHONE (OUTPATIENT)
Dept: FAMILY MEDICINE | Facility: CLINIC | Age: 53
End: 2023-10-26
Payer: MEDICARE

## 2023-10-26 NOTE — TELEPHONE ENCOUNTER
[facility] Referral was sent to different home health provider as [facility] is not in network.

## 2023-10-26 NOTE — TELEPHONE ENCOUNTER
----- Message from Aye Delgado sent at 10/26/2023 11:17 AM CDT -----  Regarding: Patient call back  .Type: Patient Call Back    Who called:Tatiana with [employer]     What is the request in detail:needs clarification on home health order received     Can the clinic reply by [facility]?no     Would the patient rather a call back or a response via [facility]? Call     Best call back number:[phone]-Tatiana     Additional Information:

## 2023-10-31 PROCEDURE — G0180 PR HOME HEALTH MD CERTIFICATION: ICD-10-PCS | Mod: ,,, | Performed by: PSYCHIATRY & NEUROLOGY

## 2023-10-31 PROCEDURE — G0180 MD CERTIFICATION HHA PATIENT: HCPCS | Mod: ,,, | Performed by: PSYCHIATRY & NEUROLOGY

## 2023-12-01 ENCOUNTER — PATIENT MESSAGE (OUTPATIENT)
Dept: NEUROLOGY | Facility: CLINIC | Age: 53
End: 2023-12-01
Payer: MEDICARE

## 2023-12-04 ENCOUNTER — DOCUMENT SCAN (OUTPATIENT)
Dept: HOME HEALTH SERVICES | Facility: HOSPITAL | Age: 53
End: 2023-12-04
Payer: MEDICARE

## 2023-12-13 ENCOUNTER — EXTERNAL HOME HEALTH (OUTPATIENT)
Dept: HOME HEALTH SERVICES | Facility: HOSPITAL | Age: 53
End: 2023-12-13
Payer: MEDICARE

## 2023-12-18 ENCOUNTER — PATIENT MESSAGE (OUTPATIENT)
Dept: NEUROLOGY | Facility: CLINIC | Age: 53
End: 2023-12-18
Payer: MEDICARE

## 2023-12-22 ENCOUNTER — TELEPHONE (OUTPATIENT)
Dept: NEUROLOGY | Facility: CLINIC | Age: 53
End: 2023-12-22
Payer: MEDICARE

## 2023-12-22 NOTE — TELEPHONE ENCOUNTER
----- Message from Lashanda Mathews MA sent at 12/22/2023 10:27 AM CST -----  Regarding: FW: pt connie  Contact: [phone]  Hey can you please call this number I spoke with Mr Martinez he was asking question that I could not answer I ask if he wanted the last clinic notes on pt Mr Martinez stated no.  ----- Message -----  From: Jaqueline Narayanan  Sent: 12/22/2023  10:17 AM CST  To: [facility]ill Staff  Subject: pt connie Martinez calling from [facility] in regards to appeal for medication PRIVIGEN, needing additional clinical notes, caller stating its urgent, appeal is due within  hour. Pls call

## 2023-12-22 NOTE — TELEPHONE ENCOUNTER
CBC & CMP    Outgoing call to SARATH Madden, to discuss lab draw in-home.     Contacted [facility]: Verbal orders provided to [facility] for patient to received CBC and CMP lab draws every 3 months. The first lab draw to occur with next IVIG Infusion.       Vaishnavi Meredith RN, BSN, BS  ALS Clinical Care Coordinator  [phone]

## 2023-12-22 NOTE — TELEPHONE ENCOUNTER
IVIG Denial    Updated notes sent to [facility] along with message to obtain additional information regarding Privigen denial    Awaiting response.       Vaishnavi Meredith RN, BSN, BS  ALS Clinical Care Coordinator  [phone]

## 2023-12-24 ENCOUNTER — PATIENT MESSAGE (OUTPATIENT)
Dept: FAMILY MEDICINE | Facility: CLINIC | Age: 53
End: 2023-12-24
Payer: MEDICARE

## 2023-12-24 DIAGNOSIS — I26.99 ACUTE PULMONARY EMBOLISM WITHOUT ACUTE COR PULMONALE, UNSPECIFIED PULMONARY EMBOLISM TYPE: ICD-10-CM

## 2023-12-24 DIAGNOSIS — I82.419 ACUTE DEEP VEIN THROMBOSIS (DVT) OF FEMORAL VEIN, UNSPECIFIED LATERALITY: ICD-10-CM

## 2023-12-26 NOTE — TELEPHONE ENCOUNTER
Last office visit 6/13/23, no follow up on file, unsure if you'd like for her to be set up within a certain time frame

## 2024-01-09 ENCOUNTER — PATIENT MESSAGE (OUTPATIENT)
Dept: FAMILY MEDICINE | Facility: CLINIC | Age: 54
End: 2024-01-09
Payer: MEDICARE

## 2024-01-09 DIAGNOSIS — G63 IMMUNE-MEDIATED NEUROPATHY: ICD-10-CM

## 2024-01-09 DIAGNOSIS — D89.89 IMMUNE-MEDIATED NEUROPATHY: ICD-10-CM

## 2024-01-09 DIAGNOSIS — I26.99 ACUTE PULMONARY EMBOLISM WITHOUT ACUTE COR PULMONALE, UNSPECIFIED PULMONARY EMBOLISM TYPE: Primary | ICD-10-CM

## 2024-01-09 DIAGNOSIS — K75.4 AUTOIMMUNE HEPATITIS: ICD-10-CM

## 2024-01-23 ENCOUNTER — PATIENT MESSAGE (OUTPATIENT)
Dept: NEUROLOGY | Facility: CLINIC | Age: 54
End: 2024-01-23
Payer: MEDICARE

## 2024-02-22 ENCOUNTER — PATIENT OUTREACH (OUTPATIENT)
Dept: ADMINISTRATIVE | Facility: HOSPITAL | Age: 54
End: 2024-02-22
Payer: MEDICARE

## 2024-02-22 ENCOUNTER — PATIENT MESSAGE (OUTPATIENT)
Dept: ADMINISTRATIVE | Facility: HOSPITAL | Age: 54
End: 2024-02-22
Payer: MEDICARE

## 2024-02-22 DIAGNOSIS — Z13.6 SCREENING FOR CARDIOVASCULAR CONDITION: Primary | ICD-10-CM

## 2024-02-22 DIAGNOSIS — Z12.31 BREAST CANCER SCREENING BY MAMMOGRAM: ICD-10-CM

## 2024-02-22 NOTE — PROGRESS NOTES
Portal reply message sent with order so pt can schedule her mammogram.  Immunization's updated/triggered.

## 2024-02-23 ENCOUNTER — PATIENT MESSAGE (OUTPATIENT)
Dept: FAMILY MEDICINE | Facility: CLINIC | Age: 54
End: 2024-02-23
Payer: MEDICARE

## 2024-03-13 ENCOUNTER — PATIENT MESSAGE (OUTPATIENT)
Dept: FAMILY MEDICINE | Facility: CLINIC | Age: 54
End: 2024-03-13
Payer: MEDICARE

## 2024-03-14 ENCOUNTER — PATIENT MESSAGE (OUTPATIENT)
Dept: ADMINISTRATIVE | Facility: HOSPITAL | Age: 54
End: 2024-03-14
Payer: MEDICARE

## 2024-03-14 ENCOUNTER — PATIENT OUTREACH (OUTPATIENT)
Dept: ADMINISTRATIVE | Facility: HOSPITAL | Age: 54
End: 2024-03-14
Payer: MEDICARE

## 2024-03-14 NOTE — TELEPHONE ENCOUNTER
ARIC REGAN  : 1973  ACCOUNT:  [id number]  [phone]  PCP: Dr. Aura Marshall     TODAY'S DATE: 2018  DICTATED BY:  [Julie Frommelt, APN]      CHIEF COMPLAINT: [Followup of Heart failure, systolic, chronic left.]    HPI:    [On 2018, Aric Regan, a 45 year old male, presented with no interim cardiac complaints.]    This is a one-month heart failure follow-up.  At his last visit his Entresto was increased to maximum dose.  He tolerated this without any difficulty.  His blood pressure has had marked improvement.  He denies dizziness, lightheadedness, syncope or near syncope.  He has occasional dyspnea when he overexerts, however no dyspnea at rest.  He does not have any chest pain.    His echocardiogram from April demonstrated EF 15-20%, moderate left atrial enlargement, PA pressure 52 mmHg. Right and left heart cath on 2018 demonstrated normal coronary arteries and mild pulmonary hypertension.  RA mean of 4, PA 37/18, wedge 11.    Recent proBNP was 136, this is down from over 500 in May.  Sodium 135, potassium 4.2, nonfasting glucose 272, BUN 19, creatinine 1.13    He has sleep apnea for which he reports consistent compliance with his CPAP.    NYHA Class: 2  RISK FACTORS:  CAD - Hypertension Weight    REVIEW OF SYSTEMS:    CONS: no fever, chills, or recent weight changes. EYES: denies significant visual changes. ENMT: denies difficulties with hearing, otherwise negative. CV: see HPI; denies claudication. RESP: mild dyspnea on exertion. GI: denies melena, hematochezia. : no hematuria. INTEG: bilateral hand and foot peeling. MS: no limiting arthritis. NEURO: no localized deficits. HEM/LYMPH: denies easy bruising. ALL: no new food or enviornmental allergies.      PAST HISTORY: diabetes, sleep apnea and cyst removal     PAST CV HISTORY: congestive heart failure, dyslipidemia, hypertension, ICD ([employer]) 2018, pulmonary HTN and secondary    FAMILY HISTORY: Negative for premature CAD.  Called [facility] and got their fax , faxed lab orders    Negative for AAA.  SOCIAL HISTORY: SMOKING: Former tobacco use. used to smoke but quit and 2016. CAFFEINE: 1 beverage daily. ALCOHOL: drinks rarely. EXERCISE: no regular exercise. DIET: low sodium diet. OCCUPATION: .     ALLERGIES: Bactrim - Oral, Hives    MEDICATIONS: Selected prescriptions see below    VITAL SIGNS: [B/P - 108/70 , Pulse - 74, Weight -  348, Height -   72 , BMI - 47.2 ]    DECISION MAKIN.  Nonischemic cardiomyopathy, functional New York Heart Association class II symptoms.  On optimal doses of GDMT, currently euvolemic.  Will repeat his echocardiogram at the end of October.  He is being considered for [employer] PA pressure sensor monitor implant.  When we submitted his prior authorization, his insurance said he was no longer covered.  Patient reports he remains covered, we will have our prior authorization department follow-up.  2.  Pulmonary hypertension, will follow PAP on echo  3.  Hypertension, well controlled  4.  Sleep apnea, reports consistent compliance with CPAP  5.  Diabetes, will check hemoglobin A1c to assess overall glucose control  6.  Hyperlipidemia, remains on rosuvastatin.  Will follow-up lipid panel    ASSESSMENT:  1. Dyspnea  2. Heart failure, systolic, chronic left  3. Pulm HTN, other secondary  4. Hypercholesterolemia, pure  5. Hypertension  6. ICD reprogramming  7. ICD, not pacemaker dependent  8. Sleep apnea    PLAN:  [1.  Continue current medication  2.  Perform 2D echocardiogram with Doppler  3.  Perform fasting labs CBC, CMP, TSH, NT proBNP, hemoglobin A1c and fasting lipids  4.  Return to see Dr. Pacheco in November with testing completed prior to visit, APN 4 months]     PRESCRIPTIONS:   18 *Entresto             97-103MG  1 TABLET 2 TIMES DAILY AFTER MEALS       18 Albuterol Sulfate     (2.5 MG/  as needed, as dir                        18 Aspirin Adult Low [id number]      1 tab daily                              18  Carvedilol            25MG      1 tab twice daily                        06/28/18 Digoxin               250MCG    1 tab daily                              06/28/18 HydrALAZINE HCl       100MG     1 tab three times daily                  06/28/18 Isosorbide Dinitrate  20MG      1 tab three times daily                  06/28/18 Loratadine            10MG      1 tab daily                              06/28/18 MetFORMIN HCl         500MG     1 tab twice daily                        06/28/18 Rosuvastatin Calcium  10MG      1 tab daily at bedtime                   06/28/18 Spironolactone        25MG      2 tabs daily                             06/28/18 Tessalon Perles       100MG     200mg as needed, as dir

## 2024-03-14 NOTE — PROGRESS NOTES
Reached out to pt in regards to overdue HM mammogram unavailable left voicemail. Gap report updated.Immunization's updated/triggered.

## 2024-03-20 ENCOUNTER — PATIENT MESSAGE (OUTPATIENT)
Dept: FAMILY MEDICINE | Facility: CLINIC | Age: 54
End: 2024-03-20
Payer: MEDICARE

## 2024-03-23 ENCOUNTER — PATIENT MESSAGE (OUTPATIENT)
Dept: NEUROLOGY | Facility: CLINIC | Age: 54
End: 2024-03-23
Payer: MEDICARE

## 2024-03-23 ENCOUNTER — HOSPITAL ENCOUNTER (INPATIENT)
Facility: HOSPITAL | Age: 54
LOS: 4 days | Discharge: HOME HEALTH | DRG: 602 | End: 2024-03-27
Attending: EMERGENCY MEDICINE | Admitting: STUDENT IN AN ORGANIZED HEALTH CARE EDUCATION/TRAINING PROGRAM
Payer: MEDICARE

## 2024-03-23 DIAGNOSIS — F31.9 BIPOLAR 1 DISORDER: ICD-10-CM

## 2024-03-23 DIAGNOSIS — L03.221 CELLULITIS OF NECK: Primary | ICD-10-CM

## 2024-03-23 DIAGNOSIS — R40.20 LOC (LOSS OF CONSCIOUSNESS): ICD-10-CM

## 2024-03-23 DIAGNOSIS — J01.90 ACUTE SINUSITIS, RECURRENCE NOT SPECIFIED, UNSPECIFIED LOCATION: ICD-10-CM

## 2024-03-23 DIAGNOSIS — D89.89 IMMUNE-MEDIATED NEUROPATHY: ICD-10-CM

## 2024-03-23 DIAGNOSIS — L03.221 CELLULITIS OF NECK: ICD-10-CM

## 2024-03-23 DIAGNOSIS — L01.00 IMPETIGO: ICD-10-CM

## 2024-03-23 DIAGNOSIS — G63 IMMUNE-MEDIATED NEUROPATHY: ICD-10-CM

## 2024-03-23 DIAGNOSIS — R07.9 CHEST PAIN: ICD-10-CM

## 2024-03-23 DIAGNOSIS — L03.211 CELLULITIS OF FACE: ICD-10-CM

## 2024-03-23 DIAGNOSIS — J38.4 LARYNGEAL EDEMA: ICD-10-CM

## 2024-03-23 DIAGNOSIS — R55 SYNCOPE: ICD-10-CM

## 2024-03-23 PROBLEM — Z86.718 HISTORY OF DVT (DEEP VEIN THROMBOSIS): Status: ACTIVE | Noted: 2024-03-23

## 2024-03-23 LAB
ALBUMIN SERPL BCP-MCNC: 3.1 G/DL (ref 3.5–5.2)
ALP SERPL-CCNC: 70 U/L (ref 55–135)
ALT SERPL W/O P-5'-P-CCNC: 20 U/L (ref 10–44)
ANION GAP SERPL CALC-SCNC: 10 MMOL/L (ref 8–16)
AST SERPL-CCNC: 22 U/L (ref 10–40)
BASOPHILS # BLD AUTO: 0.01 K/UL (ref 0–0.2)
BASOPHILS NFR BLD: 0.1 % (ref 0–1.9)
BILIRUB SERPL-MCNC: 0.3 MG/DL (ref 0.1–1)
BILIRUB UR QL STRIP: NEGATIVE
BUN SERPL-MCNC: 12 MG/DL (ref 6–20)
CALCIUM SERPL-MCNC: 9.5 MG/DL (ref 8.7–10.5)
CHLORIDE SERPL-SCNC: 108 MMOL/L (ref 95–110)
CK SERPL-CCNC: 53 U/L (ref 20–180)
CLARITY UR: CLEAR
CO2 SERPL-SCNC: 22 MMOL/L (ref 23–29)
COLOR UR: COLORLESS
CREAT SERPL-MCNC: 0.9 MG/DL (ref 0.5–1.4)
CRP SERPL-MCNC: 68.4 MG/L (ref 0–8.2)
CTP QC/QA: YES
DIFFERENTIAL METHOD BLD: ABNORMAL
EOSINOPHIL # BLD AUTO: 0.3 K/UL (ref 0–0.5)
EOSINOPHIL NFR BLD: 2.5 % (ref 0–8)
ERYTHROCYTE [DISTWIDTH] IN BLOOD BY AUTOMATED COUNT: 14.8 % (ref 11.5–14.5)
EST. GFR  (NO RACE VARIABLE): >60 ML/MIN/1.73 M^2
GLUCOSE SERPL-MCNC: 143 MG/DL (ref 70–110)
GLUCOSE UR QL STRIP: NEGATIVE
HCT VFR BLD AUTO: 38.9 % (ref 37–48.5)
HGB BLD-MCNC: 12.9 G/DL (ref 12–16)
HGB UR QL STRIP: NEGATIVE
IMM GRANULOCYTES # BLD AUTO: 0.05 K/UL (ref 0–0.04)
IMM GRANULOCYTES NFR BLD AUTO: 0.4 % (ref 0–0.5)
INR PPP: 1 (ref 0.8–1.2)
KETONES UR QL STRIP: NEGATIVE
LACTATE SERPL-SCNC: 2.1 MMOL/L (ref 0.5–2.2)
LEUKOCYTE ESTERASE UR QL STRIP: NEGATIVE
LYMPHOCYTES # BLD AUTO: 2.6 K/UL (ref 1–4.8)
LYMPHOCYTES NFR BLD: 19 % (ref 18–48)
MAGNESIUM SERPL-MCNC: 1.9 MG/DL (ref 1.6–2.6)
MCH RBC QN AUTO: 29.1 PG (ref 27–31)
MCHC RBC AUTO-ENTMCNC: 33.2 G/DL (ref 32–36)
MCV RBC AUTO: 88 FL (ref 82–98)
MOLECULAR STREP A: NEGATIVE
MONOCYTES # BLD AUTO: 0.6 K/UL (ref 0.3–1)
MONOCYTES NFR BLD: 4.5 % (ref 4–15)
NEUTROPHILS # BLD AUTO: 9.9 K/UL (ref 1.8–7.7)
NEUTROPHILS NFR BLD: 73.5 % (ref 38–73)
NITRITE UR QL STRIP: NEGATIVE
NRBC BLD-RTO: 0 /100 WBC
PH UR STRIP: 6 [PH] (ref 5–8)
PLATELET # BLD AUTO: 443 K/UL (ref 150–450)
PMV BLD AUTO: 10.7 FL (ref 9.2–12.9)
POC MOLECULAR INFLUENZA A AGN: NEGATIVE
POC MOLECULAR INFLUENZA B AGN: NEGATIVE
POTASSIUM SERPL-SCNC: 3.9 MMOL/L (ref 3.5–5.1)
PROCALCITONIN SERPL IA-MCNC: 0.07 NG/ML
PROT SERPL-MCNC: 7.8 G/DL (ref 6–8.4)
PROT UR QL STRIP: NEGATIVE
PROTHROMBIN TIME: 10.7 SEC (ref 9–12.5)
RBC # BLD AUTO: 4.44 M/UL (ref 4–5.4)
SARS-COV-2 RDRP RESP QL NAA+PROBE: NEGATIVE
SODIUM SERPL-SCNC: 140 MMOL/L (ref 136–145)
SP GR UR STRIP: 1.02 (ref 1–1.03)
URN SPEC COLLECT METH UR: ABNORMAL
UROBILINOGEN UR STRIP-ACNC: NEGATIVE EU/DL
WBC # BLD AUTO: 13.46 K/UL (ref 3.9–12.7)

## 2024-03-23 PROCEDURE — 83605 ASSAY OF LACTIC ACID: CPT | Performed by: EMERGENCY MEDICINE

## 2024-03-23 PROCEDURE — 96361 HYDRATE IV INFUSION ADD-ON: CPT

## 2024-03-23 PROCEDURE — 81003 URINALYSIS AUTO W/O SCOPE: CPT | Performed by: EMERGENCY MEDICINE

## 2024-03-23 PROCEDURE — 87040 BLOOD CULTURE FOR BACTERIA: CPT | Mod: 59 | Performed by: EMERGENCY MEDICINE

## 2024-03-23 PROCEDURE — 25000003 PHARM REV CODE 250: Performed by: STUDENT IN AN ORGANIZED HEALTH CARE EDUCATION/TRAINING PROGRAM

## 2024-03-23 PROCEDURE — 85610 PROTHROMBIN TIME: CPT | Performed by: EMERGENCY MEDICINE

## 2024-03-23 PROCEDURE — 87502 INFLUENZA DNA AMP PROBE: CPT

## 2024-03-23 PROCEDURE — 87880 STREP A ASSAY W/OPTIC: CPT

## 2024-03-23 PROCEDURE — 96375 TX/PRO/DX INJ NEW DRUG ADDON: CPT

## 2024-03-23 PROCEDURE — 93005 ELECTROCARDIOGRAM TRACING: CPT

## 2024-03-23 PROCEDURE — 25500020 PHARM REV CODE 255: Performed by: EMERGENCY MEDICINE

## 2024-03-23 PROCEDURE — 20000000 HC ICU ROOM

## 2024-03-23 PROCEDURE — 96366 THER/PROPH/DIAG IV INF ADDON: CPT | Mod: 59

## 2024-03-23 PROCEDURE — 83735 ASSAY OF MAGNESIUM: CPT | Performed by: EMERGENCY MEDICINE

## 2024-03-23 PROCEDURE — 82550 ASSAY OF CK (CPK): CPT | Performed by: EMERGENCY MEDICINE

## 2024-03-23 PROCEDURE — 93010 ELECTROCARDIOGRAM REPORT: CPT | Mod: ,,, | Performed by: INTERNAL MEDICINE

## 2024-03-23 PROCEDURE — 96365 THER/PROPH/DIAG IV INF INIT: CPT

## 2024-03-23 PROCEDURE — 63600175 PHARM REV CODE 636 W HCPCS: Performed by: EMERGENCY MEDICINE

## 2024-03-23 PROCEDURE — 99222 1ST HOSP IP/OBS MODERATE 55: CPT | Mod: ,,, | Performed by: OTOLARYNGOLOGY

## 2024-03-23 PROCEDURE — 85025 COMPLETE CBC W/AUTO DIFF WBC: CPT | Performed by: EMERGENCY MEDICINE

## 2024-03-23 PROCEDURE — 86140 C-REACTIVE PROTEIN: CPT | Performed by: EMERGENCY MEDICINE

## 2024-03-23 PROCEDURE — 25000003 PHARM REV CODE 250: Performed by: EMERGENCY MEDICINE

## 2024-03-23 PROCEDURE — 87635 SARS-COV-2 COVID-19 AMP PRB: CPT | Performed by: EMERGENCY MEDICINE

## 2024-03-23 PROCEDURE — 80053 COMPREHEN METABOLIC PANEL: CPT | Performed by: EMERGENCY MEDICINE

## 2024-03-23 PROCEDURE — 99285 EMERGENCY DEPT VISIT HI MDM: CPT | Mod: 25

## 2024-03-23 PROCEDURE — 84145 PROCALCITONIN (PCT): CPT | Performed by: EMERGENCY MEDICINE

## 2024-03-23 RX ORDER — SODIUM,POTASSIUM PHOSPHATES 280-250MG
2 POWDER IN PACKET (EA) ORAL
Status: DISCONTINUED | OUTPATIENT
Start: 2024-03-23 | End: 2024-03-27 | Stop reason: HOSPADM

## 2024-03-23 RX ORDER — DEXAMETHASONE SODIUM PHOSPHATE 4 MG/ML
8 INJECTION, SOLUTION INTRA-ARTICULAR; INTRALESIONAL; INTRAMUSCULAR; INTRAVENOUS; SOFT TISSUE
Status: DISCONTINUED | OUTPATIENT
Start: 2024-03-23 | End: 2024-03-23

## 2024-03-23 RX ORDER — SIMETHICONE 80 MG
1 TABLET,CHEWABLE ORAL 4 TIMES DAILY PRN
Status: DISCONTINUED | OUTPATIENT
Start: 2024-03-23 | End: 2024-03-27 | Stop reason: HOSPADM

## 2024-03-23 RX ORDER — MERCAPTOPURINE 50 MG/1
100 TABLET ORAL DAILY
Status: DISCONTINUED | OUTPATIENT
Start: 2024-03-24 | End: 2024-03-24

## 2024-03-23 RX ORDER — GLUCAGON 1 MG
1 KIT INJECTION
Status: DISCONTINUED | OUTPATIENT
Start: 2024-03-23 | End: 2024-03-27 | Stop reason: HOSPADM

## 2024-03-23 RX ORDER — LANOLIN ALCOHOL/MO/W.PET/CERES
800 CREAM (GRAM) TOPICAL
Status: DISCONTINUED | OUTPATIENT
Start: 2024-03-23 | End: 2024-03-27 | Stop reason: HOSPADM

## 2024-03-23 RX ORDER — POLYETHYLENE GLYCOL 3350 17 G/17G
17 POWDER, FOR SOLUTION ORAL DAILY
Status: DISCONTINUED | OUTPATIENT
Start: 2024-03-24 | End: 2024-03-27 | Stop reason: HOSPADM

## 2024-03-23 RX ORDER — DEXAMETHASONE SODIUM PHOSPHATE 4 MG/ML
8 INJECTION, SOLUTION INTRA-ARTICULAR; INTRALESIONAL; INTRAMUSCULAR; INTRAVENOUS; SOFT TISSUE EVERY 8 HOURS
Status: COMPLETED | OUTPATIENT
Start: 2024-03-24 | End: 2024-03-24

## 2024-03-23 RX ORDER — NALOXONE HCL 0.4 MG/ML
0.02 VIAL (ML) INJECTION
Status: DISCONTINUED | OUTPATIENT
Start: 2024-03-23 | End: 2024-03-27 | Stop reason: HOSPADM

## 2024-03-23 RX ORDER — IBUPROFEN 200 MG
16 TABLET ORAL
Status: DISCONTINUED | OUTPATIENT
Start: 2024-03-23 | End: 2024-03-27 | Stop reason: HOSPADM

## 2024-03-23 RX ORDER — IBUPROFEN 200 MG
400 TABLET ORAL
Status: ON HOLD | COMMUNITY
End: 2024-03-26 | Stop reason: HOSPADM

## 2024-03-23 RX ORDER — HYDROMORPHONE HYDROCHLORIDE 1 MG/ML
0.5 INJECTION, SOLUTION INTRAMUSCULAR; INTRAVENOUS; SUBCUTANEOUS EVERY 6 HOURS PRN
Status: DISCONTINUED | OUTPATIENT
Start: 2024-03-23 | End: 2024-03-27 | Stop reason: HOSPADM

## 2024-03-23 RX ORDER — FOLIC ACID 1 MG/1
1 TABLET ORAL DAILY
Status: DISCONTINUED | OUTPATIENT
Start: 2024-03-24 | End: 2024-03-27 | Stop reason: HOSPADM

## 2024-03-23 RX ORDER — IBUPROFEN 200 MG
24 TABLET ORAL
Status: DISCONTINUED | OUTPATIENT
Start: 2024-03-23 | End: 2024-03-27 | Stop reason: HOSPADM

## 2024-03-23 RX ORDER — HYDROMORPHONE HYDROCHLORIDE 1 MG/ML
0.5 INJECTION, SOLUTION INTRAMUSCULAR; INTRAVENOUS; SUBCUTANEOUS
Status: COMPLETED | OUTPATIENT
Start: 2024-03-23 | End: 2024-03-23

## 2024-03-23 RX ORDER — IPRATROPIUM BROMIDE AND ALBUTEROL SULFATE 2.5; .5 MG/3ML; MG/3ML
3 SOLUTION RESPIRATORY (INHALATION) EVERY 4 HOURS PRN
Status: DISCONTINUED | OUTPATIENT
Start: 2024-03-23 | End: 2024-03-27 | Stop reason: HOSPADM

## 2024-03-23 RX ORDER — TALC
6 POWDER (GRAM) TOPICAL NIGHTLY PRN
Status: DISCONTINUED | OUTPATIENT
Start: 2024-03-23 | End: 2024-03-27 | Stop reason: HOSPADM

## 2024-03-23 RX ORDER — ACETAMINOPHEN 325 MG/1
650 TABLET ORAL EVERY 8 HOURS PRN
Status: DISCONTINUED | OUTPATIENT
Start: 2024-03-23 | End: 2024-03-27 | Stop reason: HOSPADM

## 2024-03-23 RX ORDER — ACETAMINOPHEN 325 MG/1
650 TABLET ORAL EVERY 4 HOURS PRN
Status: DISCONTINUED | OUTPATIENT
Start: 2024-03-23 | End: 2024-03-27 | Stop reason: HOSPADM

## 2024-03-23 RX ORDER — DEXAMETHASONE SODIUM PHOSPHATE 4 MG/ML
10 INJECTION, SOLUTION INTRA-ARTICULAR; INTRALESIONAL; INTRAMUSCULAR; INTRAVENOUS; SOFT TISSUE
Status: COMPLETED | OUTPATIENT
Start: 2024-03-23 | End: 2024-03-23

## 2024-03-23 RX ORDER — ACETAMINOPHEN 325 MG/1
650 TABLET ORAL
COMMUNITY

## 2024-03-23 RX ORDER — ONDANSETRON HYDROCHLORIDE 2 MG/ML
4 INJECTION, SOLUTION INTRAVENOUS EVERY 8 HOURS PRN
Status: DISCONTINUED | OUTPATIENT
Start: 2024-03-23 | End: 2024-03-27 | Stop reason: HOSPADM

## 2024-03-23 RX ORDER — ALUMINUM HYDROXIDE, MAGNESIUM HYDROXIDE, AND SIMETHICONE 1200; 120; 1200 MG/30ML; MG/30ML; MG/30ML
30 SUSPENSION ORAL 4 TIMES DAILY PRN
Status: DISCONTINUED | OUTPATIENT
Start: 2024-03-23 | End: 2024-03-27 | Stop reason: HOSPADM

## 2024-03-23 RX ORDER — MULTIVITAMIN
1 TABLET ORAL DAILY
COMMUNITY

## 2024-03-23 RX ORDER — BISACODYL 10 MG/1
10 SUPPOSITORY RECTAL DAILY PRN
Status: DISCONTINUED | OUTPATIENT
Start: 2024-03-23 | End: 2024-03-27 | Stop reason: HOSPADM

## 2024-03-23 RX ORDER — SODIUM CHLORIDE 0.9 % (FLUSH) 0.9 %
10 SYRINGE (ML) INJECTION EVERY 12 HOURS PRN
Status: DISCONTINUED | OUTPATIENT
Start: 2024-03-23 | End: 2024-03-27 | Stop reason: HOSPADM

## 2024-03-23 RX ADMIN — APIXABAN 5 MG: 5 TABLET, FILM COATED ORAL at 11:03

## 2024-03-23 RX ADMIN — IOHEXOL 100 ML: 350 INJECTION, SOLUTION INTRAVENOUS at 06:03

## 2024-03-23 RX ADMIN — VANCOMYCIN HYDROCHLORIDE 2000 MG: 500 INJECTION, POWDER, LYOPHILIZED, FOR SOLUTION INTRAVENOUS at 05:03

## 2024-03-23 RX ADMIN — HYDROMORPHONE HYDROCHLORIDE 0.5 MG: 1 INJECTION, SOLUTION INTRAMUSCULAR; INTRAVENOUS; SUBCUTANEOUS at 05:03

## 2024-03-23 RX ADMIN — PIPERACILLIN AND TAZOBACTAM 4.5 G: 4; .5 INJECTION, POWDER, LYOPHILIZED, FOR SOLUTION INTRAVENOUS; PARENTERAL at 08:03

## 2024-03-23 RX ADMIN — SODIUM CHLORIDE, POTASSIUM CHLORIDE, SODIUM LACTATE AND CALCIUM CHLORIDE 1917 ML: 600; 310; 30; 20 INJECTION, SOLUTION INTRAVENOUS at 05:03

## 2024-03-23 RX ADMIN — HYDROMORPHONE HYDROCHLORIDE 0.5 MG: 1 INJECTION, SOLUTION INTRAMUSCULAR; INTRAVENOUS; SUBCUTANEOUS at 09:03

## 2024-03-23 RX ADMIN — DEXAMETHASONE SODIUM PHOSPHATE 10 MG: 4 INJECTION INTRA-ARTICULAR; INTRALESIONAL; INTRAMUSCULAR; INTRAVENOUS; SOFT TISSUE at 08:03

## 2024-03-23 NOTE — ED PROVIDER NOTES
"Encounter Date: 3/23/2024    SCRIBE #1 NOTE: I, JANI Fajardo, am scribing for, and in the presence of,  Rajani Matthews MD. I have scribed the following portions of the note - Other sections scribed: HPI, ROS, PE, MDM.       History     Chief Complaint   Patient presents with    Loss of Consciousness     Patient arrived via EMS w CC: LOC w NO trauma. Patient has been bed bound for the last couple of years. 3 day history of flu like symptoms and more recently a weepy rash to her neck which ia also forming pustules. NO trauma w LOC, patient is on blood thinners. EMS was on scene for a 20 sec. Episode of LOC while they were getting her down the stairs.      Thalia Rg is a 53 y.o. female, with a PMHx of autoimmune hepatitis, neuropathy, and bipolar 1 disorder, who presents to the ED with a rash to her face/neck which started 1 week ago. Patient reports rash initially started near her nose, then spread to her lower face, and finally her neck. She states the rash is itchy and burning. The appearance has been growing more "scaly and flaky". She also complains of swelling to her lips (since resolved) and bilateral eyes. She denies the use of any new face creams, lotions, cleansers, etc.     Prior to rash, pt reports she was experiencing nasal congestion ~2 weeks ago. Associated symptoms include a slight cough, chills, and pain with swallowing. Denies fever. She feels her "voice box is sore" and that her "throat feels like it's trying to close". Pt also reports that the "nodes in her neck are extremely painful".     She attempted treatment with A+D ointment, aquafor at home, and Tylenol. She felt the ointment was providing some relief to the rash near her nose temporarily, but it began to worsen again 1 day ago.     Pt's final complaint was a syncopal episode earlier today while operating the stair lift in her home. She was unaware of this episode but occurred with EMS and lasted approximately 20 seconds per their report. " No fall, did not hit head. She reports only all over body pain prior to and after event which she states is consistent with her neuropathy when she is moved. She has reported LOC prior from pain. Denies CP/SOB/ lightheadedness or any other concerns.     No other exacerbating or alleviating factors. Denies chest pain, SOB, sores anywhere besides the face, vomiting, diarrhea, decreased appetite, confusion, abdominal pain, or other associated symptoms. Daily medications include Purinethol, Eliquis, folic acid, and Geodone. Patient denies EtOH, tobacco, or illicit drug use. Patient reports a PSHx of a liver biopsy. NKDA. Pt is bedridden due to neuropathy.     The history is provided by the patient and a relative (Daughter present). No  was used.     Review of patient's allergies indicates:  No Known Allergies  Past Medical History:   Diagnosis Date    Abnormal Pap smear of vagina     Autoimmune hepatitis     Axonal neuropathy 1/9/2023    Bipolar 1 disorder     Breast disorder      Past Surgical History:   Procedure Laterality Date    LIVER BIOPSY       Family History   Problem Relation Age of Onset    Cancer Father     Diabetes Maternal Grandmother     Breast cancer Maternal Aunt      Social History     Tobacco Use    Smoking status: Never    Smokeless tobacco: Never   Substance Use Topics    Alcohol use: No    Drug use: No     Review of Systems   Constitutional:  Positive for chills. Negative for appetite change, diaphoresis and fever.   HENT:  Positive for congestion and trouble swallowing.         (+) Lip swelling   Eyes:  Negative for photophobia and visual disturbance.        (+) Bilateral eye swelling   Respiratory:  Positive for cough. Negative for shortness of breath.    Cardiovascular:  Negative for chest pain and leg swelling.   Gastrointestinal:  Negative for abdominal pain, blood in stool, constipation, diarrhea, nausea and vomiting.   Genitourinary:  Negative for dysuria, flank pain,  frequency, hematuria and urgency.   Musculoskeletal:  Negative for neck pain and neck stiffness.   Skin:  Positive for rash. Negative for wound.   Neurological:  Positive for syncope. Negative for weakness, light-headedness, numbness and headaches.   Psychiatric/Behavioral:  Negative for confusion and suicidal ideas.    All other systems reviewed and are negative.      Physical Exam     Initial Vitals [03/23/24 1646]   BP Pulse Resp Temp SpO2   126/64 (!) 116 (!) 22 98.6 °F (37 °C) 95 %      MAP       --         Physical Exam    Nursing note and vitals reviewed.  Constitutional: She appears well-developed and well-nourished. She is not diaphoretic. No distress.   Body mass index is 30.41 kg/m².    HENT:   Head: Normocephalic and atraumatic.   Mouth/Throat: Oropharynx is clear and moist. No oropharyngeal exudate.   No oral pharynx swelling or rash noted. No stridor.   Honey crusted rash noted to face with blanching erythema down to lower neck, upper chest. No crepitus. +tender LAD. No floor of mouth elevated. Not woody in consistency. Niklosky sign is negative. No hemorrhagic bullae.    Eyes: Conjunctivae and EOM are normal. Pupils are equal, round, and reactive to light. Right eye exhibits no discharge. Left eye exhibits no discharge.   Neck: Neck supple. No JVD present.   Normal range of motion.  Cardiovascular:  Normal rate, regular rhythm, normal heart sounds and intact distal pulses.     Exam reveals no gallop and no friction rub.       No murmur heard.  Slightly tachycardic.    Pulmonary/Chest: Breath sounds normal. No respiratory distress. She has no wheezes. She has no rhonchi. She has no rales.   Abdominal: Abdomen is soft. Bowel sounds are normal. She exhibits no distension. There is no abdominal tenderness. There is no rebound and no guarding.   Musculoskeletal:         General: No tenderness or edema.      Cervical back: Normal range of motion and neck supple.      Comments: 4/5 strength to bilateral legs  (pt says this is chronic).     Lymphadenopathy:     She has no cervical adenopathy.   Significant lymph adenopathy.   Neurological: She is alert and oriented to person, place, and time. No cranial nerve deficit.   Skin: Skin is warm and dry. Rash noted.   Honeycomb rash from upper lip to lower neck.  (SEE PICTURE BELOW)   Psychiatric: She has a normal mood and affect. Thought content normal.         ED Course   Critical Care    Date/Time: 3/23/2024 11:56 PM    Performed by: Rajani Matthews MD  Authorized by: Sarah Fermin MD  Direct patient critical care time: 30 minutes  Additional history critical care time: 10 minutes  Ordering / reviewing critical care time: 10 minutes  Documentation critical care time: 10 minutes  Total critical care time (exclusive of procedural time) : 60 minutes  Critical care was necessary to treat or prevent imminent or life-threatening deterioration of the following conditions: sepsis.  Critical care was time spent personally by me on the following activities: development of treatment plan with patient or surrogate, discussions with consultants, interpretation of cardiac output measurements, evaluation of patient's response to treatment, examination of patient, obtaining history from patient or surrogate, ordering and performing treatments and interventions, ordering and review of laboratory studies, ordering and review of radiographic studies, pulse oximetry, re-evaluation of patient's condition and review of old charts.        Labs Reviewed   CBC W/ AUTO DIFFERENTIAL - Abnormal; Notable for the following components:       Result Value    WBC 13.46 (*)     RDW 14.8 (*)     Gran # (ANC) 9.9 (*)     Immature Grans (Abs) 0.05 (*)     Gran % 73.5 (*)     All other components within normal limits   COMPREHENSIVE METABOLIC PANEL - Abnormal; Notable for the following components:    CO2 22 (*)     Glucose 143 (*)     Albumin 3.1 (*)     All other components within normal limits    URINALYSIS, REFLEX TO URINE CULTURE - Abnormal; Notable for the following components:    Color, UA Colorless (*)     All other components within normal limits    Narrative:     Specimen Source->Urine   C-REACTIVE PROTEIN - Abnormal; Notable for the following components:    CRP 68.4 (*)     All other components within normal limits   CULTURE, BLOOD   CULTURE, BLOOD   LACTIC ACID, PLASMA   MAGNESIUM   PROTIME-INR   PROCALCITONIN   C-REACTIVE PROTEIN   CK   CK   POCT INFLUENZA A/B MOLECULAR   SARS-COV-2 RDRP GENE   POCT STREP A MOLECULAR          Imaging Results              CT Soft Tissue Neck With Contrast (Final result)  Result time 03/23/24 19:26:37      Final result by Sampson Cooper MD (03/23/24 19:26:37)                   Impression:      1. Subcutaneous soft tissue stranding and edema involving the visualized anterior upper chest and lower neck and extending along the lateral aspects of the neck to the level of the parotid glands.  This is nonspecific.  Potential infectious process or cellulitis to be considered in the right clinical setting.  No organized focal fluid collection or abscess seen.  2. Soft tissue swelling and possible edema the vocal cord level, asymmetric to the right.  Potential infectious process or neoplastic process to be considered.  Airway remains patent.  3. Additional findings as detailed above.      Electronically signed by: Sampson Cooper MD  Date:    03/23/2024  Time:    19:26               Narrative:    EXAMINATION:  CT SOFT TISSUE NECK WITH CONTRAST    CLINICAL HISTORY:  Soft tissue swelling, infection suspected, neck xray done;    TECHNIQUE:  Low dose axial images as well as sagittal and coronal reconstructions were performed from the skull base to the clavicles following the intravenous administration of 100 mL of Omnipaque 350.    COMPARISON:  None    FINDINGS:  There is subcutaneous soft tissue stranding and edema seen tracking along the lateral aspects of the neck  superiorly to the parotid levels and involving the lower anterior neck and upper chest wall.  No organized focal fluid collection or rim enhancing abscess seen.  There is soft tissue swelling and possible edema seen at the vocal cord level, asymmetric to the right.  Airway is otherwise widely patent above and below this level.  Epiglottis is normal in thickness.  No prevertebral soft tissue swelling.  No evidence of peritonsillar or retropharyngeal fluid collection or abscess.  Thyroid is normal in size, noting that left thyroid lobe is asymmetrically larger and partially extends/wraps posterior to the esophagus.  Parotid glands and submandibular glands are normal and symmetric in size.  Right maxillary, sphenoid, and ethmoid sinus disease are partially visualized.  Visualized mastoid air cells are clear.  Large osseous protuberance is noted involving the left occipital calvarium.  No acute osseous abnormality or cervical spine abnormality identified.  Vasculature of the neck appears grossly patent.                                       X-Ray Chest AP Portable (Final result)  Result time 03/23/24 18:13:18      Final result by Sampson Cooper MD (03/23/24 18:13:18)                   Impression:      Enlarged cardiac silhouette, otherwise no definite acute cardiopulmonary process identified.      Electronically signed by: Sampson Cooper MD  Date:    03/23/2024  Time:    18:13               Narrative:    EXAMINATION:  XR CHEST AP PORTABLE    CLINICAL HISTORY:  Sepsis;    TECHNIQUE:  Single frontal view of the chest was performed.    COMPARISON:  June 2022.    FINDINGS:  Cardiac silhouette is enlarged.  Lungs are hypoinflated which accentuates the cardiac silhouette and pulmonary vascular markings.  There is mild elevation of the right hemidiaphragm.  No evidence of focal consolidative process, pneumothorax, or large pleural effusion.  No acute osseous abnormality identified.                                        Medications   apixaban tablet 5 mg (5 mg Oral Given 3/23/24 2247)   folic acid tablet 1 mg (has no administration in time range)   mercaptopurine tablet 100 mg (has no administration in time range)   multivitamin tablet (has no administration in time range)   sodium chloride 0.9% flush 10 mL (has no administration in time range)   albuterol-ipratropium 2.5 mg-0.5 mg/3 mL nebulizer solution 3 mL (has no administration in time range)   melatonin tablet 6 mg (has no administration in time range)   ondansetron injection 4 mg (has no administration in time range)   polyethylene glycol packet 17 g (has no administration in time range)   bisacodyL suppository 10 mg (has no administration in time range)   acetaminophen tablet 650 mg (has no administration in time range)   simethicone chewable tablet 80 mg (has no administration in time range)   aluminum-magnesium hydroxide-simethicone 200-200-20 mg/5 mL suspension 30 mL (has no administration in time range)   acetaminophen tablet 650 mg (has no administration in time range)   naloxone 0.4 mg/mL injection 0.02 mg (has no administration in time range)   potassium bicarbonate disintegrating tablet 50 mEq (has no administration in time range)   potassium bicarbonate disintegrating tablet 35 mEq (has no administration in time range)   potassium bicarbonate disintegrating tablet 60 mEq (has no administration in time range)   magnesium oxide tablet 800 mg (has no administration in time range)   magnesium oxide tablet 800 mg (has no administration in time range)   potassium, sodium phosphates 280-160-250 mg packet 2 packet (has no administration in time range)   potassium, sodium phosphates 280-160-250 mg packet 2 packet (has no administration in time range)   potassium, sodium phosphates 280-160-250 mg packet 2 packet (has no administration in time range)   glucose chewable tablet 16 g (has no administration in time range)   glucose chewable tablet 24 g (has no administration in  time range)   glucagon (human recombinant) injection 1 mg (has no administration in time range)   dextrose 10% bolus 125 mL 125 mL (has no administration in time range)   dextrose 10% bolus 250 mL 250 mL (has no administration in time range)   vancomycin - pharmacy to dose (has no administration in time range)   piperacillin-tazobactam (ZOSYN) 4.5 g in dextrose 5 % in water (D5W) 100 mL IVPB (MB+) (has no administration in time range)   HYDROmorphone injection 0.5 mg (has no administration in time range)   vancomycin 1,500 mg in dextrose 5 % (D5W) 250 mL IVPB (Vial-Mate) (1,500 mg Intravenous Trough Due As Scheduled Before Dose 3/25/24 0430)   dexAMETHasone injection 8 mg (has no administration in time range)   lactated ringers bolus 1,917 mL (0 mLs Intravenous Stopped 3/23/24 2130)   vancomycin (VANCOCIN) 2,000 mg in dextrose 5 % (D5W) 500 mL IVPB (0 mg Intravenous Stopped 3/23/24 2018)   HYDROmorphone injection 0.5 mg (0.5 mg Intravenous Given 3/23/24 1759)   iohexoL (OMNIPAQUE 350) injection 100 mL (100 mLs Intravenous Given 3/23/24 1858)   piperacillin-tazobactam (ZOSYN) 4.5 g in dextrose 5 % in water (D5W) 100 mL IVPB (MB+) (0 g Intravenous Stopped 3/23/24 2052)   dexAMETHasone injection 10 mg (10 mg Intravenous Given 3/23/24 2042)   HYDROmorphone injection 0.5 mg (0.5 mg Intravenous Given 3/23/24 2106)     Medical Decision Making  External documents reviewed: Neuology visit with dr. River on 10/05/23.   Pt's motor function and sensory perception were both improving. Pt was able to sit up, elevate arms, and complete ADLs. However, she was still dealing with ambulation weakness and ataxia. Pt requested to continue PT/OT.    Amount and/or Complexity of Data Reviewed  Labs: ordered.  Radiology: ordered.  ECG/medicine tests:  Decision-making details documented in ED Course.    Risk  Prescription drug management.  Decision regarding hospitalization.    MDM  53-year-old female with past medical history of  autoimmune hepatitis on mercaptopurine, neuropathy now bed-bound on Eliquis, bipolar presents with cold-like symptoms for the last 2 weeks followed by a progressive rash now with swelling, erythema from her upper lip to her lower neck.  Significant lymphadenopathy.  Patient is mildly tachycardic.  She was afebrile.  Rash is honey-crusted and appears to be impetigo with secondary cellulitis.  She reports it was initially improving with at home ointments which he is unsure of the name and then worsened yesterday.  She reports some pain with swallowing but is able to tolerate secretions and no difficulty breathing.  Patient also reports a brief loss of consciousness reported by EMS when they were moving her down the stairs.  She denies any symptoms prior to this other than all-over body pain which she gets from her neuropathy when she moves.  Denies chest pain, shortness of breath or any other concerns.  She states she has had brief loss of consciousness from pain in the past which she attributes this to.  Differential diagnosis includes was not limited to impetigo, cellulitis, deep space infection, COVID, flu, viral URI, sepsis.  Will activate sepsis code, give vancomycin, IV fluids and Dilaudid for pain.  Anticipate given degree of swelling at her neck and immune compromised will plan for admission for IV antibiotics pending improvement.    Labs with negative strep.  Negative urinalysis.  Flu and COVID negative.  White blood cell count elevated at 13.  CO2 slightly low at 22.  Normal sodium.  Lactic and procalcitonin negative.  CPK negative.  CRP elevated at 68.  Chest x-ray no acute findings.  CT neck with contrast with no gas formation, edema without focal abscess or fluid collection.  Soft tissue swelling of the vocal cord on the right with airway patent above and below this.  Discussed with ENT Dr. Shelley who reviewed the images as well as CT scan.  Recommends scheduled Decadron, continuing vanc and Zosyn.  We  discussed need for transfer over now for scope versus watching and waiting.  He states as long as patient's symptoms have not worsened and hopefully improve after the Decadron she is okay [place] [facility] [place] would anything new occur or any concerns from the staff he is happy and willing to accept her over at the ED for a scope at that time.  Patient has had no change in her rash since arrival, no significant worsening.  She denies any change in her symptoms, no shortness of breath.  Still some mild pain with swallowing.  She was able to tolerate her secretions without any difficulty.  She was in agreement with the plan and was advised to let us know immediately should any her symptoms worsen as well as understanding of the plan of transfer to [facility] at that time for ENT evaluation.  Discussed with Dr. Salvador with Hospital Medicine and will admit to the ICU for closer monitoring overnight.       Scribe Attestation:   Scribe #1: I performed the above scribed service and the documentation accurately describes the services I performed. I attest to the accuracy of the note.        ED Course as of 03/23/24 2347   Sat Mar 23, 2024   1920 EKG 12-lead  Sinus tachycardia at 105  No ST elevation or significant depression.  T-wave flattening in V1 and V2 as well as diffusely 2 3 AVF V4 V5 V6.  Normal axis.  QTC is 401.  This EKG is similar to her previous from 2022 although the flattening is more prominent today.  [JT]      ED Course User Index  [JT] Rajani Matthews MD                         I, Rajani Matthews, personally performed the services described in this documentation.  All medical record entries made by the scribe were at my direction and in my presence.  I have reviewed the chart and agree that the record reflects my personal performance and is accurate and complete.    Clinical Impression:  Final diagnoses:  [R40.20] LOC (loss of consciousness)  [L03.211] Cellulitis of face  [L03.221]  Cellulitis of neck          ED Disposition Condition    Admit Stable                Rajani Matthews MD  03/24/24 0001

## 2024-03-23 NOTE — ED TRIAGE NOTES
Thalia Rg, a 53 y.o. female presents to the ED via EMS w/ complaint of LOC. Pt has hx of autoimmune hepatitis and neuropathy. Pt reports being bed bound due to neuropathy. Pt reports tingling in feet on palpitation. Pt reports flu like symptoms x 1 week ago, then developed a itchy rash that progressively got worse over the past few days which now spreads from the top lip down to the neck with swelling, tenderness and pain rated (10/10). Pt denies SOB and cxt pain. Pt report sore throat and hard to swallow. Pt is AAOx4 and NADN.

## 2024-03-24 ENCOUNTER — E-CONSULT (OUTPATIENT)
Dept: OTOLARYNGOLOGY | Facility: HOSPITAL | Age: 54
End: 2024-03-24
Payer: MEDICARE

## 2024-03-24 DIAGNOSIS — L01.00 IMPETIGO: Primary | ICD-10-CM

## 2024-03-24 DIAGNOSIS — J01.90 ACUTE SINUSITIS, RECURRENCE NOT SPECIFIED, UNSPECIFIED LOCATION: ICD-10-CM

## 2024-03-24 DIAGNOSIS — L03.221 CELLULITIS OF NECK: ICD-10-CM

## 2024-03-24 DIAGNOSIS — J38.4 LARYNGEAL EDEMA: ICD-10-CM

## 2024-03-24 PROBLEM — K75.4 AUTOIMMUNE HEPATITIS: Status: ACTIVE | Noted: 2024-03-24

## 2024-03-24 PROBLEM — L30.4 INTERTRIGO: Status: ACTIVE | Noted: 2024-03-24

## 2024-03-24 LAB
ALBUMIN SERPL BCP-MCNC: 3.4 G/DL (ref 3.5–5.2)
ALP SERPL-CCNC: 73 U/L (ref 55–135)
ALT SERPL W/O P-5'-P-CCNC: 21 U/L (ref 10–44)
ANION GAP SERPL CALC-SCNC: 12 MMOL/L (ref 8–16)
ASCENDING AORTA: 2.92 CM
AST SERPL-CCNC: 21 U/L (ref 10–40)
AV INDEX (PROSTH): 0.7
AV MEAN GRADIENT: 8 MMHG
AV PEAK GRADIENT: 13 MMHG
AV VALVE AREA BY VELOCITY RATIO: 1.97 CM²
AV VALVE AREA: 2.25 CM²
AV VELOCITY RATIO: 0.62
BASOPHILS # BLD AUTO: 0.02 K/UL (ref 0–0.2)
BASOPHILS NFR BLD: 0.2 % (ref 0–1.9)
BILIRUB SERPL-MCNC: 0.4 MG/DL (ref 0.1–1)
BSA FOR ECHO PROCEDURE: 2.09 M2
BUN SERPL-MCNC: 8 MG/DL (ref 6–20)
CALCIUM SERPL-MCNC: 9.8 MG/DL (ref 8.7–10.5)
CHLORIDE SERPL-SCNC: 107 MMOL/L (ref 95–110)
CO2 SERPL-SCNC: 23 MMOL/L (ref 23–29)
CREAT SERPL-MCNC: 0.8 MG/DL (ref 0.5–1.4)
CV ECHO LV RWT: 0.44 CM
DIFFERENTIAL METHOD BLD: ABNORMAL
DOP CALC AO PEAK VEL: 1.77 M/S
DOP CALC AO VTI: 31 CM
DOP CALC LVOT AREA: 3.2 CM2
DOP CALC LVOT DIAMETER: 2.02 CM
DOP CALC LVOT PEAK VEL: 1.09 M/S
DOP CALC LVOT STROKE VOLUME: 69.83 CM3
DOP CALC MV VTI: 25.3 CM
DOP CALCLVOT PEAK VEL VTI: 21.8 CM
E WAVE DECELERATION TIME: 121.08 MSEC
E/A RATIO: 0.8
E/E' RATIO: 8.59 M/S
ECHO LV POSTERIOR WALL: 0.97 CM (ref 0.6–1.1)
EOSINOPHIL # BLD AUTO: 0 K/UL (ref 0–0.5)
EOSINOPHIL NFR BLD: 0.1 % (ref 0–8)
ERYTHROCYTE [DISTWIDTH] IN BLOOD BY AUTOMATED COUNT: 14.8 % (ref 11.5–14.5)
EST. GFR  (NO RACE VARIABLE): >60 ML/MIN/1.73 M^2
FRACTIONAL SHORTENING: 44 % (ref 28–44)
GLUCOSE SERPL-MCNC: 139 MG/DL (ref 70–110)
HCT VFR BLD AUTO: 44 % (ref 37–48.5)
HGB BLD-MCNC: 14 G/DL (ref 12–16)
IMM GRANULOCYTES # BLD AUTO: 0.05 K/UL (ref 0–0.04)
IMM GRANULOCYTES NFR BLD AUTO: 0.4 % (ref 0–0.5)
INTERVENTRICULAR SEPTUM: 0.98 CM (ref 0.6–1.1)
IVC DIAMETER: 2.04 CM
IVRT: 85.63 MSEC
LA MAJOR: 4.57 CM
LA MINOR: 4.92 CM
LA WIDTH: 3.5 CM
LEFT ATRIUM SIZE: 3.13 CM
LEFT ATRIUM VOLUME INDEX: 20.3 ML/M2
LEFT ATRIUM VOLUME: 44.12 CM3
LEFT INTERNAL DIMENSION IN SYSTOLE: 2.44 CM (ref 2.1–4)
LEFT VENTRICLE DIASTOLIC VOLUME INDEX: 39.89 ML/M2
LEFT VENTRICLE DIASTOLIC VOLUME: 86.57 ML
LEFT VENTRICLE MASS INDEX: 65 G/M2
LEFT VENTRICLE SYSTOLIC VOLUME INDEX: 9.7 ML/M2
LEFT VENTRICLE SYSTOLIC VOLUME: 21.02 ML
LEFT VENTRICULAR INTERNAL DIMENSION IN DIASTOLE: 4.38 CM (ref 3.5–6)
LEFT VENTRICULAR MASS: 141.71 G
LV LATERAL E/E' RATIO: 8.11 M/S
LV SEPTAL E/E' RATIO: 9.13 M/S
LVOT MG: 2.18 MMHG
LVOT MV: 0.69 CM/S
LYMPHOCYTES # BLD AUTO: 1.4 K/UL (ref 1–4.8)
LYMPHOCYTES NFR BLD: 11.2 % (ref 18–48)
MAGNESIUM SERPL-MCNC: 1.9 MG/DL (ref 1.6–2.6)
MCH RBC QN AUTO: 28.6 PG (ref 27–31)
MCHC RBC AUTO-ENTMCNC: 31.8 G/DL (ref 32–36)
MCV RBC AUTO: 90 FL (ref 82–98)
MONOCYTES # BLD AUTO: 0.1 K/UL (ref 0.3–1)
MONOCYTES NFR BLD: 1.1 % (ref 4–15)
MV MEAN GRADIENT: 1 MMHG
MV PEAK A VEL: 0.91 M/S
MV PEAK E VEL: 0.73 M/S
MV PEAK GRADIENT: 4 MMHG
MV STENOSIS PRESSURE HALF TIME: 35.11 MS
MV VALVE AREA BY CONTINUITY EQUATION: 2.76 CM2
MV VALVE AREA P 1/2 METHOD: 6.27 CM2
NEUTROPHILS # BLD AUTO: 10.5 K/UL (ref 1.8–7.7)
NEUTROPHILS NFR BLD: 87 % (ref 38–73)
NRBC BLD-RTO: 0 /100 WBC
PHOSPHATE SERPL-MCNC: 3.2 MG/DL (ref 2.7–4.5)
PISA TR MAX VEL: 1.83 M/S
PLATELET # BLD AUTO: 350 K/UL (ref 150–450)
PMV BLD AUTO: 11.1 FL (ref 9.2–12.9)
POTASSIUM SERPL-SCNC: 5 MMOL/L (ref 3.5–5.1)
PROT SERPL-MCNC: 8 G/DL (ref 6–8.4)
PV PEAK GRADIENT: 5 MMHG
PV PEAK VELOCITY: 1.13 M/S
RA MAJOR: 4.27 CM
RA PRESSURE ESTIMATED: 3 MMHG
RA WIDTH: 3 CM
RBC # BLD AUTO: 4.9 M/UL (ref 4–5.4)
RIGHT VENTRICULAR END-DIASTOLIC DIMENSION: 2.96 CM
RV TB RVSP: 5 MMHG
RV TISSUE DOPPLER FREE WALL SYSTOLIC VELOCITY 1 (APICAL 4 CHAMBER VIEW): 19.66 CM/S
SINUS: 3.13 CM
SODIUM SERPL-SCNC: 142 MMOL/L (ref 136–145)
STJ: 1.89 CM
TDI LATERAL: 0.09 M/S
TDI SEPTAL: 0.08 M/S
TDI: 0.09 M/S
TR MAX PG: 13 MMHG
TV REST PULMONARY ARTERY PRESSURE: 16 MMHG
WBC # BLD AUTO: 12.09 K/UL (ref 3.9–12.7)
Z-SCORE OF LEFT VENTRICULAR DIMENSION IN END DIASTOLE: -4.94
Z-SCORE OF LEFT VENTRICULAR DIMENSION IN END SYSTOLE: -4.61

## 2024-03-24 PROCEDURE — 25000003 PHARM REV CODE 250: Performed by: STUDENT IN AN ORGANIZED HEALTH CARE EDUCATION/TRAINING PROGRAM

## 2024-03-24 PROCEDURE — 25000003 PHARM REV CODE 250: Performed by: HOSPITALIST

## 2024-03-24 PROCEDURE — 87070 CULTURE OTHR SPECIMN AEROBIC: CPT | Performed by: STUDENT IN AN ORGANIZED HEALTH CARE EDUCATION/TRAINING PROGRAM

## 2024-03-24 PROCEDURE — 84100 ASSAY OF PHOSPHORUS: CPT | Performed by: STUDENT IN AN ORGANIZED HEALTH CARE EDUCATION/TRAINING PROGRAM

## 2024-03-24 PROCEDURE — 11000001 HC ACUTE MED/SURG PRIVATE ROOM

## 2024-03-24 PROCEDURE — 83735 ASSAY OF MAGNESIUM: CPT | Performed by: STUDENT IN AN ORGANIZED HEALTH CARE EDUCATION/TRAINING PROGRAM

## 2024-03-24 PROCEDURE — 99900035 HC TECH TIME PER 15 MIN (STAT)

## 2024-03-24 PROCEDURE — 87186 SC STD MICRODIL/AGAR DIL: CPT | Performed by: STUDENT IN AN ORGANIZED HEALTH CARE EDUCATION/TRAINING PROGRAM

## 2024-03-24 PROCEDURE — 99446 NTRPROF PH1/NTRNET/EHR 5-10: CPT | Mod: ,,, | Performed by: OTOLARYNGOLOGY

## 2024-03-24 PROCEDURE — 63600175 PHARM REV CODE 636 W HCPCS: Performed by: STUDENT IN AN ORGANIZED HEALTH CARE EDUCATION/TRAINING PROGRAM

## 2024-03-24 PROCEDURE — 99222 1ST HOSP IP/OBS MODERATE 55: CPT | Mod: ,,, | Performed by: STUDENT IN AN ORGANIZED HEALTH CARE EDUCATION/TRAINING PROGRAM

## 2024-03-24 PROCEDURE — 36415 COLL VENOUS BLD VENIPUNCTURE: CPT | Performed by: STUDENT IN AN ORGANIZED HEALTH CARE EDUCATION/TRAINING PROGRAM

## 2024-03-24 PROCEDURE — 87077 CULTURE AEROBIC IDENTIFY: CPT | Performed by: STUDENT IN AN ORGANIZED HEALTH CARE EDUCATION/TRAINING PROGRAM

## 2024-03-24 PROCEDURE — 85025 COMPLETE CBC W/AUTO DIFF WBC: CPT | Performed by: STUDENT IN AN ORGANIZED HEALTH CARE EDUCATION/TRAINING PROGRAM

## 2024-03-24 PROCEDURE — 94761 N-INVAS EAR/PLS OXIMETRY MLT: CPT

## 2024-03-24 PROCEDURE — 80053 COMPREHEN METABOLIC PANEL: CPT | Performed by: STUDENT IN AN ORGANIZED HEALTH CARE EDUCATION/TRAINING PROGRAM

## 2024-03-24 PROCEDURE — 63600175 PHARM REV CODE 636 W HCPCS: Performed by: HOSPITALIST

## 2024-03-24 RX ORDER — MICONAZOLE NITRATE 2 %
POWDER (GRAM) TOPICAL 2 TIMES DAILY
Status: DISCONTINUED | OUTPATIENT
Start: 2024-03-24 | End: 2024-03-27 | Stop reason: HOSPADM

## 2024-03-24 RX ORDER — MUPIROCIN 20 MG/G
OINTMENT TOPICAL 2 TIMES DAILY
Status: DISCONTINUED | OUTPATIENT
Start: 2024-03-24 | End: 2024-03-27 | Stop reason: HOSPADM

## 2024-03-24 RX ADMIN — VANCOMYCIN HYDROCHLORIDE 1500 MG: 1.5 INJECTION, POWDER, LYOPHILIZED, FOR SOLUTION INTRAVENOUS at 04:03

## 2024-03-24 RX ADMIN — MERCAPTOPURINE 100 MG: 50 TABLET ORAL at 09:03

## 2024-03-24 RX ADMIN — APIXABAN 5 MG: 5 TABLET, FILM COATED ORAL at 09:03

## 2024-03-24 RX ADMIN — MUPIROCIN: 20 OINTMENT TOPICAL at 09:03

## 2024-03-24 RX ADMIN — THERA TABS 1 TABLET: TAB at 09:03

## 2024-03-24 RX ADMIN — PIPERACILLIN AND TAZOBACTAM 4.5 G: 4; .5 INJECTION, POWDER, LYOPHILIZED, FOR SOLUTION INTRAVENOUS; PARENTERAL at 06:03

## 2024-03-24 RX ADMIN — DEXAMETHASONE SODIUM PHOSPHATE 8 MG: 4 INJECTION INTRA-ARTICULAR; INTRALESIONAL; INTRAMUSCULAR; INTRAVENOUS; SOFT TISSUE at 02:03

## 2024-03-24 RX ADMIN — DEXAMETHASONE SODIUM PHOSPHATE 8 MG: 4 INJECTION INTRA-ARTICULAR; INTRALESIONAL; INTRAMUSCULAR; INTRAVENOUS; SOFT TISSUE at 06:03

## 2024-03-24 RX ADMIN — MICONAZOLE NITRATE: 20 POWDER TOPICAL at 11:03

## 2024-03-24 RX ADMIN — FOLIC ACID 1 MG: 1 TABLET ORAL at 09:03

## 2024-03-24 RX ADMIN — MICONAZOLE NITRATE: 20 POWDER TOPICAL at 09:03

## 2024-03-24 RX ADMIN — MUPIROCIN: 20 OINTMENT TOPICAL at 11:03

## 2024-03-24 RX ADMIN — APIXABAN 5 MG: 5 TABLET, FILM COATED ORAL at 11:03

## 2024-03-24 NOTE — CONSULTS
[facility] Intensive Care  Infectious Disease  Consult Note    Patient Name: Thalia Rg  MRN: [id number]  Admission Date: 3/23/2024  Hospital Length of Stay: 1 days  Attending Physician: Sarah Fermin MD  Primary Care Provider: Mac Gresham MD     Isolation Status: No active isolations    Patient information was obtained from patient and ER records.      Inpatient consult to Infectious Diseases  Consult performed by: Emilee Resendiz MD  Consult ordered by: Sarah Fermin MD        Assessment/Plan:     ID  Impetigo  Thalia Garsia is a 53 year old woman with autoimmune hepatitis on mercaptopurine, DVT/PE (on Eliquis), bipolar disorder, bedbound due to neuropathy on IVIG who was admitted to MICU for severe impetigo with neck cellulitis and laryngeal edema. Potentially she developed skin breakdown with rash secondary to vicks/other OTC creams which became severely secondarily infected due to her mercaptopurine use. Likely causative pathogens are strep and staph.   - would stop pip-tazo  - continue vancomycin goal trough 15-20 mcg/ml, dosing per pharmacy  - discussed with IC ID and hepatology, recommend holding mercaptopurine during acute infection  - sent nasal culture       Above discussed with primary team.     Time: 55 minutes   50% of time spent on face-to-face counseling and coordination of care. Counseling included review of test results, diagnosis, and treatment plan with patient and/or family.  I have reviewed hospital notes from  service and other specialty providers. I have also reviewed CBC, CMP/BMP,  cultures and imaging with my interpretation as documented.     Thank you for your consult. I will follow-up with patient. Please contact us if you have any additional questions.    Emilee Resendiz MD  Infectious Disease  [facility] - Intensive Care    Subjective:     Principal Problem: Cellulitis of neck    HPI: Thalia Garsia is a 53 year old woman with autoimmune hepatitis on  mercaptopurine, DVT/PE (on Eliquis), bipolar disorder, bedbound due to neuropathy on IVIG who was admitted to MICU for severe impetigo with neck cellulitis and laryngeal edema.  She initially had URI with nasal congestion about 2 weeks prior. She was blowing her nose frequently and thinks she had some sores in her nose. She developed a rash 2 days prior on her nose and face which then progressed to lip swelling, extended to neck/chest and then felt she was having difficulty breathing. She was using vicks and other OTC creams for symptomatic relief. Rash is painful and itchy. No prior episodes of this. She feels improved since admission. She is able to tolerate PO. Denies dysphagia.     She denies any wounds or infections in her groin, axilla or sacrum.     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    apixaban (ELIQUIS) 5 mg Tab TAKE 1 TABLET BY MOUTH TWICE DAILY    folic acid (FOLVITE) 1 MG tablet Take 1 tablet (1 mg total) by mouth once daily.    ibuprofen (ADVIL,MOTRIN) 200 MG tablet Take 400 mg by mouth as needed for Pain.    mercaptopurine (PURINETHOL) 50 mg tablet Take 100 mg by mouth once daily.     multivitamin (THERAGRAN) per tablet Take 1 tablet by mouth once daily.    apixaban (ELIQUIS) 5 mg Tab TAKE 1 TABLET BY MOUTH TWICE DAILY    ziprasidone (GEODON) 20 MG Cap Take 1 capsule (20 mg total) by mouth 2 (two) times daily.     Antibiotics (From admission, onward)      Start     Stop Route Frequency Ordered    03/24/24 1200  mupirocin 2 % ointment         03/29/24 0859 Nasl 2 times daily 03/24/24  1059    03/24/24 0530  vancomycin 1,500 mg in dextrose 5 % (D5W) 250 mL IVPB (Vial-Mate)         -- IV Every 12 hours (non-standard times) 03/23/24 2318    03/23/24 2341  vancomycin - pharmacy to dose  (vancomycin IVPB (PEDS and ADULTS))        See Daria for full Linked Orders Report.    -- IV pharmacy to manage frequency 03/23/24 2241          Antifungals (From admission, onward)      Start     Stop Route Frequency Ordered    03/24/24 1230  miconazole NITRATE 2 % top powder         -- Top 2 times daily 03/24/24 1121          Antivirals (From admission, onward)      None             Immunization History   Administered Date(s) Administered    COVID-19, MRNA, LN-S, PF (Pfizer) (Purple Cap) 08/11/2021, 01/07/2022    Hepatitis A, Adult 03/17/2009, 09/15/2009    Hepatitis B, Adult 03/17/2009, 09/15/2009, 10/08/2013, 11/12/2013       Family History       Problem Relation (Age of Onset)    Breast cancer Maternal Aunt    Cancer Father    Diabetes Maternal Grandmother          Social History     Socioeconomic History    Marital status:    Tobacco Use    Smoking status: Never    Smokeless tobacco: Never   Substance and Sexual Activity    Alcohol use: No    Drug use: No    Sexual activity: Yes     Partners: Male     Birth control/protection: Condom   Social History Narrative    ** Merged History Encounter **          Review of Systems   Constitutional:  Positive for activity change and fatigue.   HENT:  Positive for facial swelling. Negative for mouth sores, sinus pressure, sinus pain, sneezing, sore throat, trouble swallowing and voice change.    Eyes:  Negative for redness.   Respiratory: Negative.     Cardiovascular: Negative.    Gastrointestinal: Negative.    Endocrine: Negative.    Genitourinary: Negative.    Musculoskeletal: Negative.    Skin:  Positive for color change and rash.   Allergic/Immunologic: Negative.    Neurological:  Positive for weakness and numbness.   Hematological: Negative.     Psychiatric/Behavioral: Negative.       Objective:     Vital Signs (Most Recent):  Temp: 97.2 °F (36.2 °C) (03/24/24 0800)  Pulse: 104 (03/24/24 0900)  Resp: (!) 4 (03/24/24 0900)  BP: (!) 158/85 (03/24/24 0900)  SpO2: (!) 94 % (03/24/24 0900) Vital Signs (24h Range):  Temp:  [97.2 °F (36.2 °C)-99.2 °F (37.3 °C)] 97.2 °F (36.2 °C)  Pulse:  [] 104  Resp:  [0-31] 4  SpO2:  [85 %-98 %] 94 %  BP: (109-179)/(63-90) 158/85     Weight: 104.6 kg (230 lb 9.6 oz)  Body mass index is 35.06 kg/m².    Estimated Creatinine Clearance: 100.6 mL/min (based on SCr of 0.8 mg/dL).     Physical Exam  Vitals and nursing note reviewed.   Constitutional:       Appearance: She is ill-appearing. She is not toxic-appearing.   HENT:      Head: Normocephalic.      Mouth/Throat:      Mouth: Mucous membranes are moist.      Pharynx: Oropharynx is clear. No posterior oropharyngeal erythema.   Eyes:      General:         Right eye: Discharge present.         Left eye: Discharge present.  Pulmonary:      Effort: Pulmonary effort is normal. No respiratory distress.   Abdominal:      General: There is no distension.      Palpations: Abdomen is soft.      Tenderness: There is no abdominal tenderness.   Musculoskeletal:         General: No swelling or tenderness.      Right lower leg: No edema.      Left lower leg: No edema.   Skin:     Findings: Rash (crusting rash near eyes, nose, mouth, neck and chest neck) present.   Neurological:      Mental Status: She is alert.   Psychiatric:         Mood and Affect: Mood normal.         Behavior: Behavior normal.          Significant Labs:   Microbiology Results (last 7 days)       Procedure Component Value Units Date/Time    Blood culture x two cultures. Draw prior to antibiotics. [[id number]] Collected: 03/23/24 1746    Order Status: Completed Specimen: Blood from Peripheral, Antecubital, Right Updated: 03/24/24 0312     Blood Culture, Routine No Growth to date    Narrative:      Aerobic and anaerobic     Blood culture x two cultures. Draw prior to antibiotics. [[id number]] Collected: 03/23/24 2785    Order Status: Completed Specimen: Blood from Peripheral, Forearm, Left Updated: 03/24/24 0312     Blood Culture, Routine No Growth to date    Narrative:      Aerobic and anaerobic            Significant Imaging: I have reviewed all pertinent imaging results/findings within the past 24 hours.

## 2024-03-24 NOTE — PLAN OF CARE
Pt arrived to ICU alert and oriented. Connected to cardiac monitor, call light within reach, bed in lowest position. Redness and swelling noted to neck and upper chest and outlined for monitoring.     HR running NSR on monitor. BP stable. On RA sating above 95%. Normothermic. IV antibiotics given. Redness/ swelling noted to neck and upper chest, did not spread outside of outline. No difficulty swallowing at this time. Pt states she does have some change in her voice but slowly returning to baseline. Purewick in place with good urine output. Updated on plan of care. No new falls, injuries, or skin breakdown this shift.

## 2024-03-24 NOTE — H&P
[facility]  History & Physical    Patient Name: Thalia Rg  MRN: [id number]  Patient Class: IP- Inpatient  Admission Date: 3/23/2024  Attending Physician: Sarah Fermin MD   Primary Care Provider: Mac Gresham MD         Patient information was obtained from patient and ER records.     Subjective:     Principal Problem:Cellulitis of neck    Chief Complaint:   Chief Complaint   Patient presents with    Loss of Consciousness     Patient arrived via EMS w CC: LOC w NO trauma. Patient has been bed bound for the last couple of years. 3 day history of flu like symptoms and more recently a weepy rash to her neck which ia also forming pustules. NO trauma w LOC, patient is on blood thinners. EMS was on scene for a 20 sec. Episode of LOC while they were getting her down the stairs.         HPI: This is a 53-year-old female with a past medical history of autoimmune hepatitis, DVT/PE (on Eliquis), bipolar disorder, bedbound 2/2 neuropathy who presents with facial swelling/rash.     Patient presents with facial swelling/rash that started 2 days prior to presentation.  She initially developed URI symptoms/nasal congestion 2 weeks prior to presentation.  She later developed a rash on her nose, face and swelling of her lips that she 1st noted 2 days prior.  It later spread to her face, and her neck.  Her rash is painful, burning and is associated with itching.  Additional symptoms include dysphagia.  She denied having shortness of breaths or drooling.    In the ED, the patient was tachycardic (110), tachypneic.  Labs were remarkable for leukocytosis (13.4), elevated CRP (68.4).  CT soft tissue neck showed subcutaneous soft tissue stranding and edema involving the visualized anterior upper chest and lower neck and extending along the lateral aspects of the neck to the level of the parotid glands & soft tissue swelling and possible edema the vocal cord level, asymmetric to the right.   ENT was consulted, deferred surgical intervention at this time and recommended IV antibiotics and IV decadron.  Patient was given 1.9 L of LR, vancomycin, Zosyn, Decadron 10 mg IV, Dilaudid 0.5 mg IV x2.  She was admitted for further management.    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     Current Outpatient Medications on File Prior to Encounter   Medication Sig    acetaminophen (TYLENOL) 325 MG tablet Take 650 mg by mouth as needed for Pain.    apixaban (ELIQUIS) 5 mg Tab TAKE 1 TABLET BY MOUTH TWICE DAILY    folic acid (FOLVITE) 1 MG tablet Take 1 tablet (1 mg total) by mouth once daily.    ibuprofen (ADVIL,MOTRIN) 200 MG tablet Take 400 mg by mouth as needed for Pain.    mercaptopurine (PURINETHOL) 50 mg tablet Take 100 mg by mouth once daily.     multivitamin (THERAGRAN) per tablet Take 1 tablet by mouth once daily.    apixaban (ELIQUIS) 5 mg Tab TAKE 1 TABLET BY MOUTH TWICE DAILY    ziprasidone (GEODON) 20 MG Cap Take 1 capsule (20 mg total) by mouth 2 (two) times daily.     Family History       Problem Relation (Age of Onset)    Breast cancer Maternal Aunt    Cancer Father    Diabetes Maternal Grandmother          Tobacco Use    Smoking status: Never    Smokeless tobacco: Never   Substance and Sexual Activity    Alcohol use: No    Drug use: No    Sexual activity: Yes     Partners: Male     Birth control/protection: Condom     Review of Systems   Constitutional: Negative.    HENT:  Positive for congestion and trouble swallowing.    Eyes: Negative.    Respiratory: Negative.     Cardiovascular: Negative.    Gastrointestinal: Negative.    Endocrine: Negative.     Genitourinary: Negative.    Musculoskeletal: Negative.    Skin:  Positive for rash.   Allergic/Immunologic: Negative.    Neurological: Negative.    Psychiatric/Behavioral: Negative.       Objective:     Vital Signs (Most Recent):  Temp: 98.4 °F (36.9 °C) (03/23/24 2001)  Pulse: 96 (03/23/24 2201)  Resp: 18 (03/23/24 2201)  BP: 109/71 (03/23/24 2201)  SpO2: 96 % (03/23/24 2201) Vital Signs (24h Range):  Temp:  [98.4 °F (36.9 °C)-98.6 °F (37 °C)] 98.4 °F (36.9 °C)  Pulse:  [] 96  Resp:  [14-22] 18  SpO2:  [95 %-96 %] 96 %  BP: (109-140)/(64-79) 109/71     Weight: 90.7 kg (200 lb)  Body mass index is 30.41 kg/m².     Physical Exam  Vitals and nursing note reviewed.   Constitutional:       General: She is not in acute distress.     Appearance: Normal appearance. She is not ill-appearing.   HENT:      Head: Normocephalic and atraumatic.      Nose: Nose normal.      Mouth/Throat:      Mouth: Mucous membranes are moist.      Comments: Able to manage secretions, no distress noted.   Eyes:      Extraocular Movements: Extraocular movements intact.   Cardiovascular:      Rate and Rhythm: Normal rate.      Pulses: Normal pulses.      Heart sounds: No murmur heard.  Pulmonary:      Effort: Pulmonary effort is normal. No respiratory distress.   Abdominal:      General: Abdomen is flat.      Palpations: Abdomen is soft.      Tenderness: There is no abdominal tenderness.   Musculoskeletal:      Right lower leg: No edema.      Left lower leg: No edema.   Skin:     General: Skin is warm.      Capillary Refill: Capillary refill takes less than 2 seconds.      Comments: See images    Neurological:      General: No focal deficit present.      Mental Status: She is alert.   Psychiatric:         Mood and Affect: Mood normal.                  Significant Labs: All pertinent labs within the past 24 hours have been reviewed.    Significant Imaging: I have reviewed all pertinent imaging results/findings within the past 24  hours.  Assessment/Plan:     * Cellulitis of neck  CT soft tissue neck showed subcutaneous soft tissue stranding and edema involving the visualized anterior upper chest and lower neck and extending along the lateral aspects of the neck to the level of the parotid glands & soft tissue swelling and possible edema the vocal cord level, asymmetric to the right.    Continue vancomycin/Zosyn   Airway monitoring   Pain control   ENT consult  Follow-up cultures and deescalate as appropriate    History of DVT/PE  Continue Eliquis       Acute sinusitis  Continue broad-spectrum antibiotics for now    Laryngeal edema  S/p Decadron 10 mg IV  Continue Decadron 8 mg Q8H x2 per ENT      Impetigo  See plan for cellulitis of night      Paresthesia of both lower extremities  C/b functional quadriplegia         VTE Risk Mitigation (From admission, onward)           Ordered     apixaban tablet 5 mg  2 times daily         03/23/24 2221     IP VTE HIGH RISK PATIENT  Once         03/23/24 2221     Place sequential compression device  Until discontinued         03/23/24 2221                  Critical care time spent on the evaluation and treatment of severe organ dysfunction, review of pertinent labs and imaging studies, discussions with consulting providers and discussions with patient/family: 60 minutes.             Pharmacokinetic Initial Assessment: IV Vancomycin    Assessment/Plan:    Initiate intravenous vancomycin with loading dose of 2000 mg once followed by a maintenance dose of vancomycin 1500mg IV every 12 hours  Desired empiric serum trough concentration is 10 to 20 mcg/mL  Draw vancomycin trough level 60 min prior to fourth dose on 3/25 at approximately 3/25  Pharmacy will continue to follow and monitor vancomycin.      Please contact pharmacy at extension [phone] with any questions regarding this assessment.     Thank you for the consult,   Ahmet Sarabia       Patient brief summary:  Thalia Rg is a 53 y.o. female  "initiated on antimicrobial therapy with IV Vancomycin for treatment of suspected skin & soft tissue infection    Drug Allergies:   Review of patient's allergies indicates:  No Known Allergies    Actual Body Weight:   104.6 kg    Renal Function:   Estimated Creatinine Clearance: 89.4 mL/min (based on SCr of 0.9 mg/dL).,     Dialysis Method (if applicable):  N/A    CBC (last 72 hours):  Recent Labs   Lab Result Units 03/23/24 1747   WBC K/uL 13.46*   Hemoglobin g/dL 12.9   Hematocrit % 38.9   Platelets K/uL 443   Gran % % 73.5*   Lymph % % 19.0   Mono % % 4.5   Eosinophil % % 2.5   Basophil % % 0.1   Differential Method  Automated       Metabolic Panel (last 72 hours):  Recent Labs   Lab Result Units 03/23/24 1747 03/23/24 2013   Sodium mmol/L 140  --    Potassium mmol/L 3.9  --    Chloride mmol/L 108  --    CO2 mmol/L 22*  --    Glucose mg/dL 143*  --    Glucose, UA   --  Negative   BUN mg/dL 12  --    Creatinine mg/dL 0.9  --    Albumin g/dL 3.1*  --    Total Bilirubin mg/dL 0.3  --    Alkaline Phosphatase U/L 70  --    AST U/L 22  --    ALT U/L 20  --    Magnesium mg/dL 1.9  --        Drug levels (last 3 results):  No results for input(s): "VANCOMYCINRA", "VANCORANDOM", "VANCOMYCINPE", "VANCOPEAK", "VANCOMYCINTR", "VANCOTROUGH" in the last 72 hours.    Microbiologic Results:  Microbiology Results (last 7 days)       Procedure Component Value Units Date/Time    Blood culture x two cultures. Draw prior to antibiotics. [[id number]] Collected: 03/23/24 1748    Order Status: Sent Specimen: Blood from Peripheral, Forearm, Left Updated: 03/23/24 1806    Blood culture x two cultures. Draw prior to antibiotics. [[id number]] Collected: 03/23/24 1748    Order Status: Sent Specimen: Blood from Peripheral, Antecubital, Right Updated: 03/23/24 1806              AdmissionCare    Guideline: Head and Neck Disease, Inpatient    Based on the indications selected for the patient, the bed status of Inpatient was determined to be " MET    The following indications were selected as present at the time of evaluation of the patient:      - Head or Neck Disease condition, symptom, or finding for which observation care has failed or is not considered appropriate. See General Criteria: Observation Care, General Admission Criteria, or Pediatric General Admission Criteria guideline as appropriate.    AdmissionCare documentation entered by: Everett Parsons    [facility], 27th edition, Copyright © 2023 [facility], [facility] All Rights Reserved.  [id number]:43-05:00    Yousif Salvador MD  Department of Hospital Medicine  [facility] - Intensive Care

## 2024-03-24 NOTE — ASSESSMENT & PLAN NOTE
Noted on CT. S/p Decadron 10 mg IV  - Continue Decadron 8 mg Q8H x2 per ENT for today  - symptoms have resolved

## 2024-03-24 NOTE — PROVIDER TRANSFER
Ms Thalia Rg was admitted to ICU with neck cellulitis. She is immunocompromised on mercaptopurine for autoimmune hepatitis with neuropathy. She developed upper respiratory symptoms and used Perez's vapor rub on her neck for relief. She then developed progressive rash and swelling on her neck. She was admitted for neck/facial cellulitis. CT showed swelling in upper check/lower neck and possible vocal cord edema. She was started on vanc, zosyn, and given dexamethasone for vocal cord edema. Speech returned to normal and rash is improving. Stepped down to floor.     To do  - monitor rash on vanc, see photo for rash today  - follow up ID/wound care recs  - holding mercaptopurine in setting of infection  - bedbound x2 years so PT/OT not consulted.     Sarah Fermin MD  03/24/2024 11:25 AM

## 2024-03-24 NOTE — ASSESSMENT & PLAN NOTE
Dexamethasone 12 mg IV now and repeat doses of 8 mg IV Q8 hours x 2 for laryngeal edema likely viral URI related.  Covered well with Zosyn or even Unasyn for any suspicion of HIB or other bacterial causes less likely.  Transfer ER to ER to [facility] or intubate if laryngeal/airway concerns do not respond quickly to treatment or progress in spite of treatment respectively.

## 2024-03-24 NOTE — ASSESSMENT & PLAN NOTE
Discussed with Dr. Matthews in the ER.  Admission for IV antibiotics covering staph/strep locally w/o transfer with no indication for surgical intervention at this time seems an appropriate next step if  hospital medicine agrees given other issues.  Recommend pharyngeal and skin swabs for culture/evaluation for GABS and MRSA.  Check CRP and CK for baseline.  No current suspicion of necrotizing or gas forming infection by report/exam and CT findings.

## 2024-03-24 NOTE — SUBJECTIVE & OBJECTIVE
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    apixaban (ELIQUIS) 5 mg Tab TAKE 1 TABLET BY MOUTH TWICE DAILY    folic acid (FOLVITE) 1 MG tablet Take 1 tablet (1 mg total) by mouth once daily.    ibuprofen (ADVIL,MOTRIN) 200 MG tablet Take 400 mg by mouth as needed for Pain.    mercaptopurine (PURINETHOL) 50 mg tablet Take 100 mg by mouth once daily.     multivitamin (THERAGRAN) per tablet Take 1 tablet by mouth once daily.    apixaban (ELIQUIS) 5 mg Tab TAKE 1 TABLET BY MOUTH TWICE DAILY    ziprasidone (GEODON) 20 MG Cap Take 1 capsule (20 mg total) by mouth 2 (two) times daily.     Antibiotics (From admission, onward)      Start     Stop Route Frequency Ordered    03/24/24 1200  mupirocin 2 % ointment         03/29/24 0859 Nasl 2 times daily 03/24/24 1059    03/24/24 0530  vancomycin 1,500 mg in dextrose 5 % (D5W) 250 mL IVPB (Vial-Mate)         -- IV Every 12 hours (non-standard times) 03/23/24 2318    03/23/24 2341  vancomycin - pharmacy to dose  (vancomycin IVPB (PEDS and ADULTS))        See Hyperspace for full Linked Orders Report.    -- IV pharmacy to manage frequency 03/23/24 2241          Antifungals (From admission, onward)      Start     Stop Route Frequency Ordered    03/24/24 1230  miconazole NITRATE 2 % top powder         -- Top 2 times daily 03/24/24 1121          Antivirals (From admission, onward)      None             Immunization History   Administered Date(s) Administered    COVID-19, MRNA, LN-S, PF (Pfizer) (Purple Cap) 08/11/2021, 01/07/2022    Hepatitis A, Adult 03/17/2009, 09/15/2009     Hepatitis B, Adult 03/17/2009, 09/15/2009, 10/08/2013, 11/12/2013       Family History       Problem Relation (Age of Onset)    Breast cancer Maternal Aunt    Cancer Father    Diabetes Maternal Grandmother          Social History     Socioeconomic History    Marital status:    Tobacco Use    Smoking status: Never    Smokeless tobacco: Never   Substance and Sexual Activity    Alcohol use: No    Drug use: No    Sexual activity: Yes     Partners: Male     Birth control/protection: Condom   Social History Narrative    ** Merged History Encounter **          Review of Systems   Constitutional:  Positive for activity change and fatigue.   HENT:  Positive for facial swelling. Negative for mouth sores, sinus pressure, sinus pain, sneezing, sore throat, trouble swallowing and voice change.    Eyes:  Negative for redness.   Respiratory: Negative.     Cardiovascular: Negative.    Gastrointestinal: Negative.    Endocrine: Negative.    Genitourinary: Negative.    Musculoskeletal: Negative.    Skin:  Positive for color change and rash.   Allergic/Immunologic: Negative.    Neurological:  Positive for weakness and numbness.   Hematological: Negative.    Psychiatric/Behavioral: Negative.       Objective:     Vital Signs (Most Recent):  Temp: 97.2 °F (36.2 °C) (03/24/24 0800)  Pulse: 104 (03/24/24 0900)  Resp: (!) 4 (03/24/24 0900)  BP: (!) 158/85 (03/24/24 0900)  SpO2: (!) 94 % (03/24/24 0900) Vital Signs (24h Range):  Temp:  [97.2 °F (36.2 °C)-99.2 °F (37.3 °C)] 97.2 °F (36.2 °C)  Pulse:  [] 104  Resp:  [0-31] 4  SpO2:  [85 %-98 %] 94 %  BP: (109-179)/(63-90) 158/85     Weight: 104.6 kg (230 lb 9.6 oz)  Body mass index is 35.06 kg/m².    Estimated Creatinine Clearance: 100.6 mL/min (based on SCr of 0.8 mg/dL).     Physical Exam  Vitals and nursing note reviewed.   Constitutional:       Appearance: She is ill-appearing. She is not toxic-appearing.   HENT:      Head: Normocephalic.      Mouth/Throat:      Mouth:  Mucous membranes are moist.      Pharynx: Oropharynx is clear. No posterior oropharyngeal erythema.   Eyes:      General:         Right eye: Discharge present.         Left eye: Discharge present.  Pulmonary:      Effort: Pulmonary effort is normal. No respiratory distress.   Abdominal:      General: There is no distension.      Palpations: Abdomen is soft.      Tenderness: There is no abdominal tenderness.   Musculoskeletal:         General: No swelling or tenderness.      Right lower leg: No edema.      Left lower leg: No edema.   Skin:     Findings: Rash (crusting rash near eyes, nose, mouth, neck and chest neck) present.   Neurological:      Mental Status: She is alert.   Psychiatric:         Mood and Affect: Mood normal.         Behavior: Behavior normal.          Significant Labs:   Microbiology Results (last 7 days)       Procedure Component Value Units Date/Time    Blood culture x two cultures. Draw prior to antibiotics. [[id number]] Collected: 03/23/24 1748    Order Status: Completed Specimen: Blood from Peripheral, Antecubital, Right Updated: 03/24/24 0312     Blood Culture, Routine No Growth to date    Narrative:      Aerobic and anaerobic    Blood culture x two cultures. Draw prior to antibiotics. [[id number]] Collected: 03/23/24 1748    Order Status: Completed Specimen: Blood from Peripheral, Forearm, Left Updated: 03/24/24 0312     Blood Culture, Routine No Growth to date    Narrative:      Aerobic and anaerobic            Significant Imaging: I have reviewed all pertinent imaging results/findings within the past 24 hours.

## 2024-03-24 NOTE — HPI
Thalia Garsia is a 53 year old woman with autoimmune hepatitis on mercaptopurine, DVT/PE (on Eliquis), bipolar disorder, bedbound due to neuropathy on IVIG who was admitted to MICU for severe impetigo with neck cellulitis and laryngeal edema.  She initially had URI with nasal congestion about 2 weeks prior. She was blowing her nose frequently and thinks she had some sores in her nose. She developed a rash 2 days prior on her nose and face which then progressed to lip swelling, extended to neck/chest and then felt she was having difficulty breathing. She was using vicks and other OTC creams for symptomatic relief. Rash is painful and itchy. No prior episodes of this. She feels improved since admission. She is able to tolerate PO. Denies dysphagia.     She denies any wounds or infections in her groin, axilla or sacrum.

## 2024-03-24 NOTE — HPI
This is a 53-year-old female with a past medical history of autoimmune hepatitis, DVT/PE (on Eliquis), bipolar disorder, bedbound 2/2 neuropathy who presents with facial swelling/rash.     Patient presents with facial swelling/rash that started 2 days prior to presentation.  She initially developed URI symptoms/nasal congestion 2 weeks prior to presentation.  She later developed a rash on her nose, face and swelling of her lips that she 1st noted 2 days prior.  It later spread to her face, and her neck.  Her rash is painful, burning and is associated with itching.  Additional symptoms include dysphagia.  She denied having shortness of breaths or drooling.    In the ED, the patient was tachycardic (110), tachypneic.  Labs were remarkable for leukocytosis (13.4), elevated CRP (68.4).  CT soft tissue neck showed subcutaneous soft tissue stranding and edema involving the visualized anterior upper chest and lower neck and extending along the lateral aspects of the neck to the level of the parotid glands & soft tissue swelling and possible edema the vocal cord level, asymmetric to the right.  ENT was consulted, deferred surgical intervention at this time and recommended IV antibiotics and IV decadron.  Patient was given 1.9 L of LR, vancomycin, Zosyn, Decadron 10 mg IV, Dilaudid 0.5 mg IV x2.  She was admitted for further management.

## 2024-03-24 NOTE — ASSESSMENT & PLAN NOTE
CT soft tissue neck showed subcutaneous soft tissue stranding and edema involving the visualized anterior upper chest and lower neck and extending along the lateral aspects of the neck to the level of the parotid glands & soft tissue swelling and possible edema the vocal cord level, asymmetric to the right.    Continue vancomycin/Zosyn   Airway monitoring   Pain control   ENT consult  Follow-up cultures and deescalate as appropriate

## 2024-03-24 NOTE — ASSESSMENT & PLAN NOTE
Liver synthetic function intact. No acute issues  - asked ID if mercaptopurine needs to be held with infection- awaiting response

## 2024-03-24 NOTE — ASSESSMENT & PLAN NOTE
Antibiotic coverage such as Unasyn or Zosyn transitioning to outpatient Augmentin.  Afrin for up to 5 days and lots of saline to facilitate drainage.

## 2024-03-24 NOTE — SUBJECTIVE & OBJECTIVE
Interval History: Awake and alert with family at bedside. The pain in her neck is much improved compared to yesterday. She does feel that her skin is more dry and cracking today which is painful. She has no shortness of breath or trouble swallowing.     Review of Systems   Constitutional:  Negative for chills and fever.   HENT:  Positive for facial swelling. Negative for postnasal drip, rhinorrhea, sore throat and trouble swallowing.    Respiratory:  Negative for cough, chest tightness, shortness of breath, wheezing and stridor.    Cardiovascular:  Negative for chest pain and leg swelling.   Gastrointestinal:  Negative for abdominal pain, constipation, diarrhea, nausea and vomiting.   Genitourinary:  Negative for difficulty urinating.   Musculoskeletal:  Negative for arthralgias and myalgias.   Skin:  Positive for color change and rash.   Psychiatric/Behavioral:  Negative for confusion.      Objective:     Vital Signs (Most Recent):  Temp: 97.2 °F (36.2 °C) (03/24/24 0800)  Pulse: 104 (03/24/24 0900)  Resp: (!) 4 (03/24/24 0900)  BP: (!) 158/85 (03/24/24 0900)  SpO2: (!) 94 % (03/24/24 0900) Vital Signs (24h Range):  Temp:  [97.2 °F (36.2 °C)-99.2 °F (37.3 °C)] 97.2 °F (36.2 °C)  Pulse:  [] 104  Resp:  [0-31] 4  SpO2:  [85 %-98 %] 94 %  BP: (109-179)/(63-90) 158/85     Weight: 104.6 kg (230 lb 9.6 oz)  Body mass index is 35.06 kg/m².    Intake/Output Summary (Last 24 hours) at 3/24/2024 1059  Last data filed at 3/24/2024 0600  Gross per 24 hour   Intake 2694.01 ml   Output --   Net 2694.01 ml         Physical Exam  Vitals and nursing note reviewed.   Constitutional:       General: She is not in acute distress.     Appearance: She is obese. She is ill-appearing. She is not toxic-appearing.   HENT:      Head: Normocephalic and atraumatic.      Nose: Nose normal.      Mouth/Throat:      Mouth: Mucous membranes are moist.      Pharynx: No oropharyngeal exudate or posterior oropharyngeal erythema.   Cardiovascular:       Rate and Rhythm: Regular rhythm. Tachycardia present.      Pulses: Normal pulses.      Heart sounds: Normal heart sounds.   Pulmonary:      Effort: Pulmonary effort is normal.      Breath sounds: Normal breath sounds. No stridor.      Comments: Room air  Abdominal:      General: Bowel sounds are normal.      Palpations: Abdomen is soft.      Tenderness: There is no abdominal tenderness.   Musculoskeletal:      Right lower leg: No edema.      Left lower leg: No edema.   Skin:     General: Skin is warm and dry.      Findings: Erythema and rash present.      Comments: Erythema with honey colored crust on face, neck, upper chest- see photo below. Hyperpigmentation under breasts   Neurological:      Mental Status: She is alert and oriented to person, place, and time. Mental status is at baseline.             Significant Labs: All pertinent labs within the past 24 hours have been reviewed.    Significant Imaging: I have reviewed all pertinent imaging results/findings within the past 24 hours.

## 2024-03-24 NOTE — ASSESSMENT & PLAN NOTE
- suspect that using vicks vapor rub caused irritant/contact dermatitis which then led to secondary Staph infection/impetigo  - CT soft tissue neck showed subcutaneous soft tissue stranding and edema involving the visualized anterior upper chest and lower neck and extending along the lateral aspects of the neck to the level of the parotid glands & soft tissue swelling and possible edema the vocal cord level, asymmetric to the right.   - continue vancomycin and mupirocin  - given dexamethasone for vocal cord edema- will end 3/24. Speech and breathing normal  - ENT and ID consulted  - follow up culture results

## 2024-03-24 NOTE — PROGRESS NOTES
eICU Brief Admission Note       Briefly, 53-year-old female with a past medical history of autoimmune hepatitis, DVT/PE (on Eliquis), bipolar disorder, bedbound due to neuropathy who presents with facial swelling/rash X 2 days.       Video assessment :  Lying comfortably in bed     Vitals reviewed   Afebrile, stable vitals     LABs reviewed       Radiology reviewed   CT soft tissue neck  1. Subcutaneous soft tissue stranding and edema involving the visualized anterior upper chest and lower neck and extending along the lateral aspects of the neck to the level of the parotid glands.  This is nonspecific.  Potential infectious process or cellulitis to be considered in the right clinical setting.  No organized focal fluid collection or abscess seen.  2. Soft tissue swelling and possible edema the vocal cord level, asymmetric to the right.  Potential infectious process or neoplastic process to be considered.  Airway remains patent.      Assessment / Plan :  Sepsis -- face cellulitis with soft tissue swelling extending upto the vocal cord; airway patent   H/o autoimmune hepatitis, DVT/PE (on Eliquis), bipolar disorder, bedbound due to neuropathy  - on Vancomycin + Zosyn; f/u cultures, trend lactate   - on steroids   - hold Eliquis as patient may need intervention if airway compromised   - f/u ENT  - monitor for airway       DVT Px : was on Eliquis, now on hold   GI Px : N/A

## 2024-03-24 NOTE — SUBJECTIVE & OBJECTIVE
Medications:  Continuous Infusions:  Scheduled Meds:   dexAMETHasone  10 mg Intravenous ED 1 Time    piperacillin-tazobactam (Zosyn) IV (PEDS and ADULTS) (extended infusion is not appropriate)  4.5 g Intravenous ED 1 Time     PRN Meds:     No current facility-administered medications on file prior to encounter.     Current Outpatient Medications on File Prior to Encounter   Medication Sig    acetaminophen (TYLENOL) 325 MG tablet Take 650 mg by mouth as needed for Pain.    apixaban (ELIQUIS) 5 mg Tab TAKE 1 TABLET BY MOUTH TWICE DAILY    folic acid (FOLVITE) 1 MG tablet Take 1 tablet (1 mg total) by mouth once daily.    ibuprofen (ADVIL,MOTRIN) 200 MG tablet Take 400 mg by mouth as needed for Pain.    mercaptopurine (PURINETHOL) 50 mg tablet Take 100 mg by mouth once daily.     multivitamin (THERAGRAN) per tablet Take 1 tablet by mouth once daily.    apixaban (ELIQUIS) 5 mg Tab TAKE 1 TABLET BY MOUTH TWICE DAILY    ziprasidone (GEODON) 20 MG Cap Take 1 capsule (20 mg total) by mouth 2 (two) times daily.       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    Bipolar 1 disorder     Breast disorder      Past Surgical History:   Procedure Laterality Date    LIVER BIOPSY       Family History       Problem Relation (Age of Onset)    Breast cancer Maternal Aunt    Cancer Father    Diabetes Maternal Grandmother          Tobacco Use    Smoking status: Never    Smokeless tobacco: Never   Substance and Sexual Activity    Alcohol use: No    Drug use: No    Sexual activity: Yes     Partners: Male     Birth control/protection: Condom     Review of Systems  Objective:     Vital Signs (Most Recent):  Temp: 98.4 °F (36.9 °C) (03/23/24 2001)  Pulse: 97 (03/23/24 2001)  Resp: 20 (03/23/24 2001)  BP: 129/74 (03/23/24 2001)  SpO2: 96 % (03/23/24 2001) Vital Signs (24h Range):  Temp:  [98.4 °F  "(36.9 °C)-98.6 °F (37 °C)] 98.4 °F (36.9 °C)  Pulse:  [] 97  Resp:  [14-22] 20  SpO2:  [95 %-96 %] 96 %  BP: (126-140)/(64-74) 129/74     Weight: 90.7 kg (200 lb)  Body mass index is 30.41 kg/m².    Date 03/23/24 0700 - 03/24/24 0659   Shift 2691-4024 2288-4109 3742-3164 24 Hour Total   INTAKE   IV Piggyback  500  500   Shift Total(mL/kg)  500(5.5)  500(5.5)   OUTPUT   Shift Total(mL/kg)       Weight (kg)  90.7 90.7 90.7             Significant Labs:  CBC:   Recent Labs   Lab 03/23/24  1747   WBC 13.46*   RBC 4.44   HGB 12.9   HCT 38.9      MCV 88   MCH 29.1   MCHC 33.2     CMP:   Recent Labs   Lab 03/23/24  1747   *   CALCIUM 9.5   ALBUMIN 3.1*   PROT 7.8      K 3.9   CO2 22*      BUN 12   CREATININE 0.9   ALKPHOS 70   ALT 20   AST 22   BILITOT 0.3     CRP: No results for input(s): "CRP" in the last 168 hours.  ESR: No results for input(s): "ERYTHROCYTES" in the last 168 hours.  TSH: No results for input(s): "TSH" in the last 168 hours.    Significant Diagnostics:  CT: I have reviewed all pertinent results/findings within the past 24 hours and my personal findings are:  fat stranding of lower neck and upper chest with focal edema/swelling of the right arytenoid and interarytenoid mucosa w/o gross airway compromise and no evidence of parapharyngeal fat changes or any suspicious adenopathy or free air.  Notable sinus mucosal thickening with fluid levels w/o destructive process.    "

## 2024-03-24 NOTE — NURSING
[facility]  Nurses Note -- 4 Eyes      3/24/2024       Skin assessed on: Admit      [x] No Pressure Injuries Present    [x]Prevention Measures Documented    [] Yes LDA  for Pressure Injury Previously documented     [] Yes New Pressure Injury Discovered   [] LDA for New Pressure Injury Added      Attending RN:  SARATH Silva     Second RN:  SARATH Laird

## 2024-03-24 NOTE — CLINICAL REVIEW
IP Sepsis Screen (most recent)       Sepsis Screen (IP) - 03/24/24 1211       Is the patient's history or complaint suggestive of a possible infection? Yes  -LW    Are there at least two of the following signs and symptoms present? Yes  -LW    Sepsis signs/symptoms - Tachycardia Tachycardia     >90  -LW    Sepsis signs/symptoms - WBC WBC < 4,000 or WBC > 12,000  -LW    Are any of the following organ dysfunction criteria present and not considered to be due to a chronic condition? Yes  -LW    Organ Dysfunction Criteria Lactate > 2.0  -LW    Organ Dysfunction Criteria - O2 O2 Saturation < 95% on room air  -LW    Initiate Sepsis Protocol No  -LW    Reason sepsis not considered Pt. receiving appropriate management  -LW              User Key  (r) = Recorded By, (t) = Taken By, (c) = Cosigned By      Initials Name    Carol Funes RN

## 2024-03-24 NOTE — ADMISSIONCARE
AdmissionCare    Guideline: Head and Neck Disease, Inpatient    Based on the indications selected for the patient, the bed status of Inpatient was determined to be MET    The following indications were selected as present at the time of evaluation of the patient:      - Head or Neck Disease condition, symptom, or finding for which observation care has failed or is not considered appropriate. See General Criteria: Observation Care, General Admission Criteria, or Pediatric General Admission Criteria guideline as appropriate.    AdmissionCare documentation entered by: Everett Parsons    [facility], 27th edition, Copyright © 2023 [facility], [facility] All Rights Reserved.  [id number]

## 2024-03-24 NOTE — HPI
53 year old female nonsmoker on mercaptopurine for autoimmune hepatitis with no known bone marrow suppression side effects in the ED at [facility] for evolving anterior neck redness and pain.  No prior antibiotics.  URI/sinus symptoms a week or so ago.  Also complains of sensation of swelling in throat without dysphagia or odynophagia, dyspnea or voice change per Dr. Matthews who provides me history.

## 2024-03-24 NOTE — ASSESSMENT & PLAN NOTE
Discussed with Dr. Matthews in the ER.  Admission for IV antibiotics covering staph/strep locally w/o transfer with no indication for surgical intervention at this time seems an appropriate next step if  hospital medicine agrees given other issues.  Recommend pharyngeal and skin swabs for culture/evaluation for GABS and MRSA.  Check CRP and CK for baseline.  No current suspicion of necrotizing or gas forming infection by report/exam and CT findings.    Total time in discussion, review of data/images and preparation of consultation 32 minutes.

## 2024-03-24 NOTE — ASSESSMENT & PLAN NOTE
Thalia Garsia is a 53 year old woman with autoimmune hepatitis on mercaptopurine, DVT/PE (on Eliquis), bipolar disorder, bedbound due to neuropathy on IVIG who was admitted to MICU for severe impetigo with neck cellulitis and laryngeal edema. Potentially she developed skin breakdown with rash secondary to vicks/other OTC creams which became severely secondarily infected due to her mercaptopurine use. Likely causative pathogens are strep and staph.   - would stop pip-tazo  - continue vancomycin goal trough 15-20 mcg/ml, dosing per pharmacy  - discussed with IC ID and hepatology, recommend holding mercaptopurine during acute infection  - sent nasal culture

## 2024-03-24 NOTE — ASSESSMENT & PLAN NOTE
With functional quadriplegia. She has been bed bound for 2 years. Due to autoimmune neuropathy  - turn q2h

## 2024-03-24 NOTE — SUBJECTIVE & OBJECTIVE
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     Current Outpatient Medications on File Prior to Encounter   Medication Sig    acetaminophen (TYLENOL) 325 MG tablet Take 650 mg by mouth as needed for Pain.    apixaban (ELIQUIS) 5 mg Tab TAKE 1 TABLET BY MOUTH TWICE DAILY    folic acid (FOLVITE) 1 MG tablet Take 1 tablet (1 mg total) by mouth once daily.    ibuprofen (ADVIL,MOTRIN) 200 MG tablet Take 400 mg by mouth as needed for Pain.    mercaptopurine (PURINETHOL) 50 mg tablet Take 100 mg by mouth once daily.     multivitamin (THERAGRAN) per tablet Take 1 tablet by mouth once daily.    apixaban (ELIQUIS) 5 mg Tab TAKE 1 TABLET BY MOUTH TWICE DAILY    ziprasidone (GEODON) 20 MG Cap Take 1 capsule (20 mg total) by mouth 2 (two) times daily.     Family History       Problem Relation (Age of Onset)    Breast cancer Maternal Aunt    Cancer Father    Diabetes Maternal Grandmother          Tobacco Use    Smoking status: Never    Smokeless tobacco: Never   Substance and Sexual Activity    Alcohol use: No    Drug use: No    Sexual activity: Yes     Partners: Male     Birth control/protection: Condom     Review of Systems   Constitutional: Negative.    HENT:  Positive for congestion and trouble swallowing.    Eyes: Negative.    Respiratory: Negative.     Cardiovascular: Negative.    Gastrointestinal: Negative.    Endocrine: Negative.    Genitourinary: Negative.    Musculoskeletal: Negative.    Skin:  Positive for rash.   Allergic/Immunologic: Negative.    Neurological: Negative.    Psychiatric/Behavioral: Negative.       Objective:     Vital Signs (Most Recent):  Temp: 98.4 °F (36.9 °C)  (03/23/24 2001)  Pulse: 96 (03/23/24 2201)  Resp: 18 (03/23/24 2201)  BP: 109/71 (03/23/24 2201)  SpO2: 96 % (03/23/24 2201) Vital Signs (24h Range):  Temp:  [98.4 °F (36.9 °C)-98.6 °F (37 °C)] 98.4 °F (36.9 °C)  Pulse:  [] 96  Resp:  [14-22] 18  SpO2:  [95 %-96 %] 96 %  BP: (109-140)/(64-79) 109/71     Weight: 90.7 kg (200 lb)  Body mass index is 30.41 kg/m².     Physical Exam  Vitals and nursing note reviewed.   Constitutional:       General: She is not in acute distress.     Appearance: Normal appearance. She is not ill-appearing.   HENT:      Head: Normocephalic and atraumatic.      Nose: Nose normal.      Mouth/Throat:      Mouth: Mucous membranes are moist.      Comments: Able to manage secretions, no distress noted.   Eyes:      Extraocular Movements: Extraocular movements intact.   Cardiovascular:      Rate and Rhythm: Normal rate.      Pulses: Normal pulses.      Heart sounds: No murmur heard.  Pulmonary:      Effort: Pulmonary effort is normal. No respiratory distress.   Abdominal:      General: Abdomen is flat.      Palpations: Abdomen is soft.      Tenderness: There is no abdominal tenderness.   Musculoskeletal:      Right lower leg: No edema.      Left lower leg: No edema.   Skin:     General: Skin is warm.      Capillary Refill: Capillary refill takes less than 2 seconds.      Comments: See images    Neurological:      General: No focal deficit present.      Mental Status: She is alert.   Psychiatric:         Mood and Affect: Mood normal.                  Significant Labs: All pertinent labs within the past 24 hours have been reviewed.    Significant Imaging: I have reviewed all pertinent imaging results/findings within the past 24 hours.

## 2024-03-24 NOTE — PLAN OF CARE
Case Management Assessment     PCP: Mac Gresham MD; prefers AM appointments  Ph[facility]: [facility]  Patient Arrived From: Home with spouse  Existing Help at Home: Spouse  Barriers to Discharge: None    Discharge Plan:    A. Home with family; follow-ups; continue Home Health with [facility]   B. TBD    Assist need with care and mobility; spouse assists as needed; currently has [facility]; uses RW, WC, SC, Life chair, hospital bed? Requested life device      03/24/24 0252   Discharge Assessment   Assessment Type Discharge Planning Assessment   Confirmed/corrected address, phone number and insurance Yes   Confirmed Demographics Correct on Facesheet   Source of Information patient;health record   Communicated CHITRA with patient/caregiver Date not available/Unable to determine   Reason For Admission Impetigo   People in Home spouse   Facility Arrived From: Home   Do you expect to return to your current living situation? Yes   Do you have help at home or someone to help you manage your care at home? Yes   Who are your caregiver(s) and their phone number(s)? Adriel-spouse: [phone]; [phone]   Prior to hospitilization cognitive status: Alert/Oriented   Current cognitive status: Alert/Oriented   Walking or Climbing Stairs Difficulty yes   Walking or Climbing Stairs ambulation difficulty, requires equipment   Mobility Management RW, WC, Lift chair   Dressing/Bathing Difficulty yes   Dressing/Bathing bathing difficulty, requires equipment;bathing difficulty, assistance 1 person;dressing difficulty, assistance 1 person   Dressing/Bathing Management SC; spouse assists with bathing and dressing   Do you have any problems with: Errands/Grocery;Needs other help   Specify other help Spouse assists as needed and transports   Home Accessibility wheelchair accessible   Home Layout Able to live on 1st floor   Equipment Currently Used at Home wheelchair;walker, rolling;lift device;shower chair   Readmission  within 30 days? No   Patient currently being followed by outpatient case management? No   Do you currently have service(s) that help you manage your care at home? No   Do you have prescription coverage? Yes   Coverage Medicaid   Do you have any problems affording any of your prescribed medications? No   Is the patient taking medications as prescribed? yes   Who is going to help you get home at discharge? Adriel-spouse   How do you get to doctors appointments? family or friend will provide   Are you on dialysis? No   Do you take coumadin? No  (Eliquis)   Discharge Plan A Home with family;Home Health  (Home Health with [facility]; follow-ups)   Discharge Plan B Other  (TBD)   DME Needed Upon Discharge  other (see comments)  (TBD)   Discharge Plan discussed with: Patient   Transition of Care Barriers None     SW Role explained to patient; two patient identifiers recognized; SW contact information placed on Communication board. Discussed patient managing health care at home and discussed discharge plans A and B; determined who would be helping patient at home with recovery: Adriel-spouse, will help with recovery at home. Reportedly has [facility] with PT

## 2024-03-24 NOTE — NURSING
[facility]  ICU Shift Summary  Date: 3/24/2024      Prehospitalization: Home  Admit Date / LOS : 3/23/2024/ 1 days    Diagnosis: Cellulitis of neck    Consults:        Active: Head and neck surgery       Needed: N/A     Code Status: Full Code   Advanced Directive: Not Received    LDA:  Lines/Drains/Airways       Drain  Duration             Female External Urinary Catheter w/ Suction 03/23/24 1900 <1 day              Peripheral Intravenous Line  Duration                  Peripheral IV - Single Lumen 03/23/24 1755 20 G Anterior;Right Forearm <1 day         Peripheral IV - Single Lumen 03/23/24 2235 22 G Left Antecubital <1 day                  Central Lines/Site/Justification:Patient Does Not Have Central Line  Urinary Cath/Order/Justification:Patient Does Not Have Urinary Catheter    Vasopressors/Infusions:        GOALS: Volume/ Hemodynamic: SBP < 180                     RASS: N/A    Pain Management: none       Pain Controlled: yes     Rhythm: NSR    Respiratory Device: Room Air                      Most Recent SBT/ SAT: N/A       MOVE Screen: PASS  ICU Liberation: yes    VTE Prophylaxis: Mechanical  Mobility: Bedrest  Stress Ulcer Prophylaxis: No    Isolation: No active isolations    Dietary:   Current Diet Order   Procedures    Diet Adult Regular (IDDSI Level 7)      Tolerance: yes  Advancement: @ goal    I & O (24h):    Intake/Output Summary (Last 24 hours) at 3/24/2024 0512  Last data filed at 3/24/2024 0500  Gross per 24 hour   Intake 2527.32 ml   Output --   Net 2527.32 ml        Restraints: No    Significant Dates:  Post Op Date: N/A  Rescue Date: N/A  Imaging/ Diagnostics: N/A    Noteworthy Labs:  see chart below    COVID Test: (--)  CBC/Anemia Labs: Coags:    Recent Labs   Lab 03/23/24 1747 03/24/24  0418   WBC 13.46* 12.09   HGB 12.9 14.0   HCT 38.9 44.0    350   MCV 88 90   RDW 14.8* 14.8*    Recent Labs   Lab 03/23/24 1747   INR 1.0        Chemistries:   Recent Labs   Lab  "03/23/24  1747 03/24/24  0418    142   K 3.9 5.0    107   CO2 22* 23   BUN 12 8   CREATININE 0.9 0.8   CALCIUM 9.5 9.8   PROT 7.8 8.0   BILITOT 0.3 0.4   ALKPHOS 70 73   ALT 20 21   AST 22 21   MG 1.9 1.9   PHOS  --  3.2        Cardiac Enzymes: Ejection Fractions:    Recent Labs     03/23/24  1747   CPK 53    EF   Date Value Ref Range Status   06/22/2022 60 % Final        POCT Glucose: HbA1c:    No results for input(s): "POCTGLUCOSE" in the last 168 hours. Hemoglobin A1C   Date Value Ref Range Status   01/19/2022 4.7 4.0 - 5.6 % Final     Comment:     ADA Screening Guidelines:  5.7-6.4%  Consistent with prediabetes  >or=6.5%  Consistent with diabetes    High levels of fetal hemoglobin interfere with the HbA1C  assay. Heterozygous hemoglobin variants (HbS, HgC, etc)do  not significantly interfere with this assay.   However, presence of multiple variants may affect accuracy.             ICU LOS 6h  Level of Care: Critical Care    Chart Check: 24 HR Done  Shift Summary/Plan for the shift: see care plan summary   "

## 2024-03-24 NOTE — PROGRESS NOTES
[facility] Medicine  Progress Note    Patient Name: Thalia Rg  MRN: [id number]  Patient Class: IP- Inpatient   Admission Date: 3/23/2024  Length of Stay: 1 days  Attending Physician: Sarah Fermin MD  Primary Care Provider: Mac Gresham MD        Subjective:     Principal Problem:Cellulitis of neck        HPI:  This is a 53-year-old female with a past medical history of autoimmune hepatitis, DVT/PE (on Eliquis), bipolar disorder, bedbound 2/2 neuropathy who presents with facial swelling/rash.     Patient presents with facial swelling/rash that started 2 days prior to presentation.  She initially developed URI symptoms/nasal congestion 2 weeks prior to presentation.  She later developed a rash on her nose, face and swelling of her lips that she 1st noted 2 days prior.  It later spread to her face, and her neck.  Her rash is painful, burning and is associated with itching.  Additional symptoms include dysphagia.  She denied having shortness of breaths or drooling.    In the ED, the patient was tachycardic (110), tachypneic.  Labs were remarkable for leukocytosis (13.4), elevated CRP (68.4).  CT soft tissue neck showed subcutaneous soft tissue stranding and edema involving the visualized anterior upper chest and lower neck and extending along the lateral aspects of the neck to the level of the parotid glands & soft tissue swelling and possible edema the vocal cord level, asymmetric to the right.  ENT was consulted, deferred surgical intervention at this time and recommended IV antibiotics and IV decadron.  Patient was given 1.9 L of LR, vancomycin, Zosyn, Decadron 10 mg IV, Dilaudid 0.5 mg IV x2.  She was admitted for further management.    Overview/Hospital Course:  Ms Thalia Rg was admitted to ICU with neck cellulitis. She is immunocompromised on mercaptopurine for autoimmune hepatitis with neuropathy. She developed upper respiratory symptoms and used Perez's vapor rub on  her neck for relief. She then developed progressive rash and swelling on her neck. She was admitted for neck/facial cellulitis. CT showed swelling in upper check/lower neck and possible vocal cord edema. She was started on vanc, zosyn, and given dexamethasone for vocal cord edema. Speech returned to normal and rash is improving. Stepped down to floor.     Interval History: Awake and alert with family at bedside. The pain in her neck is much improved compared to yesterday. She does feel that her skin is more dry and cracking today which is painful. She has no shortness of breath or trouble swallowing.     Review of Systems   Constitutional:  Negative for chills and fever.   HENT:  Positive for facial swelling. Negative for postnasal drip, rhinorrhea, sore throat and trouble swallowing.    Respiratory:  Negative for cough, chest tightness, shortness of breath, wheezing and stridor.    Cardiovascular:  Negative for chest pain and leg swelling.   Gastrointestinal:  Negative for abdominal pain, constipation, diarrhea, nausea and vomiting.   Genitourinary:  Negative for difficulty urinating.   Musculoskeletal:  Negative for arthralgias and myalgias.   Skin:  Positive for color change and rash.   Psychiatric/Behavioral:  Negative for confusion.      Objective:     Vital Signs (Most Recent):  Temp: 97.2 °F (36.2 °C) (03/24/24 0800)  Pulse: 104 (03/24/24 0900)  Resp: (!) 4 (03/24/24 0900)  BP: (!) 158/85 (03/24/24 0900)  SpO2: (!) 94 % (03/24/24 0900) Vital Signs (24h Range):  Temp:  [97.2 °F (36.2 °C)-99.2 °F (37.3 °C)] 97.2 °F (36.2 °C)  Pulse:  [] 104  Resp:  [0-31] 4  SpO2:  [85 %-98 %] 94 %  BP: (109-179)/(63-90) 158/85     Weight: 104.6 kg (230 lb 9.6 oz)  Body mass index is 35.06 kg/m².    Intake/Output Summary (Last 24 hours) at 3/24/2024 1059  Last data filed at 3/24/2024 0600  Gross per 24 hour   Intake 2694.01 ml   Output --   Net 2694.01 ml         Physical Exam  Vitals and nursing note reviewed.    Constitutional:       General: She is not in acute distress.     Appearance: She is obese. She is ill-appearing. She is not toxic-appearing.   HENT:      Head: Normocephalic and atraumatic.      Nose: Nose normal.      Mouth/Throat:      Mouth: Mucous membranes are moist.      Pharynx: No oropharyngeal exudate or posterior oropharyngeal erythema.   Cardiovascular:      Rate and Rhythm: Regular rhythm. Tachycardia present.      Pulses: Normal pulses.      Heart sounds: Normal heart sounds.   Pulmonary:      Effort: Pulmonary effort is normal.      Breath sounds: Normal breath sounds. No stridor.      Comments: Room air  Abdominal:      General: Bowel sounds are normal.      Palpations: Abdomen is soft.      Tenderness: There is no abdominal tenderness.   Musculoskeletal:      Right lower leg: No edema.      Left lower leg: No edema.   Skin:     General: Skin is warm and dry.      Findings: Erythema and rash present.      Comments: Erythema with honey colored crust on face, neck, upper chest- see photo below. Hyperpigmentation under breasts   Neurological:      Mental Status: She is alert and oriented to person, place, and time. Mental status is at baseline.             Significant Labs: All pertinent labs within the past 24 hours have been reviewed.    Significant Imaging: I have reviewed all pertinent imaging results/findings within the past 24 hours.    Assessment/Plan:      * Cellulitis of neck  - suspect that using vicks vapor rub caused irritant/contact dermatitis which then led to secondary Staph infection/impetigo  - CT soft tissue neck showed subcutaneous soft tissue stranding and edema involving the visualized anterior upper chest and lower neck and extending along the lateral aspects of the neck to the level of the parotid glands & soft tissue swelling and possible edema the vocal cord level, asymmetric to the right.   - continue vancomycin and mupirocin  - given dexamethasone for vocal cord edema- will  end 3/24. Speech and breathing normal  - ENT and ID consulted  - follow up culture results     Autoimmune hepatitis  Liver synthetic function intact. No acute issues  - asked ID if mercaptopurine needs to be held with infection- awaiting response       Intertrigo  Miconazole to under breasts      History of DVT/PE  Continue Eliquis       Laryngeal edema  Noted on CT. S/p Decadron 10 mg IV  - Continue Decadron 8 mg Q8H x2 per ENT for today  - symptoms have resolved       Impetigo  See plan for cellulitis of night      Impaired gait and mobility  She has been bedbound x2 years due to neuropathy.   - turn Q2      Paresthesia of both lower extremities  With functional quadriplegia. She has been bed bound for 2 years. Due to autoimmune neuropathy  - turn q2h        VTE Risk Mitigation (From admission, onward)           Ordered     apixaban tablet 5 mg  2 times daily         03/24/24 1059     IP VTE HIGH RISK PATIENT  Once         03/23/24 2221     Place sequential compression device  Until discontinued         03/23/24 2221                    Discharge Planning   CHITRA: 3/26/2024     Code Status: Full Code   Is the patient medically ready for discharge?:     Reason for patient still in hospital (select all that apply): Patient trending condition  Discharge Plan A: Home with family, Home Health (Home Health with [facility]; follow-ups)        Discussed plan of care with family and patient.     Critical care time spent on the evaluation and treatment of severe organ dysfunction, review of pertinent labs and imaging studies, discussions with consulting providers and discussions with patient/family: 45 minutes.      Sarah Fermin MD  Department of Hospital Medicine   [facility]

## 2024-03-24 NOTE — CONSULTS
[facility] - Emergency Dept  Otorhinolaryngology-Head & Neck Surgery  Consult Note    Patient Name: Thalia Rg  MRN: [id number]  Code Status: No Order  Admission Date: 3/23/2024  Hospital Length of Stay: 0 days  Attending Physician: Rajani Matthews MD  Primary Care Provider: Mac Gresham MD    Patient information was obtained from ER records and primary team.     E-Consult to Specialist  Consult performed by: Jeremy Shelley MD  Consult ordered by: Rajani Matthews MD  Reason for consult: Cellulitis and abnormal CT findings of the larynx  Assessment/Recommendations: See detailed consult note        Subjective:     Chief Complaint/Reason for Admission: neck redness/swelling    History of Present Illness: 53 year old female nonsmoker on mercaptopurine for autoimmune hepatitis with no known bone marrow suppression side effects in the ED at [facility] for evolving anterior neck redness and pain.  No prior antibiotics.  URI/sinus symptoms a week or so ago.  Also complains of sensation of swelling in throat without dysphagia or odynophagia, dyspnea or voice change per Dr. Matthews who provides the history.    Medications:  Continuous Infusions:  Scheduled Meds:   dexAMETHasone  10 mg Intravenous ED 1 Time    piperacillin-tazobactam (Zosyn) IV (PEDS and ADULTS) (extended infusion is not appropriate)  4.5 g Intravenous ED 1 Time     PRN Meds:     No current facility-administered medications on file prior to encounter.     Current Outpatient Medications on File Prior to Encounter   Medication Sig    acetaminophen (TYLENOL) 325 MG tablet Take 650 mg by mouth as needed for Pain.    apixaban (ELIQUIS) 5 mg Tab TAKE 1 TABLET BY MOUTH TWICE DAILY    folic acid (FOLVITE) 1 MG tablet Take 1 tablet (1 mg total) by mouth once daily.    ibuprofen (ADVIL,MOTRIN) 200 MG tablet Take 400 mg by mouth as needed for Pain.    mercaptopurine (PURINETHOL) 50 mg tablet Take 100 mg by mouth once daily.     multivitamin (THERAGRAN) per  tablet Take 1 tablet by mouth once daily.    apixaban (ELIQUIS) 5 mg Tab TAKE 1 TABLET BY MOUTH TWICE DAILY    ziprasidone (GEODON) 20 MG Cap Take 1 capsule (20 mg total) by mouth 2 (two) times daily.       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    Bipolar 1 disorder     Breast disorder      Past Surgical History:   Procedure Laterality Date    LIVER BIOPSY       Family History       Problem Relation (Age of Onset)    Breast cancer Maternal Aunt    Cancer Father    Diabetes Maternal Grandmother          Tobacco Use    Smoking status: Never    Smokeless tobacco: Never   Substance and Sexual Activity    Alcohol use: No    Drug use: No    Sexual activity: Yes     Partners: Male     Birth control/protection: Condom     Review of Systems  Objective:     Vital Signs (Most Recent):  Temp: 98.4 °F (36.9 °C) (03/23/24 2001)  Pulse: 97 (03/23/24 2001)  Resp: 20 (03/23/24 2001)  BP: 129/74 (03/23/24 2001)  SpO2: 96 % (03/23/24 2001) Vital Signs (24h Range):  Temp:  [98.4 °F (36.9 °C)-98.6 °F (37 °C)] 98.4 °F (36.9 °C)  Pulse:  [] 97  Resp:  [14-22] 20  SpO2:  [95 %-96 %] 96 %  BP: (126-140)/(64-74) 129/74     Weight: 90.7 kg (200 lb)  Body mass index is 30.41 kg/m².    Date 03/23/24 0700 - 03/24/24 0659   Shift 3885-1994 5586-9987 3821-0913 24 Hour Total   INTAKE   IV Piggyback  500  500   Shift Total(mL/kg)  500(5.5)  500(5.5)   OUTPUT   Shift Total(mL/kg)       Weight (kg)  90.7 90.7 90.7             Significant Labs:  CBC:   Recent Labs   Lab 03/23/24  1747   WBC 13.46*   RBC 4.44   HGB 12.9   HCT 38.9      MCV 88   MCH 29.1   MCHC 33.2     CMP:   Recent Labs   Lab 03/23/24  1747   *   CALCIUM 9.5   ALBUMIN 3.1*   PROT 7.8      K 3.9   CO2 22*      BUN 12   CREATININE 0.9   ALKPHOS 70   ALT 20   AST 22   BILITOT 0.3     CRP: No results for  "input(s): "CRP" in the last 168 hours.  ESR: No results for input(s): "ERYTHROCYTES" in the last 168 hours.  TSH: No results for input(s): "TSH" in the last 168 hours.    Significant Diagnostics:  CT: I have reviewed all pertinent results/findings within the past 24 hours and my personal findings are:  fat stranding of lower neck and upper chest with focal edema/swelling of the right arytenoid and interarytenoid mucosa w/o gross airway compromise and no evidence of parapharyngeal fat changes or any suspicious adenopathy or free air.  Notable sinus mucosal thickening with fluid levels w/o destructive process.    Assessment/Plan:     Cellulitis of neck  Discussed with Dr. Matthews in the ER.  Admission for IV antibiotics covering staph/strep locally w/o transfer with no indication for surgical intervention at this time seems an appropriate next step if [facility] medicine agrees given other issues.  Recommend pharyngeal and skin swabs for culture/evaluation for GABS and MRSA.  Check CRP and CK for baseline.  No current suspicion of necrotizing or gas forming infection by report/exam and CT findings.    Acute sinusitis  Antibiotic coverage such as Unasyn or Zosyn transitioning to outpatient Augmentin.  Afrin for up to 5 days and lots of saline to facilitate drainage.    Laryngeal edema  Dexamethasone 12 mg IV now and repeat doses of 8 mg IV Q8 hours x 2 for laryngeal edema likely viral URI related.  Covered well with Zosyn or even Unasyn for any suspicion of HIB or other bacterial causes less likely.  Transfer ER to ER to [facility] or intubate if laryngeal/airway concerns do not respond quickly to treatment or progress in spite of treatment respectively.    Impetigo  Consider addition of Clindamycin if necrotizing GABS infection is a concern.      VTE Risk Mitigation (From admission, onward)      None              Jeremy Shelley MD  Otorhinolaryngology-Head & Neck Surgery  [facility]  "

## 2024-03-24 NOTE — PLAN OF CARE
Seen by ID for facial/neck rash, antibiotics adjusted.  No issues with airway.  Awaiting availability of floor bed.  VSS,

## 2024-03-24 NOTE — HOSPITAL COURSE
Ms Thalia Rg was admitted to ICU with neck cellulitis. She is immunocompromised on mercaptopurine for autoimmune hepatitis with neuropathy. She developed upper respiratory symptoms and used Perez's vapor rub on her neck for relief. She then developed progressive rash and swelling on her neck. She was admitted for neck/facial cellulitis. CT showed swelling in upper check/lower neck and possible vocal cord edema. She was started on vanc, zosyn, and given dexamethasone for vocal cord edema. Speech returned to normal and rash is improving. Stepped down to floor.   Patient had been off of her psychiatric medications with  noting some manic tendencies and lack of sleep.  This medication was restarted with patient able to balance out.  Patient's  requesting a hospital bed which is ordered.  ID following and recommended switching to oral antibiotics and patient discharged home to follow-up with ENT and ID.  Patient advised to follow up with the primary care doctor within a week.  Patient and  at bedside during discussions with all questions answered and both endorsed understanding prior to discharge.

## 2024-03-24 NOTE — PROGRESS NOTES
"Pharmacokinetic Initial Assessment: IV Vancomycin    Assessment/Plan:    Initiate intravenous vancomycin with loading dose of 2000 mg once followed by a maintenance dose of vancomycin 1500mg IV every 12 hours  Desired empiric serum trough concentration is 10 to 20 mcg/mL  Draw vancomycin trough level 60 min prior to fourth dose on 3/25 at approximately 3/25  Pharmacy will continue to follow and monitor vancomycin.      Please contact pharmacy at extension [phone] with any questions regarding this assessment.     Thank you for the consult,   Ahmet Sarabia       Patient brief summary:  Thalia Rg is a 53 y.o. female initiated on antimicrobial therapy with IV Vancomycin for treatment of suspected skin & soft tissue infection    Drug Allergies:   Review of patient's allergies indicates:  No Known Allergies    Actual Body Weight:   104.6 kg    Renal Function:   Estimated Creatinine Clearance: 89.4 mL/min (based on SCr of 0.9 mg/dL).,     Dialysis Method (if applicable):  N/A    CBC (last 72 hours):  Recent Labs   Lab Result Units 03/23/24 1747   WBC K/uL 13.46*   Hemoglobin g/dL 12.9   Hematocrit % 38.9   Platelets K/uL 443   Gran % % 73.5*   Lymph % % 19.0   Mono % % 4.5   Eosinophil % % 2.5   Basophil % % 0.1   Differential Method  Automated       Metabolic Panel (last 72 hours):  Recent Labs   Lab Result Units 03/23/24 1747 03/23/24 2013   Sodium mmol/L 140  --    Potassium mmol/L 3.9  --    Chloride mmol/L 108  --    CO2 mmol/L 22*  --    Glucose mg/dL 143*  --    Glucose, UA   --  Negative   BUN mg/dL 12  --    Creatinine mg/dL 0.9  --    Albumin g/dL 3.1*  --    Total Bilirubin mg/dL 0.3  --    Alkaline Phosphatase U/L 70  --    AST U/L 22  --    ALT U/L 20  --    Magnesium mg/dL 1.9  --        Drug levels (last 3 results):  No results for input(s): "VANCOMYCINRA", "VANCORANDOM", "VANCOMYCINPE", "VANCOPEAK", "VANCOMYCINTR", "VANCOTROUGH" in the last 72 hours.    Microbiologic Results:  Microbiology " Results (last 7 days)       Procedure Component Value Units Date/Time    Blood culture x two cultures. Draw prior to antibiotics. [[id number]] Collected: 03/23/24 1748    Order Status: Sent Specimen: Blood from Peripheral, Forearm, Left Updated: 03/23/24 1806    Blood culture x two cultures. Draw prior to antibiotics. [[id number]] Collected: 03/23/24 1748    Order Status: Sent Specimen: Blood from Peripheral, Antecubital, Right Updated: 03/23/24 1806

## 2024-03-25 LAB
ALBUMIN SERPL BCP-MCNC: 3.4 G/DL (ref 3.5–5.2)
ALP SERPL-CCNC: 71 U/L (ref 55–135)
ALT SERPL W/O P-5'-P-CCNC: 22 U/L (ref 10–44)
ANION GAP SERPL CALC-SCNC: 11 MMOL/L (ref 8–16)
AST SERPL-CCNC: 30 U/L (ref 10–40)
BASOPHILS # BLD AUTO: 0.02 K/UL (ref 0–0.2)
BASOPHILS NFR BLD: 0.1 % (ref 0–1.9)
BILIRUB SERPL-MCNC: 0.3 MG/DL (ref 0.1–1)
BUN SERPL-MCNC: 11 MG/DL (ref 6–20)
CALCIUM SERPL-MCNC: 9.9 MG/DL (ref 8.7–10.5)
CHLORIDE SERPL-SCNC: 107 MMOL/L (ref 95–110)
CO2 SERPL-SCNC: 25 MMOL/L (ref 23–29)
CREAT SERPL-MCNC: 0.8 MG/DL (ref 0.5–1.4)
DIFFERENTIAL METHOD BLD: ABNORMAL
EOSINOPHIL # BLD AUTO: 0 K/UL (ref 0–0.5)
EOSINOPHIL NFR BLD: 0 % (ref 0–8)
ERYTHROCYTE [DISTWIDTH] IN BLOOD BY AUTOMATED COUNT: 14.8 % (ref 11.5–14.5)
EST. GFR  (NO RACE VARIABLE): >60 ML/MIN/1.73 M^2
GLUCOSE SERPL-MCNC: 161 MG/DL (ref 70–110)
HCT VFR BLD AUTO: 37.4 % (ref 37–48.5)
HGB BLD-MCNC: 11.8 G/DL (ref 12–16)
IMM GRANULOCYTES # BLD AUTO: 0.14 K/UL (ref 0–0.04)
IMM GRANULOCYTES NFR BLD AUTO: 0.7 % (ref 0–0.5)
LYMPHOCYTES # BLD AUTO: 1.9 K/UL (ref 1–4.8)
LYMPHOCYTES NFR BLD: 9.5 % (ref 18–48)
MCH RBC QN AUTO: 28 PG (ref 27–31)
MCHC RBC AUTO-ENTMCNC: 31.6 G/DL (ref 32–36)
MCV RBC AUTO: 89 FL (ref 82–98)
MONOCYTES # BLD AUTO: 0.8 K/UL (ref 0.3–1)
MONOCYTES NFR BLD: 4 % (ref 4–15)
NEUTROPHILS # BLD AUTO: 17 K/UL (ref 1.8–7.7)
NEUTROPHILS NFR BLD: 85.7 % (ref 38–73)
NRBC BLD-RTO: 0 /100 WBC
PLATELET # BLD AUTO: 426 K/UL (ref 150–450)
PMV BLD AUTO: 11.1 FL (ref 9.2–12.9)
POTASSIUM SERPL-SCNC: 4.4 MMOL/L (ref 3.5–5.1)
PROT SERPL-MCNC: 7.4 G/DL (ref 6–8.4)
RBC # BLD AUTO: 4.22 M/UL (ref 4–5.4)
SODIUM SERPL-SCNC: 143 MMOL/L (ref 136–145)
VANCOMYCIN TROUGH SERPL-MCNC: 19.5 UG/ML (ref 10–22)
WBC # BLD AUTO: 19.86 K/UL (ref 3.9–12.7)

## 2024-03-25 PROCEDURE — 25000003 PHARM REV CODE 250: Performed by: HOSPITALIST

## 2024-03-25 PROCEDURE — 99233 SBSQ HOSP IP/OBS HIGH 50: CPT | Mod: ,,, | Performed by: STUDENT IN AN ORGANIZED HEALTH CARE EDUCATION/TRAINING PROGRAM

## 2024-03-25 PROCEDURE — 63600175 PHARM REV CODE 636 W HCPCS: Performed by: HOSPITALIST

## 2024-03-25 PROCEDURE — 80053 COMPREHEN METABOLIC PANEL: CPT | Performed by: HOSPITALIST

## 2024-03-25 PROCEDURE — 80202 ASSAY OF VANCOMYCIN: CPT | Performed by: HOSPITALIST

## 2024-03-25 PROCEDURE — 85025 COMPLETE CBC W/AUTO DIFF WBC: CPT | Performed by: HOSPITALIST

## 2024-03-25 PROCEDURE — 99223 1ST HOSP IP/OBS HIGH 75: CPT | Mod: ,,,

## 2024-03-25 PROCEDURE — 36415 COLL VENOUS BLD VENIPUNCTURE: CPT | Performed by: HOSPITALIST

## 2024-03-25 PROCEDURE — 11000001 HC ACUTE MED/SURG PRIVATE ROOM

## 2024-03-25 RX ADMIN — VANCOMYCIN HYDROCHLORIDE 1500 MG: 1.5 INJECTION, POWDER, LYOPHILIZED, FOR SOLUTION INTRAVENOUS at 05:03

## 2024-03-25 RX ADMIN — VANCOMYCIN HYDROCHLORIDE 1500 MG: 1.5 INJECTION, POWDER, LYOPHILIZED, FOR SOLUTION INTRAVENOUS at 04:03

## 2024-03-25 RX ADMIN — APIXABAN 5 MG: 5 TABLET, FILM COATED ORAL at 08:03

## 2024-03-25 RX ADMIN — MUPIROCIN: 20 OINTMENT TOPICAL at 08:03

## 2024-03-25 RX ADMIN — THERA TABS 1 TABLET: TAB at 08:03

## 2024-03-25 RX ADMIN — MUPIROCIN 1 G: 20 OINTMENT TOPICAL at 08:03

## 2024-03-25 RX ADMIN — Medication 6 MG: at 08:03

## 2024-03-25 RX ADMIN — MICONAZOLE NITRATE 1 G: 20 POWDER TOPICAL at 08:03

## 2024-03-25 RX ADMIN — FOLIC ACID 1 MG: 1 TABLET ORAL at 08:03

## 2024-03-25 RX ADMIN — MICONAZOLE NITRATE: 20 POWDER TOPICAL at 08:03

## 2024-03-25 NOTE — NURSING
[facility], [facility] - [place]  Nurses Note -- 4 Eyes      3/25/2024       Skin assessed on: Q Shift      [x] No Pressure Injuries Present    []Prevention Measures Documented    [] Yes LDA  for Pressure Injury Previously documented     [] Yes New Pressure Injury Discovered   [] LDA for New Pressure Injury Added      Attending RN:  Beatriz Palumbo, SARATH     Second RN:  SherryRN

## 2024-03-25 NOTE — SUBJECTIVE & OBJECTIVE
Interval History:  Patient states swelling is much improved, but not quite back to baseline.  No fever or chills overnight.  Continuing with current treatment.    Review of Systems  Objective:     Vital Signs (Most Recent):  Temp: 97.3 °F (36.3 °C) (03/25/24 1107)  Pulse: (!) 118 (03/25/24 1107)  Resp: 18 (03/25/24 1107)  BP: 110/81 (03/25/24 1107)  SpO2: (!) 93 % (03/25/24 1107) Vital Signs (24h Range):  Temp:  [97 °F (36.1 °C)-98.6 °F (37 °C)] 97.3 °F (36.3 °C)  Pulse:  [] 118  Resp:  [7-57] 18  SpO2:  [90 %-98 %] 93 %  BP: (110-174)/() 110/81     Weight: 104.6 kg (230 lb 9.6 oz)  Body mass index is 35.06 kg/m².    Intake/Output Summary (Last 24 hours) at 3/25/2024 1109  Last data filed at 3/25/2024 0927  Gross per 24 hour   Intake 957.57 ml   Output 1975 ml   Net -1017.43 ml         Physical Exam    Constitutional:       General: She is not in acute distress.     Appearance: She is obese. She is ill-appearing. She is not toxic-appearing.   HENT:      Head: Normocephalic and atraumatic.      Nose: Nose normal.      Mouth/Throat:      Mouth: Mucous membranes are moist.      Pharynx: No oropharyngeal exudate or posterior oropharyngeal erythema.   Cardiovascular:      Rate and Rhythm: Regular rhythm. Tachycardia present.      Pulses: Normal pulses.      Heart sounds: Normal heart sounds.   Pulmonary:      Effort: Pulmonary effort is normal.      Breath sounds: Normal breath sounds. No stridor.      Comments: Room air  Abdominal:      General: Bowel sounds are normal.      Palpations: Abdomen is soft.      Tenderness: There is no abdominal tenderness.   Musculoskeletal:      Right lower leg: No edema.      Left lower leg: No edema.   Skin:     General: Skin is warm and dry.      Findings: Erythema and rash present.  Crusting of face appears to be resolving with minimal noted flaking skin.    Significant Labs: All pertinent labs within the past 24 hours have been reviewed.    Significant Imaging: I have  reviewed all pertinent imaging results/findings within the past 24 hours.

## 2024-03-25 NOTE — PROGRESS NOTES
Pharmacokinetic Assessment Follow Up: IV Vancomycin    Vancomycin serum concentration assessment(s):    The trough level was drawn correctly and can be used to guide therapy at this time. The measurement is within the desired definitive target range of 10 to 20 mcg/mL.    Vancomycin Regimen Plan:    Continue regimen to Vancomycin 1500 mg IV every 12 hours with next serum trough concentration measured at 1630 prior to fourth dose on 3/26    Drug levels (last 3 results):  Recent Labs   Lab Result Units 03/25/24  0433   Vancomycin-Trough ug/mL 19.5       Pharmacy will continue to follow and monitor vancomycin.    Please contact pharmacy at extension [phone] for questions regarding this assessment.    Thank you for the consult,   Ahmet Sarabia       Patient brief summary:  Thalia Rg is a 53 y.o. female initiated on antimicrobial therapy with IV Vancomycin for treatment of skin & soft tissue infection    Drug Allergies:   Review of patient's allergies indicates:  No Known Allergies    Actual Body Weight:   104.6 kg    Renal Function:   Estimated Creatinine Clearance: 100.6 mL/min (based on SCr of 0.8 mg/dL).,     Dialysis Method (if applicable):  N/A    CBC (last 72 hours):  Recent Labs   Lab Result Units 03/23/24 1747 03/24/24 0418   WBC K/uL 13.46* 12.09   Hemoglobin g/dL 12.9 14.0   Hematocrit % 38.9 44.0   Platelets K/uL 443 350   Gran % % 73.5* 87.0*   Lymph % % 19.0 11.2*   Mono % % 4.5 1.1*   Eosinophil % % 2.5 0.1   Basophil % % 0.1 0.2   Differential Method  Automated Automated       Metabolic Panel (last 72 hours):  Recent Labs   Lab Result Units 03/23/24 1747 03/23/24 2013 03/24/24 0418   Sodium mmol/L 140  --  142   Potassium mmol/L 3.9  --  5.0   Chloride mmol/L 108  --  107   CO2 mmol/L 22*  --  23   Glucose mg/dL 143*  --  139*   Glucose, UA   --  Negative  --    BUN mg/dL 12  --  8   Creatinine mg/dL 0.9  --  0.8   Albumin g/dL 3.1*  --  3.4*   Total Bilirubin mg/dL 0.3  --  0.4   Alkaline  Phosphatase U/L 70  --  73   AST U/L 22  --  21   ALT U/L 20  --  21   Magnesium mg/dL 1.9  --  1.9   Phosphorus mg/dL  --   --  3.2       Vancomycin Administrations:  vancomycin given in the last 96 hours                     vancomycin 1,500 mg in dextrose 5 % (D5W) 250 mL IVPB (Vial-Mate) (mg) 1,500 mg New Bag 03/25/24 0518     1,500 mg New Bag 03/24/24 1636     1,500 mg New Bag  0450    vancomycin (VANCOCIN) 2,000 mg in dextrose 5 % (D5W) 500 mL IVPB (mg) 2,000 mg New Bag 03/23/24 1730                    Microbiologic Results:  Microbiology Results (last 7 days)       Procedure Component Value Units Date/Time    Blood culture x two cultures. Draw prior to antibiotics. [[id number]] Collected: 03/23/24 1748    Order Status: Completed Specimen: Blood from Peripheral, Forearm, Left Updated: 03/24/24 1903     Blood Culture, Routine No Growth to date      No Growth to date    Narrative:      Aerobic and anaerobic    Blood culture x two cultures. Draw prior to antibiotics. [[id number]] Collected: 03/23/24 1748    Order Status: Completed Specimen: Blood from Peripheral, Antecubital, Right Updated: 03/24/24 1903     Blood Culture, Routine No Growth to date      No Growth to date    Narrative:      Aerobic and anaerobic    Nasal culture [[id number]] Collected: 03/24/24 1419    Order Status: Sent Specimen: Nasal Swab from Nose Updated: 03/24/24 1427

## 2024-03-25 NOTE — CLINICAL REVIEW
Sepsis Screen (most recent)       Sepsis Screen (IP) - 03/25/24 1896       Is the patient's history or complaint suggestive of a possible infection? Yes  -    Are there at least two of the following signs and symptoms present? Yes  -    Sepsis signs/symptoms - Tachycardia Tachycardia     >90  -    Sepsis signs/symptoms - WBC WBC < 4,000 or WBC > 12,000  -    Are any of the following organ dysfunction criteria present and not considered to be due to a chronic condition? Yes  -    Organ Dysfunction Criteria Lactate > 2.0  -    Initiate Sepsis Protocol No  -    Reason sepsis not considered Pt. receiving appropriate management  -              User Key  (r) = Recorded By, (t) = Taken By, (c) = Cosigned By      Initials Name    Viji Willard RN

## 2024-03-25 NOTE — NURSING
Patient arrived in the unit via hospital bed trans-in from ICU  Awake, alert, and oriented  No distress noted  With iv line in placed  On purewick  VS taken and recorded  Will continue to monitor    [facility] [place]  Nurses Note -- 4 Eyes      3/24/2024       Skin assessed on: Transfer      [x] No Pressure Injuries Present    [x]Prevention Measures Documented    [] Yes LDA  for Pressure Injury Previously documented     [] Yes New Pressure Injury Discovered   [] LDA for New Pressure Injury Added      Attending RN:  Elena Presley RN     Second RN:  SARATH Mathews

## 2024-03-25 NOTE — PROGRESS NOTES
[facility] - [facility] Surg  Infectious Disease  Progress Note    Patient Name: Thalia Rg  MRN: [id number]  Admission Date: 3/23/2024  Length of Stay: 2 days  Attending Physician: Abner Geller III, MD  Primary Care Provider: Mac Gresham MD    Isolation Status: No active isolations  Assessment/Plan:      ID  Impetigo  I independently reviewed patient's lab work and images as documented. 53 year old woman with autoimmune hepatitis on mercaptopurine, DVT/PE (on Eliquis), bipolar disorder, bedbound due to neuropathy on IVIG admitted for severe impetigo c/b neck swelling and laryngeal edema. Hospital course notable for steroid administration + abx therapy with interval improvement. CT with soft tissue edema, no focal fluid collection/abscess seen. Labs notable for new WBC today - suspect 2/2 to steroid as clinically pt reports that her swelling/erythema is improving. Mercaptopurine held due to infection. Nasal cx in process - spoke with micro, suspected staph spp.     Recommendations:  -continue vancomycin pharm to dose  -follow up nasal cx  -may benefit from staph decolonization with mupirocin if mrsa isolated          Thank you for your consult. I will follow-up with patient. Please contact us if you have any additional questions. Above d/w primary team.       Parris Raymond MD  Infectious Disease  [facility] - Med Surg    Subjective:     Principal Problem:Cellulitis of neck    HPI: Thalia Garsia is a 53 year old woman with autoimmune hepatitis on mercaptopurine, DVT/PE (on Eliquis), bipolar disorder, bedbound due to neuropathy on IVIG who was admitted to MICU for severe impetigo with neck cellulitis and laryngeal edema.  She initially had URI with nasal congestion about 2 weeks prior. She was blowing her nose frequently and thinks she had some sores in her nose. She developed a rash 2 days prior on her nose and face which then progressed to lip swelling, extended to neck/chest and then felt she was having  difficulty breathing. She was using vicks and other OTC creams for symptomatic relief. Rash is painful and itchy. No prior episodes of this. She feels improved since admission. She is able to tolerate PO. Denies dysphagia.     She denies any wounds or infections in her groin, axilla or sacrum.   Interval History: No fevers documented overnight. Pt reported tolerating abx without issues. States swelling and redness is improving.     Review of Systems   Constitutional:  Negative for chills and fever.   Skin:  Positive for rash.   All other systems reviewed and are negative.    Objective:     Vital Signs (Most Recent):  Temp: 97 °F (36.1 °C) (03/25/24 0731)  Pulse: 90 (03/25/24 0731)  Resp: 18 (03/25/24 0731)  BP: (!) 146/92 (03/25/24 0731)  SpO2: 98 % (03/25/24 0731) Vital Signs (24h Range):  Temp:  [97 °F (36.1 °C)-98.6 °F (37 °C)] 97 °F (36.1 °C)  Pulse:  [] 90  Resp:  [7-57] 18  SpO2:  [90 %-98 %] 98 %  BP: (127-174)/() 146/92     Weight: 104.6 kg (230 lb 9.6 oz)  Body mass index is 35.06 kg/m².    Estimated Creatinine Clearance: 100.6 mL/min (based on SCr of 0.8 mg/dL).     Physical Exam  Constitutional:       Appearance: She is not ill-appearing or toxic-appearing.   HENT:      Mouth/Throat:      Mouth: Mucous membranes are moist.      Pharynx: No oropharyngeal exudate or posterior oropharyngeal erythema.   Pulmonary:      Effort: Pulmonary effort is normal. No respiratory distress.   Musculoskeletal:      Cervical back: No tenderness.   Skin:     General: Skin is warm and dry.      Comments: Facial/neck edema/erythema improving  No further evidence of drainage   Neurological:      Mental Status: She is alert and oriented to person, place, and time.          Significant Labs:   Microbiology Results (last 7 days)       Procedure Component Value Units Date/Time    Nasal culture [[id number]] Collected: 03/24/24 1419    Order Status: Completed Specimen: Nasal Swab from Nose Updated: 03/25/24 0824      RESPIRATORY CULTURE - NASAL Further report to follow    Blood culture x two cultures. Draw prior to antibiotics. [[id number]] Collected: 03/23/24 1748    Order Status: Completed Specimen: Blood from Peripheral, Forearm, Left Updated: 03/24/24 1903     Blood Culture, Routine No Growth to date      No Growth to date    Narrative:      Aerobic and anaerobic    Blood culture x two cultures. Draw prior to antibiotics. [[id number]] Collected: 03/23/24 1748    Order Status: Completed Specimen: Blood from Peripheral, Antecubital, Right Updated: 03/24/24 1903     Blood Culture, Routine No Growth to date      No Growth to date    Narrative:      Aerobic and anaerobic            Significant Imaging: I have reviewed all pertinent imaging results/findings within the past 24 hours.
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
## 2024-03-25 NOTE — SUBJECTIVE & OBJECTIVE
Interval History: No fevers documented overnight. Pt reported tolerating abx without issues. States swelling and redness is improving.     Review of Systems   Constitutional:  Negative for chills and fever.   Skin:  Positive for rash.   All other systems reviewed and are negative.    Objective:     Vital Signs (Most Recent):  Temp: 97 °F (36.1 °C) (03/25/24 0731)  Pulse: 90 (03/25/24 0731)  Resp: 18 (03/25/24 0731)  BP: (!) 146/92 (03/25/24 0731)  SpO2: 98 % (03/25/24 0731) Vital Signs (24h Range):  Temp:  [97 °F (36.1 °C)-98.6 °F (37 °C)] 97 °F (36.1 °C)  Pulse:  [] 90  Resp:  [7-57] 18  SpO2:  [90 %-98 %] 98 %  BP: (127-174)/() 146/92     Weight: 104.6 kg (230 lb 9.6 oz)  Body mass index is 35.06 kg/m².    Estimated Creatinine Clearance: 100.6 mL/min (based on SCr of 0.8 mg/dL).     Physical Exam  Constitutional:       Appearance: She is not ill-appearing or toxic-appearing.   HENT:      Mouth/Throat:      Mouth: Mucous membranes are moist.      Pharynx: No oropharyngeal exudate or posterior oropharyngeal erythema.   Pulmonary:      Effort: Pulmonary effort is normal. No respiratory distress.   Musculoskeletal:      Cervical back: No tenderness.   Skin:     General: Skin is warm and dry.      Comments: Facial/neck edema/erythema improving  No further evidence of drainage   Neurological:      Mental Status: She is alert and oriented to person, place, and time.          Significant Labs:   Microbiology Results (last 7 days)       Procedure Component Value Units Date/Time    Nasal culture [[id number]] Collected: 03/24/24 1419    Order Status: Completed Specimen: Nasal Swab from Nose Updated: 03/25/24 0824     RESPIRATORY CULTURE - NASAL Further report to follow    Blood culture x two cultures. Draw prior to antibiotics. [[id number]] Collected: 03/23/24 1748    Order Status: Completed Specimen: Blood from Peripheral, Forearm, Left Updated: 03/24/24 1903     Blood Culture, Routine No Growth to date      No  Growth to date    Narrative:      Aerobic and anaerobic    Blood culture x two cultures. Draw prior to antibiotics. [[id number]] Collected: 03/23/24 1745    Order Status: Completed Specimen: Blood from Peripheral, Antecubital, Right Updated: 03/24/24 1903     Blood Culture, Routine No Growth to date      No Growth to date    Narrative:      Aerobic and anaerobic            Significant Imaging: I have reviewed all pertinent imaging results/findings within the past 24 hours.

## 2024-03-25 NOTE — ASSESSMENT & PLAN NOTE
I independently reviewed patient's lab work and images as documented. 53 year old woman with autoimmune hepatitis on mercaptopurine, DVT/PE (on Eliquis), bipolar disorder, bedbound due to neuropathy on IVIG admitted for severe impetigo c/b neck swelling and laryngeal edema. Hospital course notable for steroid administration + abx therapy with interval improvement. CT with soft tissue edema, no focal fluid collection/abscess seen. Labs notable for new WBC today - suspect 2/2 to steroid as clinically pt reports that her swelling/erythema is improving. Mercaptopurine held due to infection. Nasal cx in process - spoke with micro, suspected staph spp.     Recommendations:  -continue vancomycin pharm to dose  -follow up nasal cx  -may benefit from staph decolonization with mupirocin if mrsa isolated

## 2024-03-26 ENCOUNTER — PATIENT MESSAGE (OUTPATIENT)
Dept: FAMILY MEDICINE | Facility: CLINIC | Age: 54
End: 2024-03-26
Payer: MEDICARE

## 2024-03-26 PROBLEM — F99 PSYCHIATRIC ILLNESS: Status: ACTIVE | Noted: 2024-03-26

## 2024-03-26 LAB
ALBUMIN SERPL BCP-MCNC: 3.2 G/DL (ref 3.5–5.2)
ALP SERPL-CCNC: 59 U/L (ref 55–135)
ALT SERPL W/O P-5'-P-CCNC: 20 U/L (ref 10–44)
ANION GAP SERPL CALC-SCNC: 8 MMOL/L (ref 8–16)
AST SERPL-CCNC: 28 U/L (ref 10–40)
BACTERIA NPH AEROBE CULT: ABNORMAL
BASOPHILS # BLD AUTO: 0.03 K/UL (ref 0–0.2)
BASOPHILS NFR BLD: 0.2 % (ref 0–1.9)
BILIRUB SERPL-MCNC: 0.3 MG/DL (ref 0.1–1)
BUN SERPL-MCNC: 11 MG/DL (ref 6–20)
CALCIUM SERPL-MCNC: 9.1 MG/DL (ref 8.7–10.5)
CHLORIDE SERPL-SCNC: 109 MMOL/L (ref 95–110)
CO2 SERPL-SCNC: 27 MMOL/L (ref 23–29)
CREAT SERPL-MCNC: 0.8 MG/DL (ref 0.5–1.4)
DIFFERENTIAL METHOD BLD: ABNORMAL
EOSINOPHIL # BLD AUTO: 0.1 K/UL (ref 0–0.5)
EOSINOPHIL NFR BLD: 0.3 % (ref 0–8)
ERYTHROCYTE [DISTWIDTH] IN BLOOD BY AUTOMATED COUNT: 14.9 % (ref 11.5–14.5)
EST. GFR  (NO RACE VARIABLE): >60 ML/MIN/1.73 M^2
GLUCOSE SERPL-MCNC: 92 MG/DL (ref 70–110)
HCT VFR BLD AUTO: 33.3 % (ref 37–48.5)
HGB BLD-MCNC: 10.5 G/DL (ref 12–16)
IMM GRANULOCYTES # BLD AUTO: 0.16 K/UL (ref 0–0.04)
IMM GRANULOCYTES NFR BLD AUTO: 0.9 % (ref 0–0.5)
LYMPHOCYTES # BLD AUTO: 3.8 K/UL (ref 1–4.8)
LYMPHOCYTES NFR BLD: 20.5 % (ref 18–48)
MCH RBC QN AUTO: 28.1 PG (ref 27–31)
MCHC RBC AUTO-ENTMCNC: 31.5 G/DL (ref 32–36)
MCV RBC AUTO: 89 FL (ref 82–98)
MONOCYTES # BLD AUTO: 1.3 K/UL (ref 0.3–1)
MONOCYTES NFR BLD: 6.9 % (ref 4–15)
NEUTROPHILS # BLD AUTO: 13.4 K/UL (ref 1.8–7.7)
NEUTROPHILS NFR BLD: 71.2 % (ref 38–73)
NRBC BLD-RTO: 0 /100 WBC
PLATELET # BLD AUTO: 372 K/UL (ref 150–450)
PMV BLD AUTO: 11 FL (ref 9.2–12.9)
POTASSIUM SERPL-SCNC: 3.7 MMOL/L (ref 3.5–5.1)
PROT SERPL-MCNC: 6.8 G/DL (ref 6–8.4)
RBC # BLD AUTO: 3.74 M/UL (ref 4–5.4)
SODIUM SERPL-SCNC: 144 MMOL/L (ref 136–145)
VANCOMYCIN TROUGH SERPL-MCNC: 56.4 UG/ML (ref 10–22)
WBC # BLD AUTO: 18.72 K/UL (ref 3.9–12.7)

## 2024-03-26 PROCEDURE — 36415 COLL VENOUS BLD VENIPUNCTURE: CPT | Mod: XB | Performed by: STUDENT IN AN ORGANIZED HEALTH CARE EDUCATION/TRAINING PROGRAM

## 2024-03-26 PROCEDURE — 25000003 PHARM REV CODE 250: Performed by: STUDENT IN AN ORGANIZED HEALTH CARE EDUCATION/TRAINING PROGRAM

## 2024-03-26 PROCEDURE — 36415 COLL VENOUS BLD VENIPUNCTURE: CPT | Performed by: HOSPITALIST

## 2024-03-26 PROCEDURE — 85025 COMPLETE CBC W/AUTO DIFF WBC: CPT | Performed by: HOSPITALIST

## 2024-03-26 PROCEDURE — 80202 ASSAY OF VANCOMYCIN: CPT | Performed by: STUDENT IN AN ORGANIZED HEALTH CARE EDUCATION/TRAINING PROGRAM

## 2024-03-26 PROCEDURE — 25000003 PHARM REV CODE 250: Performed by: HOSPITALIST

## 2024-03-26 PROCEDURE — 99233 SBSQ HOSP IP/OBS HIGH 50: CPT | Mod: ,,, | Performed by: STUDENT IN AN ORGANIZED HEALTH CARE EDUCATION/TRAINING PROGRAM

## 2024-03-26 PROCEDURE — 63600175 PHARM REV CODE 636 W HCPCS: Performed by: HOSPITALIST

## 2024-03-26 PROCEDURE — 11000001 HC ACUTE MED/SURG PRIVATE ROOM

## 2024-03-26 PROCEDURE — 94761 N-INVAS EAR/PLS OXIMETRY MLT: CPT

## 2024-03-26 PROCEDURE — 80053 COMPREHEN METABOLIC PANEL: CPT | Performed by: HOSPITALIST

## 2024-03-26 RX ORDER — LABETALOL HYDROCHLORIDE 5 MG/ML
10 INJECTION, SOLUTION INTRAVENOUS EVERY 6 HOURS PRN
Status: DISCONTINUED | OUTPATIENT
Start: 2024-03-26 | End: 2024-03-27 | Stop reason: HOSPADM

## 2024-03-26 RX ORDER — ZIPRASIDONE HYDROCHLORIDE 20 MG/1
20 CAPSULE ORAL 2 TIMES DAILY
Status: DISCONTINUED | OUTPATIENT
Start: 2024-03-26 | End: 2024-03-27 | Stop reason: HOSPADM

## 2024-03-26 RX ORDER — CEFADROXIL 500 MG/1
1000 CAPSULE ORAL 2 TIMES DAILY
Qty: 20 CAPSULE | Refills: 0 | Status: SHIPPED | OUTPATIENT
Start: 2024-03-26

## 2024-03-26 RX ADMIN — VANCOMYCIN HYDROCHLORIDE 1500 MG: 1.5 INJECTION, POWDER, LYOPHILIZED, FOR SOLUTION INTRAVENOUS at 05:03

## 2024-03-26 RX ADMIN — MICONAZOLE NITRATE: 20 POWDER TOPICAL at 09:03

## 2024-03-26 RX ADMIN — MUPIROCIN: 20 OINTMENT TOPICAL at 09:03

## 2024-03-26 RX ADMIN — ZIPRASIDONE HYDROCHLORIDE 20 MG: 20 CAPSULE ORAL at 12:03

## 2024-03-26 RX ADMIN — VANCOMYCIN HYDROCHLORIDE 1500 MG: 1.5 INJECTION, POWDER, LYOPHILIZED, FOR SOLUTION INTRAVENOUS at 04:03

## 2024-03-26 RX ADMIN — Medication 6 MG: at 09:03

## 2024-03-26 RX ADMIN — FOLIC ACID 1 MG: 1 TABLET ORAL at 09:03

## 2024-03-26 RX ADMIN — THERA TABS 1 TABLET: TAB at 09:03

## 2024-03-26 RX ADMIN — ZIPRASIDONE HYDROCHLORIDE 20 MG: 20 CAPSULE ORAL at 09:03

## 2024-03-26 RX ADMIN — APIXABAN 5 MG: 5 TABLET, FILM COATED ORAL at 09:03

## 2024-03-26 NOTE — NURSING
[facility] - [place]  Nurses Note -- 4 Eyes      3/26/2024       Skin assessed on: Q Shift      [x] No Pressure Injuries Present    []Prevention Measures Documented    [] Yes LDA  for Pressure Injury Previously documented     [] Yes New Pressure Injury Discovered   [] LDA for New Pressure Injury Added      Attending RN:  Beatriz Palumbo, RN     Second RN:  PEDRO Hoover

## 2024-03-26 NOTE — PLAN OF CARE
03/26/24 1625   Medicare Message   Important Message from Medicare regarding Discharge Appeal Rights Given to patient/caregiver;Explained to patient/caregiver;Signed/date by patient/caregiver   Date IMM was signed 03/26/24   Time IMM was signed 1500

## 2024-03-26 NOTE — SUBJECTIVE & OBJECTIVE
Interval History: No fevers documented overnight.   In good spirits this morning, tolerating abx without issues. Had L PIV infiltrated yesterday, line removed. Interval improved swelling and erythema.     Review of Systems   Constitutional:  Negative for chills and fever.   Skin:  Positive for rash.   All other systems reviewed and are negative.    Objective:     Vital Signs (Most Recent):  Temp: 97.8 °F (36.6 °C) (03/26/24 0520)  Pulse: (!) 136 (03/26/24 0720)  Resp: 20 (03/26/24 0520)  BP: (!) 161/96 (03/26/24 0720)  SpO2: 98 % (03/26/24 0720) Vital Signs (24h Range):  Temp:  [97.3 °F (36.3 °C)-98.8 °F (37.1 °C)] 97.8 °F (36.6 °C)  Pulse:  [104-136] 136  Resp:  [17-20] 20  SpO2:  [92 %-98 %] 98 %  BP: (110-200)/() 161/96     Weight: 104.6 kg (230 lb 9.6 oz)  Body mass index is 35.06 kg/m².    Estimated Creatinine Clearance: 100.6 mL/min (based on SCr of 0.8 mg/dL).     Physical Exam  Constitutional:       Appearance: She is not ill-appearing or toxic-appearing.   HENT:      Mouth/Throat:      Mouth: Mucous membranes are moist.      Pharynx: No oropharyngeal exudate or posterior oropharyngeal erythema.   Pulmonary:      Effort: Pulmonary effort is normal. No respiratory distress.   Musculoskeletal:      Cervical back: No tenderness.   Skin:     General: Skin is warm and dry.      Comments: Facial/neck edema/erythema improving  No further evidence of drainage  Prior L PIV site - mild swelling and erythema      Neurological:      Mental Status: She is alert and oriented to person, place, and time.          Significant Labs:   Microbiology Results (last 7 days)       Procedure Component Value Units Date/Time    Nasal culture [[id number]] Collected: 03/24/24 1419    Order Status: Completed Specimen: Nasal Swab from Nose Updated: 03/26/24 0741     RESPIRATORY CULTURE - NASAL Further report to follow    Blood culture x two cultures. Draw prior to antibiotics. [[id number]] Collected: 03/23/24 1748    Order Status:  Completed Specimen: Blood from Peripheral, Antecubital, Right Updated: 03/25/24 1903     Blood Culture, Routine No Growth to date      No Growth to date      No Growth to date    Narrative:      Aerobic and anaerobic    Blood culture x two cultures. Draw prior to antibiotics. [[id number]] Collected: 03/23/24 1748    Order Status: Completed Specimen: Blood from Peripheral, Forearm, Left Updated: 03/25/24 1903     Blood Culture, Routine No Growth to date      No Growth to date      No Growth to date    Narrative:      Aerobic and anaerobic            Significant Imaging: I have reviewed all pertinent imaging results/findings within the past 24 hours.

## 2024-03-26 NOTE — PROGRESS NOTES
[facility] Medicine  Progress Note    Patient Name: Thalia Rg  MRN: [id number]  Patient Class: IP- Inpatient   Admission Date: 3/23/2024  Length of Stay: 3 days  Attending Physician: Abner Geller III, MD  Primary Care Provider: Mac Gresham MD        Subjective:     Principal Problem:Cellulitis of neck        HPI:  This is a 53-year-old female with a past medical history of autoimmune hepatitis, DVT/PE (on Eliquis), bipolar disorder, bedbound 2/2 neuropathy who presents with facial swelling/rash.     Patient presents with facial swelling/rash that started 2 days prior to presentation.  She initially developed URI symptoms/nasal congestion 2 weeks prior to presentation.  She later developed a rash on her nose, face and swelling of her lips that she 1st noted 2 days prior.  It later spread to her face, and her neck.  Her rash is painful, burning and is associated with itching.  Additional symptoms include dysphagia.  She denied having shortness of breaths or drooling.    In the ED, the patient was tachycardic (110), tachypneic.  Labs were remarkable for leukocytosis (13.4), elevated CRP (68.4).  CT soft tissue neck showed subcutaneous soft tissue stranding and edema involving the visualized anterior upper chest and lower neck and extending along the lateral aspects of the neck to the level of the parotid glands & soft tissue swelling and possible edema the vocal cord level, asymmetric to the right.  ENT was consulted, deferred surgical intervention at this time and recommended IV antibiotics and IV decadron.  Patient was given 1.9 L of LR, vancomycin, Zosyn, Decadron 10 mg IV, Dilaudid 0.5 mg IV x2.  She was admitted for further management.    Overview/Hospital Course:  Ms Thalia Rg was admitted to ICU with neck cellulitis. She is immunocompromised on mercaptopurine for autoimmune hepatitis with neuropathy. She developed upper respiratory symptoms and used Perez's vapor rub on her  neck for relief. She then developed progressive rash and swelling on her neck. She was admitted for neck/facial cellulitis. CT showed swelling in upper check/lower neck and possible vocal cord edema. She was started on vanc, zosyn, and given dexamethasone for vocal cord edema. Speech returned to normal and rash is improving. Stepped down to floor.     Interval History:  Patient states that she is feeling good, but has not slept for 3 days.   believes she may be becoming more manic after initially having Geodon held.  We will restart this medication.  No fever or chills overnight    Review of Systems  Objective:     Vital Signs (Most Recent):  Temp: 99.4 °F (37.4 °C) (03/26/24 1111)  Pulse: 96 (03/26/24 1111)  Resp: 18 (03/26/24 1111)  BP: 126/75 (03/26/24 1111)  SpO2: 97 % (03/26/24 1111) Vital Signs (24h Range):  Temp:  [97.8 °F (36.6 °C)-99.4 °F (37.4 °C)] 99.4 °F (37.4 °C)  Pulse:  [] 96  Resp:  [17-20] 18  SpO2:  [92 %-98 %] 97 %  BP: (123-200)/() 126/75     Weight: 104.6 kg (230 lb 9.6 oz)  Body mass index is 35.06 kg/m².    Intake/Output Summary (Last 24 hours) at 3/26/2024 1115  Last data filed at 3/26/2024 0947  Gross per 24 hour   Intake 120 ml   Output 1250 ml   Net -1130 ml         Physical Exam  Vitals reviewed.   Constitutional:       General: She is not in acute distress.  HENT:      Head: Normocephalic and atraumatic.      Mouth/Throat:      Mouth: Mucous membranes are moist.      Pharynx: Oropharynx is clear.   Eyes:      General: No scleral icterus.     Extraocular Movements: Extraocular movements intact.   Cardiovascular:      Rate and Rhythm: Regular rhythm. Tachycardia present.   Pulmonary:      Effort: Pulmonary effort is normal. No respiratory distress (on room air).   Abdominal:      General: There is no distension.      Palpations: Abdomen is soft.   Musculoskeletal:      Cervical back: Neck supple. No rigidity.   Skin:     General: Skin is warm and dry.   Neurological:       Mental Status: She is alert.   Psychiatric:         Attention and Perception: She is inattentive.         Speech: Speech is rapid and pressured and tangential.         Behavior: Behavior is cooperative.             Significant Labs: All pertinent labs within the past 24 hours have been reviewed.    Significant Imaging: I have reviewed all pertinent imaging results/findings within the past 24 hours.    Assessment/Plan:      * Cellulitis of neck  - suspect that using vicks vapor rub caused irritant/contact dermatitis which then led to secondary Staph infection/impetigo  - CT soft tissue neck showed subcutaneous soft tissue stranding and edema involving the visualized anterior upper chest and lower neck and extending along the lateral aspects of the neck to the level of the parotid glands & soft tissue swelling and possible edema the vocal cord level, asymmetric to the right.   - continue vancomycin and mupirocin.  We will transitioned to oral Cefadroxil at discharge per id recs  - given dexamethasone for vocal cord edema- will end 3/24. Speech and breathing normal  - ENT and ID consulted      Psychiatric illness  Patient normally on Geodon 20 mg b.i.d. at home, but appears this was not restarted on presentation.  Today patient more tangential and manic in presentation and  worried about going home and needing to come back due to inability to control her.  She has not slept for 3 days.  We will restart her Geodon now and monitor overnight for improvement.      Autoimmune hepatitis  Liver synthetic function intact. No acute issues        Intertrigo  Miconazole to under breasts      History of DVT/PE  Continue Eliquis       Laryngeal edema  Noted on CT. S/p Decadron 10 mg IV  - Continue Decadron 8 mg Q8H x2 per ENT for today  - symptoms have resolved       Impetigo  See plan for cellulitis of night      Impaired gait and mobility  She has been bedbound x2 years due to neuropathy.   - turn Q2      Paresthesia of  both lower extremities  With functional quadriplegia. She has been bed bound for 2 years. Due to autoimmune neuropathy  - turn q2h        VTE Risk Mitigation (From admission, onward)           Ordered     apixaban tablet 5 mg  2 times daily         03/24/24 1059     IP VTE HIGH RISK PATIENT  Once         03/23/24 2221     Place sequential compression device  Until discontinued         03/23/24 2221                    Discharge Planning   CHITRA: 3/26/2024     Code Status: Full Code   Is the patient medically ready for discharge?:     Reason for patient still in hospital (select all that apply): Treatment  Discharge Plan A: Home with family, Home Health (Home Health with [facility]; follow-ups)          Pt requires a hospital bed due to her requiring positioning of the body in ways not feasible with an ordinary bed to alleviate pain/ is completely immobile /or limited mobility and cannot independently make changes in body position without the use of the bed. The positioning of the body cannot be sufficiently resolved by the use of pillows and wedges.         Abner Geller III, MD  Department of Hospital Medicine   [facility] [place] - Med Surg

## 2024-03-26 NOTE — DISCHARGE INSTRUCTIONS
Follow up with your hepatologist (liver doctor) to discuss restarting your mercaptopurine (purinethol)

## 2024-03-26 NOTE — PROGRESS NOTES
Pharmacokinetic Assessment Follow Up: IV Vancomycin    Vancomycin serum concentration assessment(s):    The trough level was drawn incorrectly and cannot be used to guide therapy at this time.    Vancomycin Regimen Plan:    Continue regimen to Vancomycin 1500 mg IV every 12 hours with next serum trough concentration measured at 04:30 prior to next dose on 3/27  Trough drawn after bag hung  Drug levels (last 3 results):  Recent Labs   Lab Result Units 03/25/24 0433 03/26/24  1652   Vancomycin-Trough ug/mL 19.5 56.4*       Pharmacy will continue to follow and monitor vancomycin.    Please contact pharmacy at extension [phone] for questions regarding this assessment.    Thank you for the consult,   Maty Lucia       Patient brief summary:  Thalia Rg is a 53 y.o. female initiated on antimicrobial therapy with IV Vancomycin for treatment of skin & soft tissue infection    The patient's current regimen is vancomycin 1500mg IV q12hr   Drug Allergies:   Review of patient's allergies indicates:  No Known Allergies    Actual Body Weight:   104.6 kg    Renal Function:   Estimated Creatinine Clearance: 100.6 mL/min (based on SCr of 0.8 mg/dL).,     Dialysis Method (if applicable):  N/A    CBC (last 72 hours):  Recent Labs   Lab Result Units 03/24/24 0418 03/25/24 0433 03/26/24  0441   WBC K/uL 12.09 19.86* 18.72*   Hemoglobin g/dL 14.0 11.8* 10.5*   Hematocrit % 44.0 37.4 33.3*   Platelets K/uL 350 426 372   Gran % % 87.0* 85.7* 71.2   Lymph % % 11.2* 9.5* 20.5   Mono % % 1.1* 4.0 6.9   Eosinophil % % 0.1 0.0 0.3   Basophil % % 0.2 0.1 0.2   Differential Method  Automated Automated Automated       Metabolic Panel (last 72 hours):  Recent Labs   Lab Result Units 03/23/24 2013 03/24/24 0418 03/25/24 0433 03/26/24  0441   Sodium mmol/L  --  142 143 144   Potassium mmol/L  --  5.0 4.4 3.7   Chloride mmol/L  --  107 107 109   CO2 mmol/L  --  23 25 27   Glucose mg/dL  --  139* 161* 92   Glucose, UA  Negative  --   --   --     BUN mg/dL  --  8 11 11   Creatinine mg/dL  --  0.8 0.8 0.8   Albumin g/dL  --  3.4* 3.4* 3.2*   Total Bilirubin mg/dL  --  0.4 0.3 0.3   Alkaline Phosphatase U/L  --  73 71 59   AST U/L  --  21 30 28   ALT U/L  --  21 22 20   Magnesium mg/dL  --  1.9  --   --    Phosphorus mg/dL  --  3.2  --   --        Vancomycin Administrations:  vancomycin given in the last 96 hours                     vancomycin 1,500 mg in dextrose 5 % (D5W) 250 mL IVPB (Vial-Mate) (mg) 1,500 mg New Bag 03/26/24 1632     1,500 mg New Bag  0502     1,500 mg New Bag 03/25/24 1647     1,500 mg New Bag  0518     1,500 mg New Bag 03/24/24 1636     1,500 mg New Bag  0450    vancomycin (VANCOCIN) 2,000 mg in dextrose 5 % (D5W) 500 mL IVPB (mg) 2,000 mg New Bag 03/23/24 1730                    Microbiologic Results:  Microbiology Results (last 7 days)       Procedure Component Value Units Date/Time    Nasal culture [[id number]]  (Abnormal)  (Susceptibility) Collected: 03/24/24 1419    Order Status: Completed Specimen: Nasal Swab from Nose Updated: 03/26/24 1050     RESPIRATORY CULTURE - NASAL STAPHYLOCOCCUS INTERMEDIUS  Moderate      Blood culture x two cultures. Draw prior to antibiotics. [[id number]] Collected: 03/23/24 1748    Order Status: Completed Specimen: Blood from Peripheral, Antecubital, Right Updated: 03/25/24 1903     Blood Culture, Routine No Growth to date      No Growth to date      No Growth to date    Narrative:      Aerobic and anaerobic    Blood culture x two cultures. Draw prior to antibiotics. [[id number]] Collected: 03/23/24 1748    Order Status: Completed Specimen: Blood from Peripheral, Forearm, Left Updated: 03/25/24 1903     Blood Culture, Routine No Growth to date      No Growth to date      No Growth to date    Narrative:      Aerobic and anaerobic

## 2024-03-26 NOTE — ASSESSMENT & PLAN NOTE
I independently reviewed patient's lab work and images as documented. 53 year old woman with autoimmune hepatitis on mercaptopurine, DVT/PE (on Eliquis), bipolar disorder, bedbound due to neuropathy on IVIG admitted for severe impetigo c/b neck swelling and laryngeal edema. Also reported using vicks around area infected, suspect potentially contributing to dermatitis. Hospital course notable for steroid administration + abx therapy with interval improvement. CT with soft tissue edema, no focal fluid collection/abscess seen. Labs notable for leukocytosis, improving - suspect 2/2 to infiltrated PIV line that was removed Mercaptopurine held due to infection. Nasal cx in process - spoke with micro, cx with CONS, no staph aureus isolated.     Significant interval improvement in swelling/erythema around neck and face.     Recommendations:  -continue vancomycin pharm to dose while admitted, when ready for discharge, transition to cefadroxil 1g bid (if cost prohibitive, alternative can be keflex 500 mg q6hr) x 10d, michelle 4/5/24   - no staph aureus isolated, suspect strep

## 2024-03-26 NOTE — PROGRESS NOTES
[facility]  Infectious Disease  Progress Note    Patient Name: Thalia Rg  MRN: [id number]  Admission Date: 3/23/2024  Length of Stay: 3 days  Attending Physician: Abner Geller III, MD  Primary Care Provider: Mac Gresham MD    Isolation Status: No active isolations  Assessment/Plan:      ID  Impetigo  I independently reviewed patient's lab work and images as documented. 53 year old woman with autoimmune hepatitis on mercaptopurine, DVT/PE (on Eliquis), bipolar disorder, bedbound due to neuropathy on IVIG admitted for severe impetigo c/b neck swelling and laryngeal edema. Also reported using vicks around area infected, suspect potentially contributing to dermatitis. Hospital course notable for steroid administration + abx therapy with interval improvement. CT with soft tissue edema, no focal fluid collection/abscess seen. Labs notable for leukocytosis, improving - suspect 2/2 to infiltrated PIV line that was removed, interval improvement once line out. Mercaptopurine held due to infection. Nasal cx in process - spoke with micro, cx with CONS, no staph aureus isolated.     Significant interval improvement in swelling/erythema around neck and face.     Recommendations:  -continue vancomycin pharm to dose while admitted, when ready for discharge, transition to cefadroxil 1g bid (if cost prohibitive, alternative can be keflex 500 mg q6hr) x 10d, michelle 4/5/24   - no staph aureus isolated, suspect strep               Thank you for your consult. Will sign off, please call with questions, new culture data or clinical changes. Above d/w primary team.       Parris Raymond MD  Infectious Disease  [facility] - Med Surg    Subjective:     Principal Problem:Cellulitis of neck    HPI: Thalia Garsia is a 53 year old woman with autoimmune hepatitis on mercaptopurine, DVT/PE (on Eliquis), bipolar disorder, bedbound due to neuropathy on IVIG who was admitted to MICU for severe impetigo with neck cellulitis and  laryngeal edema.  She initially had URI with nasal congestion about 2 weeks prior. She was blowing her nose frequently and thinks she had some sores in her nose. She developed a rash 2 days prior on her nose and face which then progressed to lip swelling, extended to neck/chest and then felt she was having difficulty breathing. She was using vicks and other OTC creams for symptomatic relief. Rash is painful and itchy. No prior episodes of this. She feels improved since admission. She is able to tolerate PO. Denies dysphagia.     She denies any wounds or infections in her groin, axilla or sacrum.   Interval History: No fevers documented overnight.   In good spirits this morning, tolerating abx without issues. Had L PIV infiltrated yesterday, line removed. Interval improved swelling and erythema.     Review of Systems   Constitutional:  Negative for chills and fever.   Skin:  Positive for rash.   All other systems reviewed and are negative.    Objective:     Vital Signs (Most Recent):  Temp: 97.8 °F (36.6 °C) (03/26/24 0520)  Pulse: (!) 136 (03/26/24 0720)  Resp: 20 (03/26/24 0520)  BP: (!) 161/96 (03/26/24 0720)  SpO2: 98 % (03/26/24 0720) Vital Signs (24h Range):  Temp:  [97.3 °F (36.3 °C)-98.8 °F (37.1 °C)] 97.8 °F (36.6 °C)  Pulse:  [104-136] 136  Resp:  [17-20] 20  SpO2:  [92 %-98 %] 98 %  BP: (110-200)/() 161/96     Weight: 104.6 kg (230 lb 9.6 oz)  Body mass index is 35.06 kg/m².    Estimated Creatinine Clearance: 100.6 mL/min (based on SCr of 0.8 mg/dL).     Physical Exam  Constitutional:       Appearance: She is not ill-appearing or toxic-appearing.   HENT:      Mouth/Throat:      Mouth: Mucous membranes are moist.      Pharynx: No oropharyngeal exudate or posterior oropharyngeal erythema.   Pulmonary:      Effort: Pulmonary effort is normal. No respiratory distress.   Musculoskeletal:      Cervical back: No tenderness.   Skin:     General: Skin is warm and dry.      Comments: Facial/neck edema/erythema  improving  No further evidence of drainage  Prior L PIV site - mild swelling and erythema      Neurological:      Mental Status: She is alert and oriented to person, place, and time.          Significant Labs:   Microbiology Results (last 7 days)       Procedure Component Value Units Date/Time    Nasal culture [[id number]] Collected: 03/24/24 1419    Order Status: Completed Specimen: Nasal Swab from Nose Updated: 03/26/24 0741     RESPIRATORY CULTURE - NASAL Further report to follow    Blood culture x two cultures. Draw prior to antibiotics. [[id number]] Collected: 03/23/24 1748    Order Status: Completed Specimen: Blood from Peripheral, Antecubital, Right Updated: 03/25/24 1903     Blood Culture, Routine No Growth to date      No Growth to date      No Growth to date    Narrative:      Aerobic and anaerobic    Blood culture x two cultures. Draw prior to antibiotics. [[id number]] Collected: 03/23/24 1748    Order Status: Completed Specimen: Blood from Peripheral, Forearm, Left Updated: 03/25/24 1903     Blood Culture, Routine No Growth to date      No Growth to date      No Growth to date    Narrative:      Aerobic and anaerobic            Significant Imaging: I have reviewed all pertinent imaging results/findings within the past 24 hours.

## 2024-03-26 NOTE — SUBJECTIVE & OBJECTIVE
Interval History:  Patient states that she is feeling good, but has not slept for 3 days.   believes she may be becoming more manic after initially having Geodon held.  We will restart this medication.  No fever or chills overnight    Review of Systems  Objective:     Vital Signs (Most Recent):  Temp: 99.4 °F (37.4 °C) (03/26/24 1111)  Pulse: 96 (03/26/24 1111)  Resp: 18 (03/26/24 1111)  BP: 126/75 (03/26/24 1111)  SpO2: 97 % (03/26/24 1111) Vital Signs (24h Range):  Temp:  [97.8 °F (36.6 °C)-99.4 °F (37.4 °C)] 99.4 °F (37.4 °C)  Pulse:  [] 96  Resp:  [17-20] 18  SpO2:  [92 %-98 %] 97 %  BP: (123-200)/() 126/75     Weight: 104.6 kg (230 lb 9.6 oz)  Body mass index is 35.06 kg/m².    Intake/Output Summary (Last 24 hours) at 3/26/2024 1115  Last data filed at 3/26/2024 0947  Gross per 24 hour   Intake 120 ml   Output 1250 ml   Net -1130 ml         Physical Exam  Vitals reviewed.   Constitutional:       General: She is not in acute distress.  HENT:      Head: Normocephalic and atraumatic.      Mouth/Throat:      Mouth: Mucous membranes are moist.      Pharynx: Oropharynx is clear.   Eyes:      General: No scleral icterus.     Extraocular Movements: Extraocular movements intact.   Cardiovascular:      Rate and Rhythm: Regular rhythm. Tachycardia present.   Pulmonary:      Effort: Pulmonary effort is normal. No respiratory distress (on room air).   Abdominal:      General: There is no distension.      Palpations: Abdomen is soft.   Musculoskeletal:      Cervical back: Neck supple. No rigidity.   Skin:     General: Skin is warm and dry.   Neurological:      Mental Status: She is alert.   Psychiatric:         Attention and Perception: She is inattentive.         Speech: Speech is rapid and pressured and tangential.         Behavior: Behavior is cooperative.             Significant Labs: All pertinent labs within the past 24 hours have been reviewed.    Significant Imaging: I have reviewed all pertinent  imaging results/findings within the past 24 hours.

## 2024-03-26 NOTE — ASSESSMENT & PLAN NOTE
- suspect that using vicks vapor rub caused irritant/contact dermatitis which then led to secondary Staph infection/impetigo  - CT soft tissue neck showed subcutaneous soft tissue stranding and edema involving the visualized anterior upper chest and lower neck and extending along the lateral aspects of the neck to the level of the parotid glands & soft tissue swelling and possible edema the vocal cord level, asymmetric to the right.   - continue vancomycin and mupirocin.  We will transitioned to oral Cefadroxil at discharge per id recs  - given dexamethasone for vocal cord edema- will end 3/24. Speech and breathing normal  - ENT and ID consulted

## 2024-03-26 NOTE — ASSESSMENT & PLAN NOTE
Patient normally on Geodon 20 mg b.i.d. at home, but appears this was not restarted on presentation.  Today patient more tangential and manic in presentation and  worried about going home and needing to come back due to inability to control her.  She has not slept for 3 days.  We will restart her Geodon now and monitor overnight for improvement.

## 2024-03-27 ENCOUNTER — TELEPHONE (OUTPATIENT)
Dept: OTOLARYNGOLOGY | Facility: CLINIC | Age: 54
End: 2024-03-27
Payer: MEDICARE

## 2024-03-27 ENCOUNTER — TELEPHONE (OUTPATIENT)
Dept: HEPATOLOGY | Facility: CLINIC | Age: 54
End: 2024-03-27
Payer: MEDICARE

## 2024-03-27 VITALS
RESPIRATION RATE: 20 BRPM | TEMPERATURE: 98 F | WEIGHT: 230.63 LBS | HEIGHT: 68 IN | HEART RATE: 97 BPM | OXYGEN SATURATION: 94 % | SYSTOLIC BLOOD PRESSURE: 136 MMHG | BODY MASS INDEX: 34.95 KG/M2 | DIASTOLIC BLOOD PRESSURE: 79 MMHG

## 2024-03-27 LAB
ALBUMIN SERPL BCP-MCNC: 3.6 G/DL (ref 3.5–5.2)
ALP SERPL-CCNC: 66 U/L (ref 55–135)
ALT SERPL W/O P-5'-P-CCNC: 29 U/L (ref 10–44)
ANION GAP SERPL CALC-SCNC: 9 MMOL/L (ref 8–16)
AST SERPL-CCNC: 33 U/L (ref 10–40)
BACTERIA BLD CULT: NORMAL
BACTERIA BLD CULT: NORMAL
BASOPHILS # BLD AUTO: 0.06 K/UL (ref 0–0.2)
BASOPHILS NFR BLD: 0.4 % (ref 0–1.9)
BILIRUB SERPL-MCNC: 0.5 MG/DL (ref 0.1–1)
BUN SERPL-MCNC: 11 MG/DL (ref 6–20)
CALCIUM SERPL-MCNC: 9.7 MG/DL (ref 8.7–10.5)
CHLORIDE SERPL-SCNC: 106 MMOL/L (ref 95–110)
CO2 SERPL-SCNC: 27 MMOL/L (ref 23–29)
CREAT SERPL-MCNC: 0.9 MG/DL (ref 0.5–1.4)
DIFFERENTIAL METHOD BLD: ABNORMAL
EOSINOPHIL # BLD AUTO: 0.6 K/UL (ref 0–0.5)
EOSINOPHIL NFR BLD: 3.5 % (ref 0–8)
ERYTHROCYTE [DISTWIDTH] IN BLOOD BY AUTOMATED COUNT: 15.3 % (ref 11.5–14.5)
EST. GFR  (NO RACE VARIABLE): >60 ML/MIN/1.73 M^2
GLUCOSE SERPL-MCNC: 106 MG/DL (ref 70–110)
HCT VFR BLD AUTO: 33.4 % (ref 37–48.5)
HGB BLD-MCNC: 10.9 G/DL (ref 12–16)
IMM GRANULOCYTES # BLD AUTO: 0.09 K/UL (ref 0–0.04)
IMM GRANULOCYTES NFR BLD AUTO: 0.5 % (ref 0–0.5)
LYMPHOCYTES # BLD AUTO: 5.4 K/UL (ref 1–4.8)
LYMPHOCYTES NFR BLD: 32.5 % (ref 18–48)
MCH RBC QN AUTO: 28.6 PG (ref 27–31)
MCHC RBC AUTO-ENTMCNC: 32.6 G/DL (ref 32–36)
MCV RBC AUTO: 88 FL (ref 82–98)
MONOCYTES # BLD AUTO: 1.1 K/UL (ref 0.3–1)
MONOCYTES NFR BLD: 6.6 % (ref 4–15)
NEUTROPHILS # BLD AUTO: 9.4 K/UL (ref 1.8–7.7)
NEUTROPHILS NFR BLD: 56.5 % (ref 38–73)
NRBC BLD-RTO: 0 /100 WBC
PLATELET # BLD AUTO: 377 K/UL (ref 150–450)
PMV BLD AUTO: 10.8 FL (ref 9.2–12.9)
POTASSIUM SERPL-SCNC: 3.5 MMOL/L (ref 3.5–5.1)
PROT SERPL-MCNC: 7.3 G/DL (ref 6–8.4)
RBC # BLD AUTO: 3.81 M/UL (ref 4–5.4)
SODIUM SERPL-SCNC: 142 MMOL/L (ref 136–145)
VANCOMYCIN TROUGH SERPL-MCNC: 33.3 UG/ML (ref 10–22)
WBC # BLD AUTO: 16.65 K/UL (ref 3.9–12.7)

## 2024-03-27 PROCEDURE — 25000003 PHARM REV CODE 250: Performed by: STUDENT IN AN ORGANIZED HEALTH CARE EDUCATION/TRAINING PROGRAM

## 2024-03-27 PROCEDURE — 80053 COMPREHEN METABOLIC PANEL: CPT | Performed by: HOSPITALIST

## 2024-03-27 PROCEDURE — 25000003 PHARM REV CODE 250: Performed by: HOSPITALIST

## 2024-03-27 PROCEDURE — 80202 ASSAY OF VANCOMYCIN: CPT | Performed by: STUDENT IN AN ORGANIZED HEALTH CARE EDUCATION/TRAINING PROGRAM

## 2024-03-27 PROCEDURE — 36415 COLL VENOUS BLD VENIPUNCTURE: CPT | Performed by: STUDENT IN AN ORGANIZED HEALTH CARE EDUCATION/TRAINING PROGRAM

## 2024-03-27 PROCEDURE — 85025 COMPLETE CBC W/AUTO DIFF WBC: CPT | Performed by: HOSPITALIST

## 2024-03-27 RX ORDER — CEPHALEXIN 500 MG/1
500 CAPSULE ORAL EVERY 6 HOURS
Status: DISCONTINUED | OUTPATIENT
Start: 2024-03-27 | End: 2024-03-27 | Stop reason: HOSPADM

## 2024-03-27 RX ORDER — ZIPRASIDONE HYDROCHLORIDE 20 MG/1
20 CAPSULE ORAL 2 TIMES DAILY
Qty: 60 CAPSULE | Refills: 1 | Status: SHIPPED | OUTPATIENT
Start: 2024-03-27 | End: 2024-05-26

## 2024-03-27 RX ADMIN — MUPIROCIN: 20 OINTMENT TOPICAL at 09:03

## 2024-03-27 RX ADMIN — ZIPRASIDONE HYDROCHLORIDE 20 MG: 20 CAPSULE ORAL at 09:03

## 2024-03-27 RX ADMIN — MICONAZOLE NITRATE: 20 POWDER TOPICAL at 09:03

## 2024-03-27 RX ADMIN — FOLIC ACID 1 MG: 1 TABLET ORAL at 09:03

## 2024-03-27 RX ADMIN — APIXABAN 5 MG: 5 TABLET, FILM COATED ORAL at 09:03

## 2024-03-27 RX ADMIN — CEPHALEXIN 500 MG: 500 CAPSULE ORAL at 09:03

## 2024-03-27 RX ADMIN — THERA TABS 1 TABLET: TAB at 09:03

## 2024-03-27 RX ADMIN — CEPHALEXIN 500 MG: 500 CAPSULE ORAL at 12:03

## 2024-03-27 NOTE — NURSING
Pt has been discharged, Pt received discharge instructions, pt verbalized understanding of discharge instructions. Pt AAOx4, respirations even and unlabored, no acute distress, no complaints of pain. Safety precautions in place, Meds delivered to the room, case management cleared patient for discharge.   [facility]  Nurses Note -- 4 Eyes      3/27/2024       Skin assessed on: Discharge      [x] No Pressure Injuries Present    []Prevention Measures Documented    [] Yes LDA  for Pressure Injury Previously documented     [] Yes New Pressure Injury Discovered   [] LDA for New Pressure Injury Added      Attending RN:  Lucrecia Villaseñor, RN     Second RN:  Mira, RN

## 2024-03-27 NOTE — PLAN OF CARE
[facility]      HOME HEALTH ORDERS  FACE TO FACE ENCOUNTER    Patient Name: Thalia Rg  YOB: 1970    PCP: Mac Gresham MD   PCP Address: [address]  PCP Phone Number: [phone]  PCP Fax: [phone]    Encounter Date: 3/23/24    Admit to Home Health    Diagnoses:  Active Hospital Problems    Diagnosis  POA    *Cellulitis of neck [L03.221]  Yes    Psychiatric illness [F99]  Yes    Intertrigo [L30.4]  Yes    Autoimmune hepatitis [K75.4]  Yes    Impetigo [L01.00]  Yes    Laryngeal edema [J38.4]  Yes    History of DVT/PE [Z86.718]  Not Applicable    Impaired gait and mobility [R26.89]  Yes    Paresthesia of both lower extremities [R20.2]  Yes      Resolved Hospital Problems    Diagnosis Date Resolved POA    Acute sinusitis [J01.90] 03/24/2024 Yes       Follow Up Appointments:  Future Appointments   Date Time Provider Department Center   4/4/2024 11:30 AM Parris Raymond MD [facility] ID [address]   4/5/2024  8:15 AM Viji Vargas MD [facility] ENT [facility] Cli   4/5/2024  1:20 PM Mac Gresham MD [facility]       Allergies:Review of patient's allergies indicates:  No Known Allergies    Medications: Review discharge medications with patient and family and provide education.    Current Facility-Administered Medications   Medication Dose Route Frequency Provider Last Rate Last Admin    acetaminophen tablet 650 mg  650 mg Oral Q8H PRN Sarah Fermin MD        acetaminophen tablet 650 mg  650 mg Oral Q4H PRN Sarah Fermin MD        albuterol-ipratropium 2.5 mg-0.5 mg/3 mL nebulizer solution 3 mL  3 mL Nebulization Q4H PRN Sarah Fermin MD        aluminum-magnesium hydroxide-simethicone 200-200-20 mg/5 mL suspension 30 mL  30 mL Oral QID PRN Sarah Fermin MD        apixaban tablet 5 mg  5 mg Oral BID Sarah Fermin MD   5 mg at 03/27/24 0911    bisacodyL suppository 10 mg  10 mg Rectal Daily PRN Sarah Fermin MD         cephALEXin capsule 500 mg  500 mg Oral Q6H Abner Geller III, MD   500 mg at 03/27/24 1211    dextrose 10% bolus 125 mL 125 mL  12.5 g Intravenous PRN Sarah Fermin MD        dextrose 10% bolus 250 mL 250 mL  25 g Intravenous PRN Sarah Fermin MD        folic acid tablet 1 mg  1 mg Oral Daily Sarah Fermin MD   1 mg at 03/27/24 0911    glucagon (human recombinant) injection 1 mg  1 mg Intramuscular PRN Sarah Fermin MD        glucose chewable tablet 16 g  16 g Oral PRN Sarah Fermin MD        glucose chewable tablet 24 g  24 g Oral PRN Sarah Fermin MD        HYDROmorphone injection 0.5 mg  0.5 mg Intravenous Q6H PRN Sarah Fermin MD        labetaloL injection 10 mg  10 mg Intravenous Q6H PRN Abner Geller III, MD        magnesium oxide tablet 800 mg  800 mg Oral PRN Sarah Fermin MD        magnesium oxide tablet 800 mg  800 mg Oral PRN Sarah Fermin MD        melatonin tablet 6 mg  6 mg Oral Nightly PRN Sarah Fermin MD   6 mg at 03/26/24 2119    miconazole NITRATE 2 % top powder   Topical (Top) BID Sarah Fermin MD   Given at 03/27/24 0911    multivitamin tablet  1 tablet Oral Daily Sarah Fermin MD   1 tablet at 03/27/24 0911    mupirocin 2 % ointment   Nasal BID Sarah Fermin MD   Given at 03/27/24 0911    naloxone 0.4 mg/mL injection 0.02 mg  0.02 mg Intravenous PRN Sarah Fermin MD        ondansetron injection 4 mg  4 mg Intravenous Q8H PRN Sarah Fermin MD        polyethylene glycol packet 17 g  17 g Oral Daily Sarah Fermin MD        potassium bicarbonate disintegrating tablet 35 mEq  35 mEq Oral PRN Sarah Fermin MD        potassium bicarbonate disintegrating tablet 50 mEq  50 mEq Oral PRN Sarah Fermin MD        potassium bicarbonate disintegrating tablet 60 mEq  60 mEq Oral PRN Sarah Fermin MD        potassium, sodium phosphates 280-160-250 mg packet 2 packet  2 packet Oral PRN Sarah Fermin MD         potassium, sodium phosphates 280-160-250 mg packet 2 packet  2 packet Oral PRN Sarah Fermin MD        potassium, sodium phosphates 280-160-250 mg packet 2 packet  2 packet Oral PRN Sarah Fermin MD        simethicone chewable tablet 80 mg  1 tablet Oral QID PRN Sarah Fermin MD        sodium chloride 0.9% flush 10 mL  10 mL Intravenous Q12H PRN Sarah Fermin MD        ziprasidone capsule 20 mg  20 mg Oral BID Abner Geller III, MD   20 mg at 03/27/24 0910     Current Discharge Medication List        START taking these medications    Details   cefadroxil (DURICEF) 500 MG Cap Take 2 capsules (1,000 mg total) by mouth 2 (two) times a day.  Qty: 20 capsule, Refills: 0           CONTINUE these medications which have CHANGED    Details   ziprasidone (GEODON) 20 MG Cap Take 1 capsule (20 mg total) by mouth 2 (two) times daily.  Qty: 60 capsule, Refills: 1    Associated Diagnoses: Bipolar 1 disorder           CONTINUE these medications which have NOT CHANGED    Details   acetaminophen (TYLENOL) 325 MG tablet Take 650 mg by mouth as needed for Pain.      folic acid (FOLVITE) 1 MG tablet Take 1 tablet (1 mg total) by mouth once daily.  Qty: 90 tablet, Refills: 3    Associated Diagnoses: Low folate      multivitamin (THERAGRAN) per tablet Take 1 tablet by mouth once daily.      apixaban (ELIQUIS) 5 mg Tab TAKE 1 TABLET BY MOUTH TWICE DAILY  Qty: 180 tablet, Refills: 0    Associated Diagnoses: Acute deep vein thrombosis (DVT) of femoral vein, unspecified laterality; Acute pulmonary embolism without acute cor pulmonale, unspecified pulmonary embolism type           STOP taking these medications       ibuprofen (ADVIL,MOTRIN) 200 MG tablet Comments:   Reason for Stopping:         mercaptopurine (PURINETHOL) 50 mg tablet Comments:   Reason for Stopping:                 I have seen and examined this patient within the last 30 days. My clinical findings that support the need for the home health skilled  services and home bound status are the following:no   Weakness/numbness causing balance and gait disturbance due to Weakness/Debility making it taxing to leave home.     Diet:   cardiac diet      Referrals/ Consults  Physical Therapy to evaluate and treat. Evaluate for home safety and equipment needs; Establish/upgrade home exercise program. Perform / instruct on therapeutic exercises, gait training, transfer training, and Range of Motion.  Occupational Therapy to evaluate and treat. Evaluate home environment for safety and equipment needs. Perform/Instruct on transfers, ADL training, ROM, and therapeutic exercises.   to evaluate for community resources/long-range planning.  Aide to provide assistance with personal care, ADLs, and vital signs.    Activities:   activity as tolerated    Nursing:   Agency to admit patient within 24 hours of hospital discharge unless specified on physician order or at patient request    SN to complete comprehensive assessment including routine vital signs. Instruct on disease process and s/s of complications to report to MD. Review/verify medication list sent home with the patient at time of discharge  and instruct patient/caregiver as needed. Frequency may be adjusted depending on start of care date.     Skilled nurse to perform up to 3 visits PRN for symptoms related to diagnosis      Ok to schedule additional visits based on staff availability and patient request on consecutive days within the home health episode.    When multiple disciplines ordered:    Start of Care occurs on Sunday - Wednesday schedule remaining discipline evaluations as ordered on separate consecutive days following the start of care.    Thursday SOC -schedule subsequent evaluations Friday and Monday the following week.     Friday - Saturday SOC - schedule subsequent discipline evaluations on consecutive days starting Monday of the following week.      Miscellaneous   Routine Skin for Bedridden  Patients: Instruct patient/caregiver to apply moisture barrier cream to all skin folds and wet areas in perineal area daily and after baths and all bowel movements.    Home Health Aide:  Nursing Three times weekly, Physical Therapy Three times weekly, Occupational Therapy Three times weekly, Medical Social Work Weekly, and Home Health Aide Three times weekly      I certify that this patient is confined to her home and needs intermittent skilled nursing care, physical therapy, and occupational therapy.

## 2024-03-27 NOTE — NURSING
[facility]  Nurses Note -- 4 Eyes      3/27/2024       Skin assessed on: Q Shift      [x] No Pressure Injuries Present    []Prevention Measures Documented    [] Yes LDA  for Pressure Injury Previously documented     [] Yes New Pressure Injury Discovered   [] LDA for New Pressure Injury Added      Attending RN:  Lucrecia Villaseñor RN     Second RN:  PEDRO Blackburn

## 2024-03-27 NOTE — TELEPHONE ENCOUNTER
Called Mrs. Thalia Garza was her treated with a [facility]--her  Mr. Morel thinks she will stay with who she's been seeing for 20 yrs. I offered an appt if they would like to call back later in time   He was hoping for her to be discharged today--- Message from Macey Fitzpatrick MD sent at 3/26/2024  5:00 PM CDT -----  No. But happy to help. Helga will reach out to her  ----- Message -----  From: Parris Raymond MD  Sent: 3/26/2024   2:37 PM CDT  To: MD Oumar Rosario, I may have misunderstood my colleague who was on last week, I thought this was your patient originally!   ----- Message -----  From: Macey Fitzpatrick MD  Sent: 3/26/2024   2:08 PM CDT  To: Parris Raymond MD; Amari Choudhury Staff    Helga, please call her and find out if she wants to change her care to me or to continue with the outside provider for treatment of autoimmune hepatitis. If she wants to switch we need to get records. I will have to try to fit her in   ----- Message -----  From: Parris Raymond MD  Sent: 3/26/2024   1:56 PM CDT  To: MD Teresa Rosario,    I met Thalia for the first time this week - her facial edema/erythema improved significantly today. I have a virtual visit with her next week while on PO abx - I asked the  team to have them assist in scheduling a follow up visit with you (her mercaptopurine was held while admitted).     Thanks!  Parris

## 2024-03-27 NOTE — PROGRESS NOTES
Pharmacokinetic Assessment Follow Up: IV Vancomycin    Vancomycin serum concentration assessment(s):    The random level was drawn correctly and can be used to guide therapy at this time. The measurement is above the desired definitive target range of 10 to 20 mcg/mL.    Vancomycin Regimen Plan:    Vancomycin random remains elevated - obtain random level 3/27 at 1600    Drug levels (last 3 results):  Recent Labs   Lab Result Units 03/25/24  0433 03/26/24  1652 03/27/24  0433   Vancomycin-Trough ug/mL 19.5 56.4* 33.3*       Pharmacy will continue to follow and monitor vancomycin.    Please contact pharmacy at extension [phone] for questions regarding this assessment.    Thank you for the consult,   Ahmet Sarabia       Patient brief summary:  Thalia Rg is a 53 y.o. female initiated on antimicrobial therapy with IV Vancomycin for treatment of skin & soft tissue infection    Drug Allergies:   Review of patient's allergies indicates:  No Known Allergies    Actual Body Weight:   104.6 kg    Renal Function:   Estimated Creatinine Clearance: 100.6 mL/min (based on SCr of 0.8 mg/dL).,     Dialysis Method (if applicable):  N/A    CBC (last 72 hours):  Recent Labs   Lab Result Units 03/25/24  0433 03/26/24  0441 03/27/24  0433   WBC K/uL 19.86* 18.72* 16.65*   Hemoglobin g/dL 11.8* 10.5* 10.9*   Hematocrit % 37.4 33.3* 33.4*   Platelets K/uL 426 372 377   Gran % % 85.7* 71.2 56.5   Lymph % % 9.5* 20.5 32.5   Mono % % 4.0 6.9 6.6   Eosinophil % % 0.0 0.3 3.5   Basophil % % 0.1 0.2 0.4   Differential Method  Automated Automated Automated       Metabolic Panel (last 72 hours):  Recent Labs   Lab Result Units 03/25/24  0433 03/26/24  0441   Sodium mmol/L 143 144   Potassium mmol/L 4.4 3.7   Chloride mmol/L 107 109   CO2 mmol/L 25 27   Glucose mg/dL 161* 92   BUN mg/dL 11 11   Creatinine mg/dL 0.8 0.8   Albumin g/dL 3.4* 3.2*   Total Bilirubin mg/dL 0.3 0.3   Alkaline Phosphatase U/L 71 59   AST U/L 30 28   ALT U/L 22 20        Vancomycin Administrations:  vancomycin given in the last 96 hours                     vancomycin 1,500 mg in dextrose 5 % (D5W) 250 mL IVPB (Vial-Mate) (mg) 1,500 mg New Bag 03/26/24 1632     1,500 mg New Bag  0502     1,500 mg New Bag 03/25/24 1647     1,500 mg New Bag  0518     1,500 mg New Bag 03/24/24 1636     1,500 mg New Bag  0450    vancomycin (VANCOCIN) 2,000 mg in dextrose 5 % (D5W) 500 mL IVPB (mg) 2,000 mg New Bag 03/23/24 1730                    Microbiologic Results:  Microbiology Results (last 7 days)       Procedure Component Value Units Date/Time    Blood culture x two cultures. Draw prior to antibiotics. [[id number]] Collected: 03/23/24 1748    Order Status: Completed Specimen: Blood from Peripheral, Forearm, Left Updated: 03/26/24 1903     Blood Culture, Routine No Growth to date      No Growth to date      No Growth to date      No Growth to date    Narrative:      Aerobic and anaerobic    Blood culture x two cultures. Draw prior to antibiotics. [[id number]] Collected: 03/23/24 1748    Order Status: Completed Specimen: Blood from Peripheral, Antecubital, Right Updated: 03/26/24 1903     Blood Culture, Routine No Growth to date      No Growth to date      No Growth to date      No Growth to date    Narrative:      Aerobic and anaerobic    Nasal culture [[id number]]  (Abnormal)  (Susceptibility) Collected: 03/24/24 1419    Order Status: Completed Specimen: Nasal Swab from Nose Updated: 03/26/24 1050     RESPIRATORY CULTURE - NASAL STAPHYLOCOCCUS INTERMEDIUS  Moderate

## 2024-03-27 NOTE — NURSING
[facility]  Nurses Note -- 4 Eyes      3/27/2024       Skin assessed on: Transfer      [x] No Pressure Injuries Present    [x]Prevention Measures Documented    [] Yes LDA  for Pressure Injury Previously documented     [] Yes New Pressure Injury Discovered   [] LDA for New Pressure Injury Added      Attending RN:  Lucrecia Villaseñor RN     Second RN:  SARATH Mcqueen

## 2024-03-27 NOTE — PHYSICIAN QUERY
PT Name: Thalia Rg  MR #: [id number]     Documentation Clarification      CDS/: Kandi Hopper RN CDI     Contact information: [email or web address]    This form is a permanent document in the medical record.     Query Date: March 27, 2024    By submitting this query, we are merely seeking further clarification of documentation.  Please utilize your independent clinical judgment when addressing the question(s) below.     The medical record contains the following:  Indicators Supporting Clinical Findings Location in Medical Record   x Chronic Illness Paresthesia of both lower extremities  With functional quadriplegia. She has been bed bound for 2 years. Due to autoimmune neuropathy     Autoimmune hepatitis  Impaired gait and mobility   Psychiatric illness    Hmed 3/26 D Yimi ANDREWS   x Total Care or Max Assist Mobility/Activity    Flowsheet Row Most Recent Value   Activity Management Arm raise - L1 - 03/27/2024 0800   GEMS ([place] Early Mobility Scale) Level 2-Edge of bed activities with assist - 03/27/2024 0800   Activity Assistance Provided assistance, 1 person - 03/27/2024 0800   Ambulation Prior 4-->completely dependent - 03/23/2024 2352   Transferring 2-->assistive person - 03/23/2024 2352   Toileting 1-->assistive equipment - 03/23/2024 2352         Flowsheet Row Most Recent Value   Functional Screen (every 3 days)    Ambulation 4 - completely dependent - 03/24/2024 2200   Transferring 2 - assistive person - 03/24/2024 2200   Toileting 2 - assistive person - 03/24/2024 2200    Bedside handoff report   x Immobility or Debility Paresthesia of both lower extremities  With functional quadriplegia. She has been bed bound for 2 years. Due to autoimmune neuropathy    Pt reports being bed bound due to neuropathy. Pt reports tingling in feet on palpitation.     Musculoskeletal:         General: No tenderness or edema.     Comments: 4/5 strength to bilateral legs (pt says this is chronic).   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 Evaluate for home safety and equipment needs; Establish/upgrade home exercise program. Perform / instruct on therapeutic exercises, gait training, transfer training, and Range of Motion.  Occupational Therapy to evaluate and treat. Evaluate home environment for safety and equipment needs. Perform/Instruct on transfers, ADL training, ROM, and therapeutic exercises.   ed 3/26 D Yimi ANDREWS    3/27 D Yimi ANDREWS    Other       Provider, please specify "functional quadriplegia" associated with above clinical findings:    [   x ] Functional quadriplegia - lack of ability to use ones limbs due to extreme debility in the absence of damage or injury to the spinal cord,          requiring total care with all activities of daily living; a permanent condition; not a true paralysis, clincal support indicators for         functional quadriplegia include ______________________.    [    ] General debility/deconditioning    [    ] Other diagnosis (please specify): ________________________      Please document in your progress notes daily for the duration of treatment, until resolved, and include in your discharge summary.    Form No. [id number]                                                                              "

## 2024-03-27 NOTE — TELEPHONE ENCOUNTER
----- Message from Gali Daly RN sent at 3/27/2024 10:26 AM CDT -----  Regarding: Hospital follow up  Please contact patient to schedule follow up appointment. Patient discharge today. Thank you

## 2024-03-27 NOTE — PLAN OF CARE
Case Management Final Discharge Note      Discharge Disposition: [facility] CM provided the patient a choice of post acute providers and offered a list of CMS rated Home health:  Patient has declined to select a preferred provider and elects placement with the first accepting in network provider that is available to provide services as ordered by the physician.      New DME ordered / company name: Per [facility], hospital bed approved and pt's spouse contact to arrange delivery    Relevant SDOH / Transition of Care Barriers:  None    Primary Caretaker and contact information: Adriel Rg (Spouse)  [phone]     Scheduled followup appointment: ID, ENT  and pcp follow up scheduled, listed on AVS.     Referrals placed: ENT and ID    Transportation: ADT 30 order placed for Stretcher  Transportation.  Requested  time: 2:00p  If transportation does not arrive at ETA time nurse will be instructed to follow protocol for transportation below:  How can I get in touch directly with dispatch, if needed?                 Non-emergent dispatch: [phone] opt 6    +++NURSING:  If stretcher  does not arrive at requested time please call the above Non Emergent Dispatcher.  If issue not resolved please escalate to your charge nurse for further instructions.    Transport to [address] [place] [id number]    Patient and family educated on discharge services and updated on DC plan. Bedside RN notified, patient clear to discharge from Case Management Perspective.      03/27/24 1335   Final Note   Assessment Type Final Discharge Note   Anticipated Discharge Disposition [facility] Resources/Appts/Education Provided Provided patient/caregiver with written discharge plan information;Appointments scheduled and added to AVS   Post-Acute Status   Post-Acute Authorization Home [facility]   Home Health Status Set-up Complete/Auth obtained   Discharge Delays None known at this time

## 2024-03-28 ENCOUNTER — PATIENT OUTREACH (OUTPATIENT)
Dept: ADMINISTRATIVE | Facility: CLINIC | Age: 54
End: 2024-03-28
Payer: MEDICARE

## 2024-03-28 LAB
OHS QRS DURATION: 84 MS
OHS QTC CALCULATION: 401 MS

## 2024-03-28 NOTE — PROGRESS NOTES
C3 nurse spoke with Thalia Rg  for a TCC post hospital discharge follow up call. The patient has a scheduled [facility] appointment with Mac Gresham MD on 4/5/2024 @ 1:20 PM .

## 2024-03-29 NOTE — DISCHARGE SUMMARY
[facility] Medicine  Discharge Summary      Patient Name: Thalia Rg  MRN: [id number]  [facility]: [id number]  Patient Class: IP- Inpatient  Admission Date: 3/23/2024  Hospital Length of Stay: 4 days  Discharge Date and Time: 3/27/2024  4:28 PM  Attending Physician: No att. providers found   Discharging Provider: Abner Geller III, MD  Primary Care Provider: Mac Gresham MD    Primary Care Team: Networked reference to record PCT     HPI:   This is a 53-year-old female with a past medical history of autoimmune hepatitis, DVT/PE (on Eliquis), bipolar disorder, bedbound 2/2 neuropathy who presents with facial swelling/rash.     Patient presents with facial swelling/rash that started 2 days prior to presentation.  She initially developed URI symptoms/nasal congestion 2 weeks prior to presentation.  She later developed a rash on her nose, face and swelling of her lips that she 1st noted 2 days prior.  It later spread to her face, and her neck.  Her rash is painful, burning and is associated with itching.  Additional symptoms include dysphagia.  She denied having shortness of breaths or drooling.    In the ED, the patient was tachycardic (110), tachypneic.  Labs were remarkable for leukocytosis (13.4), elevated CRP (68.4).  CT soft tissue neck showed subcutaneous soft tissue stranding and edema involving the visualized anterior upper chest and lower neck and extending along the lateral aspects of the neck to the level of the parotid glands & soft tissue swelling and possible edema the vocal cord level, asymmetric to the right.  ENT was consulted, deferred surgical intervention at this time and recommended IV antibiotics and IV decadron.  Patient was given 1.9 L of LR, vancomycin, Zosyn, Decadron 10 mg IV, Dilaudid 0.5 mg IV x2.  She was admitted for further management.    * No surgery found *      Hospital Course:   Ms Thalia Rg was admitted to ICU with neck cellulitis. She is immunocompromised  on mercaptopurine for autoimmune hepatitis with neuropathy. She developed upper respiratory symptoms and used Perez's vapor rub on her neck for relief. She then developed progressive rash and swelling on her neck. She was admitted for neck/facial cellulitis. CT showed swelling in upper check/lower neck and possible vocal cord edema. She was started on vanc, zosyn, and given dexamethasone for vocal cord edema. Speech returned to normal and rash is improving. Stepped down to floor.   Patient had been off of her psychiatric medications with  noting some manic tendencies and lack of sleep.  This medication was restarted with patient able to balance out.  Patient's  requesting a hospital bed which is ordered.  ID following and recommended switching to oral antibiotics and patient discharged home to follow-up with ENT and ID.  Patient advised to follow up with the primary care doctor within a week.  Patient and  at bedside during discussions with all questions answered and both endorsed understanding prior to discharge.     Goals of Care Treatment Preferences:  Code Status: Full Code      Consults:   Consults (From admission, onward)          Status Ordering Provider     Inpatient consult to Infectious Diseases  Once        Provider:  Emilee Resendiz RN    Completed BARBARA STOKES            No new Assessment & Plan notes have been filed under this hospital service since the last note was generated.  Service: Hospital Medicine    Final Active Diagnoses:    Diagnosis Date Noted POA    PRINCIPAL PROBLEM:  Cellulitis of neck [L03.221] 03/23/2024 Yes    Psychiatric illness [F99] 03/26/2024 Yes    Intertrigo [L30.4] 03/24/2024 Yes    Autoimmune hepatitis [K75.4] 03/24/2024 Yes    Impetigo [L01.00] 03/23/2024 Yes    Laryngeal edema [J38.4] 03/23/2024 Yes    History of DVT/PE [Z86.718] 03/23/2024 Not Applicable    Impaired gait and mobility [R26.89] 05/06/2022 Yes    Paresthesia of both lower  "extremities [R20.2] 03/29/2022 Yes      Problems Resolved During this Admission:    Diagnosis Date Noted Date Resolved POA    Acute sinusitis [J01.90] 03/23/2024 03/24/2024 Yes       Discharged Condition: fair    Disposition: Home-Health Care Svc    Follow Up:   Follow-up Information       Dme, [facility] Follow up.    Specialty: DME Provider  Why: Hospital bed for home  Contact information:  [address]  [facility] [address]  [phone]               [facility] Follow up.    Specialties: Home Health Services, Home Therapy Services, Home Living Aide Services  Why: Home Health  Contact information:  [address]  [facility] [address]  [phone]                         Patient Instructions:      HOSPITAL BED FOR HOME USE     Order Specific Question Answer Comments   Type: Semi-electric    Length of need (1-99 months): 99    Does patient have medical equipment at home? wheelchair    Does patient have medical equipment at home? walker, rolling    Does patient have medical equipment at home? lift device    Does patient have medical equipment at home? shower chair    Height: 5' 8" (1.727 m)    Weight: 104.6 kg (230 lb 9.6 oz)    Please check all that apply: Patient requires positioning of the body in ways not feasible in an ordinary bed due to a medical condition which is expected to last at least one month.    Vendor: Other (use comments)    CRM Resolution Date: 3/27/2024      Ambulatory referral/consult to ENT   Standing Status: Future   Referral Priority: Routine Referral Type: Consultation   Referral Reason: Specialty Services Required   Requested Specialty: Otolaryngology   Number of Visits Requested: 1     Ambulatory referral/consult to Infectious Disease   Standing Status: Future   Referral Priority: Routine Referral Type: Consultation   Referral Reason: Specialty Services Required   Requested Specialty: Infectious Diseases   Number of Visits Requested: " 1     Notify your health care provider if you experience any of the following:  increased confusion or weakness     Notify your health care provider if you experience any of the following:  persistent dizziness, light-headedness, or visual disturbances     Notify your health care provider if you experience any of the following:  worsening rash     Notify your health care provider if you experience any of the following:  severe persistent headache     Notify your health care provider if you experience any of the following:  difficulty breathing or increased cough     Notify your health care provider if you experience any of the following:  redness, tenderness, or signs of infection (pain, swelling, redness, odor or green/yellow discharge around incision site)     Notify your health care provider if you experience any of the following:  severe uncontrolled pain     Notify your health care provider if you experience any of the following:  persistent nausea and vomiting or diarrhea     Notify your health care provider if you experience any of the following:  temperature >100.4     Activity as tolerated       Significant Diagnostic Studies: Labs: All labs within the past 24 hours have been reviewed    Pending Diagnostic Studies:       None           Medications:  Reconciled Home Medications:      Medication List        START taking these medications      cefadroxil 500 MG Cap  Commonly known as: DURICEF  Take 2 capsules (1,000 mg total) by mouth 2 (two) times a day.            CONTINUE taking these medications      acetaminophen 325 MG tablet  Commonly known as: TYLENOL  Take 650 mg by mouth as needed for Pain.     apixaban 5 mg Tab  Commonly known as: ELIQUIS  TAKE 1 TABLET BY MOUTH TWICE DAILY     folic acid 1 MG tablet  Commonly known as: FOLVITE  Take 1 tablet (1 mg total) by mouth once daily.     multivitamin per tablet  Commonly known as: THERAGRAN  Take 1 tablet by mouth once daily.     ziprasidone 20 MG  Cap  Commonly known as: GEODON  Take 1 capsule (20 mg total) by mouth 2 (two) times daily.            STOP taking these medications      ibuprofen 200 MG tablet  Commonly known as: ADVIL,MOTRIN     mercaptopurine 50 mg tablet  Commonly known as: PURINETHOL              Indwelling Lines/Drains at time of discharge:   Lines/Drains/Airways       None                   Time spent on the discharge of patient: 45 minutes         Abner Geller III, MD  Department of Hospital Medicine  [facility]

## 2024-04-04 ENCOUNTER — OFFICE VISIT (OUTPATIENT)
Dept: INFECTIOUS DISEASES | Facility: CLINIC | Age: 54
End: 2024-04-04
Payer: MEDICARE

## 2024-04-04 DIAGNOSIS — K75.4 AUTOIMMUNE HEPATITIS: ICD-10-CM

## 2024-04-04 DIAGNOSIS — L03.221 CELLULITIS OF NECK: ICD-10-CM

## 2024-04-04 DIAGNOSIS — L01.00 IMPETIGO: Primary | ICD-10-CM

## 2024-04-04 PROCEDURE — 1160F RVW MEDS BY RX/DR IN RCRD: CPT | Mod: CPTII,95,, | Performed by: STUDENT IN AN ORGANIZED HEALTH CARE EDUCATION/TRAINING PROGRAM

## 2024-04-04 PROCEDURE — 99214 OFFICE O/P EST MOD 30 MIN: CPT | Mod: 95,,, | Performed by: STUDENT IN AN ORGANIZED HEALTH CARE EDUCATION/TRAINING PROGRAM

## 2024-04-04 PROCEDURE — 1111F DSCHRG MED/CURRENT MED MERGE: CPT | Mod: CPTII,95,, | Performed by: STUDENT IN AN ORGANIZED HEALTH CARE EDUCATION/TRAINING PROGRAM

## 2024-04-04 PROCEDURE — 1159F MED LIST DOCD IN RCRD: CPT | Mod: CPTII,95,, | Performed by: STUDENT IN AN ORGANIZED HEALTH CARE EDUCATION/TRAINING PROGRAM

## 2024-04-04 NOTE — PROGRESS NOTES
The patient location is: [place]  The chief complaint leading to consultation is: follow up    Visit type: audiovisual    Face to Face time with patient: 13  30 minutes of total time spent on the encounter, which includes face to face time and non-face to face time preparing to see the patient (eg, review of tests), Obtaining and/or reviewing separately obtained history, Documenting clinical information in the electronic or other health record, Independently interpreting results (not separately reported) and communicating results to the patient/family/caregiver, or Care coordination (not separately reported).         Each patient to whom he or she provides medical services by telemedicine is:  (1) informed of the relationship between the physician and patient and the respective role of any other health care provider with respect to management of the patient; and (2) notified that he or she may decline to receive medical services by telemedicine and may withdraw from such care at any time.    Notes:       INFECTIOUS DISEASE CLINIC  04/04/2024     Subjective:      Chief Complaint:   Chief Complaint   Patient presents with    Follow-up       History of Present Illness:    This is a 53 y.o. female with AH on mercaptopurine with recent admission for impetigo who is referred to my clinic for hospital follow up.   Patient known to ID, please see prior notes for full details. Pt reports doing well since discharge - had a mild flare of her neck erythema the other day, she admits to spraying her perfume. Pt reports that she stopped vaseline and started using carmex as well. Denies fevers, chills, or intolerance to her abx, has 1 more day left. Overall, she and her  report that her swelling/erythema have improved.       Review of Systems   Constitutional: Negative for chills and fever.   All other systems reviewed and are negative.        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      Physical Exam  Constitutional:       Appearance: She is not ill-appearing.   Skin:     Comments: Limited view- though less swollen and erythematous from admission   Neurological:      Mental Status: She is alert and oriented to person, place, and time.             Micro:   3/24 nasal swab - staph intermedius    Radiology:   3/23 neck CT: subcut soft tissue stranding and edema, no organized focal fluid collection or abscess    Immunization History   Administered Date(s) Administered    COVID-19, MRNA, LN-S, PF (Pfizer) (Purple Cap) 08/11/2021, 01/07/2022    Hepatitis A, Adult 03/17/2009, 09/15/2009    Hepatitis B, Adult 03/17/2009, 09/15/2009, 10/08/2013, 11/12/2013         Assessment:     1. Cellulitis of neck [L03.221]  - Ambulatory referral/consult to Infectious Disease    2. Impetigo    3. Autoimmune hepatitis       52 yo female with AH on mercaptopurine admitted for impetigo, hospital course notable for steroid administration + abx therapy with interval improvement. CT of neck without focal fluid collection. Suspect component of dermatitis a/w henrik's. Completed mupirocin decolonization while admitted. Interval improvement in swelling, erythema since discharge - mild flare up the other day - suspect due to irritation from perfume/carmex, now improving.     Plan:     -continue remainder of cefadroxil, then stop and monitor off  -avoid perfumes,  carmex, henrik's, etc  -use unscented soaps, lotions, laundry detergents  -aquaphor  -make an appt with your hepatologist to follow up on when to restart your mercaptopurine      These recommendations have been sent to and/or discussed with the following providers:   - Dr. Abner Geller III   - Mac Gresham MD    Follow up PRN    Management of impetigo was discussed with patient. Patient was given ample time for questions, all questions answered. Strict return precautions given to patient.            Parris Raymond MD  Infectious Disease

## 2024-04-05 ENCOUNTER — OFFICE VISIT (OUTPATIENT)
Dept: FAMILY MEDICINE | Facility: CLINIC | Age: 54
End: 2024-04-05
Payer: MEDICARE

## 2024-04-05 DIAGNOSIS — K75.4 AUTOIMMUNE HEPATITIS: ICD-10-CM

## 2024-04-05 DIAGNOSIS — L01.00 IMPETIGO: Primary | ICD-10-CM

## 2024-04-05 DIAGNOSIS — F31.9 BIPOLAR 1 DISORDER: ICD-10-CM

## 2024-04-05 PROCEDURE — 1111F DSCHRG MED/CURRENT MED MERGE: CPT | Mod: CPTII,95,, | Performed by: INTERNAL MEDICINE

## 2024-04-05 PROCEDURE — 1160F RVW MEDS BY RX/DR IN RCRD: CPT | Mod: CPTII,95,, | Performed by: INTERNAL MEDICINE

## 2024-04-05 PROCEDURE — 99213 OFFICE O/P EST LOW 20 MIN: CPT | Mod: 95,,, | Performed by: INTERNAL MEDICINE

## 2024-04-05 PROCEDURE — 1159F MED LIST DOCD IN RCRD: CPT | Mod: CPTII,95,, | Performed by: INTERNAL MEDICINE

## 2024-04-05 NOTE — PROGRESS NOTES
Subjective:       Patient ID: Thalia Rg is a 53 y.o. female.    Chief Complaint: No chief complaint on file.    The patient location is: in her home in [place]  The chief complaint leading to consultation is: follow up hospitalization    Visit type: audiovisual    Face to Face time with patient: 9minutes  14 minutes of total time spent on the encounter, which includes face to face time and non-face to face time preparing to see the patient (eg, review of tests), Obtaining and/or reviewing separately obtained history, Documenting clinical information in the electronic or other health record, Independently interpreting results (not separately reported) and communicating results to the patient/family/caregiver, or Care coordination (not separately reported).         Each patient to whom he or she provides medical services by telemedicine is:  (1) informed of the relationship between the physician and patient and the respective role of any other health care provider with respect to management of the patient; and (2) notified that he or she may decline to receive medical services by telemedicine and may withdraw from such care at any time.    Notes:     54 y/o admitted for diffuse crusting rash and swelling of head and neck treated with oral antibiotics and steroids. Her imuran was held during hospitalization her skin has returned to normal finished last dose of antibiotics one day ago. No dysphagia no open or draining sores. Her strength is also improving with assistance of home PT is standing independently at side of bed for up to 30 seconds signifiacnt improvement in he rupper extremity strength as well       Review of Systems    Objective:   There were no vitals filed for this visit.       Physical Exam  Constitutional:       General: She is not in acute distress.  Pulmonary:      Effort: Pulmonary effort is normal. No respiratory distress.   Musculoskeletal:      Comments: Full flexion and extension of neck  observed   Neurological:      Mental Status: She is alert.   Psychiatric:         Mood and Affect: Mood normal.         Behavior: Behavior normal.         Assessment and Plan   1. Impetigo  resolved    2. Autoimmune hepatitis  To follow up with GI to restart imuran    3. Bipolar 1 disorder  On mood stabilizer with psychiatrist

## 2024-04-12 ENCOUNTER — TELEPHONE (OUTPATIENT)
Dept: NEUROLOGY | Facility: CLINIC | Age: 54
End: 2024-04-12
Payer: MEDICARE

## 2024-04-12 ENCOUNTER — PATIENT MESSAGE (OUTPATIENT)
Dept: NEUROLOGY | Facility: CLINIC | Age: 54
End: 2024-04-12
Payer: MEDICARE

## 2024-04-15 ENCOUNTER — TELEPHONE (OUTPATIENT)
Dept: NEUROLOGY | Facility: CLINIC | Age: 54
End: 2024-04-15
Payer: MEDICARE

## 2024-04-15 NOTE — TELEPHONE ENCOUNTER
----- Message from Carmen Zaragoza sent at 4/15/2024 10:52 AM CDT -----  Regarding: Order  Contact: [phone]  [facility] is calling stating their re faxing an order for pt's medication that they need provider to sign for insurance purposes. If needed to discuss give [facility] a call.

## 2024-04-15 NOTE — TELEPHONE ENCOUNTER
Returned call to [facility] to discuss orders    Informed pharmacy that Dr. River is out and unable to sign orders per pharmacy the orders must be signed by Dr. River for Insurance reasons.       Will attempt to get this approved they are going to add my name pre-printed on the form and attempt to get approval.       Yanira Lomeli PA-C

## 2024-04-17 ENCOUNTER — TELEPHONE (OUTPATIENT)
Dept: NEUROLOGY | Facility: CLINIC | Age: 54
End: 2024-04-17
Payer: MEDICARE

## 2024-04-17 NOTE — TELEPHONE ENCOUNTER
----- Message from Mary Christie sent at 4/17/2024 10:11 AM CDT -----  Regarding: orders  Contact: @ [phone]  [facility] is calling to get orders to continue her infusion since her release from the hospital fax#[phone] please call and adv@ [phone]

## 2024-04-18 ENCOUNTER — TELEPHONE (OUTPATIENT)
Dept: NEUROLOGY | Facility: CLINIC | Age: 54
End: 2024-04-18
Payer: MEDICARE

## 2024-04-18 NOTE — TELEPHONE ENCOUNTER
----- Message from Lisa Del Rio sent at 4/18/2024 11:40 AM CDT -----  Regarding: Orders  Contact: Tamara [phone]  Tamara/ [facility] nursing agency is calling for [facility] orders for restart of care faxed to [phone] please call

## 2024-04-19 ENCOUNTER — TELEPHONE (OUTPATIENT)
Dept: NEUROLOGY | Facility: CLINIC | Age: 54
End: 2024-04-19
Payer: MEDICARE

## 2024-04-19 NOTE — TELEPHONE ENCOUNTER
----- Message from Celestina Nguyen sent at 4/19/2024 12:32 PM CDT -----  Speciality pharmacy     is calling to speak with someone in provider office regarding they are calling to get a verbal for the patient IVIG. She is asking for a return call please call.  Thalia [phone]

## 2024-04-22 ENCOUNTER — TELEPHONE (OUTPATIENT)
Dept: NEUROLOGY | Facility: CLINIC | Age: 54
End: 2024-04-22
Payer: MEDICARE

## 2024-04-22 ENCOUNTER — PATIENT MESSAGE (OUTPATIENT)
Dept: NEUROLOGY | Facility: CLINIC | Age: 54
End: 2024-04-22
Payer: MEDICARE

## 2024-04-23 NOTE — TELEPHONE ENCOUNTER
Order faxed by L[facility]. Message received from Dermira that order have been received.     Vaishnavi Meredith RN, BSN, BS  [facility] Clinical Care Coordinator  [phone]

## 2024-05-01 ENCOUNTER — EXTERNAL HOME HEALTH (OUTPATIENT)
Dept: HOME HEALTH SERVICES | Facility: HOSPITAL | Age: 54
End: 2024-05-01
Payer: MEDICARE

## 2024-05-17 ENCOUNTER — PATIENT OUTREACH (OUTPATIENT)
Dept: ADMINISTRATIVE | Facility: HOSPITAL | Age: 54
End: 2024-05-17
Payer: MEDICARE

## 2024-08-12 DIAGNOSIS — F31.9 BIPOLAR 1 DISORDER: ICD-10-CM

## 2024-08-12 RX ORDER — ZIPRASIDONE HYDROCHLORIDE 20 MG/1
20 CAPSULE ORAL 2 TIMES DAILY
Qty: 60 CAPSULE | Refills: 1 | Status: SHIPPED | OUTPATIENT
Start: 2024-08-12 | End: 2024-10-11

## 2024-08-12 NOTE — TELEPHONE ENCOUNTER
----- Message from Jackson Alegria sent at 8/12/2024  4:04 PM CDT -----  Regarding: self  Type: RX Refill Request    Who Called:self    Have you contacted your pharmacy:    Refill    RX Name and Strength:ziprasidone (GEODON) 20 MG Cap    Preferred Pharmacy with phone number:  [id number] - [place] [address] [facility] [address] [place] [address] [address]  [facility] [address]  Phone: [phone] Fax: [phone]      Local or Mail Order:local    Would the patient rather a call back or a response via [facility]? callback    Best Call Back Number:[phone]    Additional Information:

## 2024-08-12 NOTE — TELEPHONE ENCOUNTER
No care due was identified.  [facility] Embedded Care Due Messages. Reference number: [id number].   8/12/2024 4:09:44 PM CDT

## 2024-08-14 ENCOUNTER — PATIENT MESSAGE (OUTPATIENT)
Dept: NEUROLOGY | Facility: CLINIC | Age: 54
End: 2024-08-14
Payer: MEDICARE

## 2024-08-17 ENCOUNTER — PATIENT MESSAGE (OUTPATIENT)
Dept: FAMILY MEDICINE | Facility: CLINIC | Age: 54
End: 2024-08-17
Payer: MEDICARE

## 2024-08-17 DIAGNOSIS — F31.9 BIPOLAR 1 DISORDER: ICD-10-CM

## 2024-08-17 NOTE — TELEPHONE ENCOUNTER
No care due was identified.  [facility] Embedded Care Due Messages. Reference number: [id number].   8/17/2024 9:22:47 AM CDT

## 2024-08-19 RX ORDER — ZIPRASIDONE HYDROCHLORIDE 20 MG/1
20 CAPSULE ORAL 2 TIMES DAILY
Qty: 60 CAPSULE | Refills: 1 | Status: SHIPPED | OUTPATIENT
Start: 2024-08-19 | End: 2024-10-18

## 2024-09-13 ENCOUNTER — TELEPHONE (OUTPATIENT)
Dept: NEUROLOGY | Facility: CLINIC | Age: 54
End: 2024-09-13
Payer: MEDICARE

## 2024-09-16 ENCOUNTER — OFFICE VISIT (OUTPATIENT)
Dept: NEUROLOGY | Facility: CLINIC | Age: 54
End: 2024-09-16
Payer: MEDICARE

## 2024-09-16 DIAGNOSIS — R26.89 IMPAIRED GAIT AND MOBILITY: ICD-10-CM

## 2024-09-16 DIAGNOSIS — D89.89 IMMUNE-MEDIATED NEUROPATHY: Primary | ICD-10-CM

## 2024-09-16 DIAGNOSIS — Z78.9 IMPAIRED INSTRUMENTAL ACTIVITIES OF DAILY LIVING (IADL): ICD-10-CM

## 2024-09-16 DIAGNOSIS — G63 IMMUNE-MEDIATED NEUROPATHY: Primary | ICD-10-CM

## 2024-09-16 PROCEDURE — 99215 OFFICE O/P EST HI 40 MIN: CPT | Mod: 95,,, | Performed by: PSYCHIATRY & NEUROLOGY

## 2024-09-16 NOTE — ASSESSMENT & PLAN NOTE
There has been improvement in her UE / hands strength and sensation but there are persistent deficits that she would like to work on with OT   - Home OT orders placed for strength, dexterity, fine motor skills, ROM.

## 2024-09-16 NOTE — PROGRESS NOTES
Subjective:     Chief Complaint: Immune-Mediated Polyneuropathy    Interval History 9/16/2024 - I have reviewed relevant medical records in [facility]. 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 On IVIg and doing remarkably well per her report. She is able to perform most ADLs though she complains of some dexterity and ADL deficiencies. Her sensation in the UEs is improving. She has still got profound LE strength deficits and walks only with assistance. She has walker and WC. Most of the time she remains in bed or seated in a recliner. She requires assistance with transfers. She complains of burning sensation in the LEs (sporadic) but reports that this is a good thing since her legs were previously entirely numb.     She remains on Eliquis (PE) and denies any falls. She was doing PT with good results up until 2 months ago. She enjoyed the challenge and wants to resume. She and Mr. Rg confirm that there was clinical improvement with PT. Also, her mood is significantly improved with the exercise and the challenge.    Mr. Rg remains her primary caregiver. He had massive MI in May 2024 but has had a very strong recovery. He had stents placed.    The patient location is: Home in [place]  The chief complaint leading to consultation is: Immune-mediated polyneuropathy    Visit type: audiovisual    Face to Face time with patient: 20 minutes  40 minutes of total time spent on the encounter, which includes face to face time and non-face to face time preparing to see the patient (eg, review of tests), Obtaining and/or reviewing separately obtained history, Documenting clinical information in the electronic or other health record, Independently interpreting results (not separately reported) and communicating results to the patient/family/caregiver, or Care coordination (not separately reported).         Each patient to whom he or she provides medical services by telemedicine is:  (1)  informed of the relationship between the physician and patient and the respective role of any other health care provider with respect to management of the patient; and (2) notified that he or she may decline to receive medical services by telemedicine and may withdraw from such care at any time.    Notes:       Interval History 10/05/2023 - I have reviewed relevant medical records in [facility]. Mrs. Rg reports significant improvement in both motor function and sensory perception. She and her  both report that her extremity and core strength are continuing to improve with IVIg therapy. She is able to sit up at the bedside, elevate arms above her head, complete more ADLs. She continues to have ambulation weakness and ataxia. Overall she is very excited about her continued improvements. She reports tolerating IVIg infusions without any difficulties whatsoever. She receives 90g divided over 2 days Q4 weeks from [facility]. She is interested in continuing home PT/OT and is requesting that the orders be renewed.    The patient location is: [place], [place]  The chief complaint leading to consultation is: Immune-mediated polyneuropathy    Visit type: audiovisual    Face to Face time with patient: 15 minutes  40 minutes of total time spent on the encounter, which includes face to face time and non-face to face time preparing to see the patient (eg, review of tests), Obtaining and/or reviewing separately obtained history, Documenting clinical information in the electronic or other health record, Independently interpreting results (not separately reported) and communicating results to the patient/family/caregiver, or Care coordination (not separately reported).         Each patient to whom he or she provides medical services by telemedicine is:  (1) informed of the relationship between the physician and patient and the respective role of any other health care provider with respect to management of the patient; and (2)  notified that he or she may decline to receive medical services by telemedicine and may withdraw from such care at any time.    Notes:          Interval History 3/20/2023 - I have reviewed relevant medical records in [facility].    The patient location is: At home in [place]  The chief complaint leading to consultation is: Immune-mediated polyneuropathy    Visit type: audiovisual    Face to Face time with patient: 20  35 minutes of total time spent on the encounter, which includes face to face time and non-face to face time preparing to see the patient (eg, review of tests), Obtaining and/or reviewing separately obtained history, Documenting clinical information in the electronic or other health record, Independently interpreting results (not separately reported) and communicating results to the patient/family/caregiver, or Care coordination (not separately reported).         Each patient to whom he or she provides medical services by telemedicine is:  (1) informed of the relationship between the physician and patient and the respective role of any other health care provider with respect to management of the patient; and (2) notified that he or she may decline to receive medical services by telemedicine and may withdraw from such care at any time.    Notes:     Mrs. Rg has been receiving IVIg infusions for her immune-mediated polyneuropathy and has responded favorably. She has had significant improvement in strength, sensory, and balance functions. She has had an interruption in infusions due to an insurance change (new policy declined to continue IVIg) but the Alie have since switched back to their previous insurance, which has agreed to continue infusions. This visit is to settle the matter and document improvement while on IVIg.        Interval History 11/17/2022-   I have reviewed all relevant medical history in Epic. Patient presents for a follow up regarding an immune-mediated and primarily axonal sensory  polyneuropathy with unknown etiology. At her previous visit she was started on IVIg 1g/kg IBW q3 weeks over two days, which she received at home from [facility]. She states she is doing really well with her infusions. Per her  she is getting improvement in her mobility. She is able to get her own legs into the bed. She finds that she believes she is at least senior living back to where she was at baseline, which makes her and her  really happy since she has had only 2 cycles of Ig. She is receiving infusions at home over 2 days. She would like to stay at every 3 weeks but eventually will consider spacing out her infusions more. She denies that there is any relapse in symptoms toward the end of the interval between infusions. She is able to feed herself and able to do a lot of her own self care. She does not need any assistive devices at this time. She denies any falls.    History of Present Illness:  I have reviewed all relevant medical records in [facility]. Thalia Rg is a 53 y.o. 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 Previous patient of Dr. Downey, last saw her on 3/29/22 as initial visit. At that time patient was referred for extremity tingling and numbness that was affecting her gate. She had EMG completed by Dr. River that showed length dependant and primarily axonal sensory polyneuropathy in the lower and upper extremities. She was screened for malignancy with CT c/a/p and no signs of malignancy but she had radiographic evidence of an incidental PE and was started on eliquis. 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 She was started on IVIG 1g/kg/day x 2 days in early August 2022 by Dr. Downey until she saw Dr. River.     Today she refers that since IVIG she has felt better; she is now able to feel her thighs  and developed paresthesia at her lowers. Unfortunately her only IVIG infusion was 8/3-8/4, but she tolerated well, only a mild headache.    Patient needs a wheel chair for long distances and walker at home. She has low energy and gets fatigued easily. Currently receiving PT at home. She is constantly tired and having difficulty maintaining sleep. She refers tingling on bl hands and dropping things. She is able to brush her teeth and feed herself, unable to do mateus. Denies any chewing problems, dysarthria, SOB, orthopnea, no falls, urinary/bladder incontinence, and mild saddle anesthesia. Numbness is from the waist down.     She is on mercaptopurine for autoimmune hepatitis and ziprasidone for bipolar disease. She denies any cancer history but there is long history of cancer in her family; mom passed from liver cancer, maternal aunt had breast cancer and another aunt had lung cancer. Brother has history of prostate cancer. She is up to date with her cancer screening, which has reportedly been unremarkable.     Initial HPI from Dr. Downey   52 y/o AAF referred for evaluation of extremity tingling.  She presents with her . Tingling in feet 3 months ago travelled up to the thighs over days.  This was followed by tingling in the hands.  Gait is affected and she is using a walker. There is associated weakness in all 4 extremities.  The duration of progression is difficult to tease out by history; the history conflicts regarding stability vs progression.   Bladder function is fine. No neck pain. No history of similar symptoms.      Review of Systems  Review of Systems   Constitutional:  Positive for activity change and fatigue. Negative for appetite change, chills and fever.   HENT:  Negative for congestion, sore throat, trouble swallowing and voice change.    Eyes: Negative.    Respiratory:  Negative for cough and shortness of breath.    Cardiovascular:  Negative for chest pain and leg swelling.    Gastrointestinal:  Negative for abdominal distention, abdominal pain, constipation, nausea and vomiting.   Endocrine: Negative.    Genitourinary: Negative.  Negative for difficulty urinating.   Musculoskeletal:  Positive for gait problem. Negative for back pain.   Skin: Negative.    Allergic/Immunologic: Positive for immunocompromised state.   Neurological:  Positive for weakness, numbness and headaches.   Psychiatric/Behavioral: Negative.          Objective:     There were no vitals filed for this visit.        Neurologic Exam     Mental Status   Oriented to person, place, and time.   Attention: normal.   Speech: speech is normal   Level of consciousness: alert  Knowledge: good.     Cranial Nerves     CN III, IV, VI   Extraocular motions are normal.     CN VII   Facial expression full, symmetric.     CN VIII   Hearing: intact    Motor Exam Limited by video visit       Sensory Exam   Unable to assess (video visit)     Gait, Coordination, and Reflexes     Gait  Gait: normal    Tremor   Resting tremor: absent  Intention tremor: absent  Action tremor: absent    Reflexes   Right patellar reflex grade: trace.  Left patellar reflex grade: trace.Unable to assess (video visit)       Physical Exam  Vitals reviewed.   Constitutional:       Appearance: She is well-developed.   HENT:      Head: Normocephalic and atraumatic.   Eyes:      Extraocular Movements: EOM normal.   Neck:      Thyroid: No thyromegaly.   Musculoskeletal:      Cervical back: Normal range of motion.   Lymphadenopathy:      Cervical: No cervical adenopathy.   Neurological:      Mental Status: She is alert and oriented to person, place, and time.      Gait: Gait is intact.   Psychiatric:         Speech: Speech normal.         Behavior: Behavior normal.         Thought Content: Thought content normal.           Lab Results   Component Value Date    WBC 16.65 (H) 03/27/2024    HGB 10.9 (L) 03/27/2024    HCT 33.4 (L) 03/27/2024     03/27/2024    ALT 29  03/27/2024    AST 33 03/27/2024     03/27/2024    K 3.5 03/27/2024     03/27/2024    CREATININE 0.9 03/27/2024    BUN 11 03/27/2024    CO2 27 03/27/2024    TSH 4.671 (H) 01/19/2022    HGBA1C 4.7 01/19/2022    [id number] 06/06/2022       Labs    Paraneoplastic: P/Qtype Calcium channel Ab 0.09nmol/l  Folate 3.2   B12 WNL  Ganglioside panel wnl   Urine protein wnl   Anti SSAaB NEG  ESR 53    CT chest/abdomen/pelvis  Impression:     Pulmonary thromboemboli in the distal main pulmonary arteries bilaterally extending into the interlobar and some segmental branches.     Scattered irregular pulmonary opacities.  No convincing lung nodules.     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      EMG/NCS 5/31/22  Summary   Nerve conduction study of the right upper and bilateral lower extremities revealed absent sensory responses in the median, ulnar, sural, and superficial peroneal nerves. The right radial nerve had normal antidromic sensory peak latency, diminished action potential, and normal conduction velocity.   Motor peak latencies, amplitudes, and conduction velocities were normal when adjusted for temperature. F-waves were absent in the left peroneal nerve and prolonged in the right median, right ulnar, right peroneal, and bilateral tibial nerves.   Concentric needle examination of selected muscles in the right upper and lower extremities revealed no evidence of acute or chronic denervation.   Impression   This is an abnormal study. There is electrophysiologic evidence of:   1. 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 Pure sensory neuropathies can occur in paraneoplastic syndromes, paraproteinemias, some congenital polyneuropathies, as a side effect of certain medications, some autoimmune diseases, and some metabolic and vitamin deficiency syndromes. There  are a few case reports that suggest that immune-mediated polyneuropathies like AIDP can rarely present with pure sensory pathology, however electrodiagnostic evaluation in those cases revealed characteristic demyelinating polyneuropathy rather than the axonal polyneuropathy seen in this study.         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 She responded to IVIG (prescribed by Dr. Downey), last received 8/3-8/4 and noted some improvement in her sensation.   Pertinent workup at the time is a P/Qtype Calcium channel Ab. Malignancy workup unremarkable. She does has extensive family history of malignancy. Given positive response to IVIG, will re-order and continue routine infusions. Patient is currently on eliquis for PE, falling/bleeding risk was discussed.       Continue to monitor the patients renal function.     Problem List Items Addressed This Visit       Immune-mediated neuropathy - Primary    Current Assessment & Plan     Mrs. Rg is remarkably improved from onset of her symptoms but still has strength and sensory function to regain. Her independence is improved but she still relies on assistance in transporting, walking. Her fine motor skills are improved but still need attention in order for her to better care for herself and for quality of life. Her legs remain weak and with sensory deficits, which make her a fall risk. She has assistance, a walker, and a WC. She would like to start OT and resume PT.   - Continue IVIg 90 g divided over 2 days Q 4 weeks. Tolerating well. Infused by [employer].  - Labs done last week, normal renal function          Impaired instrumental activities of daily living (IADL)    Current Assessment & Plan     There has been improvement in her UE / hands strength and sensation but there are persistent deficits that she  would like to work on with OT   - Home OT orders placed for strength, dexterity, fine motor skills, ROM.         Impaired gait and mobility    Current Assessment & Plan     Weakness and profound sensory deficits since disease onset, though with significant improvement since starting IVIg. She was previously on PT and benefited from it, she would like to resume.   - Home Health PT for gait training, mobility, strength, ROM              RTC in 4-6 months    Roman River MD  [facility]

## 2024-09-16 NOTE — ASSESSMENT & PLAN NOTE
Mrs. Rg is remarkably improved from onset of her symptoms but still has strength and sensory function to regain. Her independence is improved but she still relies on assistance in transporting, walking. Her fine motor skills are improved but still need attention in order for her to better care for herself and for quality of life. Her legs remain weak and with sensory deficits, which make her a fall risk. She has assistance, a walker, and a WC. She would like to start OT and resume PT.   - Continue IVIg 90 g divided over 2 days Q 4 weeks. Tolerating well. Infused by [employer].  - Labs done last week, normal renal function

## 2024-09-16 NOTE — ASSESSMENT & PLAN NOTE
Weakness and profound sensory deficits since disease onset, though with significant improvement since starting IVIg. She was previously on PT and benefited from it, she would like to resume.   - Home Health PT for gait training, mobility, strength, ROM

## 2024-10-01 ENCOUNTER — PATIENT MESSAGE (OUTPATIENT)
Dept: FAMILY MEDICINE | Facility: CLINIC | Age: 54
End: 2024-10-01
Payer: MEDICARE

## 2024-10-01 DIAGNOSIS — I82.419 ACUTE DEEP VEIN THROMBOSIS (DVT) OF FEMORAL VEIN, UNSPECIFIED LATERALITY: ICD-10-CM

## 2024-10-01 DIAGNOSIS — I26.99 ACUTE PULMONARY EMBOLISM WITHOUT ACUTE COR PULMONALE, UNSPECIFIED PULMONARY EMBOLISM TYPE: ICD-10-CM

## 2024-10-02 ENCOUNTER — PATIENT MESSAGE (OUTPATIENT)
Dept: FAMILY MEDICINE | Facility: CLINIC | Age: 54
End: 2024-10-02
Payer: MEDICARE

## 2024-10-02 DIAGNOSIS — F31.9 BIPOLAR 1 DISORDER: Primary | ICD-10-CM

## 2024-10-03 DIAGNOSIS — I26.99 ACUTE PULMONARY EMBOLISM WITHOUT ACUTE COR PULMONALE, UNSPECIFIED PULMONARY EMBOLISM TYPE: ICD-10-CM

## 2024-10-03 DIAGNOSIS — I82.419 ACUTE DEEP VEIN THROMBOSIS (DVT) OF FEMORAL VEIN, UNSPECIFIED LATERALITY: ICD-10-CM

## 2024-10-04 NOTE — TELEPHONE ENCOUNTER
Refill Routing Note   Medication(s) are not appropriate for processing by [facility] for the following reason(s):        Outside of protocol    [facility] action(s):  Route               Appointments  past 12m or future 3m with PCP    Date Provider   Last Visit   Visit date not found Mac Gresham MD   Next Visit   Visit date not found Mac Gresham MD   ED visits in past 90 days: 0        Note composed:9:29 PM 10/03/2024

## 2024-10-21 ENCOUNTER — OFFICE VISIT (OUTPATIENT)
Dept: PSYCHIATRY | Facility: CLINIC | Age: 54
End: 2024-10-21
Payer: COMMERCIAL

## 2024-10-21 DIAGNOSIS — F31.9 BIPOLAR 1 DISORDER: Primary | ICD-10-CM

## 2024-10-21 DIAGNOSIS — G62.89 AXONAL NEUROPATHY: ICD-10-CM

## 2024-10-21 PROCEDURE — 1159F MED LIST DOCD IN RCRD: CPT | Mod: CPTII,95,, | Performed by: PHYSICIAN ASSISTANT

## 2024-10-21 PROCEDURE — 90792 PSYCH DIAG EVAL W/MED SRVCS: CPT | Mod: 95,,, | Performed by: PHYSICIAN ASSISTANT

## 2024-10-21 PROCEDURE — 1160F RVW MEDS BY RX/DR IN RCRD: CPT | Mod: CPTII,95,, | Performed by: PHYSICIAN ASSISTANT

## 2024-10-21 RX ORDER — ZIPRASIDONE HYDROCHLORIDE 20 MG/1
20 CAPSULE ORAL 2 TIMES DAILY
Qty: 180 CAPSULE | Refills: 1 | Status: ON HOLD | OUTPATIENT
Start: 2024-10-21 | End: 2025-04-19
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
## 2024-10-23 ENCOUNTER — PATIENT MESSAGE (OUTPATIENT)
Dept: FAMILY MEDICINE | Facility: CLINIC | Age: 54
End: 2024-10-23
Payer: MEDICARE

## 2024-10-24 ENCOUNTER — HOSPITAL ENCOUNTER (INPATIENT)
Facility: HOSPITAL | Age: 54
LOS: 7 days | Discharge: HOME HEALTH | DRG: 866 | End: 2024-10-31
Attending: EMERGENCY MEDICINE | Admitting: STUDENT IN AN ORGANIZED HEALTH CARE EDUCATION/TRAINING PROGRAM
Payer: MEDICARE

## 2024-10-24 DIAGNOSIS — B02.7 DISSEMINATED ZOSTER: ICD-10-CM

## 2024-10-24 DIAGNOSIS — R07.9 CHEST PAIN: ICD-10-CM

## 2024-10-24 DIAGNOSIS — L81.9 DISCOLORATION OF SKIN: ICD-10-CM

## 2024-10-24 DIAGNOSIS — B01.9 DISSEMINATED VARICELLA: Primary | ICD-10-CM

## 2024-10-24 DIAGNOSIS — R21 RASH: ICD-10-CM

## 2024-10-24 DIAGNOSIS — B01.9 DISSEMINATED VARICELLA: ICD-10-CM

## 2024-10-24 DIAGNOSIS — I82.4Y1 ACUTE DEEP VEIN THROMBOSIS (DVT) OF PROXIMAL VEIN OF RIGHT LOWER EXTREMITY: ICD-10-CM

## 2024-10-24 DIAGNOSIS — D84.9 IMMUNOSUPPRESSION: ICD-10-CM

## 2024-10-24 DIAGNOSIS — E66.811 CLASS 1 OBESITY WITH SERIOUS COMORBIDITY AND BODY MASS INDEX (BMI) OF 34.0 TO 34.9 IN ADULT, UNSPECIFIED OBESITY TYPE: ICD-10-CM

## 2024-10-24 LAB
ALBUMIN SERPL BCP-MCNC: 2.8 G/DL (ref 3.5–5.2)
ALP SERPL-CCNC: 97 U/L (ref 40–150)
ALT SERPL W/O P-5'-P-CCNC: 20 U/L (ref 10–44)
ANION GAP SERPL CALC-SCNC: 12 MMOL/L (ref 8–16)
APTT PPP: 23.1 SEC (ref 21–32)
AST SERPL-CCNC: 29 U/L (ref 10–40)
BASOPHILS # BLD AUTO: 0.02 K/UL (ref 0–0.2)
BASOPHILS NFR BLD: 0.4 % (ref 0–1.9)
BILIRUB SERPL-MCNC: 0.5 MG/DL (ref 0.1–1)
BILIRUB UR QL STRIP: NEGATIVE
BNP SERPL-MCNC: 12 PG/ML (ref 0–99)
BUN SERPL-MCNC: 6 MG/DL (ref 6–20)
CALCIUM SERPL-MCNC: 9.4 MG/DL (ref 8.7–10.5)
CHLORIDE SERPL-SCNC: 105 MMOL/L (ref 95–110)
CLARITY UR: CLEAR
CO2 SERPL-SCNC: 26 MMOL/L (ref 23–29)
COLOR UR: YELLOW
CREAT SERPL-MCNC: 0.8 MG/DL (ref 0.5–1.4)
CRP SERPL-MCNC: 61.3 MG/L (ref 0–8.2)
DIFFERENTIAL METHOD BLD: ABNORMAL
EOSINOPHIL # BLD AUTO: 0.6 K/UL (ref 0–0.5)
EOSINOPHIL NFR BLD: 10.9 % (ref 0–8)
ERYTHROCYTE [DISTWIDTH] IN BLOOD BY AUTOMATED COUNT: 17.1 % (ref 11.5–14.5)
ERYTHROCYTE [SEDIMENTATION RATE] IN BLOOD BY PHOTOMETRIC METHOD: 120 MM/HR (ref 0–36)
EST. GFR  (NO RACE VARIABLE): >60 ML/MIN/1.73 M^2
GLUCOSE SERPL-MCNC: 115 MG/DL (ref 70–110)
GLUCOSE UR QL STRIP: NEGATIVE
HCT VFR BLD AUTO: 34.2 % (ref 37–48.5)
HGB BLD-MCNC: 11.1 G/DL (ref 12–16)
HGB UR QL STRIP: ABNORMAL
HIV1+2 IGG SERPL QL IA.RAPID: NORMAL
IMM GRANULOCYTES # BLD AUTO: 0.01 K/UL (ref 0–0.04)
IMM GRANULOCYTES NFR BLD AUTO: 0.2 % (ref 0–0.5)
INR PPP: 1 (ref 0.8–1.2)
KETONES UR QL STRIP: NEGATIVE
LACTATE SERPL-SCNC: 1 MMOL/L (ref 0.5–2.2)
LEUKOCYTE ESTERASE UR QL STRIP: NEGATIVE
LYMPHOCYTES # BLD AUTO: 2.1 K/UL (ref 1–4.8)
LYMPHOCYTES NFR BLD: 39.5 % (ref 18–48)
MAGNESIUM SERPL-MCNC: 2 MG/DL (ref 1.6–2.6)
MCH RBC QN AUTO: 29.7 PG (ref 27–31)
MCHC RBC AUTO-ENTMCNC: 32.5 G/DL (ref 32–36)
MCV RBC AUTO: 91 FL (ref 82–98)
MONOCYTES # BLD AUTO: 0.4 K/UL (ref 0.3–1)
MONOCYTES NFR BLD: 7.5 % (ref 4–15)
NEUTROPHILS # BLD AUTO: 2.2 K/UL (ref 1.8–7.7)
NEUTROPHILS NFR BLD: 41.5 % (ref 38–73)
NITRITE UR QL STRIP: NEGATIVE
NRBC BLD-RTO: 0 /100 WBC
OHS QRS DURATION: 84 MS
OHS QTC CALCULATION: 465 MS
PH UR STRIP: 7 [PH] (ref 5–8)
PLATELET # BLD AUTO: 238 K/UL (ref 150–450)
PMV BLD AUTO: 10.5 FL (ref 9.2–12.9)
POTASSIUM SERPL-SCNC: 3.6 MMOL/L (ref 3.5–5.1)
PROCALCITONIN SERPL IA-MCNC: 0.1 NG/ML
PROT SERPL-MCNC: 8 G/DL (ref 6–8.4)
PROT UR QL STRIP: NEGATIVE
PROTHROMBIN TIME: 10.7 SEC (ref 9–12.5)
RBC # BLD AUTO: 3.74 M/UL (ref 4–5.4)
SODIUM SERPL-SCNC: 143 MMOL/L (ref 136–145)
SP GR UR STRIP: 1.01 (ref 1–1.03)
T4 FREE SERPL-MCNC: 1.14 NG/DL (ref 0.71–1.51)
TREPONEMA PALLIDUM IGG+IGM AB [PRESENCE] IN SERUM OR PLASMA BY IMMUNOASSAY: NONREACTIVE
TROPONIN I SERPL DL<=0.01 NG/ML-MCNC: <0.006 NG/ML (ref 0–0.03)
TSH SERPL DL<=0.005 MIU/L-ACNC: 6.43 UIU/ML (ref 0.4–4)
URN SPEC COLLECT METH UR: ABNORMAL
UROBILINOGEN UR STRIP-ACNC: NEGATIVE EU/DL
WBC # BLD AUTO: 5.3 K/UL (ref 3.9–12.7)

## 2024-10-24 PROCEDURE — 84145 PROCALCITONIN (PCT): CPT | Performed by: EMERGENCY MEDICINE

## 2024-10-24 PROCEDURE — 25000003 PHARM REV CODE 250: Performed by: EMERGENCY MEDICINE

## 2024-10-24 PROCEDURE — 81003 URINALYSIS AUTO W/O SCOPE: CPT | Performed by: EMERGENCY MEDICINE

## 2024-10-24 PROCEDURE — 83880 ASSAY OF NATRIURETIC PEPTIDE: CPT | Performed by: EMERGENCY MEDICINE

## 2024-10-24 PROCEDURE — 86703 HIV-1/HIV-2 1 RESULT ANTBDY: CPT | Performed by: EMERGENCY MEDICINE

## 2024-10-24 PROCEDURE — 85025 COMPLETE CBC W/AUTO DIFF WBC: CPT | Performed by: EMERGENCY MEDICINE

## 2024-10-24 PROCEDURE — 93005 ELECTROCARDIOGRAM TRACING: CPT

## 2024-10-24 PROCEDURE — 87491 CHLMYD TRACH DNA AMP PROBE: CPT | Performed by: EMERGENCY MEDICINE

## 2024-10-24 PROCEDURE — 25000003 PHARM REV CODE 250: Performed by: STUDENT IN AN ORGANIZED HEALTH CARE EDUCATION/TRAINING PROGRAM

## 2024-10-24 PROCEDURE — 84484 ASSAY OF TROPONIN QUANT: CPT | Performed by: EMERGENCY MEDICINE

## 2024-10-24 PROCEDURE — 83605 ASSAY OF LACTIC ACID: CPT | Performed by: EMERGENCY MEDICINE

## 2024-10-24 PROCEDURE — 99285 EMERGENCY DEPT VISIT HI MDM: CPT | Mod: 25

## 2024-10-24 PROCEDURE — 87529 HSV DNA AMP PROBE: CPT | Mod: 59 | Performed by: EMERGENCY MEDICINE

## 2024-10-24 PROCEDURE — 27000207 HC ISOLATION

## 2024-10-24 PROCEDURE — 93010 ELECTROCARDIOGRAM REPORT: CPT | Mod: ,,, | Performed by: INTERNAL MEDICINE

## 2024-10-24 PROCEDURE — 86140 C-REACTIVE PROTEIN: CPT | Performed by: EMERGENCY MEDICINE

## 2024-10-24 PROCEDURE — 85652 RBC SED RATE AUTOMATED: CPT | Performed by: EMERGENCY MEDICINE

## 2024-10-24 PROCEDURE — 63600175 PHARM REV CODE 636 W HCPCS: Performed by: EMERGENCY MEDICINE

## 2024-10-24 PROCEDURE — 84439 ASSAY OF FREE THYROXINE: CPT | Performed by: EMERGENCY MEDICINE

## 2024-10-24 PROCEDURE — 87529 HSV DNA AMP PROBE: CPT | Performed by: EMERGENCY MEDICINE

## 2024-10-24 PROCEDURE — 11000001 HC ACUTE MED/SURG PRIVATE ROOM

## 2024-10-24 PROCEDURE — 83735 ASSAY OF MAGNESIUM: CPT | Performed by: EMERGENCY MEDICINE

## 2024-10-24 PROCEDURE — 80053 COMPREHEN METABOLIC PANEL: CPT | Performed by: EMERGENCY MEDICINE

## 2024-10-24 PROCEDURE — 85610 PROTHROMBIN TIME: CPT | Performed by: EMERGENCY MEDICINE

## 2024-10-24 PROCEDURE — 84443 ASSAY THYROID STIM HORMONE: CPT | Performed by: EMERGENCY MEDICINE

## 2024-10-24 PROCEDURE — 87040 BLOOD CULTURE FOR BACTERIA: CPT | Performed by: EMERGENCY MEDICINE

## 2024-10-24 PROCEDURE — 99223 1ST HOSP IP/OBS HIGH 75: CPT | Mod: ,,, | Performed by: INTERNAL MEDICINE

## 2024-10-24 PROCEDURE — 86593 SYPHILIS TEST NON-TREP QUANT: CPT | Performed by: EMERGENCY MEDICINE

## 2024-10-24 PROCEDURE — 85730 THROMBOPLASTIN TIME PARTIAL: CPT | Performed by: EMERGENCY MEDICINE

## 2024-10-24 RX ORDER — TALC
6 POWDER (GRAM) TOPICAL NIGHTLY PRN
Status: DISCONTINUED | OUTPATIENT
Start: 2024-10-24 | End: 2024-10-31 | Stop reason: HOSPADM

## 2024-10-24 RX ORDER — OXYCODONE AND ACETAMINOPHEN 5; 325 MG/1; MG/1
1 TABLET ORAL EVERY 6 HOURS PRN
Status: DISCONTINUED | OUTPATIENT
Start: 2024-10-24 | End: 2024-10-31 | Stop reason: HOSPADM

## 2024-10-24 RX ORDER — HUMAN IMMUNOGLOBULIN G 5 G/50ML
LIQUID INTRAVENOUS
COMMUNITY
Start: 2024-08-29

## 2024-10-24 RX ORDER — ZIPRASIDONE HYDROCHLORIDE 20 MG/1
20 CAPSULE ORAL 2 TIMES DAILY
Status: DISCONTINUED | OUTPATIENT
Start: 2024-10-24 | End: 2024-10-31 | Stop reason: HOSPADM

## 2024-10-24 RX ORDER — ALUMINUM HYDROXIDE, MAGNESIUM HYDROXIDE, AND SIMETHICONE 1200; 120; 1200 MG/30ML; MG/30ML; MG/30ML
30 SUSPENSION ORAL 4 TIMES DAILY PRN
Status: DISCONTINUED | OUTPATIENT
Start: 2024-10-24 | End: 2024-10-31 | Stop reason: HOSPADM

## 2024-10-24 RX ORDER — VALACYCLOVIR HYDROCHLORIDE 500 MG/1
1000 TABLET, FILM COATED ORAL
Status: COMPLETED | OUTPATIENT
Start: 2024-10-24 | End: 2024-10-24

## 2024-10-24 RX ORDER — GLUCAGON 1 MG
1 KIT INJECTION
Status: DISCONTINUED | OUTPATIENT
Start: 2024-10-24 | End: 2024-10-31 | Stop reason: HOSPADM

## 2024-10-24 RX ORDER — IBUPROFEN 200 MG
24 TABLET ORAL
Status: DISCONTINUED | OUTPATIENT
Start: 2024-10-24 | End: 2024-10-31 | Stop reason: HOSPADM

## 2024-10-24 RX ORDER — ONDANSETRON 8 MG/1
8 TABLET, ORALLY DISINTEGRATING ORAL EVERY 8 HOURS PRN
Status: DISCONTINUED | OUTPATIENT
Start: 2024-10-24 | End: 2024-10-31 | Stop reason: HOSPADM

## 2024-10-24 RX ORDER — IBUPROFEN 200 MG
16 TABLET ORAL
Status: DISCONTINUED | OUTPATIENT
Start: 2024-10-24 | End: 2024-10-31 | Stop reason: HOSPADM

## 2024-10-24 RX ORDER — MERCAPTOPURINE 50 MG/1
100 TABLET ORAL DAILY
COMMUNITY
Start: 2024-10-16

## 2024-10-24 RX ORDER — OXYCODONE AND ACETAMINOPHEN 5; 325 MG/1; MG/1
1 TABLET ORAL
Status: COMPLETED | OUTPATIENT
Start: 2024-10-24 | End: 2024-10-24

## 2024-10-24 RX ORDER — SODIUM CHLORIDE 0.9 % (FLUSH) 0.9 %
10 SYRINGE (ML) INJECTION EVERY 12 HOURS PRN
Status: DISCONTINUED | OUTPATIENT
Start: 2024-10-24 | End: 2024-10-31 | Stop reason: HOSPADM

## 2024-10-24 RX ORDER — ACETAMINOPHEN 325 MG/1
650 TABLET ORAL EVERY 4 HOURS PRN
Status: DISCONTINUED | OUTPATIENT
Start: 2024-10-24 | End: 2024-10-31 | Stop reason: HOSPADM

## 2024-10-24 RX ORDER — NALOXONE HCL 0.4 MG/ML
0.02 VIAL (ML) INJECTION
Status: DISCONTINUED | OUTPATIENT
Start: 2024-10-24 | End: 2024-10-31 | Stop reason: HOSPADM

## 2024-10-24 RX ORDER — FOLIC ACID 1 MG/1
1 TABLET ORAL DAILY
Status: DISCONTINUED | OUTPATIENT
Start: 2024-10-24 | End: 2024-10-31 | Stop reason: HOSPADM

## 2024-10-24 RX ORDER — MERCAPTOPURINE 50 MG/1
100 TABLET ORAL DAILY
Status: DISCONTINUED | OUTPATIENT
Start: 2024-10-24 | End: 2024-10-31 | Stop reason: HOSPADM

## 2024-10-24 RX ORDER — IBUPROFEN 200 MG
200 TABLET ORAL EVERY 6 HOURS PRN
COMMUNITY

## 2024-10-24 RX ORDER — ACETAMINOPHEN 325 MG/1
650 TABLET ORAL EVERY 8 HOURS PRN
Status: DISCONTINUED | OUTPATIENT
Start: 2024-10-24 | End: 2024-10-31 | Stop reason: HOSPADM

## 2024-10-24 RX ADMIN — OXYCODONE HYDROCHLORIDE AND ACETAMINOPHEN 1 TABLET: 5; 325 TABLET ORAL at 07:10

## 2024-10-24 RX ADMIN — RIVAROXABAN 15 MG: 15 TABLET, FILM COATED ORAL at 05:10

## 2024-10-24 RX ADMIN — VALACYCLOVIR HYDROCHLORIDE 1000 MG: 500 TABLET, FILM COATED ORAL at 05:10

## 2024-10-24 RX ADMIN — ZIPRASIDONE HYDROCHLORIDE 20 MG: 20 CAPSULE ORAL at 09:10

## 2024-10-24 RX ADMIN — FOLIC ACID 1 MG: 1 TABLET ORAL at 03:10

## 2024-10-24 RX ADMIN — OXYCODONE HYDROCHLORIDE AND ACETAMINOPHEN 1 TABLET: 5; 325 TABLET ORAL at 05:10

## 2024-10-24 RX ADMIN — ACYCLOVIR SODIUM 640 MG: 50 INJECTION, SOLUTION INTRAVENOUS at 01:10

## 2024-10-24 RX ADMIN — ACYCLOVIR SODIUM 640 MG: 50 INJECTION, SOLUTION INTRAVENOUS at 09:10

## 2024-10-24 NOTE — PLAN OF CARE
[facility] [place] - Emergency Dept  Initial Discharge Assessment       Primary Care Provider: Mac Gresham MD  Case Management Assessment     PCP: See above:  Virtual Appt Needed due to inability to ambulate  [facility]    Patient Arrived From: home with spouse  Existing Help at Home: spouse    Barriers to Discharge: Needs Ambulance transport home.  Wheelchair bound.  Unable to transfer independently    Discharge Plan:    A. Home Health (patient requesting home health for physical therapy.  Moblity has been limited for past two years).   B. Home with family      Discharge planning assessment completed with patient's assistance.  Patient is from home with spouse  and needs assistance with ADLs.  Wheelchair bound and unable to transfer independently.    Patient has a wheelchair and walker.  She has no OP services but would like home health at discharge.      When medically stable, patient will discharge to  home. She  will need ambulance transport to home.  Patient advised that she may incur a co-payment from her insurnce provider.  Patient reported that she is unable to ambulate and unable to transfer independently.   Patient will need followup appointments  with PCP and has requested Virtual Appointments due to her mobility issues.   Case Management will continue to follow and assist with discharge planning.        Admission Diagnosis: Discoloration of skin [L81.9]    Admission Date: 10/24/2024  Expected Discharge Date:     Transition of Care Barriers: Transportation (Requires ambulance transport due to inability to ambulate due to neuropathy in lower extremities.)    Payor: [employer] [facility] / Plan: [employer] [facility] / Product Type: Medicare Advantage /     Extended Emergency Contact Information  Primary Emergency Contact: Adriel Rg  Address: [address] [place] [address] [place]  Home Phone: [phone]  Mobile Phone:  [phone]  Relation: Spouse  Secondary Emergency Contact: Noris Granado  Address: [address] [place] [address] [place]  Mobile Phone: [phone]  Relation: Daughter    Discharge Plan A: Home Health  Discharge Plan B: Home with family      [id number] - [place], [place] - [address]  [address]  [facility] [address]  Phone: [phone] Fax: [phone]    [id number] - [place], [place] - [address]  [address]  [facility] [address]  Phone: [phone] Fax: [phone]      Initial Assessment (most recent)       Adult Discharge Assessment - 10/24/24 1256          Discharge Assessment    Assessment Type Discharge Planning Assessment     Confirmed/corrected address, phone number and insurance Yes     Confirmed Demographics Correct on Facesheet     Source of Information patient     When was your last doctors appointment? 10/21/24   Virtual Appts.    Reason For Admission Discoloration of skin     People in Home spouse     Facility Arrived From: Home with spouse     Do you expect to return to your current living situation? Yes     Do you have help at home or someone to help you manage your care at home? Yes     Who are your caregiver(s) and their phone number(s)? Spouse:  Adriel Rg;  [phone]     Prior to hospitilization cognitive status: Alert/Oriented     Current cognitive status: Alert/Oriented     Walking or Climbing Stairs Difficulty yes     Walking or Climbing Stairs ambulation difficulty, dependent;stair climbing difficulty, dependent;transferring difficulty, dependent     Mobility Management Wheelchair and walker;  Patient stated that she has neuropathy and is unable to ambulate due to pain.  She stated that she also has difficulty transferring but does not have a adia lift.     Dressing/Bathing Difficulty yes     Dressing/Bathing bathing  difficulty, assistance 1 person;dressing difficulty, assistance 1 person     Dressing/Bathing Management Patient reported that her spouse is her caregiver and assists her with bathing and dressing     Equipment Currently Used at Home walker, rolling;wheelchair     Readmission within 30 days? No     Patient currently being followed by outpatient case management? No     Do you currently have service(s) that help you manage your care at home? No     Do you take prescription medications? Yes     Do you have prescription coverage? Yes     Coverage [facility]     Do you have any problems affording any of your prescribed medications? No     Is the patient taking medications as prescribed? yes     Who is going to help you get home at discharge? Will need ambulance transport home.  Is able to situp in a wheelchair but unable to transfer independently.     How do you get to doctors appointments? other (see comments)   Has had virtual appointments due to inability to ambulate.    Are you on dialysis? No     Do you take coumadin? No     Discharge Plan A Home Health     Discharge Plan B Home with family     DME Needed Upon Discharge  none     Discharge Plan discussed with: Patient     Transition of Care Barriers Transportation   Requires ambulance transport due to inability to ambulate due to neuropathy in lower extremities.       OTHER    Name(s) of People in Home spouse

## 2024-10-24 NOTE — HPI
Mrs. Rg is a 53-year-old female with past medical history of bipolar disorder, autoimmune hepatitis, neuropathy and history of DVT and PE on Eliquis who presents to the emergency department for evaluation of rash that developed on her right lower extremity 2 days ago.  Patient initially noticed it on the side of her right thigh and her buttocks.  It started to spread and she reported it being painful.  She denies any fevers, chills, recent illness, respiratory symptoms.  She takes mercaptopurine for her autoimmune hepatitis.  No changes in her medications.  In the emergency department, there was concerns for disseminated herpes zoster.  She was started on acyclovir and ID consulted.  HSV labs sent off.  Also found to have DVT and right lower extremity.  She was on Eliquis at home.  It is unclear which lower extremity she had her DVT and previous and unable to discern if this is a new DVT.  She will be transitioned to Eliquis while inpatient.  She will be admitted to hospital medicine for further management.

## 2024-10-24 NOTE — CONSULTS
[facility]  Infectious Disease  Consult Note    Patient Name: Thalia Rg  MRN: [id number]  Admission Date: 10/24/2024  Hospital Length of Stay: 0 days  Attending Physician: Jc Holden MD  Primary Care Provider: Mac Gresham MD     Isolation Status: Airborne and Contact and Droplet    Patient information was obtained from patient, past medical records, and ER records.      Inpatient consult to Infectious Diseases  Consult performed by: Garfield Galindo MD  Consult ordered by: Yamile Christina MD        Assessment/Plan:       Disseminated varicella    Mrs. 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 She appears to be stable on IV ACV and is feeling a little better. She will require IV ACV until ~75% of her lesions have scabbed over. Given her immunocompromised status, this may require days to a week or more.     Recommendations  Continue ACV  Monitor renal function on IV ACV  Continue IV until lesions have crusted over    Thank you for your consult. I will follow-up with patient. Please contact us if you have any additional questions.    Garfield Galindo MD  Infectious Disease  [facility]    Subjective:     Principal Problem: Rash/skin eruption    HPI: Mrs. Rg is a pleasant 54 yo woman with bipolar disease, autoimmune hepatitis and PE (on Eliquis), and axonal sensory polyneuropathy who presents today for rash. She developed burning pain on her right leg two days ago that has not improved. At home, the pain was 10/10. In the ED, she was noted to have a vesicular rash to her right leg, possibly carey muti-dermatomal pattern - posterior thigh and anterior leg. Additionally, she has some erythematous lesions of her hand. She is admitted and started on IV ACV. ID is consulted for disseminated shingles in an immunocompromised patient  (on monthly IgG for her neuropathy and on mercaptopurine for autoimmune hepatitis). Incidentally, she was found to have a DVT.    The patient reports chickenpox as a child. She did receive a Zoster vaccine in 2018.    Past Medical History:   Diagnosis Date    Abnormal Pap smear of vagina     Autoimmune hepatitis     Axonal neuropathy 1/9/2023    Bipolar 1 disorder     Breast disorder        Past Surgical History:   Procedure Laterality Date    LIVER BIOPSY         Review of patient's allergies indicates:  No Known Allergies    Medications:  (Not in a hospital admission)    Antibiotics (From admission, onward)      None          Antifungals (From admission, onward)      None          Antivirals (From admission, onward)          Stop Route Frequency     acyclovir (ZOVIRAX) injection         -- IV Every 8 hours (non-standard times)             Immunization History   Administered Date(s) Administered    COVID-19, MRNA, LN-S, PF (Pfizer) (Purple Cap) 08/11/2021, 01/07/2022    Hepatitis A, Adult 03/17/2009, 09/15/2009    Hepatitis B, Adult 03/17/2009, 09/15/2009, 10/08/2013, 11/12/2013       Family History       Problem Relation (Age of Onset)    Breast cancer Maternal Aunt    Cancer Father    Diabetes Maternal Grandmother          Social History     Socioeconomic History    Marital status:    Tobacco Use    Smoking status: Never    Smokeless tobacco: Never   Substance and Sexual Activity    Alcohol use: No    Drug use: No    Sexual activity: Yes     Partners: Male     Birth control/protection: Condom   Social History Narrative    ** Merged History Encounter **          Social Drivers of Health     Financial Resource Strain: Low Risk  (4/4/2024)    Overall Financial Resource Strain (CARDIA)     Difficulty of Paying Living Expenses: Not hard at all   Food Insecurity: No Food Insecurity (4/4/2024)    Hunger Vital Sign     Worried About Running Out of Food in the Last Year: Never true     Ran Out of Food in the Last  Year: Never true   Transportation Needs: No Transportation Needs (4/4/2024)    PRAPARE - Transportation     Lack of Transportation (Medical): No     Lack of Transportation (Non-Medical): No   Physical Activity: Unknown (4/4/2024)    Exercise Vital Sign     Days of Exercise per Week: 7 days   Stress: No Stress Concern Present (4/4/2024)    [employer] [place] [employer] - Occupational Stress Questionnaire     Feeling of Stress : Not at all   Housing Stability: Unknown (4/4/2024)    Housing Stability Vital Sign     Unable to Pay for Housing in the Last Year: No     Unstable Housing in the Last Year: No     Review of Systems   Constitutional:  Positive for fever.   Skin:  Positive for rash.   All other systems reviewed and are negative.    Objective:     Vital Signs (Most Recent):  Temp: 98 °F (36.7 °C) (10/24/24 1500)  Pulse: 83 (10/24/24 1500)  Resp: 12 (10/24/24 1500)  BP: 124/81 (10/24/24 1500)  SpO2: 95 % (10/24/24 1500) Vital Signs (24h Range):  Temp:  [98 °F (36.7 °C)-98.6 °F (37 °C)] 98 °F (36.7 °C)  Pulse:  [65-91] 83  Resp:  [12-25] 12  SpO2:  [94 %-99 %] 95 %  BP: (124-179)/(81-88) 124/81     Weight: 104.3 kg (230 lb)  Body mass index is 34.97 kg/m².    Estimated Creatinine Clearance: 100.5 mL/min (based on SCr of 0.8 mg/dL).     Physical Exam  Vitals and nursing note reviewed.   Constitutional:       General: She is not in acute distress.     Appearance: Normal appearance. She is normal weight. She is not ill-appearing, toxic-appearing or diaphoretic.   HENT:      Head: Normocephalic and atraumatic.      Right Ear: External ear normal.      Left Ear: External ear normal.   Eyes:      Conjunctiva/sclera: Conjunctivae normal.      Pupils: Pupils are equal, round, and reactive to light.   Abdominal:      Tenderness: There is no abdominal tenderness. There is no guarding.   Skin:     Findings: Rash present.   Neurological:      General: No focal deficit present.      Mental Status: She is alert and  oriented to person, place, and time.   Psychiatric:         Mood and Affect: Mood normal.         Behavior: Behavior normal.         Thought Content: Thought content normal.         Judgment: Judgment normal.                Significant Labs: CBC:   Recent Labs   Lab 10/24/24  0525   WBC 5.30   HGB 11.1*   HCT 34.2*        CMP:   Recent Labs   Lab 10/24/24  0525      K 3.6      CO2 26   *   BUN 6   CREATININE 0.8   CALCIUM 9.4   PROT 8.0   ALBUMIN 2.8*   BILITOT 0.5   ALKPHOS 97   AST 29   ALT 20   ANIONGAP 12     Microbiology Results (last 7 days)       Procedure Component Value Units Date/Time    Blood culture #2 **CANNOT BE ORDERED STAT** [[id number]] Collected: 10/24/24 0614    Order Status: Completed Specimen: Blood from Peripheral, Antecubital, Right Updated: 10/24/24 1312     Blood Culture, Routine No Growth to date    Blood culture #1 **CANNOT BE ORDERED STAT** [[id number]] Collected: 10/24/24 0525    Order Status: Completed Specimen: Blood from Peripheral, Antecubital, Right Updated: 10/24/24 1312     Blood Culture, Routine No Growth to date            Significant Imaging: I have reviewed all pertinent imaging results/findings within the past 24 hours.

## 2024-10-24 NOTE — SUBJECTIVE & OBJECTIVE
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     Current Outpatient Medications on File Prior to Encounter   Medication Sig    apixaban (ELIQUIS) 5 mg Tab TAKE 1 TABLET BY MOUTH TWICE DAILY    ibuprofen (ADVIL,MOTRIN) 200 MG tablet Take 200 mg by mouth every 6 (six) hours as needed for Pain.    mercaptopurine (PURINETHOL) 50 mg tablet Take 100 mg by mouth once daily.    PRIVIGEN 10 % Soln Inject into the vein.    ziprasidone (GEODON) 20 MG Cap Take 1 capsule (20 mg total) by mouth 2 (two) times daily.    folic acid (FOLVITE) 1 MG tablet Take 1 tablet (1 mg total) by mouth once daily. (Patient not taking: Reported on 10/24/2024)    [DISCONTINUED] acetaminophen (TYLENOL) 325 MG tablet Take 650 mg by mouth as needed for Pain.    [DISCONTINUED] cefadroxil (DURICEF) 500 MG Cap Take 2 capsules (1,000 mg total) by mouth 2 (two) times a day.    [DISCONTINUED] multivitamin (THERAGRAN) per tablet Take 1 tablet by mouth once daily.     Family History       Problem Relation (Age of Onset)    Breast cancer Maternal Aunt    Cancer Father    Diabetes Maternal Grandmother          Tobacco Use    Smoking status: Never    Smokeless tobacco: Never   Substance and Sexual Activity    Alcohol use: No    Drug use: No    Sexual activity: Yes     Partners: Male     Birth control/protection: Condom     Review of Systems  Objective:     Vital Signs (Most Recent):  Temp: 98.2 °F (36.8 °C) (10/24/24 0610)  Pulse: 89 (10/24/24 1005)  Resp: 19 (10/24/24 1005)  BP: (!) 153/81 (10/24/24 1005)  SpO2: 98 % (10/24/24 1005) Vital Signs (24h Range):  Temp:  [98.2 °F (36.8 °C)-98.6 °F (37 °C)] 98.2 °F (36.8  °C)  Pulse:  [65-91] 89  Resp:  [13-25] 19  SpO2:  [94 %-99 %] 98 %  BP: (143-179)/(81-88) 153/81     Weight: 104.3 kg (230 lb)  Body mass index is 34.97 kg/m².     Physical Exam  Vitals and nursing note reviewed.   Constitutional:       General: She is not in acute distress.     Appearance: She is obese. She is ill-appearing.   Cardiovascular:      Rate and Rhythm: Normal rate and regular rhythm.      Pulses: Normal pulses.   Pulmonary:      Effort: Pulmonary effort is normal. No respiratory distress.      Breath sounds: No wheezing.   Abdominal:      General: Bowel sounds are normal. There is no distension.   Skin:     Comments: Right lower extremity mild swelling with diffuse pustular like lesions with erythematous base.  See photos   Neurological:      General: No focal deficit present.      Mental Status: She is alert and oriented to person, place, and time. Mental status is at baseline.   Psychiatric:         Mood and Affect: Mood normal.         Thought Content: Thought content normal.                    Significant Labs: All pertinent labs within the past 24 hours have been reviewed.    Significant Imaging: I have reviewed all pertinent imaging results/findings within the past 24 hours.

## 2024-10-24 NOTE — PLAN OF CARE
Problem: Skin Injury Risk Increased  Goal: Skin Health and Integrity  Outcome: Progressing     Problem: Adult Inpatient Plan of Care  Goal: Plan of Care Review  Outcome: Progressing  Goal: Patient-Specific Goal (Individualized)  Outcome: Progressing

## 2024-10-24 NOTE — ASSESSMENT & PLAN NOTE
RLE - U/s with Dvt noted in one of the tibial veins  - taking eliquis consistenlty with no missed doses at home  - Unclear where original DVT is -- she is bedbound so this is likely etiology  - Change to loading dose Xarelto

## 2024-10-24 NOTE — ASSESSMENT & PLAN NOTE
He was evaluated by Dr. Cody Rios on 1/17/2022.  For neck pain and cervical myofascial pain.  He was advised to continue his muscle relaxant and physical therapy.   Mrs. 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 She appears to be stable on IV ACV and is feeling a little better. She will require IV ACV until ~75% of her lesions have scabbed over. Given her immunocompromised status, this may require days to a week or more.     Recommendations  Continue ACV  Monitor renal function on IV ACV  Continue IV until lesions have crusted over

## 2024-10-24 NOTE — ED NOTES
Pt has a rash to her rt left that trails down and is painful. The rash has some blisters and some dry sloughed skin around the area. Pt has some red dots to her rt hand and rt foot but none to her left hand, foot or to her mouth    LOC: Pt is awake alert and aware of environment, oriented X3 and speaking appropriately  Appearance: Pt is in no acute distress, Pt is well groomed and clean  Skin: skin is warm and dry with normal turgor, mucus membranes are moist and pink, rash noted to rt back of her leg that tracks down her leg with red dots to the palm of rt hand and sole of rt foot  Muskuloskeletal: Pt moves all extremities well, there is no obvious swelling or deformities noted, pulses are intact.  Respiratory: Airway is open and patent, respirations are spontaneous and even.  Cardiac:  no edema and cap refill is <3sec  Neuro: Pt follows commands easily and has no obvious deficits

## 2024-10-24 NOTE — ASSESSMENT & PLAN NOTE
Concerns for disseminated herpes zoster on the right lower extremity and hands. No face/ocular lesions or cutaneous lesions. Evaluated by ID, recommending Acyclovir IV -- dosing adjusted.  - continue isolation precautions  -supportive care

## 2024-10-24 NOTE — SUBJECTIVE & OBJECTIVE
Past Medical History:   Diagnosis Date    Abnormal Pap smear of vagina     Autoimmune hepatitis     Axonal neuropathy 1/9/2023    Bipolar 1 disorder     Breast disorder        Past Surgical History:   Procedure Laterality Date    LIVER BIOPSY         Review of patient's allergies indicates:  No Known Allergies    Medications:  (Not in a hospital admission)    Antibiotics (From admission, onward)      None          Antifungals (From admission, onward)      None          Antivirals (From admission, onward)          Stop Route Frequency     acyclovir (ZOVIRAX) injection         -- IV Every 8 hours (non-standard times)             Immunization History   Administered Date(s) Administered    COVID-19, MRNA, LN-S, PF (Pfizer) (Purple Cap) 08/11/2021, 01/07/2022    Hepatitis A, Adult 03/17/2009, 09/15/2009    Hepatitis B, Adult 03/17/2009, 09/15/2009, 10/08/2013, 11/12/2013       Family History       Problem Relation (Age of Onset)    Breast cancer Maternal Aunt    Cancer Father    Diabetes Maternal Grandmother          Social History     Socioeconomic History    Marital status:    Tobacco Use    Smoking status: Never    Smokeless tobacco: Never   Substance and Sexual Activity    Alcohol use: No    Drug use: No    Sexual activity: Yes     Partners: Male     Birth control/protection: Condom   Social History Narrative    ** Merged History Encounter **          Social Drivers of Health     Financial Resource Strain: Low Risk  (4/4/2024)    Overall Financial Resource Strain (CARDIA)     Difficulty of Paying Living Expenses: Not hard at all   Food Insecurity: No Food Insecurity (4/4/2024)    Hunger Vital Sign     Worried About Running Out of Food in the Last Year: Never true     Ran Out of Food in the Last Year: Never true   Transportation Needs: No Transportation Needs (4/4/2024)    PRAPARE - Transportation     Lack of Transportation (Medical): No     Lack of Transportation (Non-Medical): No   Physical Activity: Unknown  (4/4/2024)    Exercise Vital Sign     Days of Exercise per Week: 7 days   Stress: No Stress Concern Present (4/4/2024)    [employer] [place] [employer] - Occupational Stress Questionnaire     Feeling of Stress : Not at all   Housing Stability: Unknown (4/4/2024)    Housing Stability Vital Sign     Unable to Pay for Housing in the Last Year: No     Unstable Housing in the Last Year: No     Review of Systems   Constitutional:  Positive for fever.   Skin:  Positive for rash.   All other systems reviewed and are negative.    Objective:     Vital Signs (Most Recent):  Temp: 98 °F (36.7 °C) (10/24/24 1500)  Pulse: 83 (10/24/24 1500)  Resp: 12 (10/24/24 1500)  BP: 124/81 (10/24/24 1500)  SpO2: 95 % (10/24/24 1500) Vital Signs (24h Range):  Temp:  [98 °F (36.7 °C)-98.6 °F (37 °C)] 98 °F (36.7 °C)  Pulse:  [65-91] 83  Resp:  [12-25] 12  SpO2:  [94 %-99 %] 95 %  BP: (124-179)/(81-88) 124/81     Weight: 104.3 kg (230 lb)  Body mass index is 34.97 kg/m².    Estimated Creatinine Clearance: 100.5 mL/min (based on SCr of 0.8 mg/dL).     Physical Exam  Vitals and nursing note reviewed.   Constitutional:       General: She is not in acute distress.     Appearance: Normal appearance. She is normal weight. She is not ill-appearing, toxic-appearing or diaphoretic.   HENT:      Head: Normocephalic and atraumatic.      Right Ear: External ear normal.      Left Ear: External ear normal.   Eyes:      Conjunctiva/sclera: Conjunctivae normal.      Pupils: Pupils are equal, round, and reactive to light.   Abdominal:      Tenderness: There is no abdominal tenderness. There is no guarding.   Skin:     Findings: Rash present.   Neurological:      General: No focal deficit present.      Mental Status: She is alert and oriented to person, place, and time.   Psychiatric:         Mood and Affect: Mood normal.         Behavior: Behavior normal.         Thought Content: Thought content normal.         Judgment: Judgment normal.                 Significant Labs: CBC:   Recent Labs   Lab 10/24/24  0525   WBC 5.30   HGB 11.1*   HCT 34.2*        CMP:   Recent Labs   Lab 10/24/24  0525      K 3.6      CO2 26   *   BUN 6   CREATININE 0.8   CALCIUM 9.4   PROT 8.0   ALBUMIN 2.8*   BILITOT 0.5   ALKPHOS 97   AST 29   ALT 20   ANIONGAP 12     Microbiology Results (last 7 days)       Procedure Component Value Units Date/Time    Blood culture #2 **CANNOT BE ORDERED STAT** [[id number]] Collected: 10/24/24 0614    Order Status: Completed Specimen: Blood from Peripheral, Antecubital, Right Updated: 10/24/24 1312     Blood Culture, Routine No Growth to date    Blood culture #1 **CANNOT BE ORDERED STAT** [[id number]] Collected: 10/24/24 0525    Order Status: Completed Specimen: Blood from Peripheral, Antecubital, Right Updated: 10/24/24 1312     Blood Culture, Routine No Growth to date            Significant Imaging: I have reviewed all pertinent imaging results/findings within the past 24 hours.

## 2024-10-24 NOTE — HPI
Mrs. Rg is a pleasant 54 yo woman with bipolar disease, autoimmune hepatitis and PE (on Eliquis), and axonal sensory polyneuropathy who presents today for rash. She developed burning pain on her right leg two days ago that has not improved. At home, the pain was 10/10. In the ED, she was noted to have a vesicular rash to her right leg, possibly carey muti-dermatomal pattern - posterior thigh and anterior leg. Additionally, she has some erythematous lesions of her hand. She is admitted and started on IV ACV. ID is consulted for disseminated shingles in an immunocompromised patient (on monthly IgG for her neuropathy and on mercaptopurine for autoimmune hepatitis). Incidentally, she was found to have a DVT.    The patient reports chickenpox as a child. She did receive a Zoster vaccine in 2018.

## 2024-10-24 NOTE — H&P
[facility] Emergency Dept  [facility]  History & Physical    Patient Name: Thalia Rg  MRN: [id number]  Patient Class: IP- Inpatient  Admission Date: 10/24/2024  Attending Physician: Jc Holden MD   Primary Care Provider: Mac Gresham MD         Patient information was obtained from patient, EMS personnel, past medical records, and ER records.     Subjective:     Principal Problem:Rash/skin eruption    Chief Complaint:   Chief Complaint   Patient presents with    Rash     Here via [facility] with c/o rash to right lower leg x 2 days that is starting to spread          HPI: Mrs. Rg is a 53-year-old female with past medical history of bipolar disorder, autoimmune hepatitis, neuropathy and history of DVT and PE on Eliquis who presents to the emergency department for evaluation of rash that developed on her right lower extremity 2 days ago.  Patient initially noticed it on the side of her right thigh and her buttocks.  It started to spread and she reported it being painful.  She denies any fevers, chills, recent illness, respiratory symptoms.  She takes mercaptopurine for her autoimmune hepatitis.  No changes in her medications.  In the emergency department, there was concerns for disseminated herpes zoster.  She was started on acyclovir and ID consulted.  HSV labs sent off.  Also found to have DVT and right lower extremity.  She was on Eliquis at home.  It is unclear which lower extremity she had her DVT and previous and unable to discern if this is a new DVT.  She will be transitioned to Eliquis while inpatient.  She will be admitted to hospital medicine for further management.    Past Medical History:   Diagnosis Date    Abnormal Pap smear of vagina     Autoimmune hepatitis     Axonal neuropathy 1/9/2023    Bipolar 1 disorder     Breast disorder        Past Surgical History:   Procedure Laterality Date    LIVER BIOPSY         Review of patient's allergies indicates:  No Known Allergies    No  current facility-administered medications on file prior to encounter.     Current Outpatient Medications on File Prior to Encounter   Medication Sig    apixaban (ELIQUIS) 5 mg Tab TAKE 1 TABLET BY MOUTH TWICE DAILY    ibuprofen (ADVIL,MOTRIN) 200 MG tablet Take 200 mg by mouth every 6 (six) hours as needed for Pain.    mercaptopurine (PURINETHOL) 50 mg tablet Take 100 mg by mouth once daily.    PRIVIGEN 10 % Soln Inject into the vein.    ziprasidone (GEODON) 20 MG Cap Take 1 capsule (20 mg total) by mouth 2 (two) times daily.    folic acid (FOLVITE) 1 MG tablet Take 1 tablet (1 mg total) by mouth once daily. (Patient not taking: Reported on 10/24/2024)    [DISCONTINUED] acetaminophen (TYLENOL) 325 MG tablet Take 650 mg by mouth as needed for Pain.    [DISCONTINUED] cefadroxil (DURICEF) 500 MG Cap Take 2 capsules (1,000 mg total) by mouth 2 (two) times a day.    [DISCONTINUED] multivitamin (THERAGRAN) per tablet Take 1 tablet by mouth once daily.     Family History       Problem Relation (Age of Onset)    Breast cancer Maternal Aunt    Cancer Father    Diabetes Maternal Grandmother          Tobacco Use    Smoking status: Never    Smokeless tobacco: Never   Substance and Sexual Activity    Alcohol use: No    Drug use: No    Sexual activity: Yes     Partners: Male     Birth control/protection: Condom     Review of Systems  Objective:     Vital Signs (Most Recent):  Temp: 98.2 °F (36.8 °C) (10/24/24 0610)  Pulse: 89 (10/24/24 1005)  Resp: 19 (10/24/24 1005)  BP: (!) 153/81 (10/24/24 1005)  SpO2: 98 % (10/24/24 1005) Vital Signs (24h Range):  Temp:  [98.2 °F (36.8 °C)-98.6 °F (37 °C)] 98.2 °F (36.8 °C)  Pulse:  [65-91] 89  Resp:  [13-25] 19  SpO2:  [94 %-99 %] 98 %  BP: (143-179)/(81-88) 153/81     Weight: 104.3 kg (230 lb)  Body mass index is 34.97 kg/m².     Physical Exam  Vitals and nursing note reviewed.   Constitutional:       General: She is not in acute distress.     Appearance: She is obese. She is ill-appearing.    Cardiovascular:      Rate and Rhythm: Normal rate and regular rhythm.      Pulses: Normal pulses.   Pulmonary:      Effort: Pulmonary effort is normal. No respiratory distress.      Breath sounds: No wheezing.   Abdominal:      General: Bowel sounds are normal. There is no distension.   Skin:     Comments: Right lower extremity mild swelling with diffuse pustular like lesions with erythematous base.  See photos   Neurological:      General: No focal deficit present.      Mental Status: She is alert and oriented to person, place, and time. Mental status is at baseline.   Psychiatric:         Mood and Affect: Mood normal.         Thought Content: Thought content normal.                    Significant Labs: All pertinent labs within the past 24 hours have been reviewed.    Significant Imaging: I have reviewed all pertinent imaging results/findings within the past 24 hours.  Assessment/Plan:     * Rash/skin eruption  Concerns for disseminated herpes zoster on the right lower extremity and hands. No face/ocular lesions or cutaneous lesions. Evaluated by ID, recommending Acyclovir IV -- dosing adjusted.  - continue isolation precautions  -supportive care        Psychiatric illness  - resume home geodon      Autoimmune hepatitis  - on mercaptopurine      Acute DVT (deep venous thrombosis)  RLE - U/s with Dvt noted in one of the tibial veins  - taking eliquis consistenlty with no missed doses at home  - Unclear where original DVT is -- she is bedbound so this is likely etiology  - Change to loading dose Xarelto      Impaired gait and mobility  At baseline        VTE Risk Mitigation (From admission, onward)           Ordered     rivaroxaban tablet 15 mg  2 times daily with meals         10/24/24 1321     Reason for No Pharmacological VTE Prophylaxis  Once        Question:  Reasons:  Answer:  Already adequately anticoagulated on oral Anticoagulants    10/24/24 1321     IP VTE HIGH RISK PATIENT  Once         10/24/24 1321      Place sequential compression device  Until discontinued         10/24/24 3142                                    Jc Holden MD  Department of Hospital Medicine  [facility] - Emergency Dept

## 2024-10-24 NOTE — NURSING
Patient arrived to floor via stretcher via transporter from ED.  Patient transferred to bed via x2 person assist.  AAOX4.  Patient was oriented to room, information on communication board, and medication regimen.  Bed low, adequate lighting provided, side rails x2 up, call bell within reach.  Agree with previous assessment. VSS.  Precautions maintained. Patient denied having any acute distress at this time.  None observed.  Will continue to monitor and follow treatment plan.           [facility] [place]  Nurses Note -- 4 Eyes      10/24/2024       Skin assessed on: Admit      [] No Pressure Injuries Present    [x]Prevention Measures Documented    [] Yes LDA  for Pressure Injury Previously documented     [] Yes New Pressure Injury Discovered   [] LDA for New Pressure Injury Added      Attending RN:  Chris Griffith LPN     Second RN:  ANKUSH Lopez

## 2024-10-24 NOTE — ED PROVIDER NOTES
Encounter Date: 10/24/2024       History     Chief Complaint   Patient presents with    Rash     Here via [facility] with c/o rash to right lower leg x 2 days that is starting to spread       52 yo female, bedbound, on mercaptoprine immunosuppressant for immune-mediated axonal sensory-motor polyneuropathy, presents via EMS from home for painful rash to RLE.  Patient reports that she periodically uses a heating pad to her lower extremities for spasms.  Three days ago, on Monday 10/21/24, patient was using a heating pad on her right lower extremity and fell asleep.  When she woke up, she had discoloration of her right lower extremity, which she attributed to accidentally burning herself with a heating pad.  She subsequently developed a rash to her posterior right thigh which is painful.  She also has a painless rash to her anterior right lower extremity and painless lesions to her right hand.  No fevers, chills, or other symptoms of systemic illness.  No chest pain or shortness of breath.  No nausea or vomiting.  No abdominal pain.    Patient is compliant with Eliquis 5 mg PO BID for history of PE/DVT, diagnosed in 2022.  Patient also takes mercaptopurine 100 mg PO qday.  She is receiving IVIG (Privigen) infusions as well (see neuro note 9/16/24).      Patient is weaker on the right side than the left at baseline.      TTE 3/24/24  ·  Left Ventricle: There is normal systolic function with a visually estimated ejection fraction of 60 - 65%. Grade I diastolic dysfunction.  ·  Right Ventricle: Normal right ventricular cavity size. Systolic function is normal.  ·  Pulmonary Artery: The estimated pulmonary artery systolic pressure is 16 mmHg.  ·  IVC/SVC: Normal venous pressure at 3 mmHg.          Review of patient's allergies indicates:  No Known Allergies  Past Medical History:   Diagnosis Date    Abnormal Pap smear of vagina     Autoimmune hepatitis     Axonal neuropathy 1/9/2023    Bipolar 1 disorder     Breast disorder       Past Surgical History:   Procedure Laterality Date    LIVER BIOPSY       Family History   Problem Relation Name Age of Onset    Cancer Father      Diabetes Maternal Grandmother      Breast cancer Maternal Aunt       Social History     Tobacco Use    Smoking status: Never    Smokeless tobacco: Never   Substance Use Topics    Alcohol use: No    Drug use: No     Review of Systems   Constitutional:  Negative for fever.   HENT:  Negative for sore throat.    Eyes:  Negative for photophobia.   Respiratory:  Negative for shortness of breath.    Cardiovascular:  Negative for chest pain.   Gastrointestinal:  Negative for abdominal pain.   Genitourinary:  Negative for dysuria.   Musculoskeletal:  Positive for gait problem (bedbound).   Skin:  Positive for rash.   Neurological:  Positive for weakness (extremities).   Hematological:  Bruises/bleeds easily (on Eliquis).       Physical Exam     Initial Vitals   BP Pulse Resp Temp SpO2   10/24/24 0205 10/24/24 0205 10/24/24 0205 10/24/24 0220 10/24/24 0205   (!) 153/81 91 18 98.6 °F (37 °C) 99 %      MAP       --                Physical Exam    Nursing note and vitals reviewed.  Constitutional: She appears well-developed and well-nourished. She is not diaphoretic.   Awake, alert, nontoxic.   HENT:   Head: Normocephalic and atraumatic. Mouth/Throat: Oropharynx is clear and moist.   No intraoral lesions   Eyes: Conjunctivae and EOM are normal. Pupils are equal, round, and reactive to light.   Neck: Neck supple.   Normal range of motion.  Cardiovascular:  Normal rate, regular rhythm and intact distal pulses.           Pulmonary/Chest: Breath sounds normal. No respiratory distress. She has no wheezes. She has no rhonchi. She has no rales.   Abdominal: Abdomen is soft. There is no abdominal tenderness.   Musculoskeletal:         General: Edema (trace RLE) present. No tenderness.      Cervical back: Normal range of motion and neck supple.     Neurological: She is alert and oriented  to person, place, and time.   Moving all extremities.   Skin: Skin is warm and dry. Rash noted. No pallor.   RLE is mildly darker than LLE.  R posterior thigh with patches of blisters, no surrounding erythema, no active drainage.  Tender to palpation.    Scattered mildly erythematous nontender pustules to RLE diffusely, ?folliculitis.  Nontender palpable nonblanching 2-3mm lesions to R palm.    See photos.   Psychiatric: She has a normal mood and affect.                   ED Course   Procedures  Labs Reviewed   COMPREHENSIVE METABOLIC PANEL - Abnormal       Result Value    Sodium 143      Potassium 3.6      Chloride 105      CO2 26      Glucose 115 (*)     BUN 6      Creatinine 0.8      Calcium 9.4      Total Protein 8.0      Albumin 2.8 (*)     Total Bilirubin 0.5      Alkaline Phosphatase 97      AST 29      ALT 20      eGFR >60      Anion Gap 12     CBC W/ AUTO DIFFERENTIAL - Abnormal    WBC 5.30      RBC 3.74 (*)     Hemoglobin 11.1 (*)     Hematocrit 34.2 (*)     MCV 91      MCH 29.7      MCHC 32.5      RDW 17.1 (*)     Platelets 238      MPV 10.5      Immature Granulocytes 0.2      Gran # (ANC) 2.2      Immature Grans (Abs) 0.01      Lymph # 2.1      Mono # 0.4      Eos # 0.6 (*)     Baso # 0.02      nRBC 0      Gran % 41.5      Lymph % 39.5      Mono % 7.5      Eosinophil % 10.9 (*)     Basophil % 0.4      Differential Method Automated     SEDIMENTATION RATE - Abnormal    Sed Rate 120 (*)    C-REACTIVE PROTEIN - Abnormal    CRP 61.3 (*)    CULTURE, BLOOD   CULTURE, BLOOD   MAGNESIUM    Magnesium 2.0     LACTIC ACID, PLASMA    Lactate (Lactic Acid) 1.0     PROCALCITONIN    Procalcitonin 0.10     PROTIME-INR    Prothrombin Time 10.7      INR 1.0     APTT    aPTT 23.1     TROPONIN I    Troponin I <0.006     B-TYPE NATRIURETIC PEPTIDE    BNP 12     RAPID HIV    HIV Rapid Testing Non-Reactive     HERPES SIMPLEX (HSV) BY RAPID PCR, NON-BLOOD    HSV PCR Source Drainage      Narrative:     Sources by Resulting  [facility]  Release to patient->Immediate   HERPES SIMPLEX VIRUS (HSV) TYPE 1 & 2 DNA BY PCR   TSH   TREPONEMA PALLIDIUM ANTIBODIES IGG, IGM   URINALYSIS, REFLEX TO URINE CULTURE   C. TRACHOMATIS/N. GONORRHOEAE BY AMP DNA   HERPES SIMPLEX VIRUS (HSV) TYPE 1 & 2 DNA BY PCR     EKG Readings: (Independently Interpreted)   06:13: NSR, HR 78.  Normal axis.  No ectopy.  TWI in III.  No ectopy.  No STEMI.         Imaging Results               US Lower Extremity Veins Right (Final result)  Result time 10/24/24 05:27:48      Final result by Darya Sandoval MD (10/24/24 05:27:48)                   Impression:      DVT within 1 of the paired right posterior tibial veins.      Electronically signed by: Darya Sandoval MD  Date:    10/24/2024  Time:    05:27               Narrative:    EXAMINATION:  US LOWER EXTREMITY VEINS RIGHT    CLINICAL HISTORY:  Disorder of pigmentation, unspecified    TECHNIQUE:  Duplex and color flow Doppler evaluation and graded compression of the right lower extremity veins was performed.    COMPARISON:  Ultrasound 06/21/2022    FINDINGS:  Right thigh veins: The common femoral, femoral, popliteal, upper greater saphenous, and deep femoral veins are patent and free of thrombus. The veins are normally compressible and have normal phasic flow and augmentation response.    Right calf veins: DVT within 1 of the paired posterior tibial veins.    Contralateral CFV: The contralateral (left) common femoral vein is patent and free of thrombus.    Miscellaneous: None    This report was flagged in Epic as abnormal.                                       Medications   valACYclovir tablet 1,000 mg (1,000 mg Oral Given 10/24/24 0514)   oxyCODONE-acetaminophen 5-325 mg per tablet 1 tablet (1 tablet Oral Given 10/24/24 0514)     Medical Decision Making  54 yo female with rash to R posterior thigh, R leg diffusely, R hand.  RLE discolored on exam.      Ddx includes shingles, cellulitis, endocarditis, syphilis, DVT,  phlegmasia cerulea dolens, other.    EKG no STEMI.    Labs:  WBC 5.30 and lactate and procalcitonin reassuring.  Atypical lymphocytes noted.  CMP, mag, troponin, BNP reassuring.  HIV nonreactive.    CRP 61.3 but elevated to 68.4 on 3/23/24.      ; last 53 6/6/22.      RLE DVT study shows DVT in 1 of paired posterior tibial veins.  This was present on prior DVT study in 2022.      I have ordered PO oxycodone/APAP 5/325mg for pain and PO valacyclovir 1g to cover shingles (VZV) or HSV.      I am concerned that patient's rash may represent disseminated zoster in this patient on immunosuppressants.  I am also concerned that the rash to her right hand may represent significant pathology such as syphilis or endocarditis.  I think this patient would benefit from an infectious disease consult.  I have placed the order in Epic.    Dispo pending ID consult.      Yamile Christina  6:48 AM      Amount and/or Complexity of Data Reviewed  Labs: ordered.  Radiology: ordered.  ECG/medicine tests: ordered.                                      Clinical Impression:  Final diagnoses:  [L81.9] Discoloration of skin (Primary)  [R21] Rash  [D84.9] Immunosuppression                 Yamile Christina MD  10/24/24 2924

## 2024-10-25 PROCEDURE — 99233 SBSQ HOSP IP/OBS HIGH 50: CPT | Mod: ,,, | Performed by: INTERNAL MEDICINE

## 2024-10-25 PROCEDURE — 25000003 PHARM REV CODE 250: Performed by: STUDENT IN AN ORGANIZED HEALTH CARE EDUCATION/TRAINING PROGRAM

## 2024-10-25 PROCEDURE — 27000207 HC ISOLATION

## 2024-10-25 PROCEDURE — 11000001 HC ACUTE MED/SURG PRIVATE ROOM

## 2024-10-25 PROCEDURE — 63600175 PHARM REV CODE 636 W HCPCS: Performed by: EMERGENCY MEDICINE

## 2024-10-25 PROCEDURE — 25000003 PHARM REV CODE 250: Performed by: EMERGENCY MEDICINE

## 2024-10-25 RX ADMIN — ZIPRASIDONE HYDROCHLORIDE 20 MG: 20 CAPSULE ORAL at 08:10

## 2024-10-25 RX ADMIN — ACYCLOVIR SODIUM 640 MG: 50 INJECTION, SOLUTION INTRAVENOUS at 11:10

## 2024-10-25 RX ADMIN — ZIPRASIDONE HYDROCHLORIDE 20 MG: 20 CAPSULE ORAL at 09:10

## 2024-10-25 RX ADMIN — RIVAROXABAN 15 MG: 15 TABLET, FILM COATED ORAL at 04:10

## 2024-10-25 RX ADMIN — RIVAROXABAN 15 MG: 15 TABLET, FILM COATED ORAL at 06:10

## 2024-10-25 RX ADMIN — FOLIC ACID 1 MG: 1 TABLET ORAL at 08:10

## 2024-10-25 RX ADMIN — ACYCLOVIR SODIUM 640 MG: 50 INJECTION, SOLUTION INTRAVENOUS at 04:10

## 2024-10-25 RX ADMIN — ACYCLOVIR SODIUM 640 MG: 50 INJECTION, SOLUTION INTRAVENOUS at 09:10

## 2024-10-25 NOTE — ASSESSMENT & PLAN NOTE
Mrs. 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 She appears to be stable on IV ACV and is feeling a little better. She will require IV ACV until ~75% of her lesions have scabbed over. Given her immunocompromised status, this may require days to a week or more.     Recommendations  Continue ACV  Monitor renal function on IV ACV  Continue IV until lesions have crusted over

## 2024-10-25 NOTE — NURSING
[facility] [place]  Nurses Note -- 4 Eyes      10/25/2024       Skin assessed on: Q Shift      [x] No Pressure Injuries Present    []Prevention Measures Documented    [] Yes LDA  for Pressure Injury Previously documented     [] Yes New Pressure Injury Discovered   [] LDA for New Pressure Injury Added      Attending RN:  Beatriz Palumbo, RN     Second RN:  PEDRO Serrano

## 2024-10-25 NOTE — SUBJECTIVE & OBJECTIVE
Interval History: Doing better today with less pain. Tolerating IV ACV.    Review of Systems   Skin:  Positive for rash.   All other systems reviewed and are negative.    Objective:     Vital Signs (Most Recent):  Temp: 98.1 °F (36.7 °C) (10/25/24 0735)  Pulse: 90 (10/25/24 0735)  Resp: 16 (10/25/24 0735)  BP: 109/65 (10/25/24 0735)  SpO2: 99 % (10/25/24 0735) Vital Signs (24h Range):  Temp:  [98 °F (36.7 °C)-99.4 °F (37.4 °C)] 98.1 °F (36.7 °C)  Pulse:  [83-98] 90  Resp:  [12-19] 16  SpO2:  [94 %-99 %] 99 %  BP: (109-153)/(65-94) 109/65     Weight: 104.3 kg (230 lb)  Body mass index is 34.97 kg/m².    Estimated Creatinine Clearance: 100.5 mL/min (based on SCr of 0.8 mg/dL).     Physical Exam  Vitals and nursing note reviewed.   Constitutional:       Appearance: Normal appearance.   HENT:      Head: Normocephalic and atraumatic.   Neurological:      General: No focal deficit present.      Mental Status: She is alert and oriented to person, place, and time.   Psychiatric:         Mood and Affect: Mood normal.         Behavior: Behavior normal.         Thought Content: Thought content normal.         Judgment: Judgment normal.          Significant Labs: BMP:   Recent Labs   Lab 10/24/24  0525   *      K 3.6      CO2 26   BUN 6   CREATININE 0.8   CALCIUM 9.4   MG 2.0     CBC:   Recent Labs   Lab 10/24/24  0525   WBC 5.30   HGB 11.1*   HCT 34.2*        Microbiology Results (last 7 days)       Procedure Component Value Units Date/Time    Blood culture #2 **CANNOT BE ORDERED STAT** [[id number]] Collected: 10/24/24 0614    Order Status: Completed Specimen: Blood from Peripheral, Antecubital, Right Updated: 10/25/24 0903     Blood Culture, Routine No Growth to date      No Growth to date    Blood culture #1 **CANNOT BE ORDERED STAT** [[id number]] Collected: 10/24/24 0525    Order Status: Completed Specimen: Blood from Peripheral, Antecubital, Right Updated: 10/25/24 0703     Blood Culture, Routine  No Growth to date      No Growth to date            Significant Imaging: I have reviewed all pertinent imaging results/findings within the past 24 hours.

## 2024-10-25 NOTE — NURSING
[facility] [place]  Nurses Note -- 4 Eyes      10/24/2024       Skin assessed on: Q Shift      [x] No Pressure Injuries Present    [x]Prevention Measures Documented    [] Yes LDA  for Pressure Injury Previously documented     [] Yes New Pressure Injury Discovered   [] LDA for New Pressure Injury Added      Attending RN:  Antwon Holden LPN     Second RN:  Chris Griffith LPN

## 2024-10-25 NOTE — PROGRESS NOTES
[facility]  Infectious Disease  Progress Note    Patient Name: Thalia Rg  MRN: [id number]  Admission Date: 10/24/2024  Length of Stay: 1 days  Attending Physician: Jc Holden MD  Primary Care Provider: Mac Gresham MD    Isolation Status: Airborne and Contact and Droplet  Assessment/Plan:      ID  Disseminated varicella  Mrs. 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 She appears to be stable on IV ACV and is feeling a little better. She will require IV ACV until ~75% of her lesions have scabbed over. Given her immunocompromised status, this may require days to a week or more.     Recommendations  Continue ACV  Monitor renal function on IV ACV  Continue IV until lesions have crusted over      Garfield Galindo MD  Infectious Disease  [facility] S[facility]    Subjective:     Principal Problem:Rash/skin eruption    HPI: Mrs. Rg is a pleasant 52 yo woman with bipolar disease, autoimmune hepatitis and PE (on Eliquis), and axonal sensory polyneuropathy who presents today for rash. She developed burning pain on her right leg two days ago that has not improved. At home, the pain was 10/10. In the ED, she was noted to have a vesicular rash to her right leg, possibly carey muti-dermatomal pattern - posterior thigh and anterior leg. Additionally, she has some erythematous lesions of her hand. She is admitted and started on IV ACV. ID is consulted for disseminated shingles in an immunocompromised patient (on monthly IgG for her neuropathy and on mercaptopurine for autoimmune hepatitis). Incidentally, she was found to have a DVT.    The patient reports chickenpox as a child. She did receive a Zoster vaccine in 2018.  Interval History: Doing better today with less pain. Tolerating IV ACV.    Review of Systems   Skin:  Positive for rash.   All other systems reviewed and  are negative.    Objective:     Vital Signs (Most Recent):  Temp: 98.1 °F (36.7 °C) (10/25/24 0735)  Pulse: 90 (10/25/24 0735)  Resp: 16 (10/25/24 0735)  BP: 109/65 (10/25/24 0735)  SpO2: 99 % (10/25/24 0735) Vital Signs (24h Range):  Temp:  [98 °F (36.7 °C)-99.4 °F (37.4 °C)] 98.1 °F (36.7 °C)  Pulse:  [83-98] 90  Resp:  [12-19] 16  SpO2:  [94 %-99 %] 99 %  BP: (109-153)/(65-94) 109/65     Weight: 104.3 kg (230 lb)  Body mass index is 34.97 kg/m².    Estimated Creatinine Clearance: 100.5 mL/min (based on SCr of 0.8 mg/dL).     Physical Exam  Vitals and nursing note reviewed.   Constitutional:       Appearance: Normal appearance.   HENT:      Head: Normocephalic and atraumatic.   Neurological:      General: No focal deficit present.      Mental Status: She is alert and oriented to person, place, and time.   Psychiatric:         Mood and Affect: Mood normal.         Behavior: Behavior normal.         Thought Content: Thought content normal.         Judgment: Judgment normal.          Significant Labs: BMP:   Recent Labs   Lab 10/24/24  0525   *      K 3.6      CO2 26   BUN 6   CREATININE 0.8   CALCIUM 9.4   MG 2.0     CBC:   Recent Labs   Lab 10/24/24  0525   WBC 5.30   HGB 11.1*   HCT 34.2*        Microbiology Results (last 7 days)       Procedure Component Value Units Date/Time    Blood culture #2 **CANNOT BE ORDERED STAT** [[id number]] Collected: 10/24/24 0614    Order Status: Completed Specimen: Blood from Peripheral, Antecubital, Right Updated: 10/25/24 0903     Blood Culture, Routine No Growth to date      No Growth to date    Blood culture #1 **CANNOT BE ORDERED STAT** [[id number]] Collected: 10/24/24 0525    Order Status: Completed Specimen: Blood from Peripheral, Antecubital, Right Updated: 10/25/24 0703     Blood Culture, Routine No Growth to date      No Growth to date            Significant Imaging: I have reviewed all pertinent imaging results/findings within the past 24  hours.

## 2024-10-25 NOTE — SUBJECTIVE & OBJECTIVE
Interval History:  No new issues, pain is controlled.  Discussed ultrasound findings and change to Xarelto with patient and her     Review of Systems  Objective:     Vital Signs (Most Recent):  Temp: 97.8 °F (36.6 °C) (10/25/24 1122)  Pulse: 101 (10/25/24 1122)  Resp: 16 (10/25/24 0735)  BP: 132/82 (10/25/24 1122)  SpO2: 99 % (10/25/24 1122) Vital Signs (24h Range):  Temp:  [97.8 °F (36.6 °C)-99.4 °F (37.4 °C)] 97.8 °F (36.6 °C)  Pulse:  [] 101  Resp:  [16-18] 16  SpO2:  [94 %-99 %] 99 %  BP: (109-149)/(65-94) 132/82     Weight: 104.3 kg (230 lb)  Body mass index is 34.97 kg/m².    Intake/Output Summary (Last 24 hours) at 10/25/2024 1533  Last data filed at 10/25/2024 1242  Gross per 24 hour   Intake 742.52 ml   Output 1700 ml   Net -957.48 ml         Physical Exam  Vitals and nursing note reviewed.   Constitutional:       General: She is not in acute distress.     Appearance: She is obese. She is ill-appearing.   Cardiovascular:      Rate and Rhythm: Normal rate and regular rhythm.      Pulses: Normal pulses.   Pulmonary:      Effort: Pulmonary effort is normal. No respiratory distress.      Breath sounds: No wheezing.   Abdominal:      General: Bowel sounds are normal. There is no distension.   Skin:     Comments: Right lower extremity mild swelling with diffuse pustular like lesions with erythematous base.  See photos.  Appear less red today.  No burst lesions   Neurological:      General: No focal deficit present.      Mental Status: She is alert and oriented to person, place, and time. Mental status is at baseline.   Psychiatric:         Mood and Affect: Mood normal.         Thought Content: Thought content normal.             Significant Labs: All pertinent labs within the past 24 hours have been reviewed.    Significant Imaging: I have reviewed all pertinent imaging results/findings within the past 24 hours.

## 2024-10-25 NOTE — ASSESSMENT & PLAN NOTE
Concerns for disseminated herpes zoster on the right lower extremity and hands. No face/ocular lesions or cutaneous lesions. Evaluated by ID, recommending Acyclovir IV -- dosing adjusted.  - continue IV acyclovir until lesions have crusted over per ID recommendations  - continue isolation precautions  -supportive care

## 2024-10-25 NOTE — PLAN OF CARE
Pt remained free of falls throughout the night. Pt is AAOx4 w/ no acute distress noted. Meds given as ordered. Afebrile. VSS are stable. Bed is locked in lowest position w/ call light within reach. Plan of care ongoing.      Problem: Skin Injury Risk Increased  Goal: Skin Health and Integrity  Outcome: Progressing     Problem: Adult Inpatient Plan of Care  Goal: Plan of Care Review  Outcome: Progressing  Goal: Patient-Specific Goal (Individualized)  Outcome: Progressing  Goal: Absence of Hospital-Acquired Illness or Injury  Outcome: Progressing  Goal: Optimal Comfort and Wellbeing  Outcome: Progressing  Goal: Readiness for Transition of Care  Outcome: Progressing

## 2024-10-25 NOTE — PROGRESS NOTES
[facility]  Progress Note    Patient Name: Thalia Rg  MRN: [id number]  Patient Class: IP- Inpatient   Admission Date: 10/24/2024  Length of Stay: 1 days  Attending Physician: Jc Holden MD  Primary Care Provider: Mac Greshma MD        Subjective:     Principal Problem:Rash/skin eruption        HPI:  Mrs. Rg is a 53-year-old female with past medical history of bipolar disorder, autoimmune hepatitis, neuropathy and history of DVT and PE on Eliquis who presents to the emergency department for evaluation of rash that developed on her right lower extremity 2 days ago.  Patient initially noticed it on the side of her right thigh and her buttocks.  It started to spread and she reported it being painful.  She denies any fevers, chills, recent illness, respiratory symptoms.  She takes mercaptopurine for her autoimmune hepatitis.  No changes in her medications.  In the emergency department, there was concerns for disseminated herpes zoster.  She was started on acyclovir and ID consulted.  HSV labs sent off.  Also found to have DVT and right lower extremity.  She was on Eliquis at home.  It is unclear which lower extremity she had her DVT and previous and unable to discern if this is a new DVT.  She will be transitioned to Eliquis while inpatient.  She will be admitted to hospital medicine for further management.    Overview/Hospital Course:  No notes on file    Interval History:  No new issues, pain is controlled.  Discussed ultrasound findings and change to Xarelto with patient and her     Review of Systems  Objective:     Vital Signs (Most Recent):  Temp: 97.8 °F (36.6 °C) (10/25/24 1122)  Pulse: 101 (10/25/24 1122)  Resp: 16 (10/25/24 0735)  BP: 132/82 (10/25/24 1122)  SpO2: 99 % (10/25/24 1122) Vital Signs (24h Range):  Temp:  [97.8 °F (36.6 °C)-99.4 °F (37.4 °C)] 97.8 °F (36.6 °C)  Pulse:  [] 101  Resp:  [16-18] 16  SpO2:  [94 %-99 %] 99 %  BP: (109-149)/(65-94)  132/82     Weight: 104.3 kg (230 lb)  Body mass index is 34.97 kg/m².    Intake/Output Summary (Last 24 hours) at 10/25/2024 1533  Last data filed at 10/25/2024 1242  Gross per 24 hour   Intake 742.52 ml   Output 1700 ml   Net -957.48 ml         Physical Exam  Vitals and nursing note reviewed.   Constitutional:       General: She is not in acute distress.     Appearance: She is obese. She is ill-appearing.   Cardiovascular:      Rate and Rhythm: Normal rate and regular rhythm.      Pulses: Normal pulses.   Pulmonary:      Effort: Pulmonary effort is normal. No respiratory distress.      Breath sounds: No wheezing.   Abdominal:      General: Bowel sounds are normal. There is no distension.   Skin:     Comments: Right lower extremity mild swelling with diffuse pustular like lesions with erythematous base.  See photos.  Appear less red today.  No burst lesions   Neurological:      General: No focal deficit present.      Mental Status: She is alert and oriented to person, place, and time. Mental status is at baseline.   Psychiatric:         Mood and Affect: Mood normal.         Thought Content: Thought content normal.             Significant Labs: All pertinent labs within the past 24 hours have been reviewed.    Significant Imaging: I have reviewed all pertinent imaging results/findings within the past 24 hours.    Assessment/Plan:      * Rash/skin eruption  Concerns for disseminated herpes zoster on the right lower extremity and hands. No face/ocular lesions or cutaneous lesions. Evaluated by ID, recommending Acyclovir IV -- dosing adjusted.  - continue IV acyclovir until lesions have crusted over per ID recommendations  - continue isolation precautions  -supportive care        Disseminated varicella        Psychiatric illness  - resume home geodon      Autoimmune hepatitis  - on mercaptopurine      Acute DVT (deep venous thrombosis)  RLE - U/s with Dvt noted in one of the tibial veins  - taking eliquis consistenlty  with no missed doses at home  - Unclear where original DVT is -- she is bedbound so this is likely etiology  - Change to loading dose Xarelto      Impaired gait and mobility  At baseline        VTE Risk Mitigation (From admission, onward)           Ordered     rivaroxaban tablet 15 mg  2 times daily with meals         10/24/24 1321     Reason for No Pharmacological VTE Prophylaxis  Once        Question:  Reasons:  Answer:  Already adequately anticoagulated on oral Anticoagulants    10/24/24 1321     IP VTE HIGH RISK PATIENT  Once         10/24/24 1321     Place sequential compression device  Until discontinued         10/24/24 1321                    Discharge Planning   CHITRA:      Code Status: Full Code   Is the patient medically ready for discharge?:     Reason for patient still in hospital (select all that apply): Treatment  Discharge Plan A: Home Health                  Jc Holden MD  Department of Hospital Medicine   [facility]

## 2024-10-26 LAB
ANION GAP SERPL CALC-SCNC: 10 MMOL/L (ref 8–16)
BASOPHILS # BLD AUTO: 0.03 K/UL (ref 0–0.2)
BASOPHILS NFR BLD: 0.5 % (ref 0–1.9)
BUN SERPL-MCNC: 10 MG/DL (ref 6–20)
CALCIUM SERPL-MCNC: 9.1 MG/DL (ref 8.7–10.5)
CHLORIDE SERPL-SCNC: 108 MMOL/L (ref 95–110)
CO2 SERPL-SCNC: 28 MMOL/L (ref 23–29)
CREAT SERPL-MCNC: 0.8 MG/DL (ref 0.5–1.4)
DIFFERENTIAL METHOD BLD: ABNORMAL
EOSINOPHIL # BLD AUTO: 0.4 K/UL (ref 0–0.5)
EOSINOPHIL NFR BLD: 7.2 % (ref 0–8)
ERYTHROCYTE [DISTWIDTH] IN BLOOD BY AUTOMATED COUNT: 18.1 % (ref 11.5–14.5)
EST. GFR  (NO RACE VARIABLE): >60 ML/MIN/1.73 M^2
GLUCOSE SERPL-MCNC: 121 MG/DL (ref 70–110)
HCT VFR BLD AUTO: 34.1 % (ref 37–48.5)
HGB BLD-MCNC: 10.8 G/DL (ref 12–16)
HSV-1 DNA BY PCR: NEGATIVE
HSV-2 DNA BY PCR: NEGATIVE
HSV1 DNA SPEC QL NAA+PROBE: NEGATIVE
HSV2 DNA SPEC QL NAA+PROBE: NEGATIVE
IMM GRANULOCYTES # BLD AUTO: 0.01 K/UL (ref 0–0.04)
IMM GRANULOCYTES NFR BLD AUTO: 0.2 % (ref 0–0.5)
LYMPHOCYTES # BLD AUTO: 2.4 K/UL (ref 1–4.8)
LYMPHOCYTES NFR BLD: 39.8 % (ref 18–48)
MCH RBC QN AUTO: 29.3 PG (ref 27–31)
MCHC RBC AUTO-ENTMCNC: 31.7 G/DL (ref 32–36)
MCV RBC AUTO: 92 FL (ref 82–98)
MONOCYTES # BLD AUTO: 0.5 K/UL (ref 0.3–1)
MONOCYTES NFR BLD: 8 % (ref 4–15)
NEUTROPHILS # BLD AUTO: 2.7 K/UL (ref 1.8–7.7)
NEUTROPHILS NFR BLD: 44.3 % (ref 38–73)
NRBC BLD-RTO: 0 /100 WBC
PLATELET # BLD AUTO: 335 K/UL (ref 150–450)
PMV BLD AUTO: 10.8 FL (ref 9.2–12.9)
POTASSIUM SERPL-SCNC: 3.4 MMOL/L (ref 3.5–5.1)
RBC # BLD AUTO: 3.69 M/UL (ref 4–5.4)
SODIUM SERPL-SCNC: 146 MMOL/L (ref 136–145)
SPECIMEN SOURCE: NORMAL
WBC # BLD AUTO: 6.01 K/UL (ref 3.9–12.7)

## 2024-10-26 PROCEDURE — 25000003 PHARM REV CODE 250: Performed by: STUDENT IN AN ORGANIZED HEALTH CARE EDUCATION/TRAINING PROGRAM

## 2024-10-26 PROCEDURE — 80048 BASIC METABOLIC PNL TOTAL CA: CPT | Performed by: STUDENT IN AN ORGANIZED HEALTH CARE EDUCATION/TRAINING PROGRAM

## 2024-10-26 PROCEDURE — 99233 SBSQ HOSP IP/OBS HIGH 50: CPT | Mod: ,,, | Performed by: INTERNAL MEDICINE

## 2024-10-26 PROCEDURE — 25000003 PHARM REV CODE 250: Performed by: EMERGENCY MEDICINE

## 2024-10-26 PROCEDURE — 27000207 HC ISOLATION

## 2024-10-26 PROCEDURE — 36415 COLL VENOUS BLD VENIPUNCTURE: CPT | Performed by: STUDENT IN AN ORGANIZED HEALTH CARE EDUCATION/TRAINING PROGRAM

## 2024-10-26 PROCEDURE — 63600175 PHARM REV CODE 636 W HCPCS: Performed by: EMERGENCY MEDICINE

## 2024-10-26 PROCEDURE — 11000001 HC ACUTE MED/SURG PRIVATE ROOM

## 2024-10-26 PROCEDURE — 85025 COMPLETE CBC W/AUTO DIFF WBC: CPT | Performed by: STUDENT IN AN ORGANIZED HEALTH CARE EDUCATION/TRAINING PROGRAM

## 2024-10-26 RX ADMIN — ACYCLOVIR SODIUM 640 MG: 50 INJECTION, SOLUTION INTRAVENOUS at 05:10

## 2024-10-26 RX ADMIN — ACYCLOVIR SODIUM 640 MG: 50 INJECTION, SOLUTION INTRAVENOUS at 07:10

## 2024-10-26 RX ADMIN — ZIPRASIDONE HYDROCHLORIDE 20 MG: 20 CAPSULE ORAL at 08:10

## 2024-10-26 RX ADMIN — FOLIC ACID 1 MG: 1 TABLET ORAL at 08:10

## 2024-10-26 RX ADMIN — ACYCLOVIR SODIUM 640 MG: 50 INJECTION, SOLUTION INTRAVENOUS at 12:10

## 2024-10-26 RX ADMIN — RIVAROXABAN 15 MG: 15 TABLET, FILM COATED ORAL at 04:10

## 2024-10-26 RX ADMIN — RIVAROXABAN 15 MG: 15 TABLET, FILM COATED ORAL at 08:10

## 2024-10-26 RX ADMIN — MERCAPTOPURINE 100 MG: 50 TABLET ORAL at 08:10

## 2024-10-26 NOTE — SUBJECTIVE & OBJECTIVE
Interval History:  no acute issues, she is doing well. Lesins appear stable, improving but not yet burst    Review of Systems  Objective:     Vital Signs (Most Recent):  Temp: 98 °F (36.7 °C) (10/26/24 0822)  Pulse: 85 (10/26/24 0822)  Resp: 18 (10/26/24 0822)  BP: 138/84 (10/26/24 0822)  SpO2: 98 % (10/26/24 0822) Vital Signs (24h Range):  Temp:  [98 °F (36.7 °C)-98.8 °F (37.1 °C)] 98 °F (36.7 °C)  Pulse:  [] 85  Resp:  [16-18] 18  SpO2:  [95 %-99 %] 98 %  BP: (112-138)/(73-84) 138/84     Weight: 104.3 kg (230 lb)  Body mass index is 34.97 kg/m².    Intake/Output Summary (Last 24 hours) at 10/26/2024 1234  Last data filed at 10/26/2024 0900  Gross per 24 hour   Intake 417.09 ml   Output 2500 ml   Net -2082.91 ml         Physical Exam  Vitals and nursing note reviewed.   Constitutional:       General: She is not in acute distress.     Appearance: She is obese. She is ill-appearing.   Cardiovascular:      Rate and Rhythm: Normal rate and regular rhythm.      Pulses: Normal pulses.   Pulmonary:      Effort: Pulmonary effort is normal. No respiratory distress.      Breath sounds: No wheezing.   Abdominal:      General: Bowel sounds are normal. There is no distension.   Skin:     Comments: Right lower extremity mild swelling with diffuse pustular like lesions with erythematous base.  See photos.  Appear less red today.  No burst lesions   Neurological:      General: No focal deficit present.      Mental Status: She is alert and oriented to person, place, and time. Mental status is at baseline.   Psychiatric:         Mood and Affect: Mood normal.         Thought Content: Thought content normal.             Significant Labs: All pertinent labs within the past 24 hours have been reviewed.    Significant Imaging: I have reviewed all pertinent imaging results/findings within the past 24 hours.

## 2024-10-26 NOTE — PROGRESS NOTES
[facility]  Infectious Disease  Progress Note    Patient Name: Thalia Rg  MRN: [id number]  Admission Date: 10/24/2024  Length of Stay: 2 days  Attending Physician: Jc Holden MD  Primary Care Provider: Mac Gresham MD    Isolation Status: Airborne and Contact and Droplet    Assessment/Plan:      ID  Disseminated varicella  Mrs. 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 She appears to be stable on IV ACV and is feeling a little better. She will require IV ACV until ~75% of her lesions have scabbed over. Given her immunocompromised status, this may require days to a week or more.     Recommendations  Continue ACV  Monitor renal function on IV ACV  Continue IV until lesions have crusted over (hopefully, early next week?)  Discussed with  And Mrs. Rg    Garfield Galindo MD  Infectious Disease  [facility]    Subjective:     Principal Problem:Rash/skin eruption    HPI: Mrs. Rg is a pleasant 54 yo woman with bipolar disease, autoimmune hepatitis and PE (on Eliquis), and axonal sensory polyneuropathy who presents today for rash. She developed burning pain on her right leg two days ago that has not improved. At home, the pain was 10/10. In the ED, she was noted to have a vesicular rash to her right leg, possibly carey muti-dermatomal pattern - posterior thigh and anterior leg. Additionally, she has some erythematous lesions of her hand. She is admitted and started on IV ACV. ID is consulted for disseminated shingles in an immunocompromised patient (on monthly IgG for her neuropathy and on mercaptopurine for autoimmune hepatitis). Incidentally, she was found to have a DVT.    The patient reports chickenpox as a child. She did receive a Zoster vaccine in 2018.  Interval History: Mrs. Rg reports feeling better. No fever,. Pain is  improved.    Review of Systems   Constitutional:  Negative for chills and fever.   Skin:  Positive for rash.   All other systems reviewed and are negative.    Objective:     Vital Signs (Most Recent):  Temp: 98 °F (36.7 °C) (10/26/24 0822)  Pulse: 85 (10/26/24 0822)  Resp: 18 (10/26/24 0822)  BP: 138/84 (10/26/24 0822)  SpO2: 98 % (10/26/24 0822) Vital Signs (24h Range):  Temp:  [97.8 °F (36.6 °C)-98.8 °F (37.1 °C)] 98 °F (36.7 °C)  Pulse:  [] 85  Resp:  [16-18] 18  SpO2:  [95 %-99 %] 98 %  BP: (112-138)/(73-84) 138/84     Weight: 104.3 kg (230 lb)  Body mass index is 34.97 kg/m².    Estimated Creatinine Clearance: 100.5 mL/min (based on SCr of 0.8 mg/dL).     Physical Exam  Vitals and nursing note reviewed.   Constitutional:       Appearance: Normal appearance.   HENT:      Head: Normocephalic and atraumatic.   Eyes:      Extraocular Movements: Extraocular movements intact.      Pupils: Pupils are equal, round, and reactive to light.   Skin:     Findings: Lesion present.      Comments: Right posterior thigh vessicles are flattening, no new lesions   Neurological:      General: No focal deficit present.      Mental Status: She is alert and oriented to person, place, and time.   Psychiatric:         Mood and Affect: Mood normal.         Thought Content: Thought content normal.          Significant Labs: Blood Culture:   Recent Labs   Lab 10/24/24  0525 10/24/24  0614   LABBLOO No Growth to date  No Growth to date  No Growth to date No Growth to date  No Growth to date  No Growth to date     BMP:   Recent Labs   Lab 10/26/24  0534   *   *   K 3.4*      CO2 28   BUN 10   CREATININE 0.8   CALCIUM 9.1     CBC:   Recent Labs   Lab 10/26/24  0534   WBC 6.01   HGB 10.8*   HCT 34.1*          Significant Imaging: I have reviewed all pertinent imaging results/findings within the past 24 hours.

## 2024-10-26 NOTE — PLAN OF CARE
Problem: Skin Injury Risk Increased  Goal: Skin Health and Integrity  Outcome: Progressing  Intervention: Optimize Skin Protection  Flowsheets (Taken 10/26/2024 0815)  Pressure Reduction Techniques: frequent weight shift encouraged  Pressure Reduction Devices: pressure-redistributing mattress utilized  Activity Management: Rolling - L1  Head of Bed (HOB) Positioning: HOB at 30 degrees     Problem: Adult Inpatient Plan of Care  Goal: Plan of Care Review  Outcome: Progressing  Flowsheets (Taken 10/26/2024 0815)  Plan of Care Reviewed With: patient  Goal: Patient-Specific Goal (Individualized)  Outcome: Progressing     Problem: Skin Injury Risk Increased  Goal: Skin Health and Integrity  Outcome: Progressing  Intervention: Optimize Skin Protection  Flowsheets (Taken 10/26/2024 0815)  Pressure Reduction Techniques: frequent weight shift encouraged  Pressure Reduction Devices: pressure-redistributing mattress utilized  Activity Management: Rolling - L1  Head of Bed (HOB) Positioning: HOB at 30 degrees     Problem: Skin Injury Risk Increased  Goal: Skin Health and Integrity  Outcome: Progressing     Problem: Skin Injury Risk Increased  Goal: Skin Health and Integrity  Intervention: Optimize Skin Protection  Flowsheets (Taken 10/26/2024 0815)  Pressure Reduction Techniques: frequent weight shift encouraged  Pressure Reduction Devices: pressure-redistributing mattress utilized  Activity Management: Rolling - L1  Head of Bed (HOB) Positioning: HOB at 30 degrees     Problem: Skin Injury Risk Increased  Goal: Skin Health and Integrity  Intervention: Optimize Skin Protection  Flowsheets (Taken 10/26/2024 0815)  Pressure Reduction Techniques: frequent weight shift encouraged  Pressure Reduction Devices: pressure-redistributing mattress utilized  Activity Management: Rolling - L1  Head of Bed (HOB) Positioning: HOB at 30 degrees     Problem: Adult Inpatient Plan of Care  Goal: Plan of Care Review  Outcome:  Progressing  Flowsheets (Taken 10/26/2024 0815)  Plan of Care Reviewed With: patient     Problem: Adult Inpatient Plan of Care  Goal: Plan of Care Review  Outcome: Progressing  Flowsheets (Taken 10/26/2024 0815)  Plan of Care Reviewed With: patient     Problem: Adult Inpatient Plan of Care  Goal: Patient-Specific Goal (Individualized)  Outcome: Progressing     Problem: Adult Inpatient Plan of Care  Goal: Patient-Specific Goal (Individualized)  Outcome: Progressing

## 2024-10-26 NOTE — HOSPITAL COURSE
Mrs. Rg is a 52 yo F admitted with disseminated zoster of the left lower extremity and hands. Started on IV acyclovir. Lesions cultured. Also found to have u/s + for DVT in LLE> She is on eliquis and compliant, does not miss doses. Unclear if this is same DVT from previous or new? Changed to xarelto loading dose. Received IV acyclovir until lesions crust over per ID recommendations. Okay to discharge on PO valtrex per ID on 10/31/24. PT/OT consulted and recommended moderate intensity therapy. Patient declined SNF. States she wants to go home with home health. Discussed with patient that she would benefit from acute PTOT services to address deficits and reach maximum level of function. She verbalized understanding and declined. Okay with home health.    Pt denies any fever, headaches, vision changes, chest pain, shortness of breath, palpitations, abdominal pain, nausea, vomiting, or any new weaknesses. Feels ready to go home. Patient's exam on discharge was as follow: Patient is alert and oriented, appears in no acute distress, heart with regular rate and rhythm, lungs clear to asculation with non-labored breathing, abdomen soft, and no new weaknesses or focal deficits seen. Bilateral lower extremities without any edema or calf tenderness.     Patient was counseled regarding any abnormal labs, differential diagnosis, treatment options, risk-benefit, lifestyle changes, prognosis, current condition, and medications. Patient was interactive and attentive.  Patient's questions were answered in a respectful and timely manner. Patient was instructed to follow-up with PCP within 1 week and to continue taking medications as prescribed.  Instructed to also follow up with her specialist. Also, extensively discussed the risks, benefits, and side effects of patient's medications. Discussed with patient about any medication changes. Patient verbalized understanding and agrees to treatment plan.  Patient is stable for  discharge.  Patient has no other questions or concerns at this time.  ED precautions discussed with the patient.    Vital signs are stable. Ambulating without any difficulty. Tolerating p.o. intake without any nausea or vomiting. Afebrile for over 24 hours. Patient is in stable condition and has no questions or concerns. Patient will be discharge to home with home health once transportation secured . Prescriptions sent to pharmacy.  CM/SW to assist with discharge planning.     Vitals:    10/30/24 2349 10/31/24 0438 10/31/24 0752 10/31/24 1115   BP: 121/71 132/79 120/82 114/80   BP Location: Right arm Right arm Right arm Right arm   Patient Position: Lying Lying Lying Sitting   Pulse: 86 104 97 89   Resp: 18 18 19 18   Temp: 98.7 °F (37.1 °C) 98.4 °F (36.9 °C) 98.7 °F (37.1 °C) 98.7 °F (37.1 °C)   TempSrc: Oral Oral Oral Oral   SpO2: (!) 93% 95% 95% 96%   Weight:       Height:

## 2024-10-26 NOTE — NURSING
[facility], [facility]  Nurses Note -- 4 Eyes      10/25/2024       Skin assessed on: Q Shift      [x] No Pressure Injuries Present    [x]Prevention Measures Documented    [] Yes LDA  for Pressure Injury Previously documented     [] Yes New Pressure Injury Discovered   [] LDA for New Pressure Injury Added      Attending RN:  Tasha Rojas RN     Second RN:  Desiree Palumbo Rn

## 2024-10-26 NOTE — PROGRESS NOTES
[facility]  Progress Note    Patient Name: Thalia Rg  MRN: [id number]  Patient Class: IP- Inpatient   Admission Date: 10/24/2024  Length of Stay: 2 days  Attending Physician: Jc Holden MD  Primary Care Provider: Mac Gresham MD        Subjective:     Principal Problem:Rash/skin eruption        HPI:  Mrs. Rg is a 53-year-old female with past medical history of bipolar disorder, autoimmune hepatitis, neuropathy and history of DVT and PE on Eliquis who presents to the emergency department for evaluation of rash that developed on her right lower extremity 2 days ago.  Patient initially noticed it on the side of her right thigh and her buttocks.  It started to spread and she reported it being painful.  She denies any fevers, chills, recent illness, respiratory symptoms.  She takes mercaptopurine for her autoimmune hepatitis.  No changes in her medications.  In the emergency department, there was concerns for disseminated herpes zoster.  She was started on acyclovir and ID consulted.  HSV labs sent off.  Also found to have DVT and right lower extremity.  She was on Eliquis at home.  It is unclear which lower extremity she had her DVT and previous and unable to discern if this is a new DVT.  She will be transitioned to Eliquis while inpatient.  She will be admitted to hospital medicine for further management.    Overview/Hospital Course:  Mrs. Rg is a 52 yo F admitted with disseminated zoster of the left lower extremity and hands. Started on IV acyclovir. Lesions cultured. Also found to have u/s + for DVT in LLE> She is on eliquis and compliant, does not miss doses. Unclear if this is same DVT from previous or new? Changed to xarelto loading dose. Plan to continue with IV acyclovir until lesions crust over per ID recommendations.    Interval History:  no acute issues, she is doing well. Lesins appear stable, improving but not yet burst    Review of Systems  Objective:      Vital Signs (Most Recent):  Temp: 98 °F (36.7 °C) (10/26/24 0822)  Pulse: 85 (10/26/24 0822)  Resp: 18 (10/26/24 0822)  BP: 138/84 (10/26/24 0822)  SpO2: 98 % (10/26/24 0822) Vital Signs (24h Range):  Temp:  [98 °F (36.7 °C)-98.8 °F (37.1 °C)] 98 °F (36.7 °C)  Pulse:  [] 85  Resp:  [16-18] 18  SpO2:  [95 %-99 %] 98 %  BP: (112-138)/(73-84) 138/84     Weight: 104.3 kg (230 lb)  Body mass index is 34.97 kg/m².    Intake/Output Summary (Last 24 hours) at 10/26/2024 1234  Last data filed at 10/26/2024 0900  Gross per 24 hour   Intake 417.09 ml   Output 2500 ml   Net -2082.91 ml         Physical Exam  Vitals and nursing note reviewed.   Constitutional:       General: She is not in acute distress.     Appearance: She is obese. She is ill-appearing.   Cardiovascular:      Rate and Rhythm: Normal rate and regular rhythm.      Pulses: Normal pulses.   Pulmonary:      Effort: Pulmonary effort is normal. No respiratory distress.      Breath sounds: No wheezing.   Abdominal:      General: Bowel sounds are normal. There is no distension.   Skin:     Comments: Right lower extremity mild swelling with diffuse pustular like lesions with erythematous base.  See photos.  Appear less red today.  No burst lesions   Neurological:      General: No focal deficit present.      Mental Status: She is alert and oriented to person, place, and time. Mental status is at baseline.   Psychiatric:         Mood and Affect: Mood normal.         Thought Content: Thought content normal.             Significant Labs: All pertinent labs within the past 24 hours have been reviewed.    Significant Imaging: I have reviewed all pertinent imaging results/findings within the past 24 hours.    Assessment/Plan:      * Rash/skin eruption  Concerns for disseminated herpes zoster on the right lower extremity and hands. No face/ocular lesions or cutaneous lesions. Evaluated by ID, recommending Acyclovir IV -- dosing adjusted.  - continue IV acyclovir until  lesions have crusted over per ID recommendations  - continue isolation precautions  -supportive care        Disseminated varicella  - continue with IV acyclovir       Psychiatric illness  - resume home geodon      Autoimmune hepatitis  - on mercaptopurine      Acute DVT (deep venous thrombosis)  RLE - U/s with Dvt noted in one of the tibial veins  - taking eliquis consistenlty with no missed doses at home  - Unclear where original DVT is -- she is bedbound so this is likely etiology  - Change to loading dose Xarelto      Impaired gait and mobility  At baseline        VTE Risk Mitigation (From admission, onward)           Ordered     rivaroxaban tablet 15 mg  2 times daily with meals         10/24/24 1321     Reason for No Pharmacological VTE Prophylaxis  Once        Question:  Reasons:  Answer:  Already adequately anticoagulated on oral Anticoagulants    10/24/24 1321     IP VTE HIGH RISK PATIENT  Once         10/24/24 1321     Place sequential compression device  Until discontinued         10/24/24 1321                    Discharge Planning   CHITRA:      Code Status: Full Code   Is the patient medically ready for discharge?:     Reason for patient still in hospital (select all that apply): Treatment  Discharge Plan A: Home Health                  Jc Holden MD  Department of Hospital Medicine   [facility] - [facility]

## 2024-10-26 NOTE — NURSING
[facility], [facility] [place]  Nurses Note -- 4 Eyes      10/26/2024       Skin assessed on: Q Shift      [x] No Pressure Injuries Present    []Prevention Measures Documented    [] Yes LDA  for Pressure Injury Previously documented     [] Yes New Pressure Injury Discovered   [] LDA for New Pressure Injury Added      Attending RN:  Trini Mora RN     Second RN:  Tasha

## 2024-10-26 NOTE — SUBJECTIVE & OBJECTIVE
Interval History: Mrs. Rg reports feeling better. No fever,. Pain is improved.    Review of Systems   Constitutional:  Negative for chills and fever.   Skin:  Positive for rash.   All other systems reviewed and are negative.    Objective:     Vital Signs (Most Recent):  Temp: 98 °F (36.7 °C) (10/26/24 0822)  Pulse: 85 (10/26/24 0822)  Resp: 18 (10/26/24 0822)  BP: 138/84 (10/26/24 0822)  SpO2: 98 % (10/26/24 0822) Vital Signs (24h Range):  Temp:  [97.8 °F (36.6 °C)-98.8 °F (37.1 °C)] 98 °F (36.7 °C)  Pulse:  [] 85  Resp:  [16-18] 18  SpO2:  [95 %-99 %] 98 %  BP: (112-138)/(73-84) 138/84     Weight: 104.3 kg (230 lb)  Body mass index is 34.97 kg/m².    Estimated Creatinine Clearance: 100.5 mL/min (based on SCr of 0.8 mg/dL).     Physical Exam  Vitals and nursing note reviewed.   Constitutional:       Appearance: Normal appearance.   HENT:      Head: Normocephalic and atraumatic.   Eyes:      Extraocular Movements: Extraocular movements intact.      Pupils: Pupils are equal, round, and reactive to light.   Skin:     Findings: Lesion present.      Comments: Right posterior thigh vessicles are flattening, no new lesions   Neurological:      General: No focal deficit present.      Mental Status: She is alert and oriented to person, place, and time.   Psychiatric:         Mood and Affect: Mood normal.         Thought Content: Thought content normal.          Significant Labs: Blood Culture:   Recent Labs   Lab 10/24/24  0525 10/24/24  0614   LABBLOO No Growth to date  No Growth to date  No Growth to date No Growth to date  No Growth to date  No Growth to date     BMP:   Recent Labs   Lab 10/26/24  0534   *   *   K 3.4*      CO2 28   BUN 10   CREATININE 0.8   CALCIUM 9.1     CBC:   Recent Labs   Lab 10/26/24  0534   WBC 6.01   HGB 10.8*   HCT 34.1*          Significant Imaging: I have reviewed all pertinent imaging results/findings within the past 24 hours.

## 2024-10-26 NOTE — PLAN OF CARE
Patient alert and oriented x4. Respirations even and unlabored. No distress noted. Skin warm and dry. Rash noted to skin. Open to air. Iv saline locked. No complications noted. Iv acyclovir given as ordered. Patient denies pain. Pain medication available as needed. Purewick in place. No complications noted. Frequent weight shift assistance throughout shift. Patient has no complaints at this time. Bed in lowest position. Call bell within reach. Bed alarms audible. Airborne and contact precautions maintained throughout shift.     Problem: Adult Inpatient Plan of Care  Goal: Plan of Care Review  Flowsheets (Taken 10/26/2024 0617)  Plan of Care Reviewed With: patient  Goal: Patient-Specific Goal (Individualized)  Outcome: Progressing  Goal: Absence of Hospital-Acquired Illness or Injury  Outcome: Progressing  Intervention: Identify and Manage Fall Risk  Flowsheets (Taken 10/26/2024 0617)  Safety Promotion/Fall Prevention:   assistive device/personal item within reach   bed alarm set   Fall Risk reviewed with patient/family   high risk medications identified   medications reviewed     Problem: Skin Injury Risk Increased  Goal: Skin Health and Integrity  Outcome: Progressing

## 2024-10-26 NOTE — ASSESSMENT & PLAN NOTE
Mrs. 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 She appears to be stable on IV ACV and is feeling a little better. She will require IV ACV until ~75% of her lesions have scabbed over. Given her immunocompromised status, this may require days to a week or more.     Recommendations  Continue ACV  Monitor renal function on IV ACV  Continue IV until lesions have crusted over (hopefully, early next week?)  Discussed with  And Mrs. Rg

## 2024-10-27 PROBLEM — E66.811 CLASS 1 OBESITY WITH BODY MASS INDEX (BMI) OF 34.0 TO 34.9 IN ADULT: Status: ACTIVE | Noted: 2024-10-27

## 2024-10-27 LAB
ANION GAP SERPL CALC-SCNC: 11 MMOL/L (ref 8–16)
BASOPHILS # BLD AUTO: 0.02 K/UL (ref 0–0.2)
BASOPHILS NFR BLD: 0.3 % (ref 0–1.9)
BUN SERPL-MCNC: 8 MG/DL (ref 6–20)
C TRACH DNA SPEC QL NAA+PROBE: NOT DETECTED
CALCIUM SERPL-MCNC: 9.2 MG/DL (ref 8.7–10.5)
CHLORIDE SERPL-SCNC: 108 MMOL/L (ref 95–110)
CO2 SERPL-SCNC: 25 MMOL/L (ref 23–29)
CREAT SERPL-MCNC: 0.7 MG/DL (ref 0.5–1.4)
DIFFERENTIAL METHOD BLD: ABNORMAL
EOSINOPHIL # BLD AUTO: 0.5 K/UL (ref 0–0.5)
EOSINOPHIL NFR BLD: 6.4 % (ref 0–8)
ERYTHROCYTE [DISTWIDTH] IN BLOOD BY AUTOMATED COUNT: 18.2 % (ref 11.5–14.5)
EST. GFR  (NO RACE VARIABLE): >60 ML/MIN/1.73 M^2
GLUCOSE SERPL-MCNC: 102 MG/DL (ref 70–110)
HCT VFR BLD AUTO: 32.9 % (ref 37–48.5)
HGB BLD-MCNC: 10.5 G/DL (ref 12–16)
IMM GRANULOCYTES # BLD AUTO: 0.04 K/UL (ref 0–0.04)
IMM GRANULOCYTES NFR BLD AUTO: 0.5 % (ref 0–0.5)
LYMPHOCYTES # BLD AUTO: 2.6 K/UL (ref 1–4.8)
LYMPHOCYTES NFR BLD: 33.3 % (ref 18–48)
MCH RBC QN AUTO: 28.8 PG (ref 27–31)
MCHC RBC AUTO-ENTMCNC: 31.9 G/DL (ref 32–36)
MCV RBC AUTO: 90 FL (ref 82–98)
MONOCYTES # BLD AUTO: 0.6 K/UL (ref 0.3–1)
MONOCYTES NFR BLD: 7.7 % (ref 4–15)
N GONORRHOEA DNA SPEC QL NAA+PROBE: NOT DETECTED
NEUTROPHILS # BLD AUTO: 4 K/UL (ref 1.8–7.7)
NEUTROPHILS NFR BLD: 51.8 % (ref 38–73)
NRBC BLD-RTO: 0 /100 WBC
PLATELET # BLD AUTO: 345 K/UL (ref 150–450)
PMV BLD AUTO: 12.7 FL (ref 9.2–12.9)
POCT GLUCOSE: 137 MG/DL (ref 70–110)
POTASSIUM SERPL-SCNC: 4.9 MMOL/L (ref 3.5–5.1)
RBC # BLD AUTO: 3.64 M/UL (ref 4–5.4)
SODIUM SERPL-SCNC: 144 MMOL/L (ref 136–145)
WBC # BLD AUTO: 7.69 K/UL (ref 3.9–12.7)

## 2024-10-27 PROCEDURE — 85025 COMPLETE CBC W/AUTO DIFF WBC: CPT | Performed by: STUDENT IN AN ORGANIZED HEALTH CARE EDUCATION/TRAINING PROGRAM

## 2024-10-27 PROCEDURE — 80048 BASIC METABOLIC PNL TOTAL CA: CPT | Performed by: STUDENT IN AN ORGANIZED HEALTH CARE EDUCATION/TRAINING PROGRAM

## 2024-10-27 PROCEDURE — 63600175 PHARM REV CODE 636 W HCPCS: Performed by: EMERGENCY MEDICINE

## 2024-10-27 PROCEDURE — 25000003 PHARM REV CODE 250: Performed by: EMERGENCY MEDICINE

## 2024-10-27 PROCEDURE — 99233 SBSQ HOSP IP/OBS HIGH 50: CPT | Mod: ,,, | Performed by: INTERNAL MEDICINE

## 2024-10-27 PROCEDURE — 25000003 PHARM REV CODE 250: Performed by: STUDENT IN AN ORGANIZED HEALTH CARE EDUCATION/TRAINING PROGRAM

## 2024-10-27 PROCEDURE — 36415 COLL VENOUS BLD VENIPUNCTURE: CPT | Performed by: STUDENT IN AN ORGANIZED HEALTH CARE EDUCATION/TRAINING PROGRAM

## 2024-10-27 PROCEDURE — 11000001 HC ACUTE MED/SURG PRIVATE ROOM

## 2024-10-27 PROCEDURE — 27000207 HC ISOLATION

## 2024-10-27 RX ADMIN — MERCAPTOPURINE 100 MG: 50 TABLET ORAL at 08:10

## 2024-10-27 RX ADMIN — FOLIC ACID 1 MG: 1 TABLET ORAL at 08:10

## 2024-10-27 RX ADMIN — ACYCLOVIR SODIUM 640 MG: 50 INJECTION, SOLUTION INTRAVENOUS at 04:10

## 2024-10-27 RX ADMIN — ZIPRASIDONE HYDROCHLORIDE 20 MG: 20 CAPSULE ORAL at 08:10

## 2024-10-27 RX ADMIN — RIVAROXABAN 15 MG: 15 TABLET, FILM COATED ORAL at 08:10

## 2024-10-27 RX ADMIN — RIVAROXABAN 15 MG: 15 TABLET, FILM COATED ORAL at 04:10

## 2024-10-27 RX ADMIN — ACYCLOVIR SODIUM 640 MG: 50 INJECTION, SOLUTION INTRAVENOUS at 08:10

## 2024-10-27 RX ADMIN — ACYCLOVIR SODIUM 640 MG: 50 INJECTION, SOLUTION INTRAVENOUS at 11:10

## 2024-10-27 NOTE — PROGRESS NOTES
[facility] [place] [facility]  Infectious Disease  Progress Note    Patient Name: Thalia Rg  MRN: [id number]  Admission Date: 10/24/2024  Length of Stay: 3 days  Attending Physician: Hany Herron DO  Primary Care Provider: Mac Gresham MD    Isolation Status: Airborne and Contact and Droplet  Assessment/Plan:      ID  Disseminated varicella  Mrs. 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 She appears to be stable on IV ACV and is feeling a little better. She will require IV ACV until ~75% of her lesions have scabbed over. Given her immunocompromised status, this may require days to a week or more. Slowly improving but vessicles still moist.    Recommendations  Continue IV ACV until zoster has scabbed over  Monitor renal function on IV ACV  Discussed with  And Mrs. Rg      Garfield Galindo MD  Infectious Disease  [facility] - Med Surg    Subjective:     Principal Problem:Rash/skin eruption    HPI: Mrs. Rg is a pleasant 54 yo woman with bipolar disease, autoimmune hepatitis and PE (on Eliquis), and axonal sensory polyneuropathy who presents today for rash. She developed burning pain on her right leg two days ago that has not improved. At home, the pain was 10/10. In the ED, she was noted to have a vesicular rash to her right leg, possibly carey muti-dermatomal pattern - posterior thigh and anterior leg. Additionally, she has some erythematous lesions of her hand. She is admitted and started on IV ACV. ID is consulted for disseminated shingles in an immunocompromised patient (on monthly IgG for her neuropathy and on mercaptopurine for autoimmune hepatitis). Incidentally, she was found to have a DVT.    The patient reports chickenpox as a child. She did receive a Zoster vaccine in 2018.  Interval History: Patient reports no new problems. Pain improving slowly. No fever or  chills.    Review of Systems   Skin:  Positive for rash.   All other systems reviewed and are negative.    Objective:     Vital Signs (Most Recent):  Temp: 98 °F (36.7 °C) (10/27/24 0832)  Pulse: 88 (10/27/24 0832)  Resp: 18 (10/27/24 0832)  BP: 132/81 (10/27/24 0832)  SpO2: 97 % (10/27/24 0832) Vital Signs (24h Range):  Temp:  [97.7 °F (36.5 °C)-98.5 °F (36.9 °C)] 98 °F (36.7 °C)  Pulse:  [87-95] 88  Resp:  [16-18] 18  SpO2:  [97 %-98 %] 97 %  BP: (113-132)/(65-82) 132/81     Weight: 104.3 kg (230 lb)  Body mass index is 34.97 kg/m².    Estimated Creatinine Clearance: 114.8 mL/min (based on SCr of 0.7 mg/dL).     Physical Exam  Vitals and nursing note reviewed.   Constitutional:       General: She is not in acute distress.     Appearance: Normal appearance.   HENT:      Head: Normocephalic and atraumatic.      Mouth/Throat:      Mouth: Mucous membranes are moist.   Skin:     Findings: Rash present.   Neurological:      General: No focal deficit present.      Mental Status: She is alert.   Psychiatric:         Mood and Affect: Mood normal.         Behavior: Behavior normal.         Thought Content: Thought content normal.         Judgment: Judgment normal.          Significant Labs: Blood Culture:   Recent Labs   Lab 10/24/24  0525 10/24/24  0614   LABBLOO No Growth to date  No Growth to date  No Growth to date  No Growth to date No Growth to date  No Growth to date  No Growth to date  No Growth to date     BMP:   Recent Labs   Lab 10/27/24  0634         K 4.9      CO2 25   BUN 8   CREATININE 0.7   CALCIUM 9.2     CBC:   Recent Labs   Lab 10/26/24  0534 10/27/24  0634   WBC 6.01 7.69   HGB 10.8* 10.5*   HCT 34.1* 32.9*    345       Significant Imaging: I have reviewed all pertinent imaging results/findings within the past 24 hours.

## 2024-10-27 NOTE — SUBJECTIVE & OBJECTIVE
Interval History:  no acute issues, she is doing well. States the pain is better. Lesions appear stable, improving but not yet crusted    Review of Systems   Constitutional:  Negative for fever.   Skin:  Positive for rash.   Neurological:  Negative for weakness and numbness.     Objective:     Vital Signs (Most Recent):  Temp: 97.9 °F (36.6 °C) (10/27/24 1242)  Pulse: 109 (10/27/24 1242)  Resp: 18 (10/27/24 1242)  BP: 134/77 (10/27/24 1242)  SpO2: 97 % (10/27/24 1242) Vital Signs (24h Range):  Temp:  [97.9 °F (36.6 °C)-98.5 °F (36.9 °C)] 97.9 °F (36.6 °C)  Pulse:  [] 109  Resp:  [16-18] 18  SpO2:  [97 %-98 %] 97 %  BP: (113-134)/(65-82) 134/77     Weight: 104.3 kg (230 lb)  Body mass index is 34.97 kg/m².    Intake/Output Summary (Last 24 hours) at 10/27/2024 1511  Last data filed at 10/27/2024 1301  Gross per 24 hour   Intake 642.86 ml   Output 2000 ml   Net -1357.14 ml         Physical Exam  Vitals and nursing note reviewed.   Constitutional:       General: She is not in acute distress.     Appearance: She is obese. She is not ill-appearing.   Cardiovascular:      Rate and Rhythm: Normal rate and regular rhythm.      Pulses: Normal pulses.   Pulmonary:      Effort: Pulmonary effort is normal. No respiratory distress.      Breath sounds: No wheezing.   Abdominal:      General: Bowel sounds are normal. There is no distension.   Skin:     Findings: Lesion and rash present.      Comments: Right lower extremity mild swelling with diffuse pustular like lesions with erythematous base.  Appear less red today.  No burst lesions   Neurological:      General: No focal deficit present.      Mental Status: She is alert and oriented to person, place, and time. Mental status is at baseline.   Psychiatric:         Mood and Affect: Mood normal.         Thought Content: Thought content normal.             Significant Labs: All pertinent labs within the past 24 hours have been reviewed.    Significant Imaging: I have reviewed  all pertinent imaging results/findings within the past 24 hours.

## 2024-10-27 NOTE — NURSING
[facility], [facility] - [place]  Nurses Note -- 4 Eyes      10/27/2024       Skin assessed on: Q Shift      [x] No Pressure Injuries Present    []Prevention Measures Documented    [] Yes LDA  for Pressure Injury Previously documented     [] Yes New Pressure Injury Discovered   [] LDA for New Pressure Injury Added      Attending RN:  Trini Mora RN     Second: ANKUSH Dominguez

## 2024-10-27 NOTE — ASSESSMENT & PLAN NOTE
Concerns for disseminated herpes zoster on the right lower extremity and hands. No face/ocular lesions or cutaneous lesions.     - Evaluated by ID, recommending Acyclovir IV -- dosing adjusted.  - continue IV acyclovir until lesions have crusted over per ID recommendations  - continue isolation precautions  - supportive care

## 2024-10-27 NOTE — SUBJECTIVE & OBJECTIVE
Interval History: Patient reports no new problems. Pain improving slowly. No fever or chills.    Review of Systems   Skin:  Positive for rash.   All other systems reviewed and are negative.    Objective:     Vital Signs (Most Recent):  Temp: 98 °F (36.7 °C) (10/27/24 0832)  Pulse: 88 (10/27/24 0832)  Resp: 18 (10/27/24 0832)  BP: 132/81 (10/27/24 0832)  SpO2: 97 % (10/27/24 0832) Vital Signs (24h Range):  Temp:  [97.7 °F (36.5 °C)-98.5 °F (36.9 °C)] 98 °F (36.7 °C)  Pulse:  [87-95] 88  Resp:  [16-18] 18  SpO2:  [97 %-98 %] 97 %  BP: (113-132)/(65-82) 132/81     Weight: 104.3 kg (230 lb)  Body mass index is 34.97 kg/m².    Estimated Creatinine Clearance: 114.8 mL/min (based on SCr of 0.7 mg/dL).     Physical Exam  Vitals and nursing note reviewed.   Constitutional:       General: She is not in acute distress.     Appearance: Normal appearance.   HENT:      Head: Normocephalic and atraumatic.      Mouth/Throat:      Mouth: Mucous membranes are moist.   Skin:     Findings: Rash present.   Neurological:      General: No focal deficit present.      Mental Status: She is alert.   Psychiatric:         Mood and Affect: Mood normal.         Behavior: Behavior normal.         Thought Content: Thought content normal.         Judgment: Judgment normal.          Significant Labs: Blood Culture:   Recent Labs   Lab 10/24/24  0525 10/24/24  0614   LABBLOO No Growth to date  No Growth to date  No Growth to date  No Growth to date No Growth to date  No Growth to date  No Growth to date  No Growth to date     BMP:   Recent Labs   Lab 10/27/24  0634         K 4.9      CO2 25   BUN 8   CREATININE 0.7   CALCIUM 9.2     CBC:   Recent Labs   Lab 10/26/24  0534 10/27/24  0634   WBC 6.01 7.69   HGB 10.8* 10.5*   HCT 34.1* 32.9*    345       Significant Imaging: I have reviewed all pertinent imaging results/findings within the past 24 hours.

## 2024-10-27 NOTE — PLAN OF CARE
Problem: Adult Inpatient Plan of Care  Goal: Plan of Care Review  Outcome: Progressing  Flowsheets (Taken 10/27/2024 0815)  Plan of Care Reviewed With: patient  Goal: Patient-Specific Goal (Individualized)  Outcome: Progressing     Problem: Wound  Goal: Optimal Coping  Outcome: Progressing  Intervention: Support Patient and Family Response  Flowsheets (Taken 10/27/2024 0815)  Supportive Measures:   active listening utilized   self-care encouraged   verbalization of feelings encouraged     Problem: Adult Inpatient Plan of Care  Goal: Plan of Care Review  Outcome: Progressing  Flowsheets (Taken 10/27/2024 0815)  Plan of Care Reviewed With: patient     Problem: Adult Inpatient Plan of Care  Goal: Plan of Care Review  Outcome: Progressing  Flowsheets (Taken 10/27/2024 0815)  Plan of Care Reviewed With: patient     Problem: Adult Inpatient Plan of Care  Goal: Patient-Specific Goal (Individualized)  Outcome: Progressing     Problem: Adult Inpatient Plan of Care  Goal: Patient-Specific Goal (Individualized)  Outcome: Progressing     Problem: Wound  Goal: Optimal Coping  Outcome: Progressing  Intervention: Support Patient and Family Response  Flowsheets (Taken 10/27/2024 0815)  Supportive Measures:   active listening utilized   self-care encouraged   verbalization of feelings encouraged     Problem: Wound  Goal: Optimal Coping  Outcome: Progressing     Problem: Wound  Goal: Optimal Coping  Intervention: Support Patient and Family Response  Flowsheets (Taken 10/27/2024 0815)  Supportive Measures:   active listening utilized   self-care encouraged   verbalization of feelings encouraged     Problem: Wound  Goal: Optimal Coping  Intervention: Support Patient and Family Response  Flowsheets (Taken 10/27/2024 0815)  Supportive Measures:   active listening utilized   self-care encouraged   verbalization of feelings encouraged

## 2024-10-27 NOTE — PROGRESS NOTES
[facility] Medicine  Progress Note    Patient Name: Thalia Rg  MRN: [id number]  Patient Class: IP- Inpatient   Admission Date: 10/24/2024  Length of Stay: 3 days  Attending Physician: Hany Herron DO  Primary Care Provider: Mac Gresham MD        Subjective:     Principal Problem:Disseminated varicella        HPI:  Mrs. Rg is a 53-year-old female with past medical history of bipolar disorder, autoimmune hepatitis, neuropathy and history of DVT and PE on Eliquis who presents to the emergency department for evaluation of rash that developed on her right lower extremity 2 days ago.  Patient initially noticed it on the side of her right thigh and her buttocks.  It started to spread and she reported it being painful.  She denies any fevers, chills, recent illness, respiratory symptoms.  She takes mercaptopurine for her autoimmune hepatitis.  No changes in her medications.  In the emergency department, there was concerns for disseminated herpes zoster.  She was started on acyclovir and ID consulted.  HSV labs sent off.  Also found to have DVT and right lower extremity.  She was on Eliquis at home.  It is unclear which lower extremity she had her DVT and previous and unable to discern if this is a new DVT.  She will be transitioned to Eliquis while inpatient.  She will be admitted to hospital medicine for further management.    Overview/Hospital Course:  Mrs. Rg is a 54 yo F admitted with disseminated zoster of the left lower extremity and hands. Started on IV acyclovir. Lesions cultured. Also found to have u/s + for DVT in LLE> She is on eliquis and compliant, does not miss doses. Unclear if this is same DVT from previous or new? Changed to xarelto loading dose. Plan to continue with IV acyclovir until lesions crust over per ID recommendations.    Interval History:  no acute issues, she is doing well. States the pain is better. Lesions appear stable, improving but not yet  crusted    Review of Systems   Constitutional:  Negative for fever.   Skin:  Positive for rash.   Neurological:  Negative for weakness and numbness.     Objective:     Vital Signs (Most Recent):  Temp: 97.9 °F (36.6 °C) (10/27/24 1242)  Pulse: 109 (10/27/24 1242)  Resp: 18 (10/27/24 1242)  BP: 134/77 (10/27/24 1242)  SpO2: 97 % (10/27/24 1242) Vital Signs (24h Range):  Temp:  [97.9 °F (36.6 °C)-98.5 °F (36.9 °C)] 97.9 °F (36.6 °C)  Pulse:  [] 109  Resp:  [16-18] 18  SpO2:  [97 %-98 %] 97 %  BP: (113-134)/(65-82) 134/77     Weight: 104.3 kg (230 lb)  Body mass index is 34.97 kg/m².    Intake/Output Summary (Last 24 hours) at 10/27/2024 1511  Last data filed at 10/27/2024 1301  Gross per 24 hour   Intake 642.86 ml   Output 2000 ml   Net -1357.14 ml         Physical Exam  Vitals and nursing note reviewed.   Constitutional:       General: She is not in acute distress.     Appearance: She is obese. She is not ill-appearing.   Cardiovascular:      Rate and Rhythm: Normal rate and regular rhythm.      Pulses: Normal pulses.   Pulmonary:      Effort: Pulmonary effort is normal. No respiratory distress.      Breath sounds: No wheezing.   Abdominal:      General: Bowel sounds are normal. There is no distension.   Skin:     Findings: Lesion and rash present.      Comments: Right lower extremity mild swelling with diffuse pustular like lesions with erythematous base.  Appear less red today.  No burst lesions   Neurological:      General: No focal deficit present.      Mental Status: She is alert and oriented to person, place, and time. Mental status is at baseline.   Psychiatric:         Mood and Affect: Mood normal.         Thought Content: Thought content normal.             Significant Labs: All pertinent labs within the past 24 hours have been reviewed.    Significant Imaging: I have reviewed all pertinent imaging results/findings within the past 24 hours.    Assessment/Plan:      * Disseminated varicella  - ID  consulted  - continue with IV acyclovir       Rash/skin eruption  Concerns for disseminated herpes zoster on the right lower extremity and hands. No face/ocular lesions or cutaneous lesions.     - Evaluated by ID, recommending Acyclovir IV -- dosing adjusted.  - continue IV acyclovir until lesions have crusted over per ID recommendations  - continue isolation precautions  - supportive care        Acute DVT (deep venous thrombosis)  RLE - U/s with Dvt noted in one of the tibial veins  - taking eliquis consistenlty with no missed doses at home  - Unclear where original DVT is -- she is bedbound so this is likely etiology  - Change to loading dose Xarelto      Impaired gait and mobility  At baseline      Autoimmune hepatitis  - on mercaptopurine      Psychiatric illness  - resume home geodon      Class 1 obesity with body mass index (BMI) of 34.0 to 34.9 in adult  Body mass index is 34.97 kg/m². Morbid obesity complicates all aspects of disease management from diagnostic modalities to treatment. Weight loss encouraged and health benefits explained to patient.           VTE Risk Mitigation (From admission, onward)           Ordered     rivaroxaban tablet 15 mg  2 times daily with meals         10/24/24 1321     Reason for No Pharmacological VTE Prophylaxis  Once        Question:  Reasons:  Answer:  Already adequately anticoagulated on oral Anticoagulants    10/24/24 1321     IP VTE HIGH RISK PATIENT  Once         10/24/24 1321     Place sequential compression device  Until discontinued         10/24/24 1321                    Discharge Planning   CHITRA:      Code Status: Full Code   Is the patient medically ready for discharge?:     Reason for patient still in hospital (select all that apply): Patient trending condition, Treatment, and Consult recommendations  Discharge Plan A: Home Health                  Hany Herron DO  Department of Hospital Medicine   [facility] [place] [facility] Surg

## 2024-10-27 NOTE — NURSING
[facility], [facility]  Nurses Note -- 4 Eyes      10/26/2024       Skin assessed on: Q Shift      [x] No Pressure Injuries Present    [x]Prevention Measures Documented    [] Yes LDA  for Pressure Injury Previously documented     [] Yes New Pressure Injury Discovered   [] LDA for New Pressure Injury Added      Attending RN:  Tasha Rojas RN     Second RN:  Trini Mora Rn

## 2024-10-27 NOTE — PLAN OF CARE
Patient alert and oriented x4. Respirations even and unlabored. No distress noted. Skin warm and dry. Rash noted to skin. Scattered over bilateral legs. Mostly to right leg. Wound to right buttocks. Wound similar in appearance to wounds to right leg. Mepilex foam dressing in place. Iv saline locked. No complications noted. Iv acyclovir given as ordered. Patient denies pain. Pain medication available as needed. Purewick in place. No complications noted. Frequent weight shift assistance throughout shift. Patient has no complaints at this time. Bed in lowest position. Call bell within reach. Bed alarms audible. Airborne and contact precautions maintained throughout shift.       Problem: Adult Inpatient Plan of Care  Goal: Plan of Care Review  Outcome: Progressing  Flowsheets (Taken 10/27/2024 0634)  Plan of Care Reviewed With: patient  Goal: Patient-Specific Goal (Individualized)  Outcome: Progressing  Goal: Absence of Hospital-Acquired Illness or Injury  Outcome: Progressing  Intervention: Identify and Manage Fall Risk  Flowsheets (Taken 10/27/2024 0634)  Safety Promotion/Fall Prevention:   assistive device/personal item within reach   bed alarm set   high risk medications identified   Fall Risk reviewed with patient/family     Problem: Skin Injury Risk Increased  Goal: Skin Health and Integrity  Outcome: Progressing  Intervention: Optimize Skin Protection  Flowsheets (Taken 10/27/2024 0634)  Activity Management: Rolling - L1  Head of Bed (HOB) Positioning: HOB elevated

## 2024-10-27 NOTE — ASSESSMENT & PLAN NOTE
Mrs. 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 She appears to be stable on IV ACV and is feeling a little better. She will require IV ACV until ~75% of her lesions have scabbed over. Given her immunocompromised status, this may require days to a week or more. Slowly improving but vessicles still moist.    Recommendations  Continue IV ACV until zoster has scabbed over  Monitor renal function on IV ACV  Discussed with  And Mrs. Rg

## 2024-10-28 LAB
BACTERIA BLD CULT: NORMAL
BACTERIA BLD CULT: NORMAL

## 2024-10-28 PROCEDURE — 25000003 PHARM REV CODE 250: Performed by: STUDENT IN AN ORGANIZED HEALTH CARE EDUCATION/TRAINING PROGRAM

## 2024-10-28 PROCEDURE — 25000003 PHARM REV CODE 250: Performed by: EMERGENCY MEDICINE

## 2024-10-28 PROCEDURE — 27000207 HC ISOLATION

## 2024-10-28 PROCEDURE — 11000001 HC ACUTE MED/SURG PRIVATE ROOM

## 2024-10-28 PROCEDURE — 99233 SBSQ HOSP IP/OBS HIGH 50: CPT | Mod: ,,, | Performed by: INTERNAL MEDICINE

## 2024-10-28 PROCEDURE — 63600175 PHARM REV CODE 636 W HCPCS: Performed by: EMERGENCY MEDICINE

## 2024-10-28 RX ADMIN — ACYCLOVIR SODIUM 640 MG: 50 INJECTION, SOLUTION INTRAVENOUS at 08:10

## 2024-10-28 RX ADMIN — RIVAROXABAN 15 MG: 15 TABLET, FILM COATED ORAL at 05:10

## 2024-10-28 RX ADMIN — MERCAPTOPURINE 100 MG: 50 TABLET ORAL at 08:10

## 2024-10-28 RX ADMIN — ACYCLOVIR SODIUM 640 MG: 50 INJECTION, SOLUTION INTRAVENOUS at 04:10

## 2024-10-28 RX ADMIN — FOLIC ACID 1 MG: 1 TABLET ORAL at 08:10

## 2024-10-28 RX ADMIN — ACYCLOVIR SODIUM 640 MG: 50 INJECTION, SOLUTION INTRAVENOUS at 12:10

## 2024-10-28 RX ADMIN — ZIPRASIDONE HYDROCHLORIDE 20 MG: 20 CAPSULE ORAL at 08:10

## 2024-10-28 RX ADMIN — RIVAROXABAN 15 MG: 15 TABLET, FILM COATED ORAL at 08:10

## 2024-10-28 NOTE — NURSING
[facility], [facility]  Nurses Note -- 4 Eyes      10/28/2024       Skin assessed on: Q Shift      [x] No Pressure Injuries Present    []Prevention Measures Documented    [] Yes LDA  for Pressure Injury Previously documented     [] Yes New Pressure Injury Discovered   [] LDA for New Pressure Injury Added      Attending RN:  Mirna Malave LPN     Second RN:  SARATH Cordova

## 2024-10-28 NOTE — ASSESSMENT & PLAN NOTE
Mrs. 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 She appears to be stable on IV ACV and is feeling a little better. She will require IV ACV until ~75% of her lesions have scabbed over. Given her immunocompromised status, this may require days to a week or more. Slowly improving but vessicles still moist.    Recommendations  Continue IV ACV until zoster has scabbed over - maybe another 24-48h?  Monitor renal function on IV ACV  Discussed with Mrs. Rg

## 2024-10-28 NOTE — PLAN OF CARE
Changes in medical condition or discharge plan:    Pt continuing on IV acyclovir until zoster has scabbed over. Discharge plan remains home with family.    Does patient need new DME? TBD    Follow up appts needed: PCP    Medically stable: not at this time    Estimated Discharge Date: TBD     10/28/24 0931   Discharge Reassessment   Assessment Type Discharge Planning Reassessment   Did the patient's condition or plan change since previous assessment? No   Discharge Plan discussed with: Patient   Communicated CHITRA with patient/caregiver Date not available/Unable to determine   Discharge Plan A Home with family   Discharge Plan B Home   DME Needed Upon Discharge  other (see comments)  (TBD)   Transition of Care Barriers Mobility   Why the patient remains in the hospital Requires continued medical care   Post-Acute Status   Coverage PHN   Discharge Delays None known at this time

## 2024-10-28 NOTE — PROGRESS NOTES
[facility] Medicine  Progress Note    Patient Name: Thalia Rg  MRN: [id number]  Patient Class: IP- Inpatient   Admission Date: 10/24/2024  Length of Stay: 4 days  Attending Physician: Hany Herron DO  Primary Care Provider: Mac Gresham MD        Subjective:     Principal Problem:Disseminated varicella        HPI:  Mrs. Rg is a 53-year-old female with past medical history of bipolar disorder, autoimmune hepatitis, neuropathy and history of DVT and PE on Eliquis who presents to the emergency department for evaluation of rash that developed on her right lower extremity 2 days ago.  Patient initially noticed it on the side of her right thigh and her buttocks.  It started to spread and she reported it being painful.  She denies any fevers, chills, recent illness, respiratory symptoms.  She takes mercaptopurine for her autoimmune hepatitis.  No changes in her medications.  In the emergency department, there was concerns for disseminated herpes zoster.  She was started on acyclovir and ID consulted.  HSV labs sent off.  Also found to have DVT and right lower extremity.  She was on Eliquis at home.  It is unclear which lower extremity she had her DVT and previous and unable to discern if this is a new DVT.  She will be transitioned to Eliquis while inpatient.  She will be admitted to hospital medicine for further management.    Overview/Hospital Course:  Mrs. Rg is a 54 yo F admitted with disseminated zoster of the left lower extremity and hands. Started on IV acyclovir. Lesions cultured. Also found to have u/s + for DVT in LLE> She is on eliquis and compliant, does not miss doses. Unclear if this is same DVT from previous or new? Changed to xarelto loading dose. Plan to continue with IV acyclovir until lesions crust over per ID recommendations.    Interval History:  no acute issues, she is doing well. States the pain is better. Lesions appear stable, improving but not yet  crusted    Review of Systems   Constitutional:  Negative for fever.   Skin:  Positive for rash.   Neurological:  Negative for weakness and numbness.     Objective:     Vital Signs (Most Recent):  Temp: 98.8 °F (37.1 °C) (10/28/24 1141)  Pulse: 83 (10/28/24 1141)  Resp: 18 (10/28/24 1141)  BP: 124/89 (10/28/24 1141)  SpO2: 99 % (10/28/24 1141) Vital Signs (24h Range):  Temp:  [97 °F (36.1 °C)-98.8 °F (37.1 °C)] 98.8 °F (37.1 °C)  Pulse:  [83-99] 83  Resp:  [17-20] 18  SpO2:  [96 %-100 %] 99 %  BP: (120-140)/(74-89) 124/89     Weight: 104.3 kg (230 lb)  Body mass index is 34.97 kg/m².    Intake/Output Summary (Last 24 hours) at 10/28/2024 1543  Last data filed at 10/28/2024 1328  Gross per 24 hour   Intake 762.68 ml   Output 1650 ml   Net -887.32 ml         Physical Exam  Vitals and nursing note reviewed.   Constitutional:       General: She is not in acute distress.     Appearance: She is obese. She is not ill-appearing.   Cardiovascular:      Rate and Rhythm: Normal rate and regular rhythm.      Pulses: Normal pulses.   Pulmonary:      Effort: Pulmonary effort is normal. No respiratory distress.      Breath sounds: No wheezing.   Abdominal:      General: Bowel sounds are normal. There is no distension.   Skin:     Findings: Lesion and rash present.      Comments: Right lower extremity mild swelling with diffuse pustular like lesions with erythematous base.  Appear less red today.  No burst lesions   Neurological:      General: No focal deficit present.      Mental Status: She is alert and oriented to person, place, and time. Mental status is at baseline.   Psychiatric:         Mood and Affect: Mood normal.         Thought Content: Thought content normal.               Significant Labs: All pertinent labs within the past 24 hours have been reviewed.    Significant Imaging: I have reviewed all pertinent imaging results/findings within the past 24 hours.    Assessment/Plan:      * Disseminated varicella  - ID  consulted  - continue with IV acyclovir       Rash/skin eruption  Concerns for disseminated herpes zoster on the right lower extremity and hands. No face/ocular lesions or cutaneous lesions.     - Evaluated by ID, recommending Acyclovir IV -- dosing adjusted.  - continue IV acyclovir until lesions have crusted over per ID recommendations  - continue isolation precautions  - supportive care        Acute DVT (deep venous thrombosis)  RLE - U/s with Dvt noted in one of the tibial veins  - taking eliquis consistenlty with no missed doses at home  - Unclear where original DVT is -- she is bedbound so this is likely etiology  - Change to loading dose Xarelto      Impaired gait and mobility  At baseline      Autoimmune hepatitis  - on mercaptopurine      Psychiatric illness  - resume home geodon      Class 1 obesity with body mass index (BMI) of 34.0 to 34.9 in adult  Body mass index is 34.97 kg/m². Morbid obesity complicates all aspects of disease management from diagnostic modalities to treatment. Weight loss encouraged and health benefits explained to patient.           VTE Risk Mitigation (From admission, onward)           Ordered     rivaroxaban tablet 15 mg  2 times daily with meals         10/24/24 1321     Reason for No Pharmacological VTE Prophylaxis  Once        Question:  Reasons:  Answer:  Already adequately anticoagulated on oral Anticoagulants    10/24/24 1321     IP VTE HIGH RISK PATIENT  Once         10/24/24 1321     Place sequential compression device  Until discontinued         10/24/24 1321                    Discharge Planning   CHITRA:      Code Status: Full Code   Is the patient medically ready for discharge?:     Reason for patient still in hospital (select all that apply): Patient trending condition, Treatment, and Consult recommendations  Discharge Plan A: Home with family   Discharge Delays: None known at this time              Hany Herron DO  Department of Hospital Medicine   [facility]  Surg

## 2024-10-28 NOTE — PROGRESS NOTES
[facility]  Infectious Disease  Progress Note    Patient Name: Thalia Rg  MRN: [id number]  Admission Date: 10/24/2024  Length of Stay: 4 days  Attending Physician: Hany Herron DO  Primary Care Provider: Mac Gresham MD    Isolation Status: Airborne and Contact and Droplet  Assessment/Plan:      ID  * Disseminated varicella  Mrs. 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 She appears to be stable on IV ACV and is feeling a little better. She will require IV ACV until ~75% of her lesions have scabbed over. Given her immunocompromised status, this may require days to a week or more. Slowly improving but vessicles still moist.    Recommendations  Continue IV ACV until zoster has scabbed over - maybe another 24-48h?  Monitor renal function on IV ACV  Discussed with Mrs. Rg      Garfield Galindo MD  Infectious Disease  [facility] - Med Surg    Subjective:     Principal Problem:Disseminated varicella    HPI: Mrs. Rg is a pleasant 54 yo woman with bipolar disease, autoimmune hepatitis and PE (on Eliquis), and axonal sensory polyneuropathy who presents today for rash. She developed burning pain on her right leg two days ago that has not improved. At home, the pain was 10/10. In the ED, she was noted to have a vesicular rash to her right leg, possibly carey muti-dermatomal pattern - posterior thigh and anterior leg. Additionally, she has some erythematous lesions of her hand. She is admitted and started on IV ACV. ID is consulted for disseminated shingles in an immunocompromised patient (on monthly IgG for her neuropathy and on mercaptopurine for autoimmune hepatitis). Incidentally, she was found to have a DVT.    The patient reports chickenpox as a child. She did receive a Zoster vaccine in 2018.  Interval History: Feels well. Pain has completley resolved. No  complaints.    Review of Systems   Skin:  Positive for rash.   All other systems reviewed and are negative.    Objective:     Vital Signs (Most Recent):  Temp: 97.7 °F (36.5 °C) (10/28/24 0730)  Pulse: 96 (10/28/24 0730)  Resp: 20 (10/28/24 0730)  BP: 122/83 (10/28/24 0730)  SpO2: 96 % (10/28/24 0730) Vital Signs (24h Range):  Temp:  [97 °F (36.1 °C)-98.5 °F (36.9 °C)] 97.7 °F (36.5 °C)  Pulse:  [] 96  Resp:  [17-20] 20  SpO2:  [96 %-100 %] 96 %  BP: (120-140)/(74-83) 122/83     Weight: 104.3 kg (230 lb)  Body mass index is 34.97 kg/m².    Estimated Creatinine Clearance: 114.8 mL/min (based on SCr of 0.7 mg/dL).     Physical Exam  Vitals and nursing note reviewed.   Constitutional:       Appearance: Normal appearance.   HENT:      Head: Normocephalic and atraumatic.      Right Ear: External ear normal.      Left Ear: External ear normal.   Eyes:      Pupils: Pupils are equal, round, and reactive to light.   Neurological:      General: No focal deficit present.      Mental Status: She is alert.   Psychiatric:         Mood and Affect: Mood normal.         Thought Content: Thought content normal.         Judgment: Judgment normal.     Rash: improved     Significant Labs: Blood Culture:   Recent Labs   Lab 10/24/24  0525 10/24/24  0614   LABBLOO No Growth after 4 days. No Growth after 4 days.     CBC:   Recent Labs   Lab 10/27/24  0634   WBC 7.69   HGB 10.5*   HCT 32.9*        CMP:   Recent Labs   Lab 10/27/24  0634      K 4.9      CO2 25      BUN 8   CREATININE 0.7   CALCIUM 9.2   ANIONGAP 11     Pathology Results  (Last 10 years)                 01/12/22 1132  Liquid-Based Pap Smear, Screening Final result    Narrative:  Release to patient->Immediate               Significant Imaging: None

## 2024-10-28 NOTE — NURSING
Patient complaining of pain to iv site. Hand swollen and painful to touch. Iv infiltrated. Notified Dr. Garza at this time. Warm compress applied.

## 2024-10-28 NOTE — PLAN OF CARE
Problem: Skin Injury Risk Increased  Goal: Skin Health and Integrity  Outcome: Not Progressing  Intervention: Promote and Optimize Oral Intake  Flowsheets (Taken 10/28/2024 1734)  Oral Nutrition Promotion:   adaptive equipment use encouraged   rest periods promoted

## 2024-10-28 NOTE — NURSING
[facility], [facility]  Nurses Note -- 4 Eyes      10/27/24      Skin assessed on: Q Shift      [x] No Pressure Injuries Present    [x]Prevention Measures Documented    [] Yes LDA  for Pressure Injury Previously documented     [] Yes New Pressure Injury Discovered   [] LDA for New Pressure Injury Added      Attending RN:  Tasha Rojas RN     Second RN:  Trini Mora Rn

## 2024-10-28 NOTE — PLAN OF CARE
Patient alert and oriented x4. Respirations even and unlabored. No distress noted. Skin warm and dry. Rash Scattered over bilateral legs. Mostly to right leg. Open Wounds to right leg as well and to right buttocks. Wound similar in appearance to wounds to right leg. Mepilex foam dressing in place. Iv saline locked. No complications noted. Iv acyclovir given as ordered. Patient denies pain. Pain medication available as needed. Purewick in place. No complications noted. Frequent weight shift assistance throughout shift. Patient has no complaints at this time. Bed in lowest position. Call bell within reach. Bed alarms audible. Airborne and contact precautions maintained throughout shift.        Problem: Adult Inpatient Plan of Care  Goal: Plan of Care Review  10/28/2024 0351 by Tasha Rojas RN  Outcome: Progressing  Flowsheets (Taken 10/28/2024 0351)  Plan of Care Reviewed With: patient  10/28/2024 0343 by Tasha Rojas RN  Outcome: Progressing  Flowsheets (Taken 10/28/2024 0343)  Plan of Care Reviewed With: patient  Goal: Patient-Specific Goal (Individualized)  10/28/2024 0351 by Tasha Rojas RN  Outcome: Progressing  10/28/2024 0343 by Tasha Rojas RN  Outcome: Progressing  Goal: Absence of Hospital-Acquired Illness or Injury  Outcome: Progressing  Intervention: Identify and Manage Fall Risk  Flowsheets (Taken 10/28/2024 0351)  Safety Promotion/Fall Prevention:   assistive device/personal item within reach   bed alarm set   Fall Risk reviewed with patient/family   medications reviewed   nonskid shoes/socks when out of bed     Problem: Wound  Goal: Optimal Coping  10/28/2024 0351 by Tasha Rojas RN  Outcome: Progressing  10/28/2024 0343 by Tasha Rojas RN  Outcome: Progressing  Intervention: Support Patient and Family Response  Flowsheets (Taken 10/28/2024 0351)  Supportive Measures: active listening utilized  Goal: Skin Health and  Integrity  Outcome: Progressing     Problem: Skin Injury Risk Increased  Goal: Skin Health and Integrity  Outcome: Progressing  Intervention: Optimize Skin Protection  Flowsheets (Taken 10/28/2024 9032)  Pressure Reduction Techniques:   frequent weight shift encouraged   sit time limited to 2 hours  Activity Management: Rolling - L1  Head of Bed (HOB) Positioning: HOB elevated

## 2024-10-28 NOTE — SUBJECTIVE & OBJECTIVE
Interval History:  no acute issues, she is doing well. States the pain is better. Lesions appear stable, improving but not yet crusted    Review of Systems   Constitutional:  Negative for fever.   Skin:  Positive for rash.   Neurological:  Negative for weakness and numbness.     Objective:     Vital Signs (Most Recent):  Temp: 98.8 °F (37.1 °C) (10/28/24 1141)  Pulse: 83 (10/28/24 1141)  Resp: 18 (10/28/24 1141)  BP: 124/89 (10/28/24 1141)  SpO2: 99 % (10/28/24 1141) Vital Signs (24h Range):  Temp:  [97 °F (36.1 °C)-98.8 °F (37.1 °C)] 98.8 °F (37.1 °C)  Pulse:  [83-99] 83  Resp:  [17-20] 18  SpO2:  [96 %-100 %] 99 %  BP: (120-140)/(74-89) 124/89     Weight: 104.3 kg (230 lb)  Body mass index is 34.97 kg/m².    Intake/Output Summary (Last 24 hours) at 10/28/2024 1543  Last data filed at 10/28/2024 1328  Gross per 24 hour   Intake 762.68 ml   Output 1650 ml   Net -887.32 ml         Physical Exam  Vitals and nursing note reviewed.   Constitutional:       General: She is not in acute distress.     Appearance: She is obese. She is not ill-appearing.   Cardiovascular:      Rate and Rhythm: Normal rate and regular rhythm.      Pulses: Normal pulses.   Pulmonary:      Effort: Pulmonary effort is normal. No respiratory distress.      Breath sounds: No wheezing.   Abdominal:      General: Bowel sounds are normal. There is no distension.   Skin:     Findings: Lesion and rash present.      Comments: Right lower extremity mild swelling with diffuse pustular like lesions with erythematous base.  Appear less red today.  No burst lesions   Neurological:      General: No focal deficit present.      Mental Status: She is alert and oriented to person, place, and time. Mental status is at baseline.   Psychiatric:         Mood and Affect: Mood normal.         Thought Content: Thought content normal.               Significant Labs: All pertinent labs within the past 24 hours have been reviewed.    Significant Imaging: I have reviewed all  pertinent imaging results/findings within the past 24 hours.

## 2024-10-29 LAB
ANION GAP SERPL CALC-SCNC: 10 MMOL/L (ref 8–16)
BUN SERPL-MCNC: 10 MG/DL (ref 6–20)
CALCIUM SERPL-MCNC: 9.6 MG/DL (ref 8.7–10.5)
CHLORIDE SERPL-SCNC: 107 MMOL/L (ref 95–110)
CO2 SERPL-SCNC: 25 MMOL/L (ref 23–29)
CREAT SERPL-MCNC: 0.7 MG/DL (ref 0.5–1.4)
EST. GFR  (NO RACE VARIABLE): >60 ML/MIN/1.73 M^2
GLUCOSE SERPL-MCNC: 118 MG/DL (ref 70–110)
POTASSIUM SERPL-SCNC: 4.2 MMOL/L (ref 3.5–5.1)
SODIUM SERPL-SCNC: 142 MMOL/L (ref 136–145)

## 2024-10-29 PROCEDURE — 25000003 PHARM REV CODE 250: Performed by: EMERGENCY MEDICINE

## 2024-10-29 PROCEDURE — 36415 COLL VENOUS BLD VENIPUNCTURE: CPT | Performed by: STUDENT IN AN ORGANIZED HEALTH CARE EDUCATION/TRAINING PROGRAM

## 2024-10-29 PROCEDURE — 99223 1ST HOSP IP/OBS HIGH 75: CPT | Mod: ,,,

## 2024-10-29 PROCEDURE — 27000207 HC ISOLATION

## 2024-10-29 PROCEDURE — 25000003 PHARM REV CODE 250: Performed by: STUDENT IN AN ORGANIZED HEALTH CARE EDUCATION/TRAINING PROGRAM

## 2024-10-29 PROCEDURE — 11000001 HC ACUTE MED/SURG PRIVATE ROOM

## 2024-10-29 PROCEDURE — 63600175 PHARM REV CODE 636 W HCPCS: Performed by: EMERGENCY MEDICINE

## 2024-10-29 PROCEDURE — 99233 SBSQ HOSP IP/OBS HIGH 50: CPT | Mod: ,,, | Performed by: INTERNAL MEDICINE

## 2024-10-29 PROCEDURE — 80048 BASIC METABOLIC PNL TOTAL CA: CPT | Performed by: STUDENT IN AN ORGANIZED HEALTH CARE EDUCATION/TRAINING PROGRAM

## 2024-10-29 RX ADMIN — RIVAROXABAN 15 MG: 15 TABLET, FILM COATED ORAL at 09:10

## 2024-10-29 RX ADMIN — FOLIC ACID 1 MG: 1 TABLET ORAL at 09:10

## 2024-10-29 RX ADMIN — ZIPRASIDONE HYDROCHLORIDE 20 MG: 20 CAPSULE ORAL at 09:10

## 2024-10-29 RX ADMIN — ACYCLOVIR SODIUM 640 MG: 50 INJECTION, SOLUTION INTRAVENOUS at 04:10

## 2024-10-29 RX ADMIN — ACYCLOVIR SODIUM 640 MG: 50 INJECTION, SOLUTION INTRAVENOUS at 08:10

## 2024-10-29 RX ADMIN — MERCAPTOPURINE 100 MG: 50 TABLET ORAL at 09:10

## 2024-10-29 RX ADMIN — ZIPRASIDONE HYDROCHLORIDE 20 MG: 20 CAPSULE ORAL at 08:10

## 2024-10-29 RX ADMIN — ACYCLOVIR SODIUM 640 MG: 50 INJECTION, SOLUTION INTRAVENOUS at 12:10

## 2024-10-29 RX ADMIN — RIVAROXABAN 15 MG: 15 TABLET, FILM COATED ORAL at 04:10

## 2024-10-29 NOTE — SUBJECTIVE & OBJECTIVE
Interval History:  no acute issues, she is doing better. States the pain is well controlled. Lesions appear stable, improving. Continue to monitor     Review of Systems   Constitutional:  Negative for fever.   Skin:  Positive for rash.   Neurological:  Negative for weakness and numbness.     Objective:     Vital Signs (Most Recent):  Temp: 97.5 °F (36.4 °C) (10/29/24 1210)  Pulse: 89 (10/29/24 1210)  Resp: 18 (10/29/24 1210)  BP: (!) 141/84 (10/29/24 1210)  SpO2: 98 % (10/29/24 1210) Vital Signs (24h Range):  Temp:  [97.5 °F (36.4 °C)-98.8 °F (37.1 °C)] 97.5 °F (36.4 °C)  Pulse:  [81-99] 89  Resp:  [17-20] 18  SpO2:  [95 %-98 %] 98 %  BP: (123-156)/(75-91) 141/84     Weight: 104.3 kg (230 lb)  Body mass index is 34.97 kg/m².    Intake/Output Summary (Last 24 hours) at 10/29/2024 1444  Last data filed at 10/29/2024 1329  Gross per 24 hour   Intake 747.72 ml   Output 2000 ml   Net -1252.28 ml         Physical Exam  Vitals and nursing note reviewed.   Constitutional:       General: She is not in acute distress.     Appearance: She is obese. She is not ill-appearing.   Cardiovascular:      Rate and Rhythm: Normal rate and regular rhythm.      Pulses: Normal pulses.   Pulmonary:      Effort: Pulmonary effort is normal. No respiratory distress.      Breath sounds: No wheezing.   Abdominal:      General: Bowel sounds are normal. There is no distension.   Skin:     Findings: Lesion and rash present.      Comments: Right lower extremity mild swelling with diffuse pustular like lesions with erythematous base.  Appear less red today.  No burst lesions   Neurological:      General: No focal deficit present.      Mental Status: She is alert and oriented to person, place, and time. Mental status is at baseline.   Psychiatric:         Mood and Affect: Mood normal.         Thought Content: Thought content normal.     10/28/24 pictures          Significant Labs: All pertinent labs within the past 24 hours have been  reviewed.    Significant Imaging: I have reviewed all pertinent imaging results/findings within the past 24 hours.

## 2024-10-29 NOTE — PLAN OF CARE
Patient alert and orientedx4. Respirations even and unlabored. No distress noted. Skin warm and dry. Iv saline locked. No complications noted. Iv acyclovir given as ordered. Excoriation to skin folds. Rash Scattered over bilateral legs. Mostly to right leg. Open Wounds to right leg as well and to right buttocks. Wound similar in appearance to wounds to right leg. Mepilex foam dressing in place.  Patient refuses foot boots. Patient denies pain. Pain medication available as needed. Purewick in place. No complications noted. Frequent weight shift assistance throughout shift. Patient has no complaints at this time. Bed in lowest position. Call bell within reach. Bed alarms audible. Airborne and contact precautions maintained throughout shift.        Problem: Adult Inpatient Plan of Care  Goal: Plan of Care Review  Outcome: Progressing  Flowsheets (Taken 10/29/2024 0408)  Plan of Care Reviewed With: patient  Goal: Patient-Specific Goal (Individualized)  Outcome: Progressing  Goal: Absence of Hospital-Acquired Illness or Injury  Outcome: Progressing  Intervention: Identify and Manage Fall Risk  Flowsheets (Taken 10/29/2024 0408)  Safety Promotion/Fall Prevention:   assistive device/personal item within reach   bed alarm set   Fall Risk signage in place   Fall Risk reviewed with patient/family   high risk medications identified     Problem: Wound  Goal: Optimal Coping  Outcome: Progressing  Goal: Absence of Infection Signs and Symptoms  Outcome: Progressing  Intervention: Prevent or Manage Infection  Flowsheets (Taken 10/29/2024 0408)  Isolation Precautions:   precautions maintained   airborne   droplet   contact  Goal: Skin Health and Integrity  Outcome: Progressing  Intervention: Optimize Skin Protection  Flowsheets (Taken 10/29/2024 0408)  Pressure Reduction Techniques:   frequent weight shift encouraged   sit time limited to 2 hours   weight shift assistance provided  Activity Management: Rolling - L1  Head of Bed  (HOB) Positioning: HOB elevated

## 2024-10-29 NOTE — SUBJECTIVE & OBJECTIVE
"Interval History: Doing great. No pain. ROS negative.    Review of Systems   Skin:  Positive for rash.   All other systems reviewed and are negative.    Objective:     Vital Signs (Most Recent):  Temp: 97.5 °F (36.4 °C) (10/29/24 0735)  Pulse: 81 (10/29/24 0735)  Resp: 18 (10/29/24 0735)  BP: 128/84 (10/29/24 0735)  SpO2: 98 % (10/29/24 0735) Vital Signs (24h Range):  Temp:  [97.5 °F (36.4 °C)-98.8 °F (37.1 °C)] 97.5 °F (36.4 °C)  Pulse:  [81-99] 81  Resp:  [17-20] 18  SpO2:  [95 %-99 %] 98 %  BP: (123-156)/(75-91) 128/84     Weight: 104.3 kg (230 lb)  Body mass index is 34.97 kg/m².    Estimated Creatinine Clearance: 114.8 mL/min (based on SCr of 0.7 mg/dL).     Physical Exam  Vitals and nursing note reviewed.   Constitutional:       General: She is not in acute distress.     Appearance: Normal appearance. She is not ill-appearing, toxic-appearing or diaphoretic.   HENT:      Head: Normocephalic and atraumatic.   Eyes:      Extraocular Movements: Extraocular movements intact.      Pupils: Pupils are equal, round, and reactive to light.   Skin:     Findings: Rash present.      Comments: Some vessicles still maturing but no new lesions   Neurological:      General: No focal deficit present.      Mental Status: She is alert and oriented to person, place, and time.   Psychiatric:         Mood and Affect: Mood normal.         Behavior: Behavior normal.         Thought Content: Thought content normal.         Judgment: Judgment normal.          Significant Labs: BMP:   Recent Labs   Lab 10/29/24  0650   *      K 4.2      CO2 25   BUN 10   CREATININE 0.7   CALCIUM 9.6     CBC: No results for input(s): "WBC", "HGB", "HCT", "PLT" in the last 48 hours.    Significant Imaging: I have reviewed all pertinent imaging results/findings within the past 24 hours.  "

## 2024-10-29 NOTE — PROGRESS NOTES
[facility] Medicine  Progress Note    Patient Name: Thalia Rg  MRN: [id number]  Patient Class: IP- Inpatient   Admission Date: 10/24/2024  Length of Stay: 5 days  Attending Physician: Hany Herron DO  Primary Care Provider: Mac Gresham MD        Subjective:     Principal Problem:Disseminated varicella        HPI:  Mrs. Rg is a 53-year-old female with past medical history of bipolar disorder, autoimmune hepatitis, neuropathy and history of DVT and PE on Eliquis who presents to the emergency department for evaluation of rash that developed on her right lower extremity 2 days ago.  Patient initially noticed it on the side of her right thigh and her buttocks.  It started to spread and she reported it being painful.  She denies any fevers, chills, recent illness, respiratory symptoms.  She takes mercaptopurine for her autoimmune hepatitis.  No changes in her medications.  In the emergency department, there was concerns for disseminated herpes zoster.  She was started on acyclovir and ID consulted.  HSV labs sent off.  Also found to have DVT and right lower extremity.  She was on Eliquis at home.  It is unclear which lower extremity she had her DVT and previous and unable to discern if this is a new DVT.  She will be transitioned to Eliquis while inpatient.  She will be admitted to hospital medicine for further management.    Overview/Hospital Course:  Mrs. Rg is a 52 yo F admitted with disseminated zoster of the left lower extremity and hands. Started on IV acyclovir. Lesions cultured. Also found to have u/s + for DVT in LLE> She is on eliquis and compliant, does not miss doses. Unclear if this is same DVT from previous or new? Changed to xarelto loading dose. Plan to continue with IV acyclovir until lesions crust over per ID recommendations.    Interval History:  no acute issues, she is doing better. States the pain is well controlled. Lesions appear stable, improving. Continue to  monitor     Review of Systems   Constitutional:  Negative for fever.   Skin:  Positive for rash.   Neurological:  Negative for weakness and numbness.     Objective:     Vital Signs (Most Recent):  Temp: 97.5 °F (36.4 °C) (10/29/24 1210)  Pulse: 89 (10/29/24 1210)  Resp: 18 (10/29/24 1210)  BP: (!) 141/84 (10/29/24 1210)  SpO2: 98 % (10/29/24 1210) Vital Signs (24h Range):  Temp:  [97.5 °F (36.4 °C)-98.8 °F (37.1 °C)] 97.5 °F (36.4 °C)  Pulse:  [81-99] 89  Resp:  [17-20] 18  SpO2:  [95 %-98 %] 98 %  BP: (123-156)/(75-91) 141/84     Weight: 104.3 kg (230 lb)  Body mass index is 34.97 kg/m².    Intake/Output Summary (Last 24 hours) at 10/29/2024 1444  Last data filed at 10/29/2024 1329  Gross per 24 hour   Intake 747.72 ml   Output 2000 ml   Net -1252.28 ml         Physical Exam  Vitals and nursing note reviewed.   Constitutional:       General: She is not in acute distress.     Appearance: She is obese. She is not ill-appearing.   Cardiovascular:      Rate and Rhythm: Normal rate and regular rhythm.      Pulses: Normal pulses.   Pulmonary:      Effort: Pulmonary effort is normal. No respiratory distress.      Breath sounds: No wheezing.   Abdominal:      General: Bowel sounds are normal. There is no distension.   Skin:     Findings: Lesion and rash present.      Comments: Right lower extremity mild swelling with diffuse pustular like lesions with erythematous base.  Appear less red today.  No burst lesions   Neurological:      General: No focal deficit present.      Mental Status: She is alert and oriented to person, place, and time. Mental status is at baseline.   Psychiatric:         Mood and Affect: Mood normal.         Thought Content: Thought content normal.     10/28/24 pictures          Significant Labs: All pertinent labs within the past 24 hours have been reviewed.    Significant Imaging: I have reviewed all pertinent imaging results/findings within the past 24 hours.    Assessment/Plan:      * Disseminated  varicella  - ID consulted  - continue with IV acyclovir       Rash/skin eruption  Concerns for disseminated herpes zoster on the right lower extremity and hands. No face/ocular lesions or cutaneous lesions.     - Evaluated by ID, recommending Acyclovir IV -- dosing adjusted.  - continue IV acyclovir until lesions have crusted over per ID recommendations  - continue isolation precautions  - supportive care        Acute DVT (deep venous thrombosis)  RLE - U/s with Dvt noted in one of the tibial veins  - taking eliquis consistenlty with no missed doses at home  - Unclear where original DVT is -- she is bedbound so this is likely etiology  - Change to loading dose Xarelto      Impaired gait and mobility  At baseline      Autoimmune hepatitis  - on mercaptopurine      Psychiatric illness  - resume home geodon      Class 1 obesity with body mass index (BMI) of 34.0 to 34.9 in adult  Body mass index is 34.97 kg/m². Morbid obesity complicates all aspects of disease management from diagnostic modalities to treatment. Weight loss encouraged and health benefits explained to patient.           VTE Risk Mitigation (From admission, onward)           Ordered     rivaroxaban tablet 15 mg  2 times daily with meals         10/24/24 1321     Reason for No Pharmacological VTE Prophylaxis  Once        Question:  Reasons:  Answer:  Already adequately anticoagulated on oral Anticoagulants    10/24/24 1321     IP VTE HIGH RISK PATIENT  Once         10/24/24 1321     Place sequential compression device  Until discontinued         10/24/24 1321                    Discharge Planning   CHITRA: 10/31/2024     Code Status: Full Code   Is the patient medically ready for discharge?:     Reason for patient still in hospital (select all that apply): Patient trending condition, Treatment, Consult recommendations, and PT / OT recommendations  Discharge Plan A: Home with family   Discharge Delays: None known at this time              Hany Herron,  DO  Department of Hospital Medicine   [facility] - Med Surg

## 2024-10-29 NOTE — NURSING
[facility]  Nurses Note -- 4 Eyes      10/29/2024       Skin assessed on: Q Shift      [x] No Pressure Injuries Present    [x]Prevention Measures Documented    [] Yes LDA  for Pressure Injury Previously documented     [] Yes New Pressure Injury Discovered   [] LDA for New Pressure Injury Added      Attending RN:  Mira Yi RN     Second RN:  SARATH Cordova
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
## 2024-10-29 NOTE — NURSING
[facility]  Nurses Note -- 4 Eyes      10/28/24      Skin assessed on: Q Shift      [x] No Pressure Injuries Present    [x]Prevention Measures Documented    [] Yes LDA  for Pressure Injury Previously documented     [] Yes New Pressure Injury Discovered   [] LDA for New Pressure Injury Added      Attending RN:  Tasha Rojas RN     Second RN:  Mirna Malave Lpn

## 2024-10-29 NOTE — PROGRESS NOTES
[facility] Surg  Infectious Disease  Progress Note    Patient Name: Thalia Rg  MRN: [id number]  Admission Date: 10/24/2024  Length of Stay: 5 days  Attending Physician: Hany Herron DO  Primary Care Provider: Mac Gresham MD    Isolation Status: Airborne and Contact and Droplet  Assessment/Plan:      ID  * Disseminated varicella  Mrs. 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 She appears to be stable on IV ACV and is feeling a little better. She will require IV ACV until ~75% of her lesions have scabbed over. Given her immunocompromised status, this may require days to a week or more. Slowly improving but vessicles still moist. No changes today.    Recommendations  Continue IV ACV until zoster has scabbed over - maybe another 24h?  Monitor renal function on IV ACV  Discussed with Mrs. Rg      Garfield Galindo MD  Infectious Disease  [facility] - Med Surg    Subjective:     Principal Problem:Disseminated varicella    HPI: Mrs. Rg is a pleasant 52 yo woman with bipolar disease, autoimmune hepatitis and PE (on Eliquis), and axonal sensory polyneuropathy who presents today for rash. She developed burning pain on her right leg two days ago that has not improved. At home, the pain was 10/10. In the ED, she was noted to have a vesicular rash to her right leg, possibly carey muti-dermatomal pattern - posterior thigh and anterior leg. Additionally, she has some erythematous lesions of her hand. She is admitted and started on IV ACV. ID is consulted for disseminated shingles in an immunocompromised patient (on monthly IgG for her neuropathy and on mercaptopurine for autoimmune hepatitis). Incidentally, she was found to have a DVT.    The patient reports chickenpox as a child. She did receive a Zoster vaccine in 2018.  Interval History: Doing great. No pain. ROS  "negative.    Review of Systems   Skin:  Positive for rash.   All other systems reviewed and are negative.    Objective:     Vital Signs (Most Recent):  Temp: 97.5 °F (36.4 °C) (10/29/24 0735)  Pulse: 81 (10/29/24 0735)  Resp: 18 (10/29/24 0735)  BP: 128/84 (10/29/24 0735)  SpO2: 98 % (10/29/24 0735) Vital Signs (24h Range):  Temp:  [97.5 °F (36.4 °C)-98.8 °F (37.1 °C)] 97.5 °F (36.4 °C)  Pulse:  [81-99] 81  Resp:  [17-20] 18  SpO2:  [95 %-99 %] 98 %  BP: (123-156)/(75-91) 128/84     Weight: 104.3 kg (230 lb)  Body mass index is 34.97 kg/m².    Estimated Creatinine Clearance: 114.8 mL/min (based on SCr of 0.7 mg/dL).     Physical Exam  Vitals and nursing note reviewed.   Constitutional:       General: She is not in acute distress.     Appearance: Normal appearance. She is not ill-appearing, toxic-appearing or diaphoretic.   HENT:      Head: Normocephalic and atraumatic.   Eyes:      Extraocular Movements: Extraocular movements intact.      Pupils: Pupils are equal, round, and reactive to light.   Skin:     Findings: Rash present.      Comments: Some vessicles still maturing but no new lesions   Neurological:      General: No focal deficit present.      Mental Status: She is alert and oriented to person, place, and time.   Psychiatric:         Mood and Affect: Mood normal.         Behavior: Behavior normal.         Thought Content: Thought content normal.         Judgment: Judgment normal.          Significant Labs: BMP:   Recent Labs   Lab 10/29/24  0650   *      K 4.2      CO2 25   BUN 10   CREATININE 0.7   CALCIUM 9.6     CBC: No results for input(s): "WBC", "HGB", "HCT", "PLT" in the last 48 hours.    Significant Imaging: I have reviewed all pertinent imaging results/findings within the past 24 hours.  "

## 2024-10-29 NOTE — PLAN OF CARE
Pt A&Ox4, free from falls/injury, and able to make needs known during shift. Pt had no c/o pain during shift. Airborne/contact/droplet isolation precautions continued per orders. No acute distress noted. Bed locked and in lowest position. Bedside table and call light in reach. Will cont to monitor.  Problem: Adult Inpatient Plan of Care  Goal: Plan of Care Review  Outcome: Progressing  Goal: Patient-Specific Goal (Individualized)  Outcome: Progressing  Goal: Absence of Hospital-Acquired Illness or Injury  Outcome: Progressing  Goal: Optimal Comfort and Wellbeing  Outcome: Progressing  Goal: Readiness for Transition of Care  Outcome: Progressing     Problem: Wound  Goal: Optimal Functional Ability  Outcome: Progressing  Goal: Optimal Pain Control and Function  Outcome: Progressing  Goal: Skin Health and Integrity  Outcome: Progressing  Goal: Optimal Wound Healing  Outcome: Progressing

## 2024-10-29 NOTE — ASSESSMENT & PLAN NOTE
Mrs. 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 She appears to be stable on IV ACV and is feeling a little better. She will require IV ACV until ~75% of her lesions have scabbed over. Given her immunocompromised status, this may require days to a week or more. Slowly improving but vessicles still moist. No changes today.    Recommendations  Continue IV ACV until zoster has scabbed over - maybe another 24h?  Monitor renal function on IV ACV  Discussed with Mrs. Rg

## 2024-10-30 LAB
ANION GAP SERPL CALC-SCNC: 12 MMOL/L (ref 8–16)
BUN SERPL-MCNC: 13 MG/DL (ref 6–20)
CALCIUM SERPL-MCNC: 9.8 MG/DL (ref 8.7–10.5)
CHLORIDE SERPL-SCNC: 110 MMOL/L (ref 95–110)
CO2 SERPL-SCNC: 20 MMOL/L (ref 23–29)
CREAT SERPL-MCNC: 0.8 MG/DL (ref 0.5–1.4)
EST. GFR  (NO RACE VARIABLE): >60 ML/MIN/1.73 M^2
GLUCOSE SERPL-MCNC: 124 MG/DL (ref 70–110)
POTASSIUM SERPL-SCNC: 4.6 MMOL/L (ref 3.5–5.1)
SODIUM SERPL-SCNC: 142 MMOL/L (ref 136–145)

## 2024-10-30 PROCEDURE — 36415 COLL VENOUS BLD VENIPUNCTURE: CPT | Performed by: STUDENT IN AN ORGANIZED HEALTH CARE EDUCATION/TRAINING PROGRAM

## 2024-10-30 PROCEDURE — 97162 PT EVAL MOD COMPLEX 30 MIN: CPT

## 2024-10-30 PROCEDURE — 11000001 HC ACUTE MED/SURG PRIVATE ROOM

## 2024-10-30 PROCEDURE — 80048 BASIC METABOLIC PNL TOTAL CA: CPT | Performed by: STUDENT IN AN ORGANIZED HEALTH CARE EDUCATION/TRAINING PROGRAM

## 2024-10-30 PROCEDURE — 63600175 PHARM REV CODE 636 W HCPCS: Performed by: EMERGENCY MEDICINE

## 2024-10-30 PROCEDURE — 99233 SBSQ HOSP IP/OBS HIGH 50: CPT | Mod: ,,, | Performed by: INTERNAL MEDICINE

## 2024-10-30 PROCEDURE — 97166 OT EVAL MOD COMPLEX 45 MIN: CPT

## 2024-10-30 PROCEDURE — 25000003 PHARM REV CODE 250: Performed by: EMERGENCY MEDICINE

## 2024-10-30 PROCEDURE — 25000003 PHARM REV CODE 250: Performed by: STUDENT IN AN ORGANIZED HEALTH CARE EDUCATION/TRAINING PROGRAM

## 2024-10-30 PROCEDURE — 97530 THERAPEUTIC ACTIVITIES: CPT

## 2024-10-30 PROCEDURE — 27000207 HC ISOLATION

## 2024-10-30 RX ADMIN — ZIPRASIDONE HYDROCHLORIDE 20 MG: 20 CAPSULE ORAL at 08:10

## 2024-10-30 RX ADMIN — RIVAROXABAN 15 MG: 15 TABLET, FILM COATED ORAL at 04:10

## 2024-10-30 RX ADMIN — ACYCLOVIR SODIUM 640 MG: 50 INJECTION, SOLUTION INTRAVENOUS at 11:10

## 2024-10-30 RX ADMIN — ACYCLOVIR SODIUM 640 MG: 50 INJECTION, SOLUTION INTRAVENOUS at 04:10

## 2024-10-30 RX ADMIN — RIVAROXABAN 15 MG: 15 TABLET, FILM COATED ORAL at 08:10

## 2024-10-30 RX ADMIN — ACYCLOVIR SODIUM 640 MG: 50 INJECTION, SOLUTION INTRAVENOUS at 08:10

## 2024-10-30 RX ADMIN — FOLIC ACID 1 MG: 1 TABLET ORAL at 08:10

## 2024-10-30 NOTE — PT/OT/SLP EVAL
Occupational Therapy Evaluation     Name: Thalia Rg  MRN: [id number]  Admitting Diagnosis: Disseminated varicella  Recent Surgery: * No surgery found *      Recommendations:     Discharge Recommendations: Moderate Intensity Therapy  Level of Assistance Recommended: 24 hours significant assistance  Discharge Equipment Recommendations: to be determined by next level of care  Barriers to discharge:  (generalized weakness and decline in function x5-6 months, requires assist with self care and functional mobility)    Assessment:     Thalia Rg is a 53 y.o. female with a medical diagnosis of Disseminated varicella. She presents with performance deficits affecting function including weakness, impaired endurance, impaired sensation, impaired self care skills, impaired functional mobility, impaired balance, edema, impaired skin, pain, decreased safety awareness, decreased lower extremity function, decreased upper extremity function.     Rehab Prognosis: Good and Fair; patient would benefit from acute OT services to address these deficits and reach maximum level of function.    Plan:     Patient to be seen  (3-5x/week) to address the above listed problems via therapeutic activities, therapeutic exercises, self-care/home management  Plan of Care Expires: 11/13/24  Plan of Care Reviewed with: patient, spouse    Subjective     Chief Complaint: dizziness on the EOB  Patient Comments/Goals: wants to walk  Pain/Comfort:  Pain Rating 1: 0/10    Patients cultural, spiritual, Quaker conflicts given the current situation: no    Social History:  Living Environment: Patient lives with their spouse in a 2 story home with number of inside stair(s): 16 and bed/bath on 1st  floor  Prior Level of Function: Prior to admission, patient  has been bed bound ~5-6 month . The  patient's spouse assists the patient with a sponge bath and to dress in bed. The patient used a diaper and a bed pan for toileting.   Roles and Routines: Patient  "was bed bound and has not left the house for 6 months prior to admission. The patient received medical appointments via Zoom.   Equipment Used at Home: walker, rolling, wheelchair, hospital bed, bedside commode, shower chair, grab bar, cane, straight  DME owned (not currently used): none  Assistance Upon Discharge: significant other    Objective:     Patient found HOB elevated with   upon OT entry to room.    General Precautions: Standard, fall, airborne, contact   Orthopedic Precautions: N/A   Braces: N/A    Respiratory Status: Room air    Occupational Performance    Gait belt applied - No    Bed Mobility:   Rolling/Turning to Left with contact guard assistance  Rolling/Turning to Right with contact guard assistance  Scooting to HOB in supine: minimum assistance, with drawsheet, and to scoot to the top of the bed in trendelenburg  Scooting anteriorly to EOB to have both feet planted on floor: moderate assistance and of 2 persons  Supine to sit from right side of bed with minimum assistance  Sit to Supine with dependence, of 2 persons, and 2* patient c/o dizziness on right side of bed    Functional Mobility/Transfers:  Functional Mobility: The patient sat on the EOB ~5 min with CGA<>SBA for sitting balance. The patient requested to return to supine 2* dizziness and "I'm going to pass out"    Activities of Daily Living:  Upper Body Dressing: maximal assistance  Lower Body Dressing: dependence    Cognitive/Visual Perceptual:  Cognitive/Psychosocial Skills:    -     Oriented to: Person, Place, Time, Situation  -     Follows Commands/attention: Follows two-step commands  -     Communication: clear/fluent  -     Memory: No Deficits noted  -     Safety awareness/insight to disability: impaired  -     Mood/Affect/Coping skills/emotional control: Appropriate to situation  Visual/Perceptual:    -     Intact    Physical Exam:  Balance:    -     Sitting: stand by assistance and contact guard assistance  Postural " "examination/scapula alignment:    -       Forward head  Skin integrity: multiple skin lesions 2* shinglees  Edema:  slight in B hands  Sensation:    -       Impaired  c/o neuropathy in B hands and feet  Dominant hand: Right  Upper Extremity Range of Motion:     -       Right Upper Extremity: WFL  -       Left Upper Extremity: WFL  Upper Extremity Strength:    -       Right Upper Extremity: WFL except shoulder weakness  -       Left Upper Extremity: WFL except shoulder weakness   Strength:    -       Right Upper Extremity: WFL  -       Left Upper Extremity: WFL    AMPAC 6 Click ADL:  AMPAC Total Score: 13    Treatment & Education:  Patient educated on role of OT, POC, and goals for therapy  Discussed OT goals. The patient states she "wants to walk".    Patient not clear to transfer with RN/PCT.    Patient left HOB elevated with all lines intact, call button in reach, and PT and spouse present.    GOALS:   Multidisciplinary Problems       Occupational Therapy Goals          Problem: Occupational Therapy    Goal Priority Disciplines Outcome Interventions   Occupational Therapy Goal     OT, PT/OT Progressing    Description: Goals to be met by: 11/13/2024     Patient will increase functional independence with ADLs by performing:    Feeding with Modified [place].  UE Dressing with Stand-by Assistance.  Grooming while EOB with Set-up Assistance and Supervision.  Sitting at edge of bed x30 minutes with Modified [place] and Supervision.  Rolling to Bilateral with Modified [place] and use of bedrail as needed.   Supine to sit with Contact Guard Assistance.  Stand pivot transfers: TBA  Upper extremity exercise program x15 reps per handout, with assistance as needed.                         History:     Past Medical History:   Diagnosis Date    Abnormal Pap smear of vagina     Autoimmune hepatitis     Axonal neuropathy 1/9/2023    Bipolar 1 disorder     Breast disorder          Past Surgical History: "   Procedure Laterality Date    LIVER BIOPSY         Time Tracking:     OT Date of Treatment: 10/30/24  OT Start Time: 1010  OT Stop Time: 1023  OT Total Time (min): 13 min    Billable Minutes: Evaluation 13 (with PT)    10/30/2024

## 2024-10-30 NOTE — PT/OT/SLP EVAL
"Physical Therapy Evaluation    Patient Name:  Thalia Rg   MRN:  [id number]    Recommendations:     Discharge Recommendations: Moderate Intensity Therapy   Discharge Equipment Recommendations: none   Barriers to discharge:  impaired functional mobility, impaired self care skills, decreased lower extremity function.    Assessment:     Thalia Rg is a 53 y.o. female admitted with a medical diagnosis of Disseminated varicella.  She presents with the following impairments/functional limitations: weakness, impaired endurance, impaired self care skills, impaired functional mobility, decreased lower extremity function, pain, decreased ROM, impaired skin.    Rehab Prognosis: Fair; patient would benefit from acute skilled PT services to address these deficits and reach maximum level of function.    Recent Surgery: * No surgery found *      Plan:     During this hospitalization, patient to be seen 5 x/week to address the identified rehab impairments via therapeutic activities, therapeutic exercises, neuromuscular re-education, wheelchair management/training and progress toward the following goals:    Plan of Care Expires:  11/13/24    Subjective     Chief Complaint: RLE pain  Patient/Family Comments/goals: Pt was agreeable for therapy. "I want to walk again"  Pain/Comfort:  Pain Rating 1: 0/10    Patients cultural, spiritual, Restoration conflicts given the current situation: no    Living Environment:  Pt lives w/ spouse in a 2 story house w/ no JESIKA. All needs are on the first floor. Pt was bed level for bathing and eating w/ assistance from .  Prior to admission, patients level of function was Sarika from spouse for mobility. Pt was last ambulating short distances w/ assistance 5-6 months ago and has seen a decline in function w/ pt becoming bed/wheelchair bound. Equipment used at home: bedside commode, shower chair, wheelchair, walker, rolling, grab bar, hospital bed.  DME owned (not currently used): none.  Upon " "discharge, patient will have assistance from spouse.    Objective:     Patient found HOB elevated with peripheral IV  upon PT entry to room. Pt was in a pleasant mood during session. Pt cognition WNL. Pt able to attend to all tasks and activities. Pt's  was present throughout session and assisted in history taking.    General Precautions: Standard, fall, droplet, contact, airborne  Orthopedic Precautions:N/A   Braces: N/A  Respiratory Status: Room air    Exams:  Cognitive Exam:  Patient is oriented to Person, Place, Time, and Situation  Postural Exam:  Patient presented with the following abnormalities:    -       Rounded shoulders  -       Forward head  Sensation:    -       Impaired light touch  Skin Integrity/Edema:      -       Skin integrity: Dry scabs and peeling of RLE  RLE ROM: Deficits: -10 degree of knee extension. Unable to Dorsiflex past neutral.  RLE Strength: Deficits: hip flexion- 3/5, Knee flexion/extension- 4/5  LLE ROM: WNL except unable to dorsiflex past neutral.  LLE Strength: WNL except hip flexion- 4/5.    Functional Mobility:  Bed Mobility:     Rolling Right: contact guard assistance  Scooting: moderate assistance x2 for anterior scooting. Sarika for scooting supine towards HOB utilizing BLE/UE w/ bed in trendelenburg position.   Supine to Sit: minimum assistance  Sit to Supine: dependence and of 2 persons  Balance: Pt demonstrated Good static sitting/dynamic balance. After ~5 minutes seated EOB pt complained of dizziness "I'm going to pass out if I stay up." Pt returned to supine quickly and symptoms terminated.    AM-PAC 6 CLICK MOBILITY  Total Score:12     Treatment & Education:  Pt educated on skilled acute care PT POC.  Pt educated on "call, don't fall" rule to reduce falls risk.  Pt instructed on BLE exercises: ankle pumps, SAQs, quad sets, glut sets. Pt verbalized and demonstrated understanding.  Pt educated on importance of exercise and activity to prevent muscle atrophy 2/2 " prolonged hospital stay.  Pt given and educated on green pressure relief boots to prevent heel pressure ulcer formation.     Patient left in R sidelying HOB elevated with pillow under L hip, green pressure relief boots applied, all lines intact, call button in reach, and  present.    GOALS:   Multidisciplinary Problems       Physical Therapy Goals          Problem: Physical Therapy    Goal Priority Disciplines Outcome Interventions   Physical Therapy Goal     PT, PT/OT     Description: Goals to be met by: 24     Patient will increase functional independence with mobility by performin. Supine to sit with Modified [place]  2. Sit to supine with Modified [place]  3. Rolling to Left and Right with Modified [place].  4. Sitting at edge of bed x10 minutes with Modified [place]  5. Lower extremity exercise program x20 reps per handout, with independence                         History:     Past Medical History:   Diagnosis Date    Abnormal Pap smear of vagina     Autoimmune hepatitis     Axonal neuropathy 2023    Bipolar 1 disorder     Breast disorder        Past Surgical History:   Procedure Laterality Date    LIVER BIOPSY         Time Tracking:     PT Received On: 10/30/24  PT Start Time: 1001     PT Stop Time: 1027  PT Total Time (min): 26 min     Billable Minutes: Evaluation 17 and Therapeutic Activity 9      10/30/2024

## 2024-10-30 NOTE — SUBJECTIVE & OBJECTIVE
Interval History:  no acute issues, she is doing better. States the pain is well controlled. Lesions appear stable, improving.  at bedside. Continue to monitor     Review of Systems   Constitutional:  Negative for fever.   Skin:  Positive for rash.   Neurological:  Negative for weakness and numbness.     Objective:     Vital Signs (Most Recent):  Temp: 97.6 °F (36.4 °C) (10/30/24 0753)  Pulse: 93 (10/30/24 0753)  Resp: 18 (10/30/24 0753)  BP: 120/80 (10/30/24 0753)  SpO2: 98 % (10/30/24 0753) Vital Signs (24h Range):  Temp:  [97.5 °F (36.4 °C)-98.4 °F (36.9 °C)] 97.6 °F (36.4 °C)  Pulse:  [] 93  Resp:  [17-18] 18  SpO2:  [97 %-99 %] 98 %  BP: (102-153)/(71-88) 120/80     Weight: 104.3 kg (230 lb)  Body mass index is 34.97 kg/m².    Intake/Output Summary (Last 24 hours) at 10/30/2024 1136  Last data filed at 10/30/2024 1100  Gross per 24 hour   Intake 653.93 ml   Output 3150 ml   Net -2496.07 ml         Physical Exam  Vitals and nursing note reviewed.   Constitutional:       General: She is not in acute distress.     Appearance: She is obese. She is not ill-appearing.   Cardiovascular:      Rate and Rhythm: Normal rate and regular rhythm.      Pulses: Normal pulses.   Pulmonary:      Effort: Pulmonary effort is normal. No respiratory distress.      Breath sounds: No wheezing.   Abdominal:      General: Bowel sounds are normal. There is no distension.   Skin:     Findings: Lesion and rash present.      Comments: Right lower extremity mild swelling with diffuse pustular like lesions with erythematous base.  Appear less red today.  No burst lesions   Neurological:      General: No focal deficit present.      Mental Status: She is alert and oriented to person, place, and time. Mental status is at baseline.   Psychiatric:         Mood and Affect: Mood normal.         Thought Content: Thought content normal.           Significant Labs: All pertinent labs within the past 24 hours have been  reviewed.    Significant Imaging: I have reviewed all pertinent imaging results/findings within the past 24 hours.

## 2024-10-30 NOTE — PLAN OF CARE
Problem: Physical Therapy  Goal: Physical Therapy Goal  Description: Goals to be met by: 24     Patient will increase functional independence with mobility by performin. Supine to sit with Modified [place]  2. Sit to supine with Modified [place]  3. Rolling to Left and Right with Modified [place].  4. Sitting at edge of bed x10 minutes with Modified [place]  5. Lower extremity exercise program x20 reps per handout, with independence    Outcome: Progressing

## 2024-10-30 NOTE — ASSESSMENT & PLAN NOTE
Mrs. 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 She appears to be stable on IV ACV and is feeling a little better. She will require IV ACV until ~75% of her lesions have scabbed over. Given her immunocompromised status, this may require days to a week or more. Slowly improving but vessicles still moist. No changes today.    Recommendations  Continue IV ACV until zoster has scabbed over - maybe another 24h?  If all okay in am, anticipate discharge in am on Valtrex 1 g po TID x 7 days  Discussed with Mrs. Rg

## 2024-10-30 NOTE — ASSESSMENT & PLAN NOTE
Concerns for disseminated herpes zoster on the right lower extremity and hands. No face/ocular lesions or cutaneous lesions.     - Evaluated by ID, recommending Acyclovir IV -- dosing adjusted.  - ID consulted: continue IV acyclovir   Continue IV ACV until zoster has scabbed over - maybe another 24h?  If all okay in am, anticipate discharge in am on Valtrex 1 g po TID x 7 days  - continue isolation precautions  - supportive care

## 2024-10-30 NOTE — PLAN OF CARE
CM called pt's cell phone and spoke to her . I informed him of SNF recommendation by therapy and explained SNF level of care to him. He stated he will talk to his wife about going to SNF when he arrives at the hospital this evening to visit with her. CM will follow up.

## 2024-10-30 NOTE — PROGRESS NOTES
[facility]  Infectious Disease  Progress Note    Patient Name: Thalia Rg  MRN: [id number]  Admission Date: 10/24/2024  Length of Stay: 6 days  Attending Physician: Hany Herron DO  Primary Care Provider: Mac Gresham MD    Isolation Status: Airborne and Contact and Droplet  Assessment/Plan:      ID  * Disseminated varicella  Mrs. 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 She appears to be stable on IV ACV and is feeling a little better. She will require IV ACV until ~75% of her lesions have scabbed over. Given her immunocompromised status, this may require days to a week or more. Slowly improving but vessicles still moist. No changes today.    Recommendations  Continue IV ACV until zoster has scabbed over - maybe another 24h?  If all okay in am, anticipate discharge in am on Valtrex 1 g po TID x 7 days  Discussed with Mrs. Rg      Garfield Galindo MD  Infectious Disease  [facility]    Subjective:     Principal Problem:Disseminated varicella    HPI: Mrs. Rg is a pleasant 54 yo woman with bipolar disease, autoimmune hepatitis and PE (on Eliquis), and axonal sensory polyneuropathy who presents today for rash. She developed burning pain on her right leg two days ago that has not improved. At home, the pain was 10/10. In the ED, she was noted to have a vesicular rash to her right leg, possibly carey muti-dermatomal pattern - posterior thigh and anterior leg. Additionally, she has some erythematous lesions of her hand. She is admitted and started on IV ACV. ID is consulted for disseminated shingles in an immunocompromised patient (on monthly IgG for her neuropathy and on mercaptopurine for autoimmune hepatitis). Incidentally, she was found to have a DVT.    The patient reports chickenpox as a child. She did receive a Zoster vaccine in  "2018.  Interval History: Doing well. No pain or complaints. Mr. Rg is present.    Review of Systems   Skin:  Positive for rash.   All other systems reviewed and are negative.    Objective:     Vital Signs (Most Recent):  Temp: 97.6 °F (36.4 °C) (10/30/24 0753)  Pulse: 93 (10/30/24 0753)  Resp: 18 (10/30/24 0753)  BP: 120/80 (10/30/24 0753)  SpO2: 98 % (10/30/24 0753) Vital Signs (24h Range):  Temp:  [97.5 °F (36.4 °C)-98.4 °F (36.9 °C)] 97.6 °F (36.4 °C)  Pulse:  [] 93  Resp:  [17-18] 18  SpO2:  [97 %-99 %] 98 %  BP: (102-153)/(71-88) 120/80     Weight: 104.3 kg (230 lb)  Body mass index is 34.97 kg/m².    Estimated Creatinine Clearance: 100.5 mL/min (based on SCr of 0.8 mg/dL).     Physical Exam  Vitals and nursing note reviewed.   Constitutional:       Appearance: Normal appearance.   HENT:      Head: Normocephalic and atraumatic.      Right Ear: External ear normal.      Left Ear: External ear normal.   Eyes:      Extraocular Movements: Extraocular movements intact.      Pupils: Pupils are equal, round, and reactive to light.   Skin:     Findings: Rash present.      Comments: No new lesions. Some flattened vessicles.   Neurological:      General: No focal deficit present.      Mental Status: She is alert and oriented to person, place, and time.   Psychiatric:         Mood and Affect: Mood normal.         Behavior: Behavior normal.         Thought Content: Thought content normal.         Judgment: Judgment normal.          Significant Labs: Blood Culture:   Recent Labs   Lab 10/24/24  0525 10/24/24  0614   LABBLOO No Growth after 4 days. No Growth after 4 days.     CBC: No results for input(s): "WBC", "HGB", "HCT", "PLT" in the last 48 hours.  CMP:   Recent Labs   Lab 10/29/24  0650 10/30/24  0415    142   K 4.2 4.6    110   CO2 25 20*   * 124*   BUN 10 13   CREATININE 0.7 0.8   CALCIUM 9.6 9.8   ANIONGAP 10 12       Significant Imaging: I have reviewed all pertinent imaging " results/findings within the past 24 hours.

## 2024-10-30 NOTE — PLAN OF CARE
CM received phone call from pt's . He stated he talked to his wife about SNF and they've decided they would prefer to bring her home with home health. Attending physician notified.    Notice given to pt's  by telephone. Certified copy to be mailed to home.   Tracking # [id number]   10/30/24 2850   Medicare Message   Important Message from Medicare regarding Discharge Appeal Rights Given to patient/caregiver;Explained to patient/caregiver;Signed/date by patient/caregiver   Date IMM was signed 10/30/24   Time IMM was signed 2043

## 2024-10-30 NOTE — PROGRESS NOTES
[facility] Medicine  Progress Note    Patient Name: Thalia Rg  MRN: [id number]  Patient Class: IP- Inpatient   Admission Date: 10/24/2024  Length of Stay: 6 days  Attending Physician: Hany Herron DO  Primary Care Provider: Mac Gresham MD        Subjective:     Principal Problem:Disseminated varicella        HPI:  Mrs. Rg is a 53-year-old female with past medical history of bipolar disorder, autoimmune hepatitis, neuropathy and history of DVT and PE on Eliquis who presents to the emergency department for evaluation of rash that developed on her right lower extremity 2 days ago.  Patient initially noticed it on the side of her right thigh and her buttocks.  It started to spread and she reported it being painful.  She denies any fevers, chills, recent illness, respiratory symptoms.  She takes mercaptopurine for her autoimmune hepatitis.  No changes in her medications.  In the emergency department, there was concerns for disseminated herpes zoster.  She was started on acyclovir and ID consulted.  HSV labs sent off.  Also found to have DVT and right lower extremity.  She was on Eliquis at home.  It is unclear which lower extremity she had her DVT and previous and unable to discern if this is a new DVT.  She will be transitioned to Eliquis while inpatient.  She will be admitted to hospital medicine for further management.    Overview/Hospital Course:  Mrs. Rg is a 52 yo F admitted with disseminated zoster of the left lower extremity and hands. Started on IV acyclovir. Lesions cultured. Also found to have u/s + for DVT in LLE> She is on eliquis and compliant, does not miss doses. Unclear if this is same DVT from previous or new? Changed to xarelto loading dose. Plan to continue with IV acyclovir until lesions crust over per ID recommendations.    Interval History:  no acute issues, she is doing better. States the pain is well controlled. Lesions appear stable, improving.  at  bedside. Continue to monitor     Review of Systems   Constitutional:  Negative for fever.   Skin:  Positive for rash.   Neurological:  Negative for weakness and numbness.     Objective:     Vital Signs (Most Recent):  Temp: 97.6 °F (36.4 °C) (10/30/24 0753)  Pulse: 93 (10/30/24 0753)  Resp: 18 (10/30/24 0753)  BP: 120/80 (10/30/24 0753)  SpO2: 98 % (10/30/24 0753) Vital Signs (24h Range):  Temp:  [97.5 °F (36.4 °C)-98.4 °F (36.9 °C)] 97.6 °F (36.4 °C)  Pulse:  [] 93  Resp:  [17-18] 18  SpO2:  [97 %-99 %] 98 %  BP: (102-153)/(71-88) 120/80     Weight: 104.3 kg (230 lb)  Body mass index is 34.97 kg/m².    Intake/Output Summary (Last 24 hours) at 10/30/2024 1136  Last data filed at 10/30/2024 1100  Gross per 24 hour   Intake 653.93 ml   Output 3150 ml   Net -2496.07 ml         Physical Exam  Vitals and nursing note reviewed.   Constitutional:       General: She is not in acute distress.     Appearance: She is obese. She is not ill-appearing.   Cardiovascular:      Rate and Rhythm: Normal rate and regular rhythm.      Pulses: Normal pulses.   Pulmonary:      Effort: Pulmonary effort is normal. No respiratory distress.      Breath sounds: No wheezing.   Abdominal:      General: Bowel sounds are normal. There is no distension.   Skin:     Findings: Lesion and rash present.      Comments: Right lower extremity mild swelling with diffuse pustular like lesions with erythematous base.  Appear less red today.  No burst lesions   Neurological:      General: No focal deficit present.      Mental Status: She is alert and oriented to person, place, and time. Mental status is at baseline.   Psychiatric:         Mood and Affect: Mood normal.         Thought Content: Thought content normal.           Significant Labs: All pertinent labs within the past 24 hours have been reviewed.    Significant Imaging: I have reviewed all pertinent imaging results/findings within the past 24 hours.    Assessment/Plan:      * Disseminated  varicella  - ID consulted: Continue IV ACV until zoster has scabbed over - maybe another 24h?  If all okay in am, anticipate discharge in am on Valtrex 1 g po TID x 7 days  - continue with IV acyclovir       Rash/skin eruption  Concerns for disseminated herpes zoster on the right lower extremity and hands. No face/ocular lesions or cutaneous lesions.     - Evaluated by ID, recommending Acyclovir IV -- dosing adjusted.  - ID consulted: continue IV acyclovir   Continue IV ACV until zoster has scabbed over - maybe another 24h?  If all okay in am, anticipate discharge in am on Valtrex 1 g po TID x 7 days  - continue isolation precautions  - supportive care        Acute DVT (deep venous thrombosis)  RLE - U/s with Dvt noted in one of the tibial veins  - taking eliquis consistenlty with no missed doses at home  - Unclear where original DVT is -- she is bedbound so this is likely etiology  - Change to loading dose Xarelto      Impaired gait and mobility  At baseline      Autoimmune hepatitis  - on mercaptopurine      Psychiatric illness  - resume home geodon      Class 1 obesity with body mass index (BMI) of 34.0 to 34.9 in adult  Body mass index is 34.97 kg/m². Morbid obesity complicates all aspects of disease management from diagnostic modalities to treatment. Weight loss encouraged and health benefits explained to patient.           VTE Risk Mitigation (From admission, onward)           Ordered     rivaroxaban tablet 15 mg  2 times daily with meals         10/24/24 1321     Reason for No Pharmacological VTE Prophylaxis  Once        Question:  Reasons:  Answer:  Already adequately anticoagulated on oral Anticoagulants    10/24/24 1321     IP VTE HIGH RISK PATIENT  Once         10/24/24 1321     Place sequential compression device  Until discontinued         10/24/24 1321                    Discharge Planning   CHITRA: 10/31/2024     Code Status: Full Code   Is the patient medically ready for discharge?:     Reason for  patient still in hospital (select all that apply): Patient trending condition, Treatment, and Consult recommendations  Discharge Plan A: Home with family   Discharge Delays: None known at this time              Hany Herron DO  Department of Hospital Medicine   [facility] Surg

## 2024-10-30 NOTE — NURSING
[facility] [place]  Nurses Note -- 4 Eyes      10/30/2024       Skin assessed on: Q Shift      [x] No Pressure Injuries Present    [x]Prevention Measures Documented    [] Yes LDA  for Pressure Injury Previously documented     [] Yes New Pressure Injury Discovered   [] LDA for New Pressure Injury Added      Attending RN:  Mira Yi RN     Second RN:  PEDRO Blackburn

## 2024-10-30 NOTE — NURSING
Pt resting in bed, no complaints of pain/discomfort. Scheduled medications given. Antibiotic infusing. Pt remains free from fall/injury. Vitals assessed. Pt remains on room air; no distress noted. Purewick in place. Isolation precautions maintained. Safety measures maintained. Will cont to monitor.

## 2024-10-30 NOTE — PLAN OF CARE
Problem: Occupational Therapy  Goal: Occupational Therapy Goal  Description: Goals to be met by: 11/13/2024     Patient will increase functional independence with ADLs by performing:    Feeding with Modified [place].  UE Dressing with Stand-by Assistance.  Grooming while EOB with Set-up Assistance and Supervision.  Sitting at edge of bed x30 minutes with Modified [place] and Supervision.  Rolling to Bilateral with Modified [place] and use of bedrail as needed.   Supine to sit with Contact Guard Assistance.  Stand pivot transfers: TBA  Upper extremity exercise program x15 reps per handout, with assistance as needed.    Outcome: Progressing     The patient will benefit from [facility]

## 2024-10-30 NOTE — ASSESSMENT & PLAN NOTE
- ID consulted: Continue IV ACV until zoster has scabbed over - maybe another 24h?  If all okay in am, anticipate discharge in am on Valtrex 1 g po TID x 7 days  - continue with IV acyclovir

## 2024-10-30 NOTE — SUBJECTIVE & OBJECTIVE
"Interval History: Doing well. No pain or complaints. Mr. Rg is present.    Review of Systems   Skin:  Positive for rash.   All other systems reviewed and are negative.    Objective:     Vital Signs (Most Recent):  Temp: 97.6 °F (36.4 °C) (10/30/24 0753)  Pulse: 93 (10/30/24 0753)  Resp: 18 (10/30/24 0753)  BP: 120/80 (10/30/24 0753)  SpO2: 98 % (10/30/24 0753) Vital Signs (24h Range):  Temp:  [97.5 °F (36.4 °C)-98.4 °F (36.9 °C)] 97.6 °F (36.4 °C)  Pulse:  [] 93  Resp:  [17-18] 18  SpO2:  [97 %-99 %] 98 %  BP: (102-153)/(71-88) 120/80     Weight: 104.3 kg (230 lb)  Body mass index is 34.97 kg/m².    Estimated Creatinine Clearance: 100.5 mL/min (based on SCr of 0.8 mg/dL).     Physical Exam  Vitals and nursing note reviewed.   Constitutional:       Appearance: Normal appearance.   HENT:      Head: Normocephalic and atraumatic.      Right Ear: External ear normal.      Left Ear: External ear normal.   Eyes:      Extraocular Movements: Extraocular movements intact.      Pupils: Pupils are equal, round, and reactive to light.   Skin:     Findings: Rash present.      Comments: No new lesions. Some flattened vessicles.   Neurological:      General: No focal deficit present.      Mental Status: She is alert and oriented to person, place, and time.   Psychiatric:         Mood and Affect: Mood normal.         Behavior: Behavior normal.         Thought Content: Thought content normal.         Judgment: Judgment normal.          Significant Labs: Blood Culture:   Recent Labs   Lab 10/24/24  0525 10/24/24  0614   LABBLOO No Growth after 4 days. No Growth after 4 days.     CBC: No results for input(s): "WBC", "HGB", "HCT", "PLT" in the last 48 hours.  CMP:   Recent Labs   Lab 10/29/24  0650 10/30/24  0415    142   K 4.2 4.6    110   CO2 25 20*   * 124*   BUN 10 13   CREATININE 0.7 0.8   CALCIUM 9.6 9.8   ANIONGAP 10 12       Significant Imaging: I have reviewed all pertinent imaging results/findings " within the past 24 hours.

## 2024-10-31 ENCOUNTER — TELEPHONE (OUTPATIENT)
Dept: FAMILY MEDICINE | Facility: CLINIC | Age: 54
End: 2024-10-31
Payer: MEDICARE

## 2024-10-31 VITALS
WEIGHT: 229.75 LBS | DIASTOLIC BLOOD PRESSURE: 80 MMHG | SYSTOLIC BLOOD PRESSURE: 114 MMHG | HEART RATE: 89 BPM | TEMPERATURE: 99 F | BODY MASS INDEX: 34.82 KG/M2 | HEIGHT: 68 IN | OXYGEN SATURATION: 96 % | RESPIRATION RATE: 18 BRPM

## 2024-10-31 LAB
ANION GAP SERPL CALC-SCNC: 11 MMOL/L (ref 8–16)
BASOPHILS # BLD AUTO: 0.04 K/UL (ref 0–0.2)
BASOPHILS NFR BLD: 0.5 % (ref 0–1.9)
BUN SERPL-MCNC: 13 MG/DL (ref 6–20)
CALCIUM SERPL-MCNC: 9.5 MG/DL (ref 8.7–10.5)
CHLORIDE SERPL-SCNC: 108 MMOL/L (ref 95–110)
CO2 SERPL-SCNC: 27 MMOL/L (ref 23–29)
CREAT SERPL-MCNC: 0.8 MG/DL (ref 0.5–1.4)
DIFFERENTIAL METHOD BLD: ABNORMAL
EOSINOPHIL # BLD AUTO: 0.4 K/UL (ref 0–0.5)
EOSINOPHIL NFR BLD: 4.5 % (ref 0–8)
ERYTHROCYTE [DISTWIDTH] IN BLOOD BY AUTOMATED COUNT: 18.3 % (ref 11.5–14.5)
EST. GFR  (NO RACE VARIABLE): >60 ML/MIN/1.73 M^2
GLUCOSE SERPL-MCNC: 123 MG/DL (ref 70–110)
HCT VFR BLD AUTO: 33.5 % (ref 37–48.5)
HGB BLD-MCNC: 10.6 G/DL (ref 12–16)
IMM GRANULOCYTES # BLD AUTO: 0.02 K/UL (ref 0–0.04)
IMM GRANULOCYTES NFR BLD AUTO: 0.2 % (ref 0–0.5)
LYMPHOCYTES # BLD AUTO: 2.5 K/UL (ref 1–4.8)
LYMPHOCYTES NFR BLD: 30.5 % (ref 18–48)
MCH RBC QN AUTO: 29.6 PG (ref 27–31)
MCHC RBC AUTO-ENTMCNC: 31.6 G/DL (ref 32–36)
MCV RBC AUTO: 94 FL (ref 82–98)
MONOCYTES # BLD AUTO: 0.6 K/UL (ref 0.3–1)
MONOCYTES NFR BLD: 7.6 % (ref 4–15)
NEUTROPHILS # BLD AUTO: 4.7 K/UL (ref 1.8–7.7)
NEUTROPHILS NFR BLD: 56.7 % (ref 38–73)
NRBC BLD-RTO: 0 /100 WBC
PLATELET # BLD AUTO: 562 K/UL (ref 150–450)
PMV BLD AUTO: 11.2 FL (ref 9.2–12.9)
POTASSIUM SERPL-SCNC: 4.1 MMOL/L (ref 3.5–5.1)
RBC # BLD AUTO: 3.58 M/UL (ref 4–5.4)
SODIUM SERPL-SCNC: 146 MMOL/L (ref 136–145)
WBC # BLD AUTO: 8.3 K/UL (ref 3.9–12.7)

## 2024-10-31 PROCEDURE — 85025 COMPLETE CBC W/AUTO DIFF WBC: CPT | Performed by: STUDENT IN AN ORGANIZED HEALTH CARE EDUCATION/TRAINING PROGRAM

## 2024-10-31 PROCEDURE — 36415 COLL VENOUS BLD VENIPUNCTURE: CPT | Performed by: STUDENT IN AN ORGANIZED HEALTH CARE EDUCATION/TRAINING PROGRAM

## 2024-10-31 PROCEDURE — 97535 SELF CARE MNGMENT TRAINING: CPT

## 2024-10-31 PROCEDURE — 97530 THERAPEUTIC ACTIVITIES: CPT

## 2024-10-31 PROCEDURE — 63600175 PHARM REV CODE 636 W HCPCS: Performed by: EMERGENCY MEDICINE

## 2024-10-31 PROCEDURE — 80048 BASIC METABOLIC PNL TOTAL CA: CPT | Performed by: STUDENT IN AN ORGANIZED HEALTH CARE EDUCATION/TRAINING PROGRAM

## 2024-10-31 PROCEDURE — 99233 SBSQ HOSP IP/OBS HIGH 50: CPT | Mod: ,,, | Performed by: INTERNAL MEDICINE

## 2024-10-31 PROCEDURE — 25000003 PHARM REV CODE 250: Performed by: EMERGENCY MEDICINE

## 2024-10-31 PROCEDURE — 25000003 PHARM REV CODE 250: Performed by: STUDENT IN AN ORGANIZED HEALTH CARE EDUCATION/TRAINING PROGRAM

## 2024-10-31 RX ORDER — VALACYCLOVIR HYDROCHLORIDE 1 G/1
1000 TABLET, FILM COATED ORAL 3 TIMES DAILY
Qty: 21 TABLET | Refills: 0 | Status: SHIPPED | OUTPATIENT
Start: 2024-10-31 | End: 2024-11-07

## 2024-10-31 RX ORDER — OXYCODONE AND ACETAMINOPHEN 5; 325 MG/1; MG/1
1 TABLET ORAL EVERY 12 HOURS PRN
Qty: 6 TABLET | Refills: 0 | Status: SHIPPED | OUTPATIENT
Start: 2024-10-31

## 2024-10-31 RX ADMIN — FOLIC ACID 1 MG: 1 TABLET ORAL at 08:10

## 2024-10-31 RX ADMIN — ACYCLOVIR SODIUM 640 MG: 50 INJECTION, SOLUTION INTRAVENOUS at 12:10

## 2024-10-31 RX ADMIN — ACYCLOVIR SODIUM 640 MG: 50 INJECTION, SOLUTION INTRAVENOUS at 03:10

## 2024-10-31 RX ADMIN — RIVAROXABAN 15 MG: 15 TABLET, FILM COATED ORAL at 08:10

## 2024-10-31 RX ADMIN — ZIPRASIDONE HYDROCHLORIDE 20 MG: 20 CAPSULE ORAL at 08:10

## 2024-10-31 RX ADMIN — MERCAPTOPURINE 100 MG: 50 TABLET ORAL at 08:10

## 2024-10-31 NOTE — PLAN OF CARE
Problem: Skin Injury Risk Increased  Goal: Skin Health and Integrity  Outcome: Progressing     Problem: Wound  Goal: Optimal Pain Control and Function  Outcome: Progressing  Goal: Optimal Wound Healing  Outcome: Progressing

## 2024-10-31 NOTE — PLAN OF CARE
[facility]      HOME HEALTH ORDERS  FACE TO FACE ENCOUNTER    Patient Name: Thalia Rg  YOB: 1970    PCP: Mac Gresham MD   PCP Address: [address] / [address]  PCP Phone Number: [phone]  PCP Fax: [phone]    Encounter Date: 10/24/24    Admit to Home Health    Diagnoses:  Active Hospital Problems    Diagnosis  POA    *Disseminated varicella [B01.9]  Yes     Priority: 1 - High    Rash/skin eruption [R21]  Yes     Priority: 2     Acute DVT (deep venous thrombosis) [I82.409]  Yes     Priority: 3     Impaired gait and mobility [R26.89]  Yes     Priority: 4     Autoimmune hepatitis [K75.4]  Yes     Priority: 5     Psychiatric illness [F99]  Yes     Priority: 6     Class 1 obesity with body mass index (BMI) of 34.0 to 34.9 in adult [E66.811, Z68.34]  Not Applicable      Resolved Hospital Problems   No resolved problems to display.       Follow Up Appointments:  No future appointments.    Allergies:Review of patient's allergies indicates:  No Known Allergies    Medications: Review discharge medications with patient and family and provide education.    Current Facility-Administered Medications   Medication Dose Route Frequency Provider Last Rate Last Admin    acetaminophen tablet 650 mg  650 mg Oral Q4H PRN Jc Holden MD        acetaminophen tablet 650 mg  650 mg Oral Q8H PRN Jc Holden MD        acyclovir 640 mg in D5W 100 mL IVPB  10 mg/kg (Ideal) Intravenous Q8H Genaro Swanson MD   Stopped at 10/31/24 0426    aluminum-magnesium hydroxide-simethicone 200-200-20 mg/5 mL suspension 30 mL  30 mL Oral QID PRN Jc Holden MD        dextrose 10% bolus 125 mL 125 mL  12.5 g Intravenous PRN Jc Holden MD        dextrose 10% bolus 250 mL 250 mL  25 g Intravenous PRN Jc Holden MD        folic acid tablet 1 mg  1 mg Oral Daily Jc Holden MD   1 mg at 10/31/24 0808    glucagon (human recombinant) injection 1 mg  1 mg Intramuscular PRN  Jc Holden MD        glucose chewable tablet 16 g  16 g Oral PRN Jc Holden MD        glucose chewable tablet 24 g  24 g Oral PRN Jc Holden MD        melatonin tablet 6 mg  6 mg Oral Nightly PRN Jc Holden MD        mercaptopurine tablet 100 mg (patient supplied med)  100 mg Oral Daily Jc Holden MD   100 mg at 10/31/24 0808    naloxone 0.4 mg/mL injection 0.02 mg  0.02 mg Intravenous PRN Jc Holden MD        ondansetron disintegrating tablet 8 mg  8 mg Oral Q8H PRN Jc Holden MD        oxyCODONE-acetaminophen 5-325 mg per tablet 1 tablet  1 tablet Oral Q6H PRN Jc Holden MD   1 tablet at 10/24/24 1908    rivaroxaban tablet 15 mg  15 mg Oral BID WM Jc Holden MD   15 mg at 10/31/24 0808    sodium chloride 0.9% flush 10 mL  10 mL Intravenous Q12H PRN Jc Holden MD        ziprasidone capsule 20 mg  20 mg Oral BID Jc Holden MD   20 mg at 10/31/24 0807        Medication List        START taking these medications      oxyCODONE-acetaminophen 5-325 mg per tablet  Commonly known as: PERCOCET  Take 1 tablet by mouth every 12 (twelve) hours as needed for Pain.     * rivaroxaban 15 mg Tab  Commonly known as: XARELTO  Take 1 tablet (15 mg total) by mouth 2 (two) times daily with meals. for 7 days  Replaces: apixaban 5 mg Tab     * rivaroxaban 20 mg Tab  Commonly known as: XARELTO  Take 1 tablet (20 mg total) by mouth daily with dinner or evening meal.  Start taking on: November 7, 2024     valACYclovir 1000 MG tablet  Commonly known as: VALTREX  Take 1 tablet (1,000 mg total) by mouth 3 (three) times daily. for 7 days           * This list has 2 medication(s) that are the same as other medications prescribed for you. Read the directions carefully, and ask your doctor or other care provider to review them with you.                CONTINUE taking these medications      ibuprofen 200 MG tablet  Commonly known as: ADVIL,MOTRIN  Take 200 mg by mouth every 6 (six)  hours as needed for Pain.     mercaptopurine 50 mg tablet  Commonly known as: PURINETHOL  Take 100 mg by mouth once daily.     PRIVIGEN 10 % Soln  Generic drug: Immune Globulin G (IGG)-PRO-IGA 10 % injection (Privigen)  Inject into the vein.     ziprasidone 20 MG Cap  Commonly known as: GEODON  Take 1 capsule (20 mg total) by mouth 2 (two) times daily.            STOP taking these medications      apixaban 5 mg Tab  Commonly known as: ELIQUIS  Replaced by: rivaroxaban 15 mg Tab            ASK your doctor about these medications      folic acid 1 MG tablet  Commonly known as: FOLVITE  Take 1 tablet (1 mg total) by mouth once daily.                I have seen and examined this patient within the last 30 days. My clinical findings that support the need for the home health skilled services and home bound status are the following:no   Weakness/numbness causing balance and gait disturbance due to Weakness/Debility making it taxing to leave home.     Diet:   regular diet        Referrals/ Consults  Physical Therapy to evaluate and treat. Evaluate for home safety and equipment needs; Establish/upgrade home exercise program. Perform / instruct on therapeutic exercises, gait training, transfer training, and Range of Motion.  Occupational Therapy to evaluate and treat. Evaluate home environment for safety and equipment needs. Perform/Instruct on transfers, ADL training, ROM, and therapeutic exercises.    Activities:   activity as tolerated    Nursing:   Agency to admit patient within 24 hours of hospital discharge unless specified on physician order or at patient request    SN to complete comprehensive assessment including routine vital signs. Instruct on disease process and s/s of complications to report to MD. Review/verify medication list sent home with the patient at time of discharge  and instruct patient/caregiver as needed. Frequency may be adjusted depending on start of care date.     Skilled nurse to perform up to 3  visits PRN for symptoms related to diagnosis    Notify MD if SBP > 160 or < 90; DBP > 90 or < 50; HR > 120 or < 50; Temp > 101; O2 < 88%;     Ok to schedule additional visits based on staff availability and patient request on consecutive days within the home health episode.    When multiple disciplines ordered:    Start of Care occurs on Sunday - Wednesday schedule remaining discipline evaluations as ordered on separate consecutive days following the start of care.    Thursday SOC -schedule subsequent evaluations Friday and Monday the following week.     Friday - Saturday SOC - schedule subsequent discipline evaluations on consecutive days starting Monday of the following week.    For all post-discharge communication and subsequent orders please contact patient's primary care physician.         Home Health Aide:  Physical Therapy Three times weekly and Occupational Therapy Three times weekly    Wound Care Orders  YES  Right lower thigh- Two areas of dried skin and peeling epidermis  Blistered areas moist and peeling. States feeling much better with less pain.   Treatment/Plan:  Local wound care to right thigh wounds every other day and prn- Cleanse with Vashe. Apply Aquacel Ag hydrofiber covered with Mepilex foam. Aquacel Ag to absorb moisture and promote drying of lesions.  Treatment plan discussed with patient.    I certify that this patient is confined to her home and needs intermittent skilled nursing care, physical therapy, and occupational therapy.

## 2024-10-31 NOTE — NURSING
AVS virtually reviewed with patient in its entirety with emphasis on diet, medications, follow-up appointments and reasons to return to the ED or contact the [facility] Line. Patient also encouraged to utilize their patient portal. Ease and convenience of use reiterated. Education complete and patient voiced understanding. All questions answered. Discharge teaching complete    Not applicable

## 2024-10-31 NOTE — SUBJECTIVE & OBJECTIVE
Interval History: Doing great. No pain. Ready for discharge.    Review of Systems   All other systems reviewed and are negative.    Objective:     Vital Signs (Most Recent):  Temp: 98.7 °F (37.1 °C) (10/31/24 1115)  Pulse: 89 (10/31/24 1115)  Resp: 18 (10/31/24 1115)  BP: 114/80 (10/31/24 1115)  SpO2: 96 % (10/31/24 1115) Vital Signs (24h Range):  Temp:  [98 °F (36.7 °C)-98.7 °F (37.1 °C)] 98.7 °F (37.1 °C)  Pulse:  [] 89  Resp:  [17-20] 18  SpO2:  [93 %-99 %] 96 %  BP: (109-132)/(71-82) 114/80     Weight: 104.2 kg (229 lb 11.5 oz)  Body mass index is 34.93 kg/m².    Estimated Creatinine Clearance: 100.3 mL/min (based on SCr of 0.8 mg/dL).     Physical Exam  Vitals and nursing note reviewed.   Constitutional:       Appearance: Normal appearance.   Eyes:      Extraocular Movements: Extraocular movements intact.      Pupils: Pupils are equal, round, and reactive to light.   Skin:     Findings: Rash present.      Comments: Slowly healing   Neurological:      General: No focal deficit present.      Mental Status: She is alert and oriented to person, place, and time.   Psychiatric:         Mood and Affect: Mood normal.         Behavior: Behavior normal.         Thought Content: Thought content normal.         Judgment: Judgment normal.          Significant Labs: CBC:   Recent Labs   Lab 10/31/24  0440   WBC 8.30   HGB 10.6*   HCT 33.5*   *     CMP:   Recent Labs   Lab 10/30/24  0415 10/31/24  0440    146*   K 4.6 4.1    108   CO2 20* 27   * 123*   BUN 13 13   CREATININE 0.8 0.8   CALCIUM 9.8 9.5   ANIONGAP 12 11       Significant Imaging: I have reviewed all pertinent imaging results/findings within the past 24 hours.

## 2024-10-31 NOTE — NURSING
Patient discharged per MD order.  IV removed.  Catheter tip intact.  No distress noted.  Discharge instructions explained by Roslyn Suarez RN.  AVS given to patient.  Patient verbalized understanding.  VSS. Afebrile.  No complaints of pain, N/V, diarrhea, or SOB.  Rx provided.  Patient left with belongings to Main Entrance [facility].   notified of patient transport home.

## 2024-10-31 NOTE — PROGRESS NOTES
[facility]  Infectious Disease  Progress Note    Patient Name: Thalia Rg  MRN: [id number]  Admission Date: 10/24/2024  Length of Stay: 7 days  Attending Physician: Hany Herron DO  Primary Care Provider: Mac Gresham MD    Isolation Status: Airborne and Contact and Droplet  Assessment/Plan:      ID  * Disseminated varicella  Mrs. 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 Slowly improving and ready for discharge.    Recommendations  Okay for discharge in am on Valtrex 1 g po TID x 7 days  Discussed with Mrs. Rg      Garfield Galindo MD  Infectious Disease  [facility] - Med Surg    Subjective:     Principal Problem:Disseminated varicella    HPI: Mrs. Rg is a pleasant 54 yo woman with bipolar disease, autoimmune hepatitis and PE (on Eliquis), and axonal sensory polyneuropathy who presents today for rash. She developed burning pain on her right leg two days ago that has not improved. At home, the pain was 10/10. In the ED, she was noted to have a vesicular rash to her right leg, possibly carey muti-dermatomal pattern - posterior thigh and anterior leg. Additionally, she has some erythematous lesions of her hand. She is admitted and started on IV ACV. ID is consulted for disseminated shingles in an immunocompromised patient (on monthly IgG for her neuropathy and on mercaptopurine for autoimmune hepatitis). Incidentally, she was found to have a DVT.    The patient reports chickenpox as a child. She did receive a Zoster vaccine in 2018.  Interval History: Doing great. No pain. Ready for discharge.    Review of Systems   All other systems reviewed and are negative.    Objective:     Vital Signs (Most Recent):  Temp: 98.7 °F (37.1 °C) (10/31/24 1115)  Pulse: 89 (10/31/24 1115)  Resp: 18 (10/31/24 1115)  BP: 114/80 (10/31/24 1115)  SpO2: 96 % (10/31/24 1115)  Vital Signs (24h Range):  Temp:  [98 °F (36.7 °C)-98.7 °F (37.1 °C)] 98.7 °F (37.1 °C)  Pulse:  [] 89  Resp:  [17-20] 18  SpO2:  [93 %-99 %] 96 %  BP: (109-132)/(71-82) 114/80     Weight: 104.2 kg (229 lb 11.5 oz)  Body mass index is 34.93 kg/m².    Estimated Creatinine Clearance: 100.3 mL/min (based on SCr of 0.8 mg/dL).     Physical Exam  Vitals and nursing note reviewed.   Constitutional:       Appearance: Normal appearance.   Eyes:      Extraocular Movements: Extraocular movements intact.      Pupils: Pupils are equal, round, and reactive to light.   Skin:     Findings: Rash present.      Comments: Slowly healing   Neurological:      General: No focal deficit present.      Mental Status: She is alert and oriented to person, place, and time.   Psychiatric:         Mood and Affect: Mood normal.         Behavior: Behavior normal.         Thought Content: Thought content normal.         Judgment: Judgment normal.          Significant Labs: CBC:   Recent Labs   Lab 10/31/24  0440   WBC 8.30   HGB 10.6*   HCT 33.5*   *     CMP:   Recent Labs   Lab 10/30/24  0415 10/31/24  0440    146*   K 4.6 4.1    108   CO2 20* 27   * 123*   BUN 13 13   CREATININE 0.8 0.8   CALCIUM 9.8 9.5   ANIONGAP 12 11       Significant Imaging: I have reviewed all pertinent imaging results/findings within the past 24 hours.

## 2024-10-31 NOTE — PLAN OF CARE
Problem: Skin Injury Risk Increased  Goal: Skin Health and Integrity  Outcome: Adequate for Care Transition

## 2024-10-31 NOTE — NURSING
[facility]  Nurses Note -- 4 Eyes      10/31/2024       Skin assessed on: Q Shift      [x] No Pressure Injuries Present    [x]Prevention Measures Documented    [] Yes LDA  for Pressure Injury Previously documented     [] Yes New Pressure Injury Discovered   [] LDA for New Pressure Injury Added      Attending RN:  Mira Yi RN     Second RN:  SARATH Forbes

## 2024-10-31 NOTE — PLAN OF CARE
Case Management Final Discharge Note      Discharge Disposition: Home Health - per Laquita with [facility], pt has been accepted by [place]    New DME ordered / company name: None    Relevant SDOH / Transition of Care Barriers:  None identified at this time    Primary Caretaker and contact information: , Adriel Rg [phone]    Scheduled followup appointment: Dr. Gresham/PCP - message sent to clinic to schedule pt for a [facility] hospital follow up appointment. Earliest available appointment was 12/12.    Referrals placed: hematology/oncology    Transportation: stretcher requested for 1500      Patient and family educated on discharge services and updated on DC plan. Bedside RN notified, patient clear to discharge from Case Management Perspective.      10/31/24 1211   Final Note   Assessment Type Final Discharge Note   Anticipated Discharge Disposition [facility]   Hospital Resources/Appts/Education Provided Appointments scheduled and added to AVS;Provided patient/caregiver with written discharge plan information   Post-Acute Status   Coverage PHN   Discharge Delays None known at this time

## 2024-10-31 NOTE — NURSING
PER handoff given by Mira CLEMENS      Pt resting in bed quietly. NAD noted. No c/o pain.      Fall and safety precautions maintained. Bed alarm activated and audible.. Bed locked in lowest position, with side rails up x2. Call bell and personal items within reach    [facility] [place]  Nurses Note -- 4 Eyes      10/30/2024       Skin assessed on: Q Shift      [x] No Pressure Injuries Present    []Prevention Measures Documented    [] Yes LDA  for Pressure Injury Previously documented     [] Yes New Pressure Injury Discovered   [] LDA for New Pressure Injury Added      Attending RN:  Leidy Plummer RN     Second RN:  Mira Yi RN

## 2024-10-31 NOTE — ASSESSMENT & PLAN NOTE
Mrs. 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 Slowly improving and ready for discharge.    Recommendations  Okay for discharge in am on Valtrex 1 g po TID x 7 days  Discussed with Mrs. Rg

## 2024-10-31 NOTE — DISCHARGE SUMMARY
[facility] Medicine  Discharge Summary      Patient Name: Thalia Rg  MRN: [id number]  [facility]: [id number]  Patient Class: IP- Inpatient  Admission Date: 10/24/2024  Hospital Length of Stay: 7 days  Discharge Date and Time:  10/31/2024 2:10 PM  Attending Physician: Hany Herron DO   Discharging Provider: Hany Herron DO  Primary Care Provider: Mac Gresham MD    Primary Care Team: Networked reference to record PCT     HPI:   Mrs. Rg is a 53-year-old female with past medical history of bipolar disorder, autoimmune hepatitis, neuropathy and history of DVT and PE on Eliquis who presents to the emergency department for evaluation of rash that developed on her right lower extremity 2 days ago.  Patient initially noticed it on the side of her right thigh and her buttocks.  It started to spread and she reported it being painful.  She denies any fevers, chills, recent illness, respiratory symptoms.  She takes mercaptopurine for her autoimmune hepatitis.  No changes in her medications.  In the emergency department, there was concerns for disseminated herpes zoster.  She was started on acyclovir and ID consulted.  HSV labs sent off.  Also found to have DVT and right lower extremity.  She was on Eliquis at home.  It is unclear which lower extremity she had her DVT and previous and unable to discern if this is a new DVT.  She will be transitioned to Eliquis while inpatient.  She will be admitted to hospital medicine for further management.    * No surgery found *      Hospital Course:   Mrs. Rg is a 54 yo F admitted with disseminated zoster of the left lower extremity and hands. Started on IV acyclovir. Lesions cultured. Also found to have u/s + for DVT in LLE> She is on eliquis and compliant, does not miss doses. Unclear if this is same DVT from previous or new? Changed to xarelto loading dose. Received IV acyclovir until lesions crust over per ID recommendations. Okay to discharge on PO  valtrex per ID on 10/31/24. PT/OT consulted and recommended moderate intensity therapy. Patient declined SNF. States she wants to go home with home health. Discussed with patient that she would benefit from acute PTOT services to address deficits and reach maximum level of function. She verbalized understanding and declined. Okay with home health.    Pt denies any fever, headaches, vision changes, chest pain, shortness of breath, palpitations, abdominal pain, nausea, vomiting, or any new weaknesses. Feels ready to go home. Patient's exam on discharge was as follow: Patient is alert and oriented, appears in no acute distress, heart with regular rate and rhythm, lungs clear to asculation with non-labored breathing, abdomen soft, and no new weaknesses or focal deficits seen. Bilateral lower extremities without any edema or calf tenderness.     Patient was counseled regarding any abnormal labs, differential diagnosis, treatment options, risk-benefit, lifestyle changes, prognosis, current condition, and medications. Patient was interactive and attentive.  Patient's questions were answered in a respectful and timely manner. Patient was instructed to follow-up with PCP within 1 week and to continue taking medications as prescribed.  Instructed to also follow up with her specialist. Also, extensively discussed the risks, benefits, and side effects of patient's medications. Discussed with patient about any medication changes. Patient verbalized understanding and agrees to treatment plan.  Patient is stable for discharge.  Patient has no other questions or concerns at this time.  ED precautions discussed with the patient.    Vital signs are stable. Ambulating without any difficulty. Tolerating p.o. intake without any nausea or vomiting. Afebrile for over 24 hours. Patient is in stable condition and has no questions or concerns. Patient will be discharge to home with home health once transportation secured . Prescriptions sent  to pharmacy.  CM/SW to assist with discharge planning.     Vitals:    10/30/24 2349 10/31/24 0438 10/31/24 0752 10/31/24 1115   BP: 121/71 132/79 120/82 114/80   BP Location: Right arm Right arm Right arm Right arm   Patient Position: Lying Lying Lying Sitting   Pulse: 86 104 97 89   Resp: 18 18 19 18   Temp: 98.7 °F (37.1 °C) 98.4 °F (36.9 °C) 98.7 °F (37.1 °C) 98.7 °F (37.1 °C)   TempSrc: Oral Oral Oral Oral   SpO2: (!) 93% 95% 95% 96%   Weight:       Height:                Goals of Care Treatment Preferences:  Code Status: Full Code      SDOH Screening:  The patient declined to be screened for utility difficulties, food insecurity, transport difficulties, housing insecurity, and interpersonal safety, so no concerns could be identified this admission.     Consults:   Consults (From admission, onward)          Status Ordering Provider     Inpatient consult to Infectious Diseases  Once        Provider:  Garfield Galindo MD    Completed SANAZ VILLARREAL            No new Assessment & Plan notes have been filed under this hospital service since the last note was generated.  Service: Hospital Medicine    Final Active Diagnoses:    Diagnosis Date Noted POA    PRINCIPAL PROBLEM:  Disseminated varicella [B01.9] 10/24/2024 Yes    Rash/skin eruption [R21] 10/24/2024 Yes    Acute DVT (deep venous thrombosis) [I82.409] 06/21/2022 Yes    Impaired gait and mobility [R26.89] 05/06/2022 Yes    Autoimmune hepatitis [K75.4] 03/24/2024 Yes    Psychiatric illness [F99] 03/26/2024 Yes    Class 1 obesity with body mass index (BMI) of 34.0 to 34.9 in adult [E66.811, Z68.34] 10/27/2024 Not Applicable      Problems Resolved During this Admission:       Discharged Condition: stable    Disposition: Home or Self Care    Follow Up:   Follow-up Information       Mac Gresham MD Follow up in 1 week(s).    Specialties: Internal Medicine, Wound Care  Contact information:  [address] [place] [address]  [place] [id number] [phone]                            Patient Instructions:      Ambulatory referral/consult to Hematology / Oncology   Standing Status: Future   Referral Priority: Routine Referral Type: Consultation   Referral Reason: Specialty Services Required   Requested Specialty: Hematology and Oncology   Number of Visits Requested: 1     Diet Cardiac     Notify your health care provider if you experience any of the following:  temperature >100.4     Notify your health care provider if you experience any of the following:  persistent nausea and vomiting or diarrhea     Notify your health care provider if you experience any of the following:  severe uncontrolled pain     Notify your health care provider if you experience any of the following:  redness, tenderness, or signs of infection (pain, swelling, redness, odor or green/yellow discharge around incision site)     Notify your health care provider if you experience any of the following:  increased confusion or weakness     Activity as tolerated       Significant Diagnostic Studies: Labs: All labs within the past 24 hours have been reviewed      Recent Results (from the past 100 hours)   Basic Metabolic Panel    Collection Time: 10/29/24  6:50 AM   Result Value Ref Range    Sodium 142 136 - 145 mmol/L    Potassium 4.2 3.5 - 5.1 mmol/L    Chloride 107 95 - 110 mmol/L    CO2 25 23 - 29 mmol/L    Glucose 118 (H) 70 - 110 mg/dL    BUN 10 6 - 20 mg/dL    Creatinine 0.7 0.5 - 1.4 mg/dL    Calcium 9.6 8.7 - 10.5 mg/dL    Anion Gap 10 8 - 16 mmol/L    eGFR >60 >60 mL/min/1.73 m^2   Basic Metabolic Panel    Collection Time: 10/30/24  4:15 AM   Result Value Ref Range    Sodium 142 136 - 145 mmol/L    Potassium 4.6 3.5 - 5.1 mmol/L    Chloride 110 95 - 110 mmol/L    CO2 20 (L) 23 - 29 mmol/L    Glucose 124 (H) 70 - 110 mg/dL    BUN 13 6 - 20 mg/dL    Creatinine 0.8 0.5 - 1.4 mg/dL    Calcium 9.8 8.7 - 10.5 mg/dL    Anion Gap 12 8 - 16 mmol/L    eGFR >60 >60 mL/min/1.73 m^2   Basic Metabolic Panel    Collection  Time: 10/31/24  4:40 AM   Result Value Ref Range    Sodium 146 (H) 136 - 145 mmol/L    Potassium 4.1 3.5 - 5.1 mmol/L    Chloride 108 95 - 110 mmol/L    CO2 27 23 - 29 mmol/L    Glucose 123 (H) 70 - 110 mg/dL    BUN 13 6 - 20 mg/dL    Creatinine 0.8 0.5 - 1.4 mg/dL    Calcium 9.5 8.7 - 10.5 mg/dL    Anion Gap 11 8 - 16 mmol/L    eGFR >60 >60 mL/min/1.73 m^2   CBC Auto Differential    Collection Time: 10/31/24  4:40 AM   Result Value Ref Range    WBC 8.30 3.90 - 12.70 K/uL    RBC 3.58 (L) 4.00 - 5.40 M/uL    Hemoglobin 10.6 (L) 12.0 - 16.0 g/dL    Hematocrit 33.5 (L) 37.0 - 48.5 %    MCV 94 82 - 98 fL    MCH 29.6 27.0 - 31.0 pg    MCHC 31.6 (L) 32.0 - 36.0 g/dL    RDW 18.3 (H) 11.5 - 14.5 %    Platelets 562 (H) 150 - 450 K/uL    MPV 11.2 9.2 - 12.9 fL    Immature Granulocytes 0.2 0.0 - 0.5 %    Gran # (ANC) 4.7 1.8 - 7.7 K/uL    Immature Grans (Abs) 0.02 0.00 - 0.04 K/uL    Lymph # 2.5 1.0 - 4.8 K/uL    Mono # 0.6 0.3 - 1.0 K/uL    Eos # 0.4 0.0 - 0.5 K/uL    Baso # 0.04 0.00 - 0.20 K/uL    nRBC 0 0 /100 WBC    Gran % 56.7 38.0 - 73.0 %    Lymph % 30.5 18.0 - 48.0 %    Mono % 7.6 4.0 - 15.0 %    Eosinophil % 4.5 0.0 - 8.0 %    Basophil % 0.5 0.0 - 1.9 %    Differential Method Automated        Microbiology Results (last 7 days)       Procedure Component Value Units Date/Time    Blood culture #2 **CANNOT BE ORDERED STAT** [[id number]] Collected: 10/24/24 0614    Order Status: Completed Specimen: Blood from Peripheral, Antecubital, Right Updated: 10/28/24 0903     Blood Culture, Routine No Growth after 4 days.    Blood culture #1 **CANNOT BE ORDERED STAT** [[id number]] Collected: 10/24/24 0525    Order Status: Completed Specimen: Blood from Peripheral, Antecubital, Right Updated: 10/28/24 0703     Blood Culture, Routine No Growth after 4 days.            Imaging Results              X-Ray Chest AP Portable (Final result)  Result time 10/24/24 07:13:59      Final result by Viraj Hoskins MD (10/24/24 07:13:59)      "              Impression:      No convincing acute abnormality identified on this limited single view.      Electronically signed by: Viraj Hoskins MD  Date:    10/24/2024  Time:    07:13               Narrative:    EXAMINATION:  XR CHEST AP PORTABLE    CLINICAL HISTORY:  Provided history is "  Rash and other nonspecific skin eruption".    TECHNIQUE:  One view of the chest.    COMPARISON:  03/23/2024.    FINDINGS:  Cardiac wires overlie the chest.  Cardiomediastinal silhouette is mildly enlarged and similar to the prior study, potentially exaggerated by portable technique.  Lung volumes are relatively low, accentuating basilar markings.  Trace pleural effusions not excluded, though this could be exaggerated by soft tissue attenuation of the x-ray beam.  No large focal consolidation.  No sizable pleural effusion.  No pneumothorax.                                        US Lower Extremity Veins Right (Final result)  Result time 10/24/24 05:27:48      Final result by Darya Sandoval MD (10/24/24 05:27:48)                   Impression:      DVT within 1 of the paired right posterior tibial veins.      Electronically signed by: Darya Sandoval MD  Date:    10/24/2024  Time:    05:27               Narrative:    EXAMINATION:  US LOWER EXTREMITY VEINS RIGHT    CLINICAL HISTORY:  Disorder of pigmentation, unspecified    TECHNIQUE:  Duplex and color flow Doppler evaluation and graded compression of the right lower extremity veins was performed.    COMPARISON:  Ultrasound 06/21/2022    FINDINGS:  Right thigh veins: The common femoral, femoral, popliteal, upper greater saphenous, and deep femoral veins are patent and free of thrombus. The veins are normally compressible and have normal phasic flow and augmentation response.    Right calf veins: DVT within 1 of the paired posterior tibial veins.    Contralateral CFV: The contralateral (left) common femoral vein is patent and free of thrombus.    Miscellaneous: None    This " report was flagged in Epic as abnormal.                                          Pending Diagnostic Studies:       None           Medications:  Reconciled Home Medications:      Medication List        START taking these medications      oxyCODONE-acetaminophen 5-325 mg per tablet  Commonly known as: PERCOCET  Take 1 tablet by mouth every 12 (twelve) hours as needed for Pain.     valACYclovir 1000 MG tablet  Commonly known as: VALTREX  Take 1 tablet (1,000 mg total) by mouth 3 (three) times daily. for 7 days     * XARELTO 15 mg Tab  Generic drug: rivaroxaban  Take 1 tablet (15 mg total) by mouth 2 (two) times daily with meals. for 7 days  Replaces: apixaban 5 mg Tab     * XARELTO 20 mg Tab  Generic drug: rivaroxaban  Take 1 tablet (20 mg total) by mouth daily with dinner or evening meal.  Start taking on: November 7, 2024           * This list has 2 medication(s) that are the same as other medications prescribed for you. Read the directions carefully, and ask your doctor or other care provider to review them with you.                CONTINUE taking these medications      ibuprofen 200 MG tablet  Commonly known as: ADVIL,MOTRIN  Take 200 mg by mouth every 6 (six) hours as needed for Pain.     mercaptopurine 50 mg tablet  Commonly known as: PURINETHOL  Take 100 mg by mouth once daily.     PRIVIGEN 10 % Soln  Generic drug: Immune Globulin G (IGG)-PRO-IGA 10 % injection (Privigen)  Inject into the vein.     ziprasidone 20 MG Cap  Commonly known as: GEODON  Take 1 capsule (20 mg total) by mouth 2 (two) times daily.            STOP taking these medications      apixaban 5 mg Tab  Commonly known as: ELIQUIS  Replaced by: XARELTO 15 mg Tab            ASK your doctor about these medications      folic acid 1 MG tablet  Commonly known as: FOLVITE  Take 1 tablet (1 mg total) by mouth once daily.              Indwelling Lines/Drains at time of discharge:   Lines/Drains/Airways       Drain  Duration             Female External  Urinary Catheter w/ Suction 10/25/24 6 days                    Time spent on the discharge of patient: Greater than 35 minutes         Hany Herron DO  Department of Hospital Medicine  [facility] [place] - Med Surg

## 2024-10-31 NOTE — PT/OT/SLP PROGRESS
Occupational Therapy   Treatment    Name: Thalia Rg  MRN: [id number]  Admitting Diagnosis:  Disseminated varicella       Recommendations:     Discharge Recommendations: Moderate Intensity Therapy (If D/C to home will benefit from HH OT)  Discharge Equipment Recommendations:  none  Barriers to discharge:   (generalized weakness and decline in function x5-6 months, requires assist with self care and functional mobility)    Assessment:     Thalia Rg is a 53 y.o. female with a medical diagnosis of Disseminated varicella.  Performance deficits affecting function are impaired endurance, impaired sensation, impaired self care skills, impaired functional mobility, impaired balance, decreased coordination, decreased upper extremity function, decreased lower extremity function, pain.     Rehab Prognosis:  Fair; patient would benefit from acute skilled OT services to address these deficits and reach maximum level of function.       Plan:     Patient to be seen  (3-5x/week) to address the above listed problems via self-care/home management, therapeutic activities, therapeutic exercises  Plan of Care Expires: 11/13/24  Plan of Care Reviewed with: patient    Subjective     Chief Complaint: foot pain with stand  Patient/Family Comments/goals: plans to go home today  Pain/Comfort:  Pain Rating 1: 0/10 (at rest, c/o sever B foot pain 2* neuropathy with standing)    Objective:     Communicated with: nurse prior to session.  Patient found HOB elevated with peripheral IV upon OT entry to room.    General Precautions: Standard, airborne, fall, droplet    Orthopedic Precautions:N/A  Braces: N/A  Respiratory Status: Room air     Occupational Performance:     Bed Mobility:    Patient completed Rolling/Turning to Left with  contact guard assistance  Patient completed Scooting/Bridging with supervision  Patient completed Supine to Sit with minimum assistance  Patient completed Sit to Supine with minimum assistance and with leg lift  "    Functional Mobility/Transfers:  Patient completed Sit <> Stand Transfer with maximal assistance and of 2 persons  with  use of B bed rail x2 trials   Functional Mobility: The patient sat on the EOB ~15 min with SBA/(S)    Activities of Daily Living:  Grooming: set up assist in elevated bed (the patient refused remain seated on the EOB to perform)  Upper Body Dressing: minimum assistance    Lower Body Dressing: dependence        AMPAC 6 Click ADL: 13    Treatment & Education:  Performed self care and functional mobility as noted above  Educated the patient re: the benefits of sitting on the EOB if unable to stand or transfer to a chair. The patient states she sits on the EOB at home "sometimes" for 30 min, 1x/day. OT encouraged the patient to sit on the EOB for meals as tolerated.    Patient left HOB elevated with all lines intact and call button in reach    GOALS:   Multidisciplinary Problems       Occupational Therapy Goals       Not on file              Multidisciplinary Problems (Resolved)          Problem: Occupational Therapy    Goal Priority Disciplines Outcome Interventions   Occupational Therapy Goal   (Resolved)     OT, PT/OT Met    Description: Goals to be met by: 11/13/2024     Patient will increase functional independence with ADLs by performing:    Feeding with Modified [place].  UE Dressing with Stand-by Assistance.  Grooming while EOB with Set-up Assistance and Supervision.  Sitting at edge of bed x30 minutes with Modified [place] and Supervision.  Rolling to Bilateral with Modified [place] and use of bedrail as needed.   Supine to sit with Contact Guard Assistance.  Stand pivot transfers: TBA  Upper extremity exercise program x15 reps per handout, with assistance as needed.                         Time Tracking:     OT Date of Treatment: 10/31/24  OT Start Time: 1029  OT Stop Time: 1053  OT Total Time (min): 24 min    Billable Minutes:Self Care/Home Management 15  Therapeutic " Activity 9  Total Time 24 (with PT)    OT/FIDE: OT          10/31/2024

## 2024-10-31 NOTE — PT/OT/SLP PROGRESS
Physical Therapy Treatment    Patient Name:  Thalia Rg   MRN:  [id number]    Recommendations:     Discharge Recommendations: Moderate Intensity Therapy  Discharge Equipment Recommendations: none  Barriers to discharge:  impaired functional mobility, impaired self care skills, decreased lower extremity function.    Assessment:     Thalia Rg is a 53 y.o. female admitted with a medical diagnosis of Disseminated varicella.  She presents with the following impairments/functional limitations: weakness, impaired endurance, impaired self care skills, impaired functional mobility, gait instability, impaired balance, decreased lower extremity function, pain.    Rehab Prognosis: Fair; patient would benefit from acute skilled PT services to address these deficits and reach maximum level of function.    Recent Surgery: * No surgery found *      Plan:     During this hospitalization, patient to be seen 5 x/week to address the identified rehab impairments via gait training, therapeutic activities, therapeutic exercises, neuromuscular re-education, wheelchair management/training and progress toward the following goals:    Plan of Care Expires:  11/13/24    Subjective     Chief Complaint: BLE pain  Patient/Family Comments/goals: Pt was agreeable for therapy  Pain/Comfort:  Pain Rating 1: 0/10      Objective:     Patient found HOB elevated with B green pressure relief boots and peripheral IV upon PT entry to room. Pt was in a pleasant mood during session. Pt cognition WNL. Pt was able to attend to all tasks and activities. Pt VS w/ HOB elevated: /76, 99 bpm. Pt VS after ~5 minutes of sitting EOB: /81, 119 bpm.    General Precautions: Standard, airborne, fall, droplet, contact  Orthopedic Precautions: N/A  Braces: N/A  Respiratory Status: Room air     Functional Mobility:  Bed Mobility:     Rolling Left:  contact guard assistance  Scooting anteriorly and to the L EOB: supervision. Scooting supine towards HOB w/ bed  "in trendelenburg position: Supervision utilizing BUE/LE  Supine to Sit: minimum assistance for LE management  Sit to Supine: minimum assistance for LE management  Transfers:     Sit to Stand:  maximal assistance and of 2 persons with bed siderails for BUE support. Completed 2x during session. 1st trial pt stood for ~10 sec. 2nd trial pt stood for ~14 sec. Pt complained of "excruciating" pain in BLE while standing. After 2nd trial, pt was adamant that she was not doing anything else and wanted to lay down to rest.  Balance: Pt demonstrated Good static/dynamic sitting balance for ~10 min of sitting EOB. Pt demonstrated Poor standing static balance.    AM-PAC 6 CLICK MOBILITY  Turning over in bed (including adjusting bedclothes, sheets and blankets)?: 3  Sitting down on and standing up from a chair with arms (e.g., wheelchair, bedside commode, etc.): 2  Moving from lying on back to sitting on the side of the bed?: 3  Moving to and from a bed to a chair (including a wheelchair)?: 2  Need to walk in hospital room?: 1  Climbing 3-5 steps with a railing?: 1  Basic Mobility Total Score: 12     Treatment & Education:  Pt educated on importance of exercise and activity to prevent muscle atrophy 2/2 prolonged hospital stay.  Pt denied opportunity to perform exercises on EOB or supine w/ HOB in bed. Pt instructed to perform bed exercises when she is able during the day.    Patient left with bed in chair position with B green pressure relief boots applied, all lines intact and call button in reach.    GOALS:   Multidisciplinary Problems       Physical Therapy Goals          Problem: Physical Therapy    Goal Priority Disciplines Outcome Interventions   Physical Therapy Goal     PT, PT/OT Progressing    Description: Goals to be met by: 24     Patient will increase functional independence with mobility by performin. Supine to sit with Modified [place]  2. Sit to supine with Modified [place]  3. Rolling to " Left and Right with Modified [place].  4. Sitting at edge of bed x10 minutes with Modified [place]  5. Lower extremity exercise program x20 reps per handout, with independence  6. Sit to Stand with Sarika                       Time Tracking:     PT Received On: 10/31/24  PT Start Time: 1032     PT Stop Time: 1054  PT Total Time (min): 22 min     Billable Minutes: Therapeutic Activity 22    (13:29-13:35)   Pt found HOB elevated w/ B green pressure relief boots and peripheral IV upon PT entry to room. Pt given LE exercise handout to be completed throughout the day. Pt educated on importance of exercise and activity to prevent muscle atrophy from prolonged hospital stay. Pt verbalized understanding, but declined to demonstrate exercises at this time. Pt left w/ HOB elevated, B green pressure relief boots, call light in reach, and all lines intact.    Treatment Type: Treatment  PT/PTA: PT     Number of PTA visits since last PT visit: 0     10/31/2024

## 2024-11-04 ENCOUNTER — PATIENT OUTREACH (OUTPATIENT)
Dept: ADMINISTRATIVE | Facility: CLINIC | Age: 54
End: 2024-11-04
Payer: MEDICARE

## 2024-11-04 NOTE — PROGRESS NOTES
2nd attempt made to reach patient for TCC call. Left voicemail please call [phone] and leave first name, last name and  for Ha. I will return your call.

## 2024-11-04 NOTE — PROGRESS NOTES
C3 nurse attempted to contact Thalia Rg for a TCC post hospital discharge follow up call. No answer. Left voicemail with callback information. The patient has a scheduled HOSFU appointment with Mac Gresham MD  on 11/12/24 @ 1600.

## 2024-11-05 ENCOUNTER — PATIENT MESSAGE (OUTPATIENT)
Dept: NEUROLOGY | Facility: CLINIC | Age: 54
End: 2024-11-05
Payer: MEDICARE

## 2024-11-05 NOTE — PROGRESS NOTES
C3 nurse spoke with Thalia Rg's  Adriel for a TCC post hospital discharge follow up call. The patient has a scheduled virtual HOSFU appointment with Mac Gresham MD on 11/12/24 @ 1600.

## 2024-11-05 NOTE — PROGRESS NOTES
3rd Attempt made to reach patient for TCC call. Left voicemail please call [phone] leave first name, last name, and  for Ha.  I will return your call.

## 2024-11-06 NOTE — TELEPHONE ENCOUNTER
IVIG orders, demographics and notes faxed to [facility] [phone].   What Type Of Note Output Would You Prefer (Optional)?: Standard Output Hpi Title: Evaluation of Skin Lesions How Severe Are Your Spot(S)?: mild Have Your Spot(S) Been Treated In The Past?: has not been treated

## 2024-11-07 ENCOUNTER — TELEPHONE (OUTPATIENT)
Dept: NEUROLOGY | Facility: CLINIC | Age: 54
End: 2024-11-07
Payer: MEDICARE

## 2024-11-07 NOTE — TELEPHONE ENCOUNTER
Returned call to [facility], spoke with [facility]  verbal orders providers on behalf of Dr. River to continue with IVIg as ordered.

## 2024-11-07 NOTE — TELEPHONE ENCOUNTER
----- Message from Garfield River MD sent at 11/7/2024 10:16 AM CST -----    ----- Message -----  From: Celestina Nguyen  Sent: 11/6/2024   9:04 AM CST  To: Garfield River MD    Type:  Pharmacy Calling to Clarify an RX    Name of Caller:Ms Greenfield   Pharmacy Name:[facility]  Prescription Name:none  What do they need to clarify?:if the office received the order to resume the patient IVIG   Best Call Back Number:[phone]  Additional Information: none

## 2024-11-12 ENCOUNTER — OFFICE VISIT (OUTPATIENT)
Dept: FAMILY MEDICINE | Facility: CLINIC | Age: 54
End: 2024-11-12
Payer: MEDICARE

## 2024-11-12 DIAGNOSIS — I82.419 ACUTE DEEP VEIN THROMBOSIS (DVT) OF FEMORAL VEIN, UNSPECIFIED LATERALITY: Primary | ICD-10-CM

## 2024-11-12 DIAGNOSIS — B01.9 DISSEMINATED VARICELLA: ICD-10-CM

## 2024-11-12 DIAGNOSIS — K75.4 AUTOIMMUNE HEPATITIS: ICD-10-CM

## 2024-11-12 DIAGNOSIS — F31.9 BIPOLAR 1 DISORDER: ICD-10-CM

## 2024-11-12 PROBLEM — M87.059 AVASCULAR NECROSIS OF FEMORAL HEAD: Status: RESOLVED | Noted: 2022-06-21 | Resolved: 2024-11-12

## 2024-11-12 RX ORDER — VALACYCLOVIR HYDROCHLORIDE 1 G/1
1000 TABLET, FILM COATED ORAL 3 TIMES DAILY
Qty: 21 TABLET | Refills: 0 | Status: SHIPPED | OUTPATIENT
Start: 2024-11-12 | End: 2024-11-19

## 2024-11-12 NOTE — PROGRESS NOTES
Subjective:       Patient ID: Thalia Rg is a 53 y.o. female.    Chief Complaint: No chief complaint on file.    The patient location is: in her home in [place]  The chief complaint leading to consultation is: follow up from hospitalization    Visit type: audiovisual    Face to Face time with patient: 11 minutes  21 minutes of total time spent on the encounter, which includes face to face time and non-face to face time preparing to see the patient (eg, review of tests), Obtaining and/or reviewing separately obtained history, Documenting clinical information in the electronic or other health record, Independently interpreting results (not separately reported) and communicating results to the patient/family/caregiver, or Care coordination (not separately reported).         Each patient to whom he or she provides medical services by telemedicine is:  (1) informed of the relationship between the physician and patient and the respective role of any other health care provider with respect to management of the patient; and (2) notified that he or she may decline to receive medical services by telemedicine and may withdraw from such care at any time.    Notes:     52 y/o w/ autoimmune hepatitis and polyneuropathy on IVIG admitted Oct 24-31 2024 with painful bualle found to have dissemenated zoster treated initially with IV acyclovir she still has two areas on hip that are draining but overall improved not painful no fevers/ she is to have IVIG infusion this week per neurology she has home health PT/OT and wound care       Review of Systems    Objective:   There were no vitals filed for this visit.       Physical Exam  Constitutional:       General: She is not in acute distress.  Eyes:      General: No scleral icterus.  Pulmonary:      Effort: Pulmonary effort is normal. No respiratory distress.   Neurological:      Mental Status: She is alert.         Assessment and Plan   1. Acute deep vein thrombosis (DVT) of  femoral vein, unspecified laterality (Primary)  On rivoroxaban continue    2. Bipolar 1 disorder  Followed by psychiatry mood stable continue current medications    3. Autoimmune hepatitis  On mercaptopurine LFT's wNL continue     4. Disseminated varicella  Extend valtrex x one more week continue wound care with home health   - valACYclovir (VALTREX) 1000 MG tablet; Take 1 tablet (1,000 mg total) by mouth 3 (three) times daily. for 7 days  Dispense: 21 tablet; Refill: 0

## 2024-12-06 ENCOUNTER — TELEPHONE (OUTPATIENT)
Dept: NEUROLOGY | Facility: CLINIC | Age: 54
End: 2024-12-06
Payer: MEDICARE

## 2024-12-06 NOTE — TELEPHONE ENCOUNTER
----- Message from Dara sent at 12/6/2024  1:43 PM CST -----  Regarding: pt advice  Contact: Ness brown/Curtis  ..Type:  Needs Medical Advice    Who Called: Ness Sevilla  Symptoms (please be specific): caller asking to speak to nurse with some follow up questions for the PA needed for an injection . Needs to be completed on 12/8 by 9AM.    Would the patient rather a call back or a response via [facility]? Call back   Best Call Back Number: [phone]  Additional Information: ref#[id number]

## 2024-12-09 ENCOUNTER — EXTERNAL HOME HEALTH (OUTPATIENT)
Dept: HOME HEALTH SERVICES | Facility: HOSPITAL | Age: 54
End: 2024-12-09
Payer: MEDICARE

## 2024-12-09 NOTE — TELEPHONE ENCOUNTER
Returned phone call, was informed that [employer] was denied and denial letter was faxed over.  Will follow up when denial letter is received.

## 2024-12-10 ENCOUNTER — TELEPHONE (OUTPATIENT)
Dept: HEMATOLOGY/ONCOLOGY | Facility: CLINIC | Age: 54
End: 2024-12-10
Payer: MEDICARE

## 2024-12-31 DIAGNOSIS — I26.99 ACUTE PULMONARY EMBOLISM WITHOUT ACUTE COR PULMONALE, UNSPECIFIED PULMONARY EMBOLISM TYPE: ICD-10-CM

## 2024-12-31 DIAGNOSIS — I82.419 ACUTE DEEP VEIN THROMBOSIS (DVT) OF FEMORAL VEIN, UNSPECIFIED LATERALITY: ICD-10-CM

## 2025-01-02 RX ORDER — APIXABAN 5 MG/1
5 TABLET, FILM COATED ORAL 2 TIMES DAILY
Qty: 180 TABLET | Refills: 3 | Status: SHIPPED | OUTPATIENT
Start: 2025-01-02

## 2025-01-02 NOTE — TELEPHONE ENCOUNTER
Refill Routing Note   Medication(s) are not appropriate for processing by [facility] for the following reason(s):        Outside of protocol    [facility] action(s):  Route               Appointments  past 12m or future 3m with PCP    Date Provider   Last Visit   11/12/2024 Mac Gresham MD   Next Visit   Visit date not found Mac Gresham MD   ED visits in past 90 days: 0        Note composed:10:49 AM 01/02/2025

## 2025-01-05 PROCEDURE — G0179 MD RECERTIFICATION HHA PT: HCPCS | Mod: ,,, | Performed by: INTERNAL MEDICINE

## 2025-01-09 ENCOUNTER — PATIENT MESSAGE (OUTPATIENT)
Dept: INFECTIOUS DISEASES | Facility: CLINIC | Age: 55
End: 2025-01-09
Payer: MEDICARE

## 2025-01-14 ENCOUNTER — PATIENT MESSAGE (OUTPATIENT)
Dept: FAMILY MEDICINE | Facility: CLINIC | Age: 55
End: 2025-01-14
Payer: MEDICARE

## 2025-01-14 DIAGNOSIS — I26.99 ACUTE PULMONARY EMBOLISM WITHOUT ACUTE COR PULMONALE, UNSPECIFIED PULMONARY EMBOLISM TYPE: Primary | ICD-10-CM

## 2025-01-20 ENCOUNTER — PATIENT MESSAGE (OUTPATIENT)
Dept: FAMILY MEDICINE | Facility: CLINIC | Age: 55
End: 2025-01-20
Payer: MEDICARE

## 2025-01-21 ENCOUNTER — OFFICE VISIT (OUTPATIENT)
Dept: PSYCHIATRY | Facility: CLINIC | Age: 55
End: 2025-01-21
Payer: COMMERCIAL

## 2025-01-21 DIAGNOSIS — G82.50 QUADRIPARESIS: ICD-10-CM

## 2025-01-21 DIAGNOSIS — F31.9 BIPOLAR 1 DISORDER: Primary | ICD-10-CM

## 2025-01-21 RX ORDER — ZIPRASIDONE HYDROCHLORIDE 20 MG/1
20 CAPSULE ORAL 2 TIMES DAILY
Qty: 180 CAPSULE | Refills: 3 | Status: SHIPPED | OUTPATIENT
Start: 2025-01-21 | End: 2026-01-21

## 2025-01-21 NOTE — PROGRESS NOTES
Outpatient Psychiatry Follow-Up Visit (MD/NP)    1/21/2025    Clinical Status of Patient:  Outpatient (Ambulatory)    The patient location is: At home at address on record in [place]  The chief complaint leading to consultation is: Medication refills    Visit type: audiovisual    Face to Face time with patient: 6 min  15 minutes of total time spent on the encounter, which includes face to face time and non-face to face time preparing to see the patient (eg, review of tests), Obtaining and/or reviewing separately obtained history, Documenting clinical information in the electronic or other health record, Independently interpreting results (not separately reported) and communicating results to the patient/family/caregiver, or Care coordination (not separately reported).         Each patient to whom he or she provides medical services by telemedicine is:  (1) informed of the relationship between the physician and patient and the respective role of any other health care provider with respect to management of the patient; and (2) notified that he or she may decline to receive medical services by telemedicine and may withdraw from such care at any time.    Notes:       Chief Complaint:  Thalia Rg is a 54 y.o. female who presents today for follow-up of mood disorder.  Met with patient and spouse.      Interval History and Content of Current Session:  Interim Events/Subjective Report/Content of Current Session: Pt presents for follow up of bipolar I disorder.  She is currently taking ziprasidone 20 mg BID, which she has been stable on for many years.  She is requesting refills today.  She states that she is doing great mentally, denies any depressive episodes, manic episodes, sleeping difficulty, or anxiety.  Had disseminated herpes zoster recently but states the lesions are mostly healed up now.      Review of Systems   PSYCHIATRIC: Pertinant items are noted in the narrative.    Past Medical, Family and Social  History: The patient's past medical, family and social history have been reviewed and updated as appropriate within the electronic medical record - see encounter notes.    Compliance: yes    Side effects: None    Risk Parameters:  Patient reports no suicidal ideation  Patient reports no homicidal ideation  Patient reports no self-injurious behavior  Patient reports no violent behavior    Exam (detailed: at least 9 elements; comprehensive: all 15 elements)   Constitutional  Vitals:  Most recent vital signs, dated less than 90 days prior to this appointment, were reviewed.   There were no vitals filed for this visit.     General:  unremarkable, age appropriate, lying in bed     Musculoskeletal  Muscle Strength/Tone:  not examined   Gait & Station:  In her bed     Psychiatric  Speech:  no latency; no press   Mood & Affect:  steady, happy  congruent and appropriate   Thought Process:  normal and logical   Associations:  intact   Thought Content:  normal, no suicidality, no homicidality, delusions, or paranoia   Insight:  has awareness of illness   Judgement: behavior is adequate to circumstances   Orientation:  grossly intact   Memory: intact for content of interview   Language: grossly intact   Attention Span & Concentration:  able to focus   Fund of Knowledge:  intact and appropriate to age and level of education     Assessment and Diagnosis   Status/Progress: Based on the examination today, the patient's problem(s) is/are well controlled.  New problems have not been presented today.   Lack of compliance are not complicating management of the primary condition.  There are no active rule-out diagnoses for this patient at this time.     General Impression: Bipolar I stable on ziprasidone 20 mg BID.  Last Qtc a little over a year ago, 465.  All BG on file less than 130.  Lipid panel ordered for next month.      ICD-10-CM ICD-9-CM   1. Bipolar 1 disorder  F31.9 296.7   2. Quadriparesis  G82.50 344.00     Continue  ziprasidone 20 mg BID.  Risks/bebefits/side effects discussed, known to tolerate for years.  F/u 1 year or sooner if needed.    Intervention/Counseling/Treatment Plan   Medication Management: Continue current medications.      Return to Clinic:  1 year or sooner if needed    Visit today included increased complexity associated with the care of the episodic problem medication refills addressed and managing the longitudinal care of the patient due to the serious and/or complex managed problem(s) Bipolar I disorder.

## 2025-02-05 ENCOUNTER — PATIENT MESSAGE (OUTPATIENT)
Dept: FAMILY MEDICINE | Facility: CLINIC | Age: 55
End: 2025-02-05
Payer: MEDICARE

## 2025-02-05 NOTE — LETTER
2025    Thalia Rg  [address] [place] [address]             [facility]  [address]  [address]  [place] [address]  Phone: [phone] To Whom It May Concern:    Ms. Thalia Rg (: 1970) is a patient under my care for primary care. She has a chronic condition that limits her mobility. She requires maximum assistance to use bathroom, dress herself, and moderate assistance to feed herself. Due to this permenant disability I would recommend she be medically excused from jury duty.     If you have any questions regarding this matter please contact my office.    Very Respectfully          Mac Gresham M.D.

## 2025-02-25 ENCOUNTER — EXTERNAL HOME HEALTH (OUTPATIENT)
Dept: HOME HEALTH SERVICES | Facility: HOSPITAL | Age: 55
End: 2025-02-25
Payer: MEDICARE

## 2025-03-11 ENCOUNTER — TELEPHONE (OUTPATIENT)
Dept: FAMILY MEDICINE | Facility: CLINIC | Age: 55
End: 2025-03-11
Payer: MEDICARE

## 2025-03-11 DIAGNOSIS — Z12.11 ENCOUNTER FOR SCREENING COLONOSCOPY FOR NON-HIGH-RISK PATIENT: Primary | ICD-10-CM

## 2025-03-11 NOTE — TELEPHONE ENCOUNTER
Called and spoke to said pt is unable to walk and he can only get pt to facility is by ambulance. Want to know if there is anyone who helps with coordinating.

## 2025-03-11 NOTE — TELEPHONE ENCOUNTER
----- Message from Med Assistant Felipa sent at 3/11/2025  2:56 PM CDT -----  Type: Patient Call BackWho called: Gissel - Please call the pt. To assist with scheduling once the orders are placed .. What is the request in detail: she's do for her mammo and colonoscopy ., she's asking the orders be placed for her .. Can the clinic reply by [facility]? NOWould the patient rather a call back or a response via [facility]? Yes, [facility] call back number: [phone] (home) ([phone] [facility])

## 2025-04-30 ENCOUNTER — TELEPHONE (OUTPATIENT)
Dept: FAMILY MEDICINE | Facility: CLINIC | Age: 55
End: 2025-04-30
Payer: MEDICARE

## 2025-04-30 DIAGNOSIS — Z12.11 ENCOUNTER FOR SCREENING COLONOSCOPY: ICD-10-CM

## 2025-04-30 DIAGNOSIS — Z12.31 ENCOUNTER FOR SCREENING MAMMOGRAM FOR BREAST CANCER: Primary | ICD-10-CM

## 2025-04-30 NOTE — TELEPHONE ENCOUNTER
----- Message from Ira sent at 4/30/2025  2:54 PM CDT -----  Type: Patient Call BackWho called:  Quirky What is the request in detail: is requesting order for Mammo and colonoscopy. Can the clinic reply by [facility]? No Would the patient rather a call back or a response via [facility]?  Best call back number: .[phone] (home)  Additional Information:

## 2025-04-30 NOTE — TELEPHONE ENCOUNTER
Called and spoke to pt's . Pt is asleep and he will call back once she wakes up. Pt isn't due for a colonoscopy until 11/11/25. Both orders have been pt in. Pt's  is also working with their insurance company to receive transportation for appointments.

## 2025-05-05 ENCOUNTER — EXTERNAL HOME HEALTH (OUTPATIENT)
Dept: HOME HEALTH SERVICES | Facility: HOSPITAL | Age: 55
End: 2025-05-05
Payer: MEDICARE

## 2025-05-17 ENCOUNTER — TELEPHONE (OUTPATIENT)
Dept: ENDOSCOPY | Facility: HOSPITAL | Age: 55
End: 2025-05-17

## 2025-05-17 ENCOUNTER — CLINICAL SUPPORT (OUTPATIENT)
Dept: ENDOSCOPY | Facility: HOSPITAL | Age: 55
End: 2025-05-17
Attending: INTERNAL MEDICINE
Payer: MEDICARE

## 2025-05-17 DIAGNOSIS — Z12.11 SPECIAL SCREENING FOR MALIGNANT NEOPLASMS, COLON: Primary | ICD-10-CM

## 2025-05-17 DIAGNOSIS — Z12.11 ENCOUNTER FOR SCREENING COLONOSCOPY: ICD-10-CM

## 2025-05-17 NOTE — PLAN OF CARE
Patient is scheduled for a Colonoscopy on 07/03/2025 with Dr. PAOLA Saeed  Referral for procedure from PAT appointment

## 2025-05-17 NOTE — PATIENT INSTRUCTIONS
Colonoscopy Procedure Prep Instructions    Date of procedure: 07/03/202 Arrive at: 09:30 AM    Location of Department:   [facility] [address] [place] [address]  Take the Elevators to 2nd Floor Endoscopy Procedural Area    As soon as possible:   your prep from pharmacy and over the counter DULCOLAX LAXATIVE TABLETS            On the day before your procedure   What You CAN do:   You may have clear liquids ONLY-see below for list.     Liquids That Are OK to Drink:   Water  Sports drinks (Gatorade, Power-Aid)  Coffee or tea (no cream or nondairy creamer)  Clear juices without pulp (apple, white grape)  Gelatin desserts (no fruit or toppings)  Clear soda (sprite, coke, ginger ale)  Chicken broth (until 12 midnight the night before procedure)    What You CANNOT do:   Do not EAT solid food, drink milk or anything   colored red.  Do not drink alcohol.  Do not take oral medications within 1 hour of starting   each dose of prep.  No gum chewing or candy morning of procedure                       Note:   (Please disregard the insert instructions from pharmacy).  PEG Bowel Prep is indicated for cleansing of the colon as a preparation for colonoscopy in adults.   Be sure to tell your doctor about all the medicines you take, including prescription and non-prescription medicines, vitamins, and herbal supplements. PEG Bowel Prep may affect how other medicines work.  Medication taken by mouth may not be absorbed properly when taken within 1 hour before the start of each dose of PEG Bowel Prep.    It is not uncommon to experience some abdominal cramping, nausea and/or vomiting when taking the prep. If you have nausea and/or vomiting while taking the prep, stop drinking for 20 to 30 minutes then continue.      How to take prep:    PEG Bowel Prep is a (2-day) prep.    One (1) bottle of prep are required for a complete preparation for colonoscopy. Dilute the solution concentrate as directed prior  to use. You must drink water with each dose of prep, and additional water after each dose.    DOSE 1--Day Before Colonoscopy 07/02/2025     Drink at least 6 to 8 glasses of clear liquids from time you wake up until you begin your prep and then continue until bedtime to avoid dehydration.     12:00 pm (NOON) Mix your entire container of prep with lukewarm water and refrigerate. Take four (4) Dulcolax (Bisacodyl) tablets with at least 8 ounces or more of clear liquids.       6:00 pm:    You must complete Steps 1 and 2 below before going to bed:    Step 1-Drink half the liquid in the container within one (1) hour.   Step 2-Refrigerate the remaining half of the liquid for dose 2. See below when to begin this step.                       IMPORTANT: If you experience preparation-related symptoms (for example, nausea, bloating, or cramping), stop, or slow the rate of drinking the additional water until your symptoms decrease.    DOSE 2--Day of the Colonoscopy 07/03/2025 at 2-3 AM.    For this dose, repeat Step 1 shown above using the remaining half of the liquid prep.   You may continue drinking water/clear liquids until   4 hours before your colonoscopy or as directed by the scheduling nurse  06:30 AM.      For information about your procedure, two (2) things to view prior to colonoscopy:  Please watch this informational video. It is important to watch this animated consent video prior to your arrival. If you haven't watched the video prior to arriving, you are required to watch it during admission which can causes delays.    Options for viewing:   Using a keyboard:  press and hold the control tab (Ctrl) and left mouse click to follow links.           Colonoscopy Instructional Video                                                                                   OR    Type link address into your web browser's address bar:  [email or web address]/[email or web address]      Educational Booklet with  pictures:      Colonoscopy Prep - Liquid      Comments:        IMPORTANT INFORMATION TO KNOW BEFORE YOUR PROCEDURE    [facility] 2nd Floor       When you arrive:  If your procedure is Monday - Friday 5am - 7pm - Please enter through the front door near [facility]. Please proceed up the first set of elevators to the 2nd floor where you will check in at the endo registration desk.     If your procedure is on a Saturday (weekend), enter through the back Outpatient entrance. Please note this entrance is diagonal from the Emergency Department entrance.              If your procedure requires the administration of anesthesia, it is necessary for a responsible adult to drive you home. (Medical Transportation, Uber, Lyft, Taxi, etc. may ONLY be used if a responsible adult is present to accompany you home.  The responsible adult CAN'T be the  of the service).      person must be available to return to pick you up within 15 minutes of being notified of discharge.       Please bring a picture ID, insurance card, & copayment      Take Medications as directed below:        1) Stop taking Xarelto (rivaroxaban) 2 days (prior to the procedure) on:  07/01/2025      If you begin taking any blood thinning medications, injectable weight loss/diabetes medications (other than insulin) , Adipex (Phentermine) , please contact the endoscopy scheduling department listed below as soon as possible.    If you are diabetic see the attached instruction sheet regarding your medication.     If you take HEART, BLOOD PRESSURE, SEIZURE, PAIN, LUNG (including inhalers/nebulizers), ANTI-REJECTION (transplant patients), or PSYCHIATRIC medications, please take at your regular times with a sip of water or as directed by the scheduling nurse.     Important contact information:    Endoscopy [phone] Hours of operation Monday-Friday 8:00-4:30pm.    Questions about insurance or financial obligations  call [phone] or [phone].    If you have questions regarding the prep or need to reschedule, please call [phone]. After hours questions requiring immediate assistance, contact [facility] nurse line at [phone] or [phone].   NOTE:     On occasion, unforeseen circumstances may cause a delay in your procedure start time. We respect your time and appreciate your patience during these circumstances.      Comments:

## 2025-05-19 ENCOUNTER — PATIENT MESSAGE (OUTPATIENT)
Dept: ADMINISTRATIVE | Facility: HOSPITAL | Age: 55
End: 2025-05-19
Payer: MEDICARE

## 2025-05-19 ENCOUNTER — TELEPHONE (OUTPATIENT)
Dept: ENDOSCOPY | Facility: HOSPITAL | Age: 55
End: 2025-05-19
Payer: MEDICARE

## 2025-05-19 NOTE — TELEPHONE ENCOUNTER
Dear Dr Gresham,    Patient has a scheduled procedure Colonoscopy on 7/3/25 and is currently taking a blood thinner. In order to ensure patient safety, we would like to confirm that the patient can place their blood thinner medication on hold for the procedure. Can he/she discontinue Xarelto (rivaroxaban) for a minimum of 2 days prior to the procedure?     Thank you for your prompt reply.    [facility] Endoscopy Scheduling

## 2025-05-19 NOTE — TELEPHONE ENCOUNTER
Contact the patient about ambulance service. No answer, left a message for a call back. Direct line was provided.

## 2025-05-19 NOTE — TELEPHONE ENCOUNTER
----- Message from Med Assistant Ramirez sent at 2025  1:04 PM CDT -----  Regardin/3 BT  The patient is currently under an internal PCP, Mac Gresham MD,  [facility]. Is patient okay to hold blood thinner Xarelto (rivaroxaban) for their upcoming scheduled Colonoscopy on 7/3/2025.

## 2025-05-19 NOTE — TELEPHONE ENCOUNTER
FYI:  
good morning, I am reaching out to you because Thalia Rg mrn: [id number] is requesting to have ambulance service for  and drop off for her colonoscopy procedures. Is this something you or your team can set up for her? procedure is 7/3/2025 for 10:30 at [facility]   
#[address]

## 2025-05-19 NOTE — TELEPHONE ENCOUNTER
Good morning, I am reaching out to you because Thalia Rg mrn: [id number] is requesting to have ambulance service for  and drop off for her colonoscopy procedures. Is this something you or your team can set up for her? procedure is 7/3/2025 for 10:30 at [facility]

## 2025-05-20 ENCOUNTER — PATIENT OUTREACH (OUTPATIENT)
Dept: ADMINISTRATIVE | Facility: HOSPITAL | Age: 55
End: 2025-05-20
Payer: MEDICARE

## 2025-05-20 DIAGNOSIS — R73.03 PREDIABETES: Primary | ICD-10-CM

## 2025-05-20 DIAGNOSIS — Z13.6 SCREENING FOR CARDIOVASCULAR CONDITION: ICD-10-CM

## 2025-05-20 NOTE — PROGRESS NOTES
Portal reply message sent with order so pt can schedule her mammogram. Immunization's updated/triggered.   8ICU

## 2025-05-21 ENCOUNTER — PATIENT MESSAGE (OUTPATIENT)
Dept: NEUROLOGY | Facility: CLINIC | Age: 55
End: 2025-05-21
Payer: MEDICARE

## 2025-05-21 ENCOUNTER — TELEPHONE (OUTPATIENT)
Facility: CLINIC | Age: 55
End: 2025-05-21
Payer: MEDICARE

## 2025-05-22 ENCOUNTER — TELEPHONE (OUTPATIENT)
Dept: ENDOSCOPY | Facility: HOSPITAL | Age: 55
End: 2025-05-22
Payer: MEDICARE

## 2025-05-22 NOTE — TELEPHONE ENCOUNTER
Contacted the patient to discuss ambulance service she will need for colonoscopy. No answer, left a voicemail if she have any questions/concerns. Direct line was provided.

## 2025-05-26 DIAGNOSIS — Z00.00 ENCOUNTER FOR MEDICARE ANNUAL WELLNESS EXAM: ICD-10-CM

## 2025-06-23 ENCOUNTER — TELEPHONE (OUTPATIENT)
Dept: NEUROLOGY | Facility: CLINIC | Age: 55
End: 2025-06-23
Payer: MEDICARE

## 2025-06-23 NOTE — TELEPHONE ENCOUNTER
Pt's spouse called asking if pt can do a virtual NP appt with Dr. Feng as pt is immobile. I informed pt that I would have to ask Dr. Feng if that would be ok with her and I would call pt back once I had that information. Patient verbalized agreement

## 2025-06-25 ENCOUNTER — TELEPHONE (OUTPATIENT)
Dept: NEUROLOGY | Facility: CLINIC | Age: 55
End: 2025-06-25
Payer: MEDICARE

## 2025-06-25 ENCOUNTER — PATIENT MESSAGE (OUTPATIENT)
Dept: FAMILY MEDICINE | Facility: CLINIC | Age: 55
End: 2025-06-25
Payer: MEDICARE

## 2025-06-25 NOTE — TELEPHONE ENCOUNTER
Called pt to inform them that Dr. Feng said it was fine for pt to do a virtual appt and I had changed pt's 6/26 at 1pm appt to a virtual appt. Patient verbalized agreement

## 2025-06-26 ENCOUNTER — OFFICE VISIT (OUTPATIENT)
Facility: CLINIC | Age: 55
End: 2025-06-26
Payer: MEDICARE

## 2025-06-26 ENCOUNTER — PATIENT MESSAGE (OUTPATIENT)
Dept: FAMILY MEDICINE | Facility: CLINIC | Age: 55
End: 2025-06-26
Payer: MEDICARE

## 2025-06-26 DIAGNOSIS — R26.89 IMPAIRED GAIT AND MOBILITY: ICD-10-CM

## 2025-06-26 DIAGNOSIS — D89.89 IMMUNE-MEDIATED NEUROPATHY: ICD-10-CM

## 2025-06-26 DIAGNOSIS — G63 IMMUNE-MEDIATED NEUROPATHY: ICD-10-CM

## 2025-06-26 DIAGNOSIS — R53.81 DEBILITY: Primary | ICD-10-CM

## 2025-06-26 DIAGNOSIS — G73.7 MYOPATHY IN DISEASES CLASSIFIED ELSEWHERE: ICD-10-CM

## 2025-06-26 NOTE — PROGRESS NOTES
"    [facility] - NEUROLOGY [address]TH [address] [facility], [place]         Patient ID: [id number]  Referring Physician:  Roman River    Chief Complaint/Reason for Consult:  Immune-Mediated Polyneuropathy     The patient location is: Home in [place]   Visit type:  Audio-visual     Subjective:     HPI  Thalia Rg is a 54 y.o. woman with immune-mediated polyneuropathy who is seen as a new patient to me.  She has previously followed with Dr. Roman River.  She was initially seen by Dr. Leighton Downey on 3/29/22 for extremity tingling and numbness that was affecting her gait.     EMG completed by Dr. River showed a length-dependent and primarily axonal sensory polyneuropathy in the upper and lower extremities. She was screened for malignancy with CT c/a/p and no signs of malignancy, but she had radiographic evidence of an incidental PE and was started on Eliquis. 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     She was started on IVIG 1g/kg/day x 2 days in early August 2022 by Dr. Downey until she saw Dr. River. She has remained on the same dose and denies having a weight change which would alter the dose.    Current treatment:  She receives 90g (1 gm/kg) divided over 2 days Q4 weeks      Last visit with Dr. River (9/16/2024) - "I have reviewed relevant medical records in [facility]. 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 On IVIg and doing remarkably well per her report. She is able to perform most ADLs though she complains of some dexterity and ADL deficiencies. Her sensation in the UEs is improving. She has still got profound LE strength deficits and walks only with assistance. She has walker and WC. Most of the time she remains in bed or seated in a recliner. She requires assistance with transfers. She complains of burning sensation in the LEs (sporadic) but reports that this is a " "good thing since her legs were previously entirely numb.      She remains on Eliquis (PE) and denies any falls. She was doing PT with good results up until 2 months ago. She enjoyed the challenge and wants to resume. She and Mr. Rg confirm that there was clinical improvement with PT. Also, her mood is significantly improved with the exercise and the challenge.     Mr. Rg remains her primary caregiver. He had massive MI in May 2024 but has had a very strong recovery. He had stents placed."         Face to Face time with patient: 20 minutes  40 minutes of total time spent on the encounter, which includes face to face time and non-face to face time preparing to see the patient (eg, review of tests), Obtaining and/or reviewing separately obtained history, Documenting clinical information in the electronic or other health record, Independently interpreting results (not separately reported) and communicating results to the patient/family/caregiver, or Care coordination (not separately reported).            Each patient to whom he or she provides medical services by telemedicine is:  (1) informed of the relationship between the physician and patient and the respective role of any other health care provider with respect to management of the patient; and (2) notified that he or she may decline to receive medical services by telemedicine and may withdraw from such care at any time.     Review of Systems  Negative except as per HPI.  No falls, concurrent illness    Past Medical History:  -------------------------------------    Abnormal Pap smear of vagina    Autoimmune hepatitis    Axonal neuropathy    Bipolar 1 disorder    Breast disorder       Allergies:  Review of patient's allergies indicates:  No Known Allergies    Pertinent Family History:  Family History   Problem Relation Name Age of Onset    Cancer Father      Diabetes Maternal Grandmother      Breast cancer Maternal Aunt         Pertinent Social " History:  Social History[1]    Medications:  Current Outpatient Medications   Medication Instructions    folic acid (FOLVITE) 1 mg, Oral, Daily    ibuprofen (ADVIL,MOTRIN) 200 mg, Every 6 hours PRN    mercaptopurine (PURINETHOL) 100 mg, Daily    oxyCODONE-acetaminophen (PERCOCET) 5-325 mg per tablet 1 tablet, Oral, Every 12 hours PRN    PRIVIGEN 10 % Soln Inject into the vein. Twice a month, two consecutive days in a row    rivaroxaban (XARELTO) 20 mg, Oral, With dinner    valACYclovir (VALTREX) 1,000 mg, Oral, 3 times daily    ziprasidone (GEODON) 20 mg, Oral, 2 times daily        Objective:     General:  Well-appearing, well-nourished and in NAD.  She was eating a hot dog during this appointment.  No coughing/choking.  She was lying in a hospital bed.  HEENT:  Normocephalic, atraumatic    Neurologic Exam:   Awake, alert, and oriented x3  Speech spontaneous and fluent, intact comprehension.   Adequate fund of knowledge, vocabulary.      Pertinent lab results:   Last labs 10/24  Lab Results   Component Value Date    FOLATE 3.2 (L) 11/03/2021    [id number] 06/06/2022    LACTATE 1.0 10/24/2024     Lab Results   Component Value Date    METHLYMALONI 0.15 06/06/2022     Lab Results   Component Value Date    PATHINTPSIF REVIEWED 06/06/2022     Lab Results   Component Value Date    ANTISSAANTIB 0.05 06/06/2022    SEDRATE 120 (H) 10/24/2024     Lab Results   Component Value Date    [id number] Negative 01/19/2022     Lab Results   Component Value Date    TSH 6.432 (H) 10/24/2024    FREET4 1.14 10/24/2024    WBC 8.30 10/31/2024    LYMPH 2.5 10/31/2024    LYMPH 30.5 10/31/2024    RBC 3.58 (L) 10/31/2024    HGB 10.6 (L) 10/31/2024    HCT 33.5 (L) 10/31/2024    MCV 94 10/31/2024     (H) 10/31/2024     (H) 10/31/2024    K 4.1 10/31/2024    CO2 27 10/31/2024    BUN 13 10/31/2024    CREATININE 0.8 10/31/2024    CALCIUM 9.5 10/31/2024    AST 29 10/24/2024    ALT 20 10/24/2024            Assessment and Plan:    Ms.  Arcenio is a 54 y.o. RH female with immune-mediated polyneuropathy.  She describes much improvement in neuropathic pain control and fine motor coordination with IVIG.  Without it, she cannot do simple manual tasks.  She is bedbound and there is surely debility.  Discussed home health/PT/needs assessment.  Her  would like to build a ramp for the home so that she can be transported outdoors.  We would appreciate PT/OT evaluation and assessment.  Social work requested to assist with additional needs assessment.    I am ordering labs, and the home health nurse may obtain.     1. Debility    2. Immune-mediated neuropathy    3. Impaired gait and mobility                  This is a patient with a complex neurologic diagnosis whose overall, ongoing care is being managed and monitored by me and our Neurology clinic.   As such, since 2024,  is the appropriate add-on code to accompany the other E/M billing for this visit.                         [1]   Social History  Tobacco Use    Smoking status: Never    Smokeless tobacco: Never   Substance Use Topics    Alcohol use: No    Drug use: No

## 2025-07-01 ENCOUNTER — PATIENT MESSAGE (OUTPATIENT)
Facility: CLINIC | Age: 55
End: 2025-07-01
Payer: MEDICARE

## 2025-07-10 ENCOUNTER — TELEPHONE (OUTPATIENT)
Facility: CLINIC | Age: 55
End: 2025-07-10
Payer: MEDICARE

## 2025-07-10 NOTE — TELEPHONE ENCOUNTER
Contacted pt to inform them that they need to contact [facility]. The phone number is [phone]. Informed them to let me know once everything is coordinated with them and I can fax over Thalia's lab orders to them. Patient verbalized agreement

## 2025-07-11 ENCOUNTER — PATIENT MESSAGE (OUTPATIENT)
Facility: CLINIC | Age: 55
End: 2025-07-11
Payer: MEDICARE

## 2025-07-11 ENCOUNTER — TELEPHONE (OUTPATIENT)
Facility: CLINIC | Age: 55
End: 2025-07-11
Payer: MEDICARE

## 2025-07-11 NOTE — TELEPHONE ENCOUNTER
Spoke to the nurse at [facility] who asked me if we could fax the lab orders for pt to them. Their fax number is [phone]. They also informed me their phone number was [phone]. I informed them I would fax those lab orders right now. Nurse verbalized agreement

## 2025-07-14 ENCOUNTER — EXTERNAL HOME HEALTH (OUTPATIENT)
Dept: HOME HEALTH SERVICES | Facility: HOSPITAL | Age: 55
End: 2025-07-14
Payer: MEDICARE

## 2025-07-21 ENCOUNTER — PATIENT MESSAGE (OUTPATIENT)
Dept: FAMILY MEDICINE | Facility: CLINIC | Age: 55
End: 2025-07-21
Payer: MEDICARE

## 2025-08-15 ENCOUNTER — PATIENT MESSAGE (OUTPATIENT)
Dept: PSYCHIATRY | Facility: CLINIC | Age: 55
End: 2025-08-15
Payer: MEDICARE

## 2025-08-26 ENCOUNTER — EXTERNAL HOME HEALTH (OUTPATIENT)
Dept: HOME HEALTH SERVICES | Facility: HOSPITAL | Age: 55
End: 2025-08-26
Payer: MEDICARE

## 2025-08-26 ENCOUNTER — PATIENT MESSAGE (OUTPATIENT)
Dept: FAMILY MEDICINE | Facility: CLINIC | Age: 55
End: 2025-08-26
Payer: MEDICARE

## 2025-08-26 DIAGNOSIS — I26.99 ACUTE PULMONARY EMBOLISM WITHOUT ACUTE COR PULMONALE, UNSPECIFIED PULMONARY EMBOLISM TYPE: ICD-10-CM
